# Patient Record
Sex: MALE | Race: WHITE | NOT HISPANIC OR LATINO | Employment: OTHER | URBAN - METROPOLITAN AREA
[De-identification: names, ages, dates, MRNs, and addresses within clinical notes are randomized per-mention and may not be internally consistent; named-entity substitution may affect disease eponyms.]

---

## 2017-01-18 ENCOUNTER — HOSPITAL ENCOUNTER (OUTPATIENT)
Dept: RADIOLOGY | Facility: HOSPITAL | Age: 72
Discharge: HOME | End: 2017-01-18
Attending: FAMILY MEDICINE
Payer: MEDICARE

## 2017-01-18 ENCOUNTER — TRANSCRIBE ORDERS (OUTPATIENT)
Dept: ADMINISTRATIVE | Facility: HOSPITAL | Age: 72
End: 2017-01-18

## 2017-01-18 DIAGNOSIS — R05.9 COUGH: Primary | ICD-10-CM

## 2017-01-18 DIAGNOSIS — R05.9 COUGH: ICD-10-CM

## 2017-01-18 PROCEDURE — 71020 HB CHEST X-RAY 2VW FRONTAL&LATL: CPT

## 2017-10-04 ENCOUNTER — HOSPITAL ENCOUNTER (EMERGENCY)
Facility: HOSPITAL | Age: 72
Discharge: HOME | End: 2017-10-04
Payer: OTHER MISCELLANEOUS

## 2017-10-04 VITALS
HEART RATE: 77 BPM | RESPIRATION RATE: 16 BRPM | SYSTOLIC BLOOD PRESSURE: 123 MMHG | OXYGEN SATURATION: 96 % | WEIGHT: 162.04 LBS | TEMPERATURE: 97.6 F | DIASTOLIC BLOOD PRESSURE: 73 MMHG

## 2017-10-04 DIAGNOSIS — M43.6 ACUTE TORTICOLLIS: Primary | ICD-10-CM

## 2017-10-04 PROCEDURE — 96372 THER/PROPH/DIAG INJ SC/IM: CPT

## 2017-10-04 PROCEDURE — 99283 EMERGENCY DEPT VISIT LOW MDM: CPT

## 2017-10-04 RX ORDER — DIAZEPAM 5 MG/ML
5 INJECTION, SOLUTION INTRAMUSCULAR; INTRAVENOUS ONCE
Status: COMPLETED | OUTPATIENT
Start: 2017-10-04 | End: 2017-10-04

## 2017-10-04 RX ORDER — BACLOFEN 10 MG/1
10 TABLET ORAL 3 TIMES DAILY
Qty: 4 TABLET | Refills: 0 | Status: SHIPPED | OUTPATIENT
Start: 2017-10-04 | End: 2018-03-29

## 2017-10-04 RX ORDER — GABAPENTIN 600 MG/1
800 TABLET ORAL 2 TIMES DAILY
COMMUNITY

## 2017-10-04 RX ORDER — IBUPROFEN 800 MG/1
TABLET ORAL EVERY 6 HOURS PRN
Status: ON HOLD | COMMUNITY
End: 2018-04-12

## 2017-10-04 RX ORDER — SIMVASTATIN 20 MG
20 TABLET ORAL
COMMUNITY

## 2017-10-04 RX ORDER — DIAZEPAM 10 MG/1
10 TABLET ORAL
COMMUNITY

## 2017-10-04 RX ORDER — PRAMIPEXOLE DIHYDROCHLORIDE 0.5 MG/1
0.5 TABLET ORAL DAILY
COMMUNITY
End: 2018-03-29

## 2017-10-04 RX ORDER — OXYCODONE HYDROCHLORIDE 15 MG/1
15 TABLET ORAL 2 TIMES DAILY
COMMUNITY
End: 2018-03-29

## 2017-10-04 RX ADMIN — DIAZEPAM 5 MG: 5 INJECTION, SOLUTION INTRAMUSCULAR; INTRAVENOUS at 14:00

## 2017-10-04 NOTE — ED NOTES
Pt reports worsening left lateral neck pain radiating into left shoulder  Pt reports pain has worsened over the last week  Pt denies injury  Pt reports has hx of C6 and C7 injury   Pt reports taking home medication with no relief      Gail Grewal RN  10/04/17 9617

## 2017-10-04 NOTE — ED PROVIDER NOTES
History  Chief Complaint   Patient presents with    Neck Pain     Gradually worsening left neck in to shoulder pain for 6 days  States 2 weeks ago PCP recommended ortho doctor for left arm pain and tingling  Hx C 6-7 damage from a fall  This is a 61-year-old male patient with chronic neck pain  He states that approximately 1 week ago he started getting increased pain without recent trauma  Patient had been on Percocet 2 a day but states she has been taking every 4 hours ran out 3 days ago  He has an appointment next week to see Orthopedics  He admits to smoking marijuana before he came in to help with this pain  His wife actually states that he is noncompliant with his medications  Patient states that he has chronic numbness in his 1st 3 digits in his left hand for many years  No weakness in the arm  The neck pain is no different than usual however just slightly more intense  He states that he is taking gabapentin and Motrin as directed  No fever no chills no headache no blurred vision or double vision no cough congestion sore throat no nausea vomiting diarrhea abdominal pain no chest pain or shortness of breath  He states he has occasional numbness and tingling down his arm but nothing worse than usual   He walked in under his own power  He moves his left arm without difficulty  Patient does admit to taking a steroids that was added date  Prior to Admission Medications   Prescriptions Last Dose Informant Patient Reported? Taking?    Cholecalciferol (VITAMIN D3) 50648 units TABS   Yes Yes   Sig: Take by mouth once a week   diazepam (VALIUM) 10 mg tablet   Yes Yes   Sig: Take 5 mg by mouth daily   gabapentin (NEURONTIN) 600 MG tablet   Yes Yes   Sig: Take 300 mg by mouth 3 (three) times a day   ibuprofen (MOTRIN) 800 mg tablet   Yes Yes   Sig: Take by mouth every 6 (six) hours as needed for mild pain   oxyCODONE (ROXICODONE) 15 mg immediate release tablet   Yes Yes   Sig: Take 15 mg by mouth 2 (two) times a day   pramipexole (MIRAPEX) 0 5 mg tablet   Yes Yes   Sig: Take 0 5 mg by mouth daily   simvastatin (ZOCOR) 20 mg tablet   Yes Yes   Sig: Take 20 mg by mouth daily at bedtime      Facility-Administered Medications: None       Past Medical History:   Diagnosis Date    Cervical pain (neck)        History reviewed  No pertinent surgical history  History reviewed  No pertinent family history  I have reviewed and agree with the history as documented  Social History   Substance Use Topics    Smoking status: Current Every Day Smoker    Smokeless tobacco: Former User    Alcohol use Not on file        Review of Systems   All other systems reviewed and are negative  Physical Exam  ED Triage Vitals [10/04/17 1248]   Temperature Pulse Respirations Blood Pressure SpO2   97 6 °F (36 4 °C) 77 16 123/73 96 %      Temp Source Heart Rate Source Patient Position - Orthostatic VS BP Location FiO2 (%)   Oral -- -- -- --      Pain Score       9           Physical Exam   Constitutional: He appears well-developed and well-nourished  HENT:   Head: Normocephalic and atraumatic  Right Ear: External ear normal    Left Ear: External ear normal    Nose: Nose normal    Mouth/Throat: Oropharynx is clear and moist    Eyes: Conjunctivae are normal  Pupils are equal, round, and reactive to light  Neck: Normal range of motion  Neck supple  Cardiovascular: Normal rate and regular rhythm  Pulmonary/Chest: Effort normal and breath sounds normal    Abdominal: Soft  Bowel sounds are normal  There is no tenderness  Musculoskeletal:        Back:    Neurological: He is alert  Skin: Skin is warm  Psychiatric: He has a normal mood and affect  His behavior is normal    Nursing note and vitals reviewed        ED Medications  Medications   diazepam (VALIUM) injection 5 mg (5 mg Intramuscular Given 10/4/17 1400)       Diagnostic Studies  Labs Reviewed - No data to display    No orders to display Procedures  Procedures      Phone Contacts  ED Phone Contact    ED Course  ED Course                                MDM  CritCare Time    Disposition  Final diagnoses:   Acute torticollis     ED Disposition     ED Disposition Condition Comment    Discharge  [address]  discharge to home/self care  Condition at discharge: Good        Follow-up Information     Follow up With Specialties Details Why Contact Clint John DO Family Medicine Schedule an appointment as soon as possible for a visit  [address] [place] [id number]  [phone]          Patient's Medications   Discharge Prescriptions    BACLOFEN 10 MG TABLET    Take 1 tablet by mouth 3 (three) times a day       Start Date: 10/4/2017 End Date: --       Order Dose: 10 mg       Quantity: 4 tablet    Refills: 0     No discharge procedures on file      ED Provider  Electronically Signed by       Mario Aiken PA-C  10/04/17 6832

## 2017-10-04 NOTE — DISCHARGE INSTRUCTIONS
Spasmodic Torticollis   WHAT YOU NEED TO KNOW:   Spasmodic torticollis, also called cervical dystonia, is a condition that causes your neck muscles to contract abnormally  Your neck may twist and cause your head to tilt to one side, forward, or backward  Spasmodic torticollis may occur occasionally or continuously  It often gets worse with stress  DISCHARGE INSTRUCTIONS:   Medicines: You may need any of the following:  · Muscle relaxers  decrease pain and muscle spasms  · Botulinum toxin injections  may also be given to relax your muscles  · NSAIDs , such as ibuprofen, help decrease swelling, pain, and fever  This medicine is available with or without a doctor's order  NSAIDs can cause stomach bleeding or kidney problems in certain people  If you take blood thinner medicine, always ask if NSAIDs are safe for you  Always read the medicine label and follow directions  Do not give these medicines to children under 10months of age without direction from your child's healthcare provider  · Acetaminophen  decreases pain  It is available without a doctor's order  Ask how much to take and how often to take it  Follow directions  Acetaminophen can cause liver damage if not taken correctly  · Prescription pain medicine  may be given  Ask your healthcare provider how to take this medicine safely  · Take your medicine as directed  Contact your healthcare provider if you think your medicine is not helping or if you have side effects  Tell him if you are allergic to any medicine  Keep a list of the medicines, vitamins, and herbs you take  Include the amounts, and when and why you take them  Bring the list or the pill bottles to follow-up visits  Carry your medicine list with you in case of an emergency  Follow up with your healthcare provider as directed:  Write down your questions so you remember to ask them during your visits    Self-care:   · Apply ice  on your neck for 15 to 20 minutes every hour or as directed  Use an ice pack, or put crushed ice in a plastic bag  Cover it with a towel  Ice helps prevent tissue damage and decreases swelling and pain  · Apply heat  on your neck for 20 to 30 minutes every 2 hours for as many days as directed  Heat helps decrease pain and muscle spasms  · Rest  as needed  Return to your daily activities as directed  · Use cervical collar as directed  A cervical collar may be needed to support your neck  Contact your healthcare provider if:   · You have a fever  · You have swelling in your neck area that gets worse or does not go away  · You have an increased feeling of sadness or loneliness, or you feel depressed  · You have questions or concerns about your condition or care  Return to the emergency department if:   · You have increased pain in your neck or shoulder  · You have sudden shortness of breath  · You have trouble moving your arms or legs  · Your arms or legs feel numb  © 2017 [facility] Information is for End User's use only and may not be sold, redistributed or otherwise used for commercial purposes  All illustrations and images included in CareNotes® are the copyrighted property of [employer]  or [employer]  The above information is an  only  It is not intended as medical advice for individual conditions or treatments  Talk to your doctor, nurse or pharmacist before following any medical regimen to see if it is safe and effective for you

## 2017-10-04 NOTE — ED NOTES
States has ortho appt next Wed  Took his Gabapentin 600mg and Ibuprofen 800mg 1100 today  Reports was on Oxycodone 15mg, but does not currently have any  Female with patient states they are trying to get him off of opiates       Crista Sandifer, RN  10/04/17 9740

## 2018-03-29 ENCOUNTER — APPOINTMENT (OUTPATIENT)
Dept: LAB | Facility: HOSPITAL | Age: 73
End: 2018-03-29
Attending: ORTHOPAEDIC SURGERY
Payer: OTHER MISCELLANEOUS

## 2018-03-29 ENCOUNTER — TRANSCRIBE ORDERS (OUTPATIENT)
Dept: ADMINISTRATIVE | Facility: HOSPITAL | Age: 73
End: 2018-03-29

## 2018-03-29 ENCOUNTER — HOSPITAL ENCOUNTER (OUTPATIENT)
Dept: NON INVASIVE DIAGNOSTICS | Facility: HOSPITAL | Age: 73
Discharge: HOME | End: 2018-03-29
Attending: ORTHOPAEDIC SURGERY
Payer: OTHER MISCELLANEOUS

## 2018-03-29 ENCOUNTER — APPOINTMENT (OUTPATIENT)
Dept: PREADMISSION TESTING | Facility: HOSPITAL | Age: 73
End: 2018-03-29
Payer: OTHER MISCELLANEOUS

## 2018-03-29 ENCOUNTER — HOSPITAL ENCOUNTER (OUTPATIENT)
Dept: RADIOLOGY | Facility: HOSPITAL | Age: 73
Discharge: HOME | End: 2018-03-29
Attending: ORTHOPAEDIC SURGERY
Payer: OTHER MISCELLANEOUS

## 2018-03-29 ENCOUNTER — ANESTHESIA EVENT (OUTPATIENT)
Dept: PERIOP | Facility: HOSPITAL | Age: 73
DRG: 472 | End: 2018-03-29
Payer: MEDICARE

## 2018-03-29 DIAGNOSIS — M54.12 BRACHIAL NEURITIS: ICD-10-CM

## 2018-03-29 DIAGNOSIS — G95.89 RADIATION-INDUCED MYELOPATHY (HCC): ICD-10-CM

## 2018-03-29 DIAGNOSIS — G95.89 RADIATION-INDUCED MYELOPATHY (HCC): Primary | ICD-10-CM

## 2018-03-29 DIAGNOSIS — Z01.818 PREOP EXAMINATION: ICD-10-CM

## 2018-03-29 LAB
25(OH)D3 SERPL-MCNC: 102.1 NG/ML (ref 30–100)
ALBUMIN SERPL BCP-MCNC: 3.9 G/DL (ref 3.5–5)
ALP SERPL-CCNC: 149 U/L (ref 46–116)
ALT SERPL W P-5'-P-CCNC: 33 U/L (ref 12–78)
ANION GAP SERPL CALCULATED.3IONS-SCNC: 7 MMOL/L (ref 4–13)
AST SERPL W P-5'-P-CCNC: 26 U/L (ref 5–45)
BASOPHILS # BLD AUTO: 0.06 THOUSANDS/ΜL (ref 0–0.1)
BASOPHILS NFR BLD AUTO: 1 % (ref 0–1)
BILIRUB SERPL-MCNC: 0.35 MG/DL (ref 0.2–1)
BILIRUB UR QL STRIP: NEGATIVE
BUN SERPL-MCNC: 12 MG/DL (ref 5–25)
CALCIUM SERPL-MCNC: 9.2 MG/DL (ref 8.3–10.1)
CHLORIDE SERPL-SCNC: 103 MMOL/L (ref 100–108)
CLARITY UR: CLEAR
CO2 SERPL-SCNC: 32 MMOL/L (ref 21–32)
COLOR UR: YELLOW
CREAT SERPL-MCNC: 0.9 MG/DL (ref 0.6–1.3)
EOSINOPHIL # BLD AUTO: 0.34 THOUSAND/ΜL (ref 0–0.61)
EOSINOPHIL NFR BLD AUTO: 5 % (ref 0–6)
ERYTHROCYTE [DISTWIDTH] IN BLOOD BY AUTOMATED COUNT: 15.4 % (ref 11.6–15.1)
ERYTHROCYTE [SEDIMENTATION RATE] IN BLOOD: 5 MM/HOUR (ref 0–10)
GFR SERPL CREATININE-BSD FRML MDRD: 86 ML/MIN/1.73SQ M
GLUCOSE SERPL-MCNC: 86 MG/DL (ref 65–140)
GLUCOSE UR STRIP-MCNC: NEGATIVE MG/DL
HCT VFR BLD AUTO: 47.2 % (ref 36.5–49.3)
HGB BLD-MCNC: 16 G/DL (ref 12–17)
HGB UR QL STRIP.AUTO: NEGATIVE
KETONES UR STRIP-MCNC: NEGATIVE MG/DL
LEUKOCYTE ESTERASE UR QL STRIP: NEGATIVE
LYMPHOCYTES # BLD AUTO: 2.21 THOUSANDS/ΜL (ref 0.6–4.47)
LYMPHOCYTES NFR BLD AUTO: 29 % (ref 14–44)
MCH RBC QN AUTO: 31.6 PG (ref 26.8–34.3)
MCHC RBC AUTO-ENTMCNC: 33.9 G/DL (ref 31.4–37.4)
MCV RBC AUTO: 93 FL (ref 82–98)
MONOCYTES # BLD AUTO: 0.76 THOUSAND/ΜL (ref 0.17–1.22)
MONOCYTES NFR BLD AUTO: 10 % (ref 4–12)
NEUTROPHILS # BLD AUTO: 4.19 THOUSANDS/ΜL (ref 1.85–7.62)
NEUTS SEG NFR BLD AUTO: 55 % (ref 43–75)
NITRITE UR QL STRIP: NEGATIVE
NRBC BLD AUTO-RTO: 0 /100 WBCS
PH UR STRIP.AUTO: 5.5 [PH] (ref 4.5–8)
PLATELET # BLD AUTO: 130 THOUSANDS/UL (ref 149–390)
POTASSIUM SERPL-SCNC: 4.6 MMOL/L (ref 3.5–5.3)
PROT SERPL-MCNC: 7.6 G/DL (ref 6.4–8.2)
PROT UR STRIP-MCNC: NEGATIVE MG/DL
RBC # BLD AUTO: 5.06 MILLION/UL (ref 3.88–5.62)
SODIUM SERPL-SCNC: 142 MMOL/L (ref 136–145)
SP GR UR STRIP.AUTO: <=1.005 (ref 1–1.03)
UROBILINOGEN UR QL STRIP.AUTO: 0.2 E.U./DL
WBC # BLD AUTO: 7.56 THOUSAND/UL (ref 4.31–10.16)

## 2018-03-29 PROCEDURE — 85025 COMPLETE CBC W/AUTO DIFF WBC: CPT

## 2018-03-29 PROCEDURE — 87081 CULTURE SCREEN ONLY: CPT

## 2018-03-29 PROCEDURE — 71046 X-RAY EXAM CHEST 2 VIEWS: CPT

## 2018-03-29 PROCEDURE — 86803 HEPATITIS C AB TEST: CPT

## 2018-03-29 PROCEDURE — 80053 COMPREHEN METABOLIC PANEL: CPT

## 2018-03-29 PROCEDURE — 93005 ELECTROCARDIOGRAM TRACING: CPT

## 2018-03-29 PROCEDURE — 81003 URINALYSIS AUTO W/O SCOPE: CPT | Performed by: ORTHOPAEDIC SURGERY

## 2018-03-29 PROCEDURE — 85652 RBC SED RATE AUTOMATED: CPT

## 2018-03-29 PROCEDURE — 82306 VITAMIN D 25 HYDROXY: CPT

## 2018-03-29 PROCEDURE — 36415 COLL VENOUS BLD VENIPUNCTURE: CPT

## 2018-03-29 RX ORDER — PRAMIPEXOLE DIHYDROCHLORIDE 0.75 MG/1
0.75 TABLET ORAL
COMMUNITY

## 2018-03-29 RX ORDER — CALCIUM CARBONATE 200(500)MG
1 TABLET,CHEWABLE ORAL AS NEEDED
COMMUNITY
End: 2018-06-13

## 2018-03-29 RX ORDER — AZELASTINE 1 MG/ML
2 SPRAY, METERED NASAL DAILY
COMMUNITY
End: 2018-07-24

## 2018-03-29 RX ORDER — SODIUM CHLORIDE 9 MG/ML
125 INJECTION, SOLUTION INTRAVENOUS CONTINUOUS
Status: CANCELLED | OUTPATIENT
Start: 2018-04-12

## 2018-03-29 NOTE — PRE-PROCEDURE INSTRUCTIONS
Pre-Surgery Instructions:   Medication Instructions    azelastine (ASTELIN) 0 1 % nasal spray Patient was instructed by Physician and understands   calcium carbonate (TUMS) 500 mg chewable tablet Patient was instructed by Physician and understands   Cholecalciferol (VITAMIN D3) 39737 units TABS Patient was instructed by Physician and understands   diazepam (VALIUM) 10 mg tablet Patient was instructed by Physician and understands   gabapentin (NEURONTIN) 600 MG tablet Patient was instructed by Physician and understands   ibuprofen (MOTRIN) 800 mg tablet Patient was instructed by Physician and understands   pramipexole (MIRAPEX) 0 75 MG tablet Patient was instructed by Physician and understands   simvastatin (ZOCOR) 20 mg tablet Patient was instructed by Physician and understands  Was seen by Dr Kayleigh Caldwell and was told to stop NSAIDS and supplements one week preop and on DOS may take Gabapentin with a sip of water and use nasal spray

## 2018-03-29 NOTE — ANESTHESIA PREPROCEDURE EVALUATION
Review of Systems/Medical History  Patient summary reviewed  Chart reviewed      Cardiovascular  Exercise tolerance: good,  Hyperlipidemia,    Pulmonary  Negative pulmonary ROS        GI/Hepatic  Negative GI/hepatic ROS          Negative  ROS        Endo/Other  Negative endo/other ROS      GYN  Negative gynecology ROS          Hematology  Negative hematology ROS      Musculoskeletal  Negative musculoskeletal ROS        Neurology    Motor deficit , left hand/finger weakness,   Comment: Left hand numbness Psychology   Negative psychology ROS              Physical Exam    Airway    Mallampati score: II  TM Distance: <3 FB  Neck ROM: full     Dental   lower dentures and upper dentures,     Cardiovascular  Rhythm: regular, Rate: normal,     Pulmonary  Breath sounds clear to auscultation,     Other Findings        Anesthesia Plan  ASA Score- 2     Anesthesia Type- general with ASA Monitors  Additional Monitors:   Airway Plan:         Plan Factors- Patient instructed to abstain from smoking on day of procedure  Patient did not smoke on day of surgery  Induction- intravenous  Postoperative Plan-     Informed Consent- Anesthetic plan and risks discussed with patient

## 2018-03-30 LAB
HCV AB SER QL: NORMAL
MRSA NOSE QL CULT: NORMAL

## 2018-04-02 LAB
ATRIAL RATE: 60 BPM
P AXIS: 74 DEGREES
PR INTERVAL: 138 MS
QRS AXIS: 74 DEGREES
QRSD INTERVAL: 88 MS
QT INTERVAL: 410 MS
QTC INTERVAL: 410 MS
T WAVE AXIS: 72 DEGREES
VENTRICULAR RATE: 60 BPM

## 2018-04-02 PROCEDURE — 93010 ELECTROCARDIOGRAM REPORT: CPT | Performed by: INTERNAL MEDICINE

## 2018-04-06 ENCOUNTER — ANESTHESIA EVENT (OUTPATIENT)
Dept: PERIOP | Facility: HOSPITAL | Age: 73
DRG: 460 | End: 2018-04-06
Payer: MEDICARE

## 2018-04-06 NOTE — PROGRESS NOTES
Patient was seen on 3/21 in PAT for surgery on 4/12 and surgery on 4/30  Instructions regarding bathing and medications at that time for both surgeries

## 2018-04-12 ENCOUNTER — HOSPITAL ENCOUNTER (INPATIENT)
Facility: HOSPITAL | Age: 73
LOS: 1 days | Discharge: HOME | DRG: 472 | End: 2018-04-13
Attending: ORTHOPAEDIC SURGERY | Admitting: ORTHOPAEDIC SURGERY
Payer: MEDICARE

## 2018-04-12 ENCOUNTER — ANESTHESIA (OUTPATIENT)
Dept: PERIOP | Facility: HOSPITAL | Age: 73
DRG: 472 | End: 2018-04-12
Payer: MEDICARE

## 2018-04-12 ENCOUNTER — APPOINTMENT (OUTPATIENT)
Dept: RADIOLOGY | Facility: HOSPITAL | Age: 73
DRG: 472 | End: 2018-04-12
Payer: MEDICARE

## 2018-04-12 DIAGNOSIS — M54.12 RADICULOPATHY OF CERVICAL SPINE: ICD-10-CM

## 2018-04-12 LAB
ABO GROUP BLD: NORMAL
BLD GP AB SCN SERPL QL: NEGATIVE
GLUCOSE SERPL-MCNC: 93 MG/DL (ref 65–140)
RH BLD: POSITIVE
SPECIMEN EXPIRATION DATE: NORMAL

## 2018-04-12 PROCEDURE — 86900 BLOOD TYPING SEROLOGIC ABO: CPT | Performed by: ORTHOPAEDIC SURGERY

## 2018-04-12 PROCEDURE — 82948 REAGENT STRIP/BLOOD GLUCOSE: CPT

## 2018-04-12 PROCEDURE — 0RB30ZZ EXCISION OF CERVICAL VERTEBRAL DISC, OPEN APPROACH: ICD-10-PCS | Performed by: ORTHOPAEDIC SURGERY

## 2018-04-12 PROCEDURE — 0RG20K0 FUSION OF 2 OR MORE CERVICAL VERTEBRAL JOINTS WITH NONAUTOLOGOUS TISSUE SUBSTITUTE, ANTERIOR APPROACH, ANTERIOR COLUMN, OPEN APPROACH: ICD-10-PCS | Performed by: ORTHOPAEDIC SURGERY

## 2018-04-12 PROCEDURE — 86901 BLOOD TYPING SEROLOGIC RH(D): CPT | Performed by: ORTHOPAEDIC SURGERY

## 2018-04-12 PROCEDURE — 72020 X-RAY EXAM OF SPINE 1 VIEW: CPT

## 2018-04-12 PROCEDURE — 0RG40K0 FUSION OF CERVICOTHORACIC VERTEBRAL JOINT WITH NONAUTOLOGOUS TISSUE SUBSTITUTE, ANTERIOR APPROACH, ANTERIOR COLUMN, OPEN APPROACH: ICD-10-PCS | Performed by: ORTHOPAEDIC SURGERY

## 2018-04-12 PROCEDURE — 4A11X4G MONITORING OF PERIPHERAL NERVOUS ELECTRICAL ACTIVITY, INTRAOPERATIVE, EXTERNAL APPROACH: ICD-10-PCS | Performed by: ORTHOPAEDIC SURGERY

## 2018-04-12 PROCEDURE — 86850 RBC ANTIBODY SCREEN: CPT | Performed by: ORTHOPAEDIC SURGERY

## 2018-04-12 PROCEDURE — 0RG20A0 FUSION OF 2 OR MORE CERVICAL VERTEBRAL JOINTS WITH INTERBODY FUSION DEVICE, ANTERIOR APPROACH, ANTERIOR COLUMN, OPEN APPROACH: ICD-10-PCS | Performed by: ORTHOPAEDIC SURGERY

## 2018-04-12 PROCEDURE — 94760 N-INVAS EAR/PLS OXIMETRY 1: CPT

## 2018-04-12 PROCEDURE — 0RB50ZZ EXCISION OF CERVICOTHORACIC VERTEBRAL DISC, OPEN APPROACH: ICD-10-PCS | Performed by: ORTHOPAEDIC SURGERY

## 2018-04-12 DEVICE — DBX PUTTY, 5CC
Type: IMPLANTABLE DEVICE | Site: SPINE CERVICAL | Status: FUNCTIONAL
Brand: DBX®

## 2018-04-12 DEVICE — IMPLANTABLE DEVICE: Type: IMPLANTABLE DEVICE | Site: SPINE CERVICAL | Status: FUNCTIONAL

## 2018-04-12 RX ORDER — SENNOSIDES 8.6 MG
1 TABLET ORAL DAILY
Status: DISCONTINUED | OUTPATIENT
Start: 2018-04-12 | End: 2018-04-13 | Stop reason: HOSPADM

## 2018-04-12 RX ORDER — ONDANSETRON 2 MG/ML
4 INJECTION INTRAMUSCULAR; INTRAVENOUS EVERY 6 HOURS PRN
Status: DISCONTINUED | OUTPATIENT
Start: 2018-04-12 | End: 2018-04-12 | Stop reason: HOSPADM

## 2018-04-12 RX ORDER — ACETAMINOPHEN 325 MG/1
975 TABLET ORAL
Status: DISCONTINUED | OUTPATIENT
Start: 2018-04-12 | End: 2018-04-12 | Stop reason: HOSPADM

## 2018-04-12 RX ORDER — 0.9 % SODIUM CHLORIDE 0.9 %
VIAL (ML) INJECTION AS NEEDED
Status: DISCONTINUED | OUTPATIENT
Start: 2018-04-12 | End: 2018-04-12 | Stop reason: HOSPADM

## 2018-04-12 RX ORDER — MAGNESIUM HYDROXIDE/ALUMINUM HYDROXICE/SIMETHICONE 120; 1200; 1200 MG/30ML; MG/30ML; MG/30ML
15 SUSPENSION ORAL EVERY 6 HOURS PRN
Status: DISCONTINUED | OUTPATIENT
Start: 2018-04-12 | End: 2018-04-13 | Stop reason: HOSPADM

## 2018-04-12 RX ORDER — MORPHINE SULFATE 2 MG/ML
1 INJECTION, SOLUTION INTRAMUSCULAR; INTRAVENOUS
Status: DISCONTINUED | OUTPATIENT
Start: 2018-04-12 | End: 2018-04-13 | Stop reason: HOSPADM

## 2018-04-12 RX ORDER — CELECOXIB 200 MG/1
200 CAPSULE ORAL
Status: DISCONTINUED | OUTPATIENT
Start: 2018-04-12 | End: 2018-04-12 | Stop reason: HOSPADM

## 2018-04-12 RX ORDER — SIMETHICONE 80 MG
80 TABLET,CHEWABLE ORAL 4 TIMES DAILY PRN
Status: DISCONTINUED | OUTPATIENT
Start: 2018-04-12 | End: 2018-04-13 | Stop reason: HOSPADM

## 2018-04-12 RX ORDER — CALCIUM CARBONATE 200(500)MG
1000 TABLET,CHEWABLE ORAL DAILY PRN
Status: DISCONTINUED | OUTPATIENT
Start: 2018-04-12 | End: 2018-04-13 | Stop reason: HOSPADM

## 2018-04-12 RX ORDER — PRAVASTATIN SODIUM 40 MG
40 TABLET ORAL
Status: DISCONTINUED | OUTPATIENT
Start: 2018-04-12 | End: 2018-04-13 | Stop reason: HOSPADM

## 2018-04-12 RX ORDER — METHOCARBAMOL 750 MG/1
750 TABLET, FILM COATED ORAL 4 TIMES DAILY PRN
Status: DISCONTINUED | OUTPATIENT
Start: 2018-04-12 | End: 2018-04-13 | Stop reason: HOSPADM

## 2018-04-12 RX ORDER — FENTANYL CITRATE 50 UG/ML
INJECTION, SOLUTION INTRAMUSCULAR; INTRAVENOUS AS NEEDED
Status: DISCONTINUED | OUTPATIENT
Start: 2018-04-12 | End: 2018-04-12 | Stop reason: SURG

## 2018-04-12 RX ORDER — PROMETHAZINE HYDROCHLORIDE 25 MG/ML
12.5 INJECTION, SOLUTION INTRAMUSCULAR; INTRAVENOUS EVERY 4 HOURS PRN
Status: DISCONTINUED | OUTPATIENT
Start: 2018-04-12 | End: 2018-04-12 | Stop reason: HOSPADM

## 2018-04-12 RX ORDER — PROPOFOL 10 MG/ML
INJECTION, EMULSION INTRAVENOUS AS NEEDED
Status: DISCONTINUED | OUTPATIENT
Start: 2018-04-12 | End: 2018-04-12 | Stop reason: SURG

## 2018-04-12 RX ORDER — ONDANSETRON 2 MG/ML
INJECTION INTRAMUSCULAR; INTRAVENOUS AS NEEDED
Status: DISCONTINUED | OUTPATIENT
Start: 2018-04-12 | End: 2018-04-12 | Stop reason: SURG

## 2018-04-12 RX ORDER — CALCIUM CARBONATE 200(500)MG
500 TABLET,CHEWABLE ORAL DAILY PRN
Status: DISCONTINUED | OUTPATIENT
Start: 2018-04-12 | End: 2018-04-13 | Stop reason: HOSPADM

## 2018-04-12 RX ORDER — SODIUM CHLORIDE, SODIUM LACTATE, POTASSIUM CHLORIDE, CALCIUM CHLORIDE 600; 310; 30; 20 MG/100ML; MG/100ML; MG/100ML; MG/100ML
100 INJECTION, SOLUTION INTRAVENOUS CONTINUOUS
Status: DISCONTINUED | OUTPATIENT
Start: 2018-04-12 | End: 2018-04-12

## 2018-04-12 RX ORDER — ONDANSETRON 2 MG/ML
4 INJECTION INTRAMUSCULAR; INTRAVENOUS EVERY 6 HOURS PRN
Status: DISCONTINUED | OUTPATIENT
Start: 2018-04-12 | End: 2018-04-13 | Stop reason: HOSPADM

## 2018-04-12 RX ORDER — SUCCINYLCHOLINE CHLORIDE 20 MG/ML
INJECTION INTRAMUSCULAR; INTRAVENOUS AS NEEDED
Status: DISCONTINUED | OUTPATIENT
Start: 2018-04-12 | End: 2018-04-12 | Stop reason: SURG

## 2018-04-12 RX ORDER — ASPIRIN 325 MG
325 TABLET, DELAYED RELEASE (ENTERIC COATED) ORAL DAILY
Status: DISCONTINUED | OUTPATIENT
Start: 2018-04-12 | End: 2018-04-13 | Stop reason: HOSPADM

## 2018-04-12 RX ORDER — FENTANYL CITRATE 50 UG/ML
50 INJECTION, SOLUTION INTRAMUSCULAR; INTRAVENOUS
Status: DISCONTINUED | OUTPATIENT
Start: 2018-04-12 | End: 2018-04-12 | Stop reason: HOSPADM

## 2018-04-12 RX ORDER — KETAMINE HYDROCHLORIDE 50 MG/ML
INJECTION, SOLUTION, CONCENTRATE INTRAMUSCULAR; INTRAVENOUS AS NEEDED
Status: DISCONTINUED | OUTPATIENT
Start: 2018-04-12 | End: 2018-04-12 | Stop reason: SURG

## 2018-04-12 RX ORDER — GABAPENTIN 300 MG/1
600 CAPSULE ORAL 2 TIMES DAILY
Status: DISCONTINUED | OUTPATIENT
Start: 2018-04-12 | End: 2018-04-13 | Stop reason: HOSPADM

## 2018-04-12 RX ORDER — SODIUM CHLORIDE, SODIUM LACTATE, POTASSIUM CHLORIDE, CALCIUM CHLORIDE 600; 310; 30; 20 MG/100ML; MG/100ML; MG/100ML; MG/100ML
125 INJECTION, SOLUTION INTRAVENOUS CONTINUOUS
Status: DISCONTINUED | OUTPATIENT
Start: 2018-04-12 | End: 2018-04-13 | Stop reason: HOSPADM

## 2018-04-12 RX ORDER — PROPOFOL 10 MG/ML
INJECTION, EMULSION INTRAVENOUS CONTINUOUS PRN
Status: DISCONTINUED | OUTPATIENT
Start: 2018-04-12 | End: 2018-04-12 | Stop reason: SURG

## 2018-04-12 RX ORDER — SODIUM CHLORIDE, SODIUM LACTATE, POTASSIUM CHLORIDE, CALCIUM CHLORIDE 600; 310; 30; 20 MG/100ML; MG/100ML; MG/100ML; MG/100ML
INJECTION, SOLUTION INTRAVENOUS CONTINUOUS PRN
Status: DISCONTINUED | OUTPATIENT
Start: 2018-04-12 | End: 2018-04-12 | Stop reason: SURG

## 2018-04-12 RX ORDER — HYDROCODONE BITARTRATE AND ACETAMINOPHEN 5; 325 MG/1; MG/1
1 TABLET ORAL EVERY 6 HOURS PRN
Qty: 80 TABLET | Refills: 0 | Status: SHIPPED | OUTPATIENT
Start: 2018-04-12 | End: 2018-04-22

## 2018-04-12 RX ORDER — EPHEDRINE SULFATE 50 MG/ML
INJECTION, SOLUTION INTRAVENOUS AS NEEDED
Status: DISCONTINUED | OUTPATIENT
Start: 2018-04-12 | End: 2018-04-12 | Stop reason: SURG

## 2018-04-12 RX ORDER — DIAZEPAM 5 MG/1
10 TABLET ORAL
Status: DISCONTINUED | OUTPATIENT
Start: 2018-04-12 | End: 2018-04-13 | Stop reason: HOSPADM

## 2018-04-12 RX ORDER — AZELASTINE 1 MG/ML
1 SPRAY, METERED NASAL 2 TIMES DAILY
Status: DISCONTINUED | OUTPATIENT
Start: 2018-04-12 | End: 2018-04-13 | Stop reason: HOSPADM

## 2018-04-12 RX ORDER — SODIUM CHLORIDE 9 MG/ML
125 INJECTION, SOLUTION INTRAVENOUS CONTINUOUS
Status: DISCONTINUED | OUTPATIENT
Start: 2018-04-12 | End: 2018-04-12

## 2018-04-12 RX ORDER — MIDAZOLAM HYDROCHLORIDE 1 MG/ML
INJECTION INTRAMUSCULAR; INTRAVENOUS AS NEEDED
Status: DISCONTINUED | OUTPATIENT
Start: 2018-04-12 | End: 2018-04-12 | Stop reason: SURG

## 2018-04-12 RX ORDER — DOCUSATE SODIUM 100 MG/1
100 CAPSULE, LIQUID FILLED ORAL 2 TIMES DAILY
Status: DISCONTINUED | OUTPATIENT
Start: 2018-04-12 | End: 2018-04-13 | Stop reason: HOSPADM

## 2018-04-12 RX ORDER — HYDROCODONE BITARTRATE AND ACETAMINOPHEN 5; 325 MG/1; MG/1
2 TABLET ORAL EVERY 4 HOURS PRN
Status: DISCONTINUED | OUTPATIENT
Start: 2018-04-12 | End: 2018-04-13 | Stop reason: HOSPADM

## 2018-04-12 RX ORDER — TIZANIDINE 4 MG/1
4 TABLET ORAL EVERY 8 HOURS PRN
Qty: 60 TABLET | Refills: 0 | Status: SHIPPED | OUTPATIENT
Start: 2018-04-12 | End: 2018-05-19 | Stop reason: HOSPADM

## 2018-04-12 RX ORDER — BUPIVACAINE HYDROCHLORIDE AND EPINEPHRINE 2.5; 5 MG/ML; UG/ML
INJECTION, SOLUTION INFILTRATION; PERINEURAL AS NEEDED
Status: DISCONTINUED | OUTPATIENT
Start: 2018-04-12 | End: 2018-04-12 | Stop reason: HOSPADM

## 2018-04-12 RX ADMIN — PRAMIPEXOLE DIHYDROCHLORIDE 0.75 MG: 0.5 TABLET ORAL at 22:53

## 2018-04-12 RX ADMIN — SODIUM CHLORIDE, SODIUM LACTATE, POTASSIUM CHLORIDE, AND CALCIUM CHLORIDE 125 ML/HR: .6; .31; .03; .02 INJECTION, SOLUTION INTRAVENOUS at 20:37

## 2018-04-12 RX ADMIN — SODIUM CHLORIDE, SODIUM LACTATE, POTASSIUM CHLORIDE, AND CALCIUM CHLORIDE 125 ML/HR: .6; .31; .03; .02 INJECTION, SOLUTION INTRAVENOUS at 11:32

## 2018-04-12 RX ADMIN — FENTANYL CITRATE 50 MCG: 50 INJECTION, SOLUTION INTRAMUSCULAR; INTRAVENOUS at 08:53

## 2018-04-12 RX ADMIN — KETAMINE HYDROCHLORIDE 25 MG: 50 INJECTION INTRAMUSCULAR; INTRAVENOUS at 08:28

## 2018-04-12 RX ADMIN — CELECOXIB 200 MG: 200 CAPSULE ORAL at 06:22

## 2018-04-12 RX ADMIN — FENTANYL CITRATE 200 MCG: 50 INJECTION, SOLUTION INTRAMUSCULAR; INTRAVENOUS at 07:38

## 2018-04-12 RX ADMIN — Medication 0.3 MCG/KG/HR: at 07:51

## 2018-04-12 RX ADMIN — SODIUM CHLORIDE 125 ML/HR: 0.9 INJECTION, SOLUTION INTRAVENOUS at 06:29

## 2018-04-12 RX ADMIN — PRAVASTATIN SODIUM 40 MG: 40 TABLET ORAL at 17:23

## 2018-04-12 RX ADMIN — SUCCINYLCHOLINE CHLORIDE 100 MG: 20 INJECTION, SOLUTION INTRAMUSCULAR; INTRAVENOUS at 07:38

## 2018-04-12 RX ADMIN — KETAMINE HYDROCHLORIDE 25 MG: 50 INJECTION INTRAMUSCULAR; INTRAVENOUS at 09:56

## 2018-04-12 RX ADMIN — EPHEDRINE SULFATE 10 MG: 50 INJECTION, SOLUTION INTRAMUSCULAR; INTRAVENOUS; SUBCUTANEOUS at 08:01

## 2018-04-12 RX ADMIN — KETAMINE HYDROCHLORIDE 25 MG: 50 INJECTION INTRAMUSCULAR; INTRAVENOUS at 07:38

## 2018-04-12 RX ADMIN — SODIUM CHLORIDE: 0.9 INJECTION, SOLUTION INTRAVENOUS at 08:14

## 2018-04-12 RX ADMIN — CEFAZOLIN SODIUM 1000 MG: 1 SOLUTION INTRAVENOUS at 17:22

## 2018-04-12 RX ADMIN — GABAPENTIN 600 MG: 300 CAPSULE ORAL at 14:05

## 2018-04-12 RX ADMIN — DIAZEPAM 10 MG: 5 TABLET ORAL at 22:53

## 2018-04-12 RX ADMIN — SODIUM CHLORIDE, SODIUM LACTATE, POTASSIUM CHLORIDE, AND CALCIUM CHLORIDE: .6; .31; .03; .02 INJECTION, SOLUTION INTRAVENOUS at 08:15

## 2018-04-12 RX ADMIN — EPHEDRINE SULFATE 10 MG: 50 INJECTION, SOLUTION INTRAMUSCULAR; INTRAVENOUS; SUBCUTANEOUS at 07:46

## 2018-04-12 RX ADMIN — ACETAMINOPHEN 975 MG: 325 TABLET, FILM COATED ORAL at 06:22

## 2018-04-12 RX ADMIN — DOCUSATE SODIUM 100 MG: 100 CAPSULE, LIQUID FILLED ORAL at 17:23

## 2018-04-12 RX ADMIN — PROPOFOL 75 MCG/KG/MIN: 10 INJECTION, EMULSION INTRAVENOUS at 07:42

## 2018-04-12 RX ADMIN — GABAPENTIN 600 MG: 300 CAPSULE ORAL at 17:23

## 2018-04-12 RX ADMIN — HYDROCODONE BITARTRATE AND ACETAMINOPHEN 2 TABLET: 5; 325 TABLET ORAL at 14:05

## 2018-04-12 RX ADMIN — ONDANSETRON HYDROCHLORIDE 4 MG: 2 INJECTION, SOLUTION INTRAVENOUS at 10:25

## 2018-04-12 RX ADMIN — PROPOFOL 200 MG: 10 INJECTION, EMULSION INTRAVENOUS at 07:38

## 2018-04-12 RX ADMIN — FENTANYL CITRATE 50 MCG: 50 INJECTION, SOLUTION INTRAMUSCULAR; INTRAVENOUS at 08:35

## 2018-04-12 RX ADMIN — ASPIRIN 325 MG: 325 TABLET, COATED ORAL at 14:06

## 2018-04-12 RX ADMIN — DEXAMETHASONE SODIUM PHOSPHATE 8 MG: 10 INJECTION INTRAMUSCULAR; INTRAVENOUS at 09:22

## 2018-04-12 RX ADMIN — MIDAZOLAM HYDROCHLORIDE 2 MG: 1 INJECTION, SOLUTION INTRAMUSCULAR; INTRAVENOUS at 07:29

## 2018-04-12 RX ADMIN — AZELASTINE HYDROCHLORIDE 1 SPRAY: 137 SPRAY, METERED NASAL at 17:23

## 2018-04-12 RX ADMIN — Medication 2000 MG: at 08:05

## 2018-04-12 RX ADMIN — SENNOSIDES 8.6 MG: 8.6 TABLET, FILM COATED ORAL at 14:05

## 2018-04-12 NOTE — PROGRESS NOTES
Pt's wife located his upper dentures  Patient belonging sheet has been updated to reflect that pt has both upper and lower dentures

## 2018-04-12 NOTE — DISCHARGE SUMMARY
DISCHARGE SUMMARY  ? Patient Name: Julio César Hernandes  Patient MRN: [id number]  Admitting Provider: Analia Whaley MD  Discharging Provider: Analia Whaley MD  Primary Care Phys[facility] at Discharge[facility]   Admission Date: 4/12/2018   Discharge Date: 4/13/18  Admission Diagnosis   Other specified diseases of spinal cord (CODE) [G95 89]  Radiculopathy of cervical region [M54 12]  Discharge Diagnoses  Same  Hospital Course  Pt was admitted to [facility] on 4/12/18 for ACDF with Dr Tarun Kline  He tolerated the procedure well and post-operative course was uneventful  He was discharged home on 4/13/18  This was part 1 of a staged procedure and the pt is scheduled to return to the hospital for part 2 (posterior cervical laminectomy and fusion) on 4/30/18  Medications  See after visit summary for reconciled discharge medications provided to patient and family  Allergies  No Known Allergies  Outpatient Follow-Up  No future appointments  Discharge Disposition  Condition: Good  Discharged to:  [facility] - Dr Lula Lemus  Operative Procedures Performed  Procedure(s):  FUSION CERVICAL ANTERIOR W DISCECTOMY C3/4, C7/T1: POSSIBLE C6/7  Pertinent Test Results   none    Discharge instructions:  ASA 325mg for DVT prophylaxis   Norco 5/325 for pain  Activity as tolerated in aspen collar   Follow up x 2 weeks in the office as instructed    ?   Mary Anne Perez PA-C

## 2018-04-12 NOTE — ANESTHESIA POSTPROCEDURE EVALUATION
Post-Op Assessment Note      CV Status:  Stable    Mental Status:  Alert and awake    Hydration Status:  Euvolemic    PONV Controlled:  Controlled    Airway Patency:  Patent    Post Op Vitals Reviewed: Yes          Staff: Anesthesiologist           /74 (04/12/18 1244)    Temp (!) 96 8 °F (36 °C) (04/12/18 1244)    Pulse 74 (04/12/18 1244)   Resp 18 (04/12/18 1244)    SpO2 97 % (3liter) (04/12/18 1244)

## 2018-04-13 VITALS
BODY MASS INDEX: 21.98 KG/M2 | HEIGHT: 72 IN | RESPIRATION RATE: 18 BRPM | HEART RATE: 73 BPM | TEMPERATURE: 98 F | WEIGHT: 162.26 LBS | SYSTOLIC BLOOD PRESSURE: 113 MMHG | DIASTOLIC BLOOD PRESSURE: 69 MMHG | OXYGEN SATURATION: 100 %

## 2018-04-13 PROBLEM — M48.02 SPINAL STENOSIS OF CERVICAL REGION: Chronic | Status: ACTIVE | Noted: 2018-04-13

## 2018-04-13 LAB
ANION GAP SERPL CALCULATED.3IONS-SCNC: 7 MMOL/L (ref 4–13)
BUN SERPL-MCNC: 15 MG/DL (ref 5–25)
CALCIUM SERPL-MCNC: 8.2 MG/DL
CHLORIDE SERPL-SCNC: 106 MMOL/L (ref 100–108)
CO2 SERPL-SCNC: 28 MMOL/L (ref 21–32)
CREAT SERPL-MCNC: 0.75 MG/DL (ref 0.6–1.3)
ERYTHROCYTE [DISTWIDTH] IN BLOOD BY AUTOMATED COUNT: 15.7 % (ref 11.6–15.1)
GFR SERPL CREATININE-BSD FRML MDRD: 93 ML/MIN/1.73SQ M
GLUCOSE SERPL-MCNC: 131 MG/DL (ref 65–140)
HCT VFR BLD AUTO: 40.7 % (ref 36.5–49.3)
HGB BLD-MCNC: 13.5 G/DL (ref 12–17)
MCH RBC QN AUTO: 31 PG (ref 26.8–34.3)
MCHC RBC AUTO-ENTMCNC: 33.2 G/DL (ref 31.4–37.4)
MCV RBC AUTO: 93 FL (ref 82–98)
PLATELET # BLD AUTO: 119 THOUSANDS/UL (ref 149–390)
PMV BLD AUTO: 14 FL (ref 8.9–12.7)
POTASSIUM SERPL-SCNC: 4.2 MMOL/L (ref 3.5–5.3)
RBC # BLD AUTO: 4.36 MILLION/UL (ref 3.88–5.62)
SODIUM SERPL-SCNC: 141 MMOL/L (ref 136–145)
WBC # BLD AUTO: 13.53 THOUSAND/UL (ref 4.31–10.16)

## 2018-04-13 PROCEDURE — 97163 PT EVAL HIGH COMPLEX 45 MIN: CPT | Performed by: PHYSICAL THERAPIST

## 2018-04-13 PROCEDURE — 80048 BASIC METABOLIC PNL TOTAL CA: CPT | Performed by: PHYSICIAN ASSISTANT

## 2018-04-13 PROCEDURE — G8979 MOBILITY GOAL STATUS: HCPCS | Performed by: PHYSICAL THERAPIST

## 2018-04-13 PROCEDURE — G8978 MOBILITY CURRENT STATUS: HCPCS | Performed by: PHYSICAL THERAPIST

## 2018-04-13 PROCEDURE — 85027 COMPLETE CBC AUTOMATED: CPT | Performed by: PHYSICIAN ASSISTANT

## 2018-04-13 PROCEDURE — G8980 MOBILITY D/C STATUS: HCPCS | Performed by: PHYSICAL THERAPIST

## 2018-04-13 RX ADMIN — CEFAZOLIN SODIUM 1000 MG: 1 SOLUTION INTRAVENOUS at 00:02

## 2018-04-13 RX ADMIN — GABAPENTIN 600 MG: 300 CAPSULE ORAL at 09:11

## 2018-04-13 RX ADMIN — ASPIRIN 325 MG: 325 TABLET, COATED ORAL at 09:11

## 2018-04-13 NOTE — PROGRESS NOTES
POST-OP SPINE PROGRESS NOTE    Patient Name: Shannon Mares  Patient MRN:  [id number]  Date of Service:  04/13/18 9:52 AM  Service: [facility] [address] Spine       Subjective      Pt seen sitting in chair  He states that he is not having any pain, denies any issues swallowing or other complaints at this time  He states that he has been up and out of bed today and is ready to go home      Objective      Physical Exam  Temp:  [96 6 °F (35 9 °C)-98 5 °F (36 9 °C)] 98 °F (36 7 °C)  HR:  [71-86] 73  Resp:  [13-18] 18  BP: (104-142)/(55-76) 113/69    Incision: Dressing clean, dry and intact    Sensation grossly intact    Motor function intact 5/5 without deficits BUE    Aspen collar in place      Laboratory Studies  Recent Results (from the past 24 hour(s))   Basic metabolic panel    Collection Time: 04/13/18  4:42 AM   Result Value Ref Range    Sodium 141 136 - 145 mmol/L    Potassium 4 2 3 5 - 5 3 mmol/L    Chloride 106 100 - 108 mmol/L    CO2 28 21 - 32 mmol/L    Anion Gap 7 4 - 13 mmol/L    BUN 15 5 - 25 mg/dL    Creatinine 0 75 0 60 - 1 30 mg/dL    Glucose 131 65 - 140 mg/dL    Calcium 8 2 mg/dL    eGFR 93 ml/min/1 73sq m   CBC    Collection Time: 04/13/18  4:42 AM   Result Value Ref Range    WBC 13 53 (H) 4 31 - 10 16 Thousand/uL    RBC 4 36 3 88 - 5 62 Million/uL    Hemoglobin 13 5 12 0 - 17 0 g/dL    Hematocrit 40 7 36 5 - 49 3 %    MCV 93 82 - 98 fL    MCH 31 0 26 8 - 34 3 pg    MCHC 33 2 31 4 - 37 4 g/dL    RDW 15 7 (H) 11 6 - 15 1 %    Platelets 605 (L) 868 - 390 Thousands/uL    MPV 14 0 (H) 8 9 - 12 7 fL       Assessment / Plan:     POD #1 s/p ACDF C3/4, 7/T1  Mobilize with PT/OT  Activity as tolerated in aspen collar  DVT Prophylaxis  daily  Pain medication Norco 5/325  Discharge planning - home today  Follow up in the office in 2 weeks as instructed  This was part 1 of a staged procedure and the patient is scheduled to return to the OR on 4/30 for posterior spinal fusion Rudy Polanco PA-C

## 2018-04-13 NOTE — OCCUPATIONAL THERAPY NOTE
Occupational Therapy         Patient Name: Frederick Abbott  RJPIV'K Date: 4/13/2018      MD's orders received  Per P T 's report, pt is doing well with their mobility/balance without major functional deficits noted  Acute OT tx not indicated at this time 2* limited functional deficits   Genevieve HERRERA/L

## 2018-04-13 NOTE — PHYSICAL THERAPY NOTE
Physical Therapy Evaluation      Patient Active Problem List   Diagnosis    Spinal stenosis of cervical region       Past Medical History:   Diagnosis Date    Arthritis     Back pain     Cervical pain (neck)     [facility]     History of transfusion     Age 8 after bleeding post tonsillectomy    Hyperlipidemia     Mild heartburn     Neck pain     Numbness     Left hand and arm    Scratches     Multiple on limbs and hands    Wears dentures     Upper and lower  Has 4 pins on bottom to hold bottom denture       Past Surgical History:   Procedure Laterality Date    COLONOSCOPY      ESOPHAGOGASTRODUODENOSCOPY      EYE SURGERY      Bilateral implants to correct vision    [place] ANTERIOR INSTRUMENTATION 2-3 VERTEBRAL SEGMENTS N/A 4/12/2018    Procedure: FUSION CERVICAL ANTERIOR W DISCECTOMY C3/4, C7/T1;  Surgeon: Myesha Gagnon MD;  Location: [facility];  Service: Orthopedics    TONSILLECTOMY          04/13/18 0828   Note Type   Note type Eval only   Pain Assessment   Pain Assessment No/denies pain   Home Living   Type of [address] Two level   Prior Function   Level of [place] Independent with ADLs and functional mobility   Lives With Spouse   Receives Help From Family   ADL Assistance Independent   IADLs Independent   Falls in the last 6 months 0   Comments pt indep without device   atrophy l shoulder, triceps, hand from prior neuropathy, long standing    Restrictions/Precautions   Weight Bearing Precautions Per Order No   Braces or Orthoses C/S Collar  (adjusted due to poor fit)   Other Precautions Spinal precautions   General   Family/Caregiver Present No   Cognition   Overall Cognitive Status WFL   Orientation Level Oriented X4   RUE Assessment   RUE Assessment WNL   LUE Assessment   LUE Assessment (deltoid 4+/5, biceps 4+/5, triceps 3/5, wrist extension 4/5)   RLE Assessment   RLE Assessment WNL   LLE Assessment   LLE Assessment WNL Coordination   Movements are Fluid and Coordinated 1   Sensation X  (l hand, except 5th digit numb)   Bed Mobility   Rolling R 7  Independent   Rolling L 7  Independent   Supine to Sit 7  Independent   Sit to Supine 7  Independent   Transfers   Sit to Stand 7  Independent   Stand to Sit 7  Independent   Stand pivot 7  Independent   Ambulation/Elevation   Gait pattern WNL   Gait Assistance 7  Independent   Assistive Device None   Distance 300'   Stair Management Assistance 7  Independent   Stair Management Technique One rail R;Alternating pattern; Foreward   Number of Stairs 13   Balance   Static Sitting Normal   Dynamic Sitting Normal   Static Standing Normal   Dynamic Standing Normal   Ambulatory Normal   Endurance Deficit   Endurance Deficit No   Activity Tolerance   Activity Tolerance Patient tolerated treatment well   Nurse Made Aware yes   Assessment   Assessment pt admitted with long standing cervical radiculopathy and recent exacerbation of pain  underwent acdf C3-4 and C7-T1  pt refered to PT  pt was indep PTA but having numbness l hand and loss of strength in triceps, wrist and biceps  pt demonstrated indep mobility without assistive device  pt continues to have sensory loss, strength loss and atrophy lue as noted above  pt educated on alley of collar which was adjusted tighter , spinal restrictions for rotation, flexion, lifting > 10#  pt edcuated on don and doff of c collar   pt is able to return home, pt does not need further skilled PT  eventually needing OPPT for strengthening lue as motor returns and cleared by MD   Barriers to Discharge None   Goals   Patient Goals go home   Recommendation   Recommendation Home with family support   PT - OK to Discharge Yes   Modified Granger Scale   Modified [place] Scale 2   Barthel Index   Feeding 10   Bathing 5   Grooming Score 5   Dressing Score 10   Bladder Score 10   Bowels Score 10   Toilet Use Score 10   Transfers (Bed/Chair) Score 15   Mobility (Level Surface) Score 15   Stairs Score 5   Barthel Index Score 95   History: co - morbidities, fall risk, spinal precautions    Exam: impairments in musculoskeletal, joint integrity, skin integrity, sensory, self care- spinal restrictions   Clinical: unstable/unpredictable  Complexity:high        Ellen Davis, PT

## 2018-04-13 NOTE — CASE MANAGEMENT
Initial Clinical Review    Age/Sex: 81 y o  male    Surgery Date: 4/12/2018    Procedure: Fusion Cervical Anterior W Discectomy C3/4, C7/T1: Possible C6/7- 4/12/18  acdf  Anesthesia:     Admission Orders: Date/Time/Statement: 4/12/18 @ 3351     Orders Placed This Encounter   Procedures    Inpatient Admission     Standing Status:   Standing     Number of Occurrences:   1     Order Specific Question:   Admitting Physician     Answer:   Geovanna Giraldo     Order Specific Question:   Level of Care     Answer:   Med Surg [16]     Order Specific Question:   Estimated length of stay     Answer:   Inpatient Only Surgery       Vital Signs: /69 (BP Location: Right arm)   Pulse 73   Temp 98 °F (36 7 °C) (Tympanic)   Resp 18   Ht 5' 11 5" (1 816 m)   Wt 73 6 kg (162 lb 4 1 oz)   SpO2 100%   BMI 22 32 kg/m²     Diet:        Diet Orders            Start     Ordered    04/12/18 1235  Diet Regular; Regular House  Diet effective now     Question Answer Comment   Diet Type Regular    Regular Regular House    RD to adjust diet per protocol?  Yes        04/12/18 1235          Mobility: oob with brace q8    DVT Prophylaxis: scd    Pain Control:   Pain Medications             gabapentin (NEURONTIN) 600 MG tablet Take 600 mg by mouth 2 (two) times a day      HYDROcodone-acetaminophen (NORCO) 5-325 mg per tablet Take 1 tablet by mouth every 6 (six) hours as needed for pain for up to 10 days Max Daily Amount: 4 tablets    tiZANidine (ZANAFLEX) 4 mg tablet Take 1 tablet (4 mg total) by mouth every 8 (eight) hours as needed for muscle spasms

## 2018-04-19 NOTE — OP NOTE
OPERATIVE REPORT  PATIENT NAME: Jevon Yates    :  1945  MRN: [id number]  Pt Location: [facility] 05    SURGERY DATE: 2018    Surgeon(s) and Role:     * Abimbola Barton MD - Primary     * Joan Evans PA-C - Assisting    Preop Diagnosis:  Other specified diseases of spinal cord (CODE) [G95 89]  Radiculopathy of cervical region [M54 12]    Post-Op Diagnosis Codes:     * Other specified diseases of spinal cord (CODE) [G95 89]     * Radiculopathy of cervical region [M54 12]    Procedure(s) (LRB):  FUSION CERVICAL ANTERIOR W DISCECTOMY C3/4, C7/T1 (N/A)  Anterior cervical diskectomy and fusion C3-4  Anterior cervical diskectomy fusion see C7-T1     Specimen(s):  * No specimens in log *    Estimated Blood Loss:   Minimal    Drains:  None    Anesthesia Type:   General    Operative Indications: Other specified diseases of spinal cord (CODE) [G95 89]  Radiculopathy of cervical region []  11year-old gentleman with multilevel severe cervical degenerative disc disease, out of fusion and sagittal imbalance  He was suffering from multilevel neural foraminal stenosis and significant left upper extremity radiculopathy  Significant stenosis and degenerative disc disease was noted at C3-4 and C7-T1  Given the extent of the involvement a stage procedure was recommended were anterior cervical diskectomy fusion would be performed at C3-4 and C7-T1 followed by a posterior foraminotomy and instrumentation  Risk and benefit surgeries with surgery was discussed with him in detail and he consented the procedure fully and sending the risks  Operative Findings:  Severe neural foraminal stenosis C3-4 and C7-T1  Severe degenerative disc disease C3-4 and C7-T1  Loss of sagittal balance    Complications:   None    Procedure and Technique:  The patient was brought to the operating room and following identification, underwent general endotracheal anesthesia  Antibiotic prophylaxis was infused    Neural monitoring electrodes were placed  He was then positioned supine on the OR table and all bony promises were padded  The neck was then placed in traction using chinstrap  Shoulders were taped down and all bony promises were padded  Post positioning evoked potentials were obtained and were noted to be stable  The anterior aspect neck was then prepped and draped in sterile fashion  Left-sided approach was utilized  A vertical incision was made along the anterior surface of the sternocleidomastoid  Subcutaneous tissue was divided using electrocautery  The platysma was incised in line with incision and subplatysmal dissection was performed  Superior thyroid vessels were isolated and tied off  Omahyoid muscle belly was then incised in a vertical fashion and deep cervical fascia was exposed  Attention was then directed C3-4 level which was isolated on the fluoroscopy  The anterior aspect of the disc was visualized  Osteophytes were resected  Annulus was then sized  The disc space was then thoroughly decompressed using combination of curettes and pituitaries and Kerrisons  Posterior longitudinal ligament was taken down and bilateral foraminotomy was performed  The disc was also distracted  Appropriate size self-retaining implant was then packed with demineralized bone matrix and tapped in place  Demineralized bone matrix was also placed posterior and lateral to the implant  Following confirmation of position of the anchors were extended into the endplate prevent thing of dislodgement of the device  Attention was then directed to the C7-T1 level  Anterior osteophytes were resected  Annulus was incised  The disc was then thoroughly decompressed  Bone-on-bone deformity was seen at both this level and the C3-4 level  Sequential dissected distraction was then performed  Posterior longitudinal ligament was then resected and foraminotomies were performed   This the disc space was sized and appropriate sized implant was tapped into place  Following confirmation of position the shims were extended prevent thing the device from coming back out  Relies bone matrix was placed posterior lateral and within the implant  At this time the wound was thoroughly irrigated add and examined for active bleeding  There was no evidence of active bleeding seen  The platysma was then reapproximated  This was followed by closure of subcutaneous tissue and skin  The patient was then extubated and taken to PACU in stable condition  Neural monitoring was extensively use during the procedure and there were no changes to the potentials  I was personally present throughout the procedure and a qualified resident or fellow was not available  Mrs Bruna Figueroa assistants was essential during every aspect of the case including positioning, exposure, diskectomies, instrumentation, fusion and closure      Patient Disposition:  PACU     SIGNATURE: Sha Corrales MD  DATE: April 19, 2018  TIME: 10:11 AM

## 2018-04-30 ENCOUNTER — HOSPITAL ENCOUNTER (INPATIENT)
Facility: HOSPITAL | Age: 73
LOS: 9 days | DRG: 460 | End: 2018-05-09
Attending: ORTHOPAEDIC SURGERY | Admitting: ORTHOPAEDIC SURGERY
Payer: MEDICARE

## 2018-04-30 ENCOUNTER — HOSPITAL ENCOUNTER (OUTPATIENT)
Dept: RADIOLOGY | Facility: HOSPITAL | Age: 73
Setting detail: SURGERY ADMIT
Discharge: HOME | DRG: 460 | End: 2018-04-30
Payer: MEDICARE

## 2018-04-30 ENCOUNTER — ANESTHESIA (OUTPATIENT)
Dept: PERIOP | Facility: HOSPITAL | Age: 73
DRG: 460 | End: 2018-04-30
Payer: MEDICARE

## 2018-04-30 DIAGNOSIS — M48.02 SPINAL STENOSIS OF CERVICAL REGION: Chronic | ICD-10-CM

## 2018-04-30 DIAGNOSIS — E46 PROTEIN CALORIE MALNUTRITION (HCC): Primary | ICD-10-CM

## 2018-04-30 DIAGNOSIS — M48.02 SPINAL STENOSIS IN CERVICAL REGION: ICD-10-CM

## 2018-04-30 LAB
ABO GROUP BLD: NORMAL
BLD GP AB SCN SERPL QL: NEGATIVE
GLUCOSE SERPL-MCNC: 78 MG/DL (ref 65–140)
RH BLD: POSITIVE
SPECIMEN EXPIRATION DATE: NORMAL

## 2018-04-30 PROCEDURE — C1713 ANCHOR/SCREW BN/BN,TIS/BN: HCPCS | Performed by: ORTHOPAEDIC SURGERY

## 2018-04-30 PROCEDURE — 86900 BLOOD TYPING SEROLOGIC ABO: CPT | Performed by: ORTHOPAEDIC SURGERY

## 2018-04-30 PROCEDURE — 0RG6071 FUSION OF THORACIC VERTEBRAL JOINT WITH AUTOLOGOUS TISSUE SUBSTITUTE, POSTERIOR APPROACH, POSTERIOR COLUMN, OPEN APPROACH: ICD-10-PCS | Performed by: ORTHOPAEDIC SURGERY

## 2018-04-30 PROCEDURE — 86901 BLOOD TYPING SEROLOGIC RH(D): CPT | Performed by: ORTHOPAEDIC SURGERY

## 2018-04-30 PROCEDURE — 0RG2071 FUSION OF 2 OR MORE CERVICAL VERTEBRAL JOINTS WITH AUTOLOGOUS TISSUE SUBSTITUTE, POSTERIOR APPROACH, POSTERIOR COLUMN, OPEN APPROACH: ICD-10-PCS | Performed by: ORTHOPAEDIC SURGERY

## 2018-04-30 PROCEDURE — 0RG4071 FUSION OF CERVICOTHORACIC VERTEBRAL JOINT WITH AUTOLOGOUS TISSUE SUBSTITUTE, POSTERIOR APPROACH, POSTERIOR COLUMN, OPEN APPROACH: ICD-10-PCS | Performed by: ORTHOPAEDIC SURGERY

## 2018-04-30 PROCEDURE — 86850 RBC ANTIBODY SCREEN: CPT | Performed by: ORTHOPAEDIC SURGERY

## 2018-04-30 PROCEDURE — 72040 X-RAY EXAM NECK SPINE 2-3 VW: CPT

## 2018-04-30 PROCEDURE — 82948 REAGENT STRIP/BLOOD GLUCOSE: CPT

## 2018-04-30 DEVICE — IMPLANTABLE DEVICE: Type: IMPLANTABLE DEVICE | Site: SPINE CERVICAL | Status: FUNCTIONAL

## 2018-04-30 DEVICE — GRAFT BONE PUTTY 10ML: Type: IMPLANTABLE DEVICE | Site: SPINE CERVICAL | Status: FUNCTIONAL

## 2018-04-30 RX ORDER — GABAPENTIN 300 MG/1
600 CAPSULE ORAL 2 TIMES DAILY
Status: DISCONTINUED | OUTPATIENT
Start: 2018-04-30 | End: 2018-05-09 | Stop reason: HOSPADM

## 2018-04-30 RX ORDER — CELECOXIB 200 MG/1
200 CAPSULE ORAL ONCE
Status: COMPLETED | OUTPATIENT
Start: 2018-04-30 | End: 2018-04-30

## 2018-04-30 RX ORDER — DIPHENHYDRAMINE HYDROCHLORIDE 50 MG/ML
25 INJECTION INTRAMUSCULAR; INTRAVENOUS EVERY 6 HOURS PRN
Status: DISCONTINUED | OUTPATIENT
Start: 2018-04-30 | End: 2018-05-01

## 2018-04-30 RX ORDER — DOCUSATE SODIUM 100 MG/1
100 CAPSULE, LIQUID FILLED ORAL 2 TIMES DAILY
Status: DISCONTINUED | OUTPATIENT
Start: 2018-04-30 | End: 2018-05-09 | Stop reason: HOSPADM

## 2018-04-30 RX ORDER — SENNOSIDES 8.6 MG
1 TABLET ORAL DAILY
Status: DISCONTINUED | OUTPATIENT
Start: 2018-04-30 | End: 2018-05-09 | Stop reason: HOSPADM

## 2018-04-30 RX ORDER — SIMETHICONE 80 MG
80 TABLET,CHEWABLE ORAL 4 TIMES DAILY PRN
Status: DISCONTINUED | OUTPATIENT
Start: 2018-04-30 | End: 2018-05-09 | Stop reason: HOSPADM

## 2018-04-30 RX ORDER — AZELASTINE 1 MG/ML
2 SPRAY, METERED NASAL DAILY
Status: DISCONTINUED | OUTPATIENT
Start: 2018-04-30 | End: 2018-05-09 | Stop reason: HOSPADM

## 2018-04-30 RX ORDER — EPHEDRINE SULFATE 50 MG/ML
INJECTION, SOLUTION INTRAVENOUS AS NEEDED
Status: DISCONTINUED | OUTPATIENT
Start: 2018-04-30 | End: 2018-04-30 | Stop reason: SURG

## 2018-04-30 RX ORDER — PROPOFOL 10 MG/ML
INJECTION, EMULSION INTRAVENOUS AS NEEDED
Status: DISCONTINUED | OUTPATIENT
Start: 2018-04-30 | End: 2018-04-30 | Stop reason: SURG

## 2018-04-30 RX ORDER — SODIUM CHLORIDE 9 MG/ML
INJECTION, SOLUTION INTRAVENOUS CONTINUOUS PRN
Status: DISCONTINUED | OUTPATIENT
Start: 2018-04-30 | End: 2018-04-30

## 2018-04-30 RX ORDER — ACETAMINOPHEN 325 MG/1
1000 TABLET ORAL ONCE
Status: COMPLETED | OUTPATIENT
Start: 2018-04-30 | End: 2018-04-30

## 2018-04-30 RX ORDER — ONDANSETRON 2 MG/ML
4 INJECTION INTRAMUSCULAR; INTRAVENOUS ONCE AS NEEDED
Status: DISCONTINUED | OUTPATIENT
Start: 2018-04-30 | End: 2018-04-30 | Stop reason: HOSPADM

## 2018-04-30 RX ORDER — MORPHINE SULFATE 2 MG/ML
1 INJECTION, SOLUTION INTRAMUSCULAR; INTRAVENOUS
Status: DISCONTINUED | OUTPATIENT
Start: 2018-04-30 | End: 2018-05-09 | Stop reason: HOSPADM

## 2018-04-30 RX ORDER — TIZANIDINE 4 MG/1
4 TABLET ORAL EVERY 8 HOURS PRN
Qty: 80 TABLET | Refills: 0 | Status: SHIPPED | OUTPATIENT
Start: 2018-04-30 | End: 2018-05-19 | Stop reason: HOSPADM

## 2018-04-30 RX ORDER — ONDANSETRON 2 MG/ML
INJECTION INTRAMUSCULAR; INTRAVENOUS AS NEEDED
Status: DISCONTINUED | OUTPATIENT
Start: 2018-04-30 | End: 2018-04-30 | Stop reason: SURG

## 2018-04-30 RX ORDER — ASPIRIN 325 MG
325 TABLET, DELAYED RELEASE (ENTERIC COATED) ORAL DAILY
Status: DISCONTINUED | OUTPATIENT
Start: 2018-04-30 | End: 2018-05-09 | Stop reason: HOSPADM

## 2018-04-30 RX ORDER — SODIUM CHLORIDE 9 MG/ML
125 INJECTION, SOLUTION INTRAVENOUS CONTINUOUS
Status: DISCONTINUED | OUTPATIENT
Start: 2018-04-30 | End: 2018-04-30

## 2018-04-30 RX ORDER — TIZANIDINE 4 MG/1
4 TABLET ORAL EVERY 8 HOURS PRN
Status: DISCONTINUED | OUTPATIENT
Start: 2018-04-30 | End: 2018-04-30 | Stop reason: ALTCHOICE

## 2018-04-30 RX ORDER — MIDAZOLAM HYDROCHLORIDE 1 MG/ML
INJECTION INTRAMUSCULAR; INTRAVENOUS AS NEEDED
Status: DISCONTINUED | OUTPATIENT
Start: 2018-04-30 | End: 2018-04-30 | Stop reason: SURG

## 2018-04-30 RX ORDER — HYDROCODONE BITARTRATE AND ACETAMINOPHEN 5; 325 MG/1; MG/1
1 TABLET ORAL EVERY 6 HOURS PRN
Qty: 80 TABLET | Refills: 0 | Status: SHIPPED | OUTPATIENT
Start: 2018-04-30 | End: 2018-05-08 | Stop reason: HOSPADM

## 2018-04-30 RX ORDER — MAGNESIUM HYDROXIDE/ALUMINUM HYDROXICE/SIMETHICONE 120; 1200; 1200 MG/30ML; MG/30ML; MG/30ML
15 SUSPENSION ORAL EVERY 6 HOURS PRN
Status: DISCONTINUED | OUTPATIENT
Start: 2018-04-30 | End: 2018-05-04

## 2018-04-30 RX ORDER — MAGNESIUM HYDROXIDE 1200 MG/15ML
LIQUID ORAL AS NEEDED
Status: DISCONTINUED | OUTPATIENT
Start: 2018-04-30 | End: 2018-04-30 | Stop reason: HOSPADM

## 2018-04-30 RX ORDER — SODIUM CHLORIDE, SODIUM LACTATE, POTASSIUM CHLORIDE, CALCIUM CHLORIDE 600; 310; 30; 20 MG/100ML; MG/100ML; MG/100ML; MG/100ML
100 INJECTION, SOLUTION INTRAVENOUS CONTINUOUS
Status: DISCONTINUED | OUTPATIENT
Start: 2018-04-30 | End: 2018-04-30

## 2018-04-30 RX ORDER — HYDROCODONE BITARTRATE AND ACETAMINOPHEN 5; 325 MG/1; MG/1
2 TABLET ORAL EVERY 4 HOURS PRN
Status: DISCONTINUED | OUTPATIENT
Start: 2018-04-30 | End: 2018-05-03

## 2018-04-30 RX ORDER — PROPOFOL 10 MG/ML
INJECTION, EMULSION INTRAVENOUS CONTINUOUS PRN
Status: DISCONTINUED | OUTPATIENT
Start: 2018-04-30 | End: 2018-04-30 | Stop reason: SURG

## 2018-04-30 RX ORDER — CALCIUM CARBONATE 200(500)MG
500 TABLET,CHEWABLE ORAL AS NEEDED
Status: DISCONTINUED | OUTPATIENT
Start: 2018-04-30 | End: 2018-05-09 | Stop reason: HOSPADM

## 2018-04-30 RX ORDER — PRAMIPEXOLE DIHYDROCHLORIDE 0.25 MG/1
0.75 TABLET ORAL
Status: DISCONTINUED | OUTPATIENT
Start: 2018-04-30 | End: 2018-05-09 | Stop reason: HOSPADM

## 2018-04-30 RX ORDER — DIAZEPAM 5 MG/1
10 TABLET ORAL
Status: DISCONTINUED | OUTPATIENT
Start: 2018-04-30 | End: 2018-05-09 | Stop reason: HOSPADM

## 2018-04-30 RX ORDER — SODIUM CHLORIDE, SODIUM LACTATE, POTASSIUM CHLORIDE, CALCIUM CHLORIDE 600; 310; 30; 20 MG/100ML; MG/100ML; MG/100ML; MG/100ML
125 INJECTION, SOLUTION INTRAVENOUS CONTINUOUS
Status: DISCONTINUED | OUTPATIENT
Start: 2018-04-30 | End: 2018-05-04

## 2018-04-30 RX ORDER — FENTANYL CITRATE 50 UG/ML
INJECTION, SOLUTION INTRAMUSCULAR; INTRAVENOUS AS NEEDED
Status: DISCONTINUED | OUTPATIENT
Start: 2018-04-30 | End: 2018-04-30 | Stop reason: SURG

## 2018-04-30 RX ORDER — BUPIVACAINE HYDROCHLORIDE AND EPINEPHRINE 5; 5 MG/ML; UG/ML
INJECTION, SOLUTION EPIDURAL; INTRACAUDAL; PERINEURAL AS NEEDED
Status: DISCONTINUED | OUTPATIENT
Start: 2018-04-30 | End: 2018-04-30 | Stop reason: HOSPADM

## 2018-04-30 RX ORDER — ONDANSETRON 2 MG/ML
4 INJECTION INTRAMUSCULAR; INTRAVENOUS EVERY 6 HOURS PRN
Status: DISCONTINUED | OUTPATIENT
Start: 2018-04-30 | End: 2018-05-01

## 2018-04-30 RX ORDER — CALCIUM CARBONATE 200(500)MG
1000 TABLET,CHEWABLE ORAL DAILY PRN
Status: DISCONTINUED | OUTPATIENT
Start: 2018-04-30 | End: 2018-05-09 | Stop reason: HOSPADM

## 2018-04-30 RX ORDER — METHOCARBAMOL 500 MG/1
750 TABLET, FILM COATED ORAL 4 TIMES DAILY PRN
Status: DISCONTINUED | OUTPATIENT
Start: 2018-04-30 | End: 2018-05-04

## 2018-04-30 RX ORDER — PRAVASTATIN SODIUM 40 MG
40 TABLET ORAL
Status: DISCONTINUED | OUTPATIENT
Start: 2018-04-30 | End: 2018-05-09 | Stop reason: HOSPADM

## 2018-04-30 RX ORDER — HYDROMORPHONE HYDROCHLORIDE 2 MG/ML
INJECTION, SOLUTION INTRAMUSCULAR; INTRAVENOUS; SUBCUTANEOUS AS NEEDED
Status: DISCONTINUED | OUTPATIENT
Start: 2018-04-30 | End: 2018-04-30 | Stop reason: SURG

## 2018-04-30 RX ADMIN — SODIUM CHLORIDE, SODIUM LACTATE, POTASSIUM CHLORIDE, AND CALCIUM CHLORIDE 100 ML/HR: .6; .31; .03; .02 INJECTION, SOLUTION INTRAVENOUS at 15:38

## 2018-04-30 RX ADMIN — CELECOXIB 200 MG: 200 CAPSULE ORAL at 08:17

## 2018-04-30 RX ADMIN — GABAPENTIN 600 MG: 300 CAPSULE ORAL at 18:17

## 2018-04-30 RX ADMIN — Medication 2000 MG: at 09:25

## 2018-04-30 RX ADMIN — HYDROMORPHONE HYDROCHLORIDE 0.5 MG: 1 INJECTION, SOLUTION INTRAMUSCULAR; INTRAVENOUS; SUBCUTANEOUS at 13:55

## 2018-04-30 RX ADMIN — PRAVASTATIN SODIUM 40 MG: 40 TABLET ORAL at 18:17

## 2018-04-30 RX ADMIN — CEFAZOLIN SODIUM 1000 MG: 10 INJECTION, POWDER, FOR SOLUTION INTRAVENOUS at 19:20

## 2018-04-30 RX ADMIN — EPHEDRINE SULFATE 10 MG: 50 INJECTION, SOLUTION INTRAMUSCULAR; INTRAVENOUS; SUBCUTANEOUS at 09:16

## 2018-04-30 RX ADMIN — ONDANSETRON HYDROCHLORIDE 4 MG: 2 INJECTION, SOLUTION INTRAVENOUS at 09:46

## 2018-04-30 RX ADMIN — ACETAMINOPHEN 975 MG: 325 TABLET, FILM COATED ORAL at 08:18

## 2018-04-30 RX ADMIN — MIDAZOLAM HYDROCHLORIDE 2 MG: 1 INJECTION, SOLUTION INTRAMUSCULAR; INTRAVENOUS at 09:00

## 2018-04-30 RX ADMIN — HYDROMORPHONE HYDROCHLORIDE 0.5 MG: 1 INJECTION, SOLUTION INTRAMUSCULAR; INTRAVENOUS; SUBCUTANEOUS at 14:05

## 2018-04-30 RX ADMIN — PROPOFOL 120 MCG/KG/MIN: 10 INJECTION, EMULSION INTRAVENOUS at 09:07

## 2018-04-30 RX ADMIN — ASPIRIN 325 MG: 325 TABLET, COATED ORAL at 18:16

## 2018-04-30 RX ADMIN — HYDROMORPHONE HYDROCHLORIDE 0.5 MG: 1 INJECTION, SOLUTION INTRAMUSCULAR; INTRAVENOUS; SUBCUTANEOUS at 14:26

## 2018-04-30 RX ADMIN — SODIUM CHLORIDE: 0.9 INJECTION, SOLUTION INTRAVENOUS at 12:12

## 2018-04-30 RX ADMIN — FENTANYL CITRATE 50 MCG: 50 INJECTION, SOLUTION INTRAMUSCULAR; INTRAVENOUS at 10:48

## 2018-04-30 RX ADMIN — HYDROMORPHONE HYDROCHLORIDE: 10 INJECTION, SOLUTION INTRAMUSCULAR; INTRAVENOUS; SUBCUTANEOUS at 18:08

## 2018-04-30 RX ADMIN — DIAZEPAM 10 MG: 5 TABLET ORAL at 21:58

## 2018-04-30 RX ADMIN — PRAMIPEXOLE DIHYDROCHLORIDE 0.75 MG: 0.25 TABLET ORAL at 21:58

## 2018-04-30 RX ADMIN — DEXAMETHASONE SODIUM PHOSPHATE 4 MG: 10 INJECTION INTRAMUSCULAR; INTRAVENOUS at 09:46

## 2018-04-30 RX ADMIN — FENTANYL CITRATE 25 MCG: 50 INJECTION, SOLUTION INTRAMUSCULAR; INTRAVENOUS at 12:47

## 2018-04-30 RX ADMIN — MORPHINE SULFATE 1 MG: 2 INJECTION, SOLUTION INTRAMUSCULAR; INTRAVENOUS at 16:23

## 2018-04-30 RX ADMIN — FENTANYL CITRATE 100 MCG: 50 INJECTION, SOLUTION INTRAMUSCULAR; INTRAVENOUS at 09:04

## 2018-04-30 RX ADMIN — HYDROMORPHONE HYDROCHLORIDE 0.5 MG: 1 INJECTION, SOLUTION INTRAMUSCULAR; INTRAVENOUS; SUBCUTANEOUS at 14:15

## 2018-04-30 RX ADMIN — SODIUM CHLORIDE 125 ML/HR: 0.9 INJECTION, SOLUTION INTRAVENOUS at 08:14

## 2018-04-30 RX ADMIN — HYDROMORPHONE HYDROCHLORIDE 0.5 MG: 1 INJECTION, SOLUTION INTRAMUSCULAR; INTRAVENOUS; SUBCUTANEOUS at 15:16

## 2018-04-30 RX ADMIN — FENTANYL CITRATE 25 MCG: 50 INJECTION, SOLUTION INTRAMUSCULAR; INTRAVENOUS at 13:02

## 2018-04-30 RX ADMIN — Medication 0.2 MCG/KG/MIN: at 09:07

## 2018-04-30 RX ADMIN — SODIUM CHLORIDE: 0.9 INJECTION, SOLUTION INTRAVENOUS at 09:18

## 2018-04-30 RX ADMIN — EPHEDRINE SULFATE 10 MG: 50 INJECTION, SOLUTION INTRAMUSCULAR; INTRAVENOUS; SUBCUTANEOUS at 09:14

## 2018-04-30 RX ADMIN — SODIUM CHLORIDE, SODIUM LACTATE, POTASSIUM CHLORIDE, AND CALCIUM CHLORIDE 125 ML/HR: .6; .31; .03; .02 INJECTION, SOLUTION INTRAVENOUS at 19:43

## 2018-04-30 RX ADMIN — HYDROMORPHONE HYDROCHLORIDE 0.5 MG: 1 INJECTION, SOLUTION INTRAMUSCULAR; INTRAVENOUS; SUBCUTANEOUS at 14:49

## 2018-04-30 RX ADMIN — EPHEDRINE SULFATE 10 MG: 50 INJECTION, SOLUTION INTRAMUSCULAR; INTRAVENOUS; SUBCUTANEOUS at 09:52

## 2018-04-30 RX ADMIN — SODIUM CHLORIDE: 0.9 INJECTION, SOLUTION INTRAVENOUS at 09:36

## 2018-04-30 RX ADMIN — PROPOFOL 200 MG: 10 INJECTION, EMULSION INTRAVENOUS at 09:12

## 2018-04-30 RX ADMIN — HYDROMORPHONE HYDROCHLORIDE 0.5 MG: 2 INJECTION, SOLUTION INTRAMUSCULAR; INTRAVENOUS; SUBCUTANEOUS at 10:11

## 2018-04-30 RX ADMIN — Medication 500 MG: at 22:32

## 2018-04-30 NOTE — ANESTHESIA POSTPROCEDURE EVALUATION
Post-Op Assessment Note      CV Status:  Stable    Mental Status:  Alert and awake    Hydration Status:  Euvolemic    PONV Controlled:  Controlled    Airway Patency:  Patent    Post Op Vitals Reviewed: Yes          Staff: Anesthesiologist           /79 (04/30/18 1452)    Temp     Pulse 94 (04/30/18 1452)   Resp (!) 9 (04/30/18 1452)    SpO2 92 % (04/30/18 1452)

## 2018-04-30 NOTE — DISCHARGE SUMMARY
DISCHARGE SUMMARY  ? Patient Name: Jevon Yates  Patient MRN: [id number]  Admitting Provider: Abimbola Barton MD  Discharging Provider: Abimbola Barton MD  Primary Care Physician at Discharge: Classie Decant, [facility]   Admission Date: 4/30/2018   Discharge Date: 5/9/18  Admission Diagnosis   Disease of spinal cord [facility]) [G95 9]  Spinal stenosis of cervical region [M48 02]  Other cervical disc degeneration, unspecified cervical region [M50 30]  Radiculopathy of cervical region [M54 12]  Discharge Diagnoses  Same  Hospital Course  Pt was admitted to [address] [place] [address] on 4/30/18 for the second part of a 2 stage cervical spinal fusion  He underwent ACDF on 4/12/18 and returned on 4/30/18 for posterior cervical spinal fusion  He had issues with pain post-operatively, although this was controlled with medications  He was discharged to rehab on 5/9/18  Medications  See after visit summary for reconciled discharge medications provided to patient and family  Allergies  No Known Allergies  Outpatient Follow-Up  No future appointments  Discharge Disposition  Condition: Stable  Discharged to:  [facility] - Dr Corrina Cloud  Operative Procedures Performed  Procedure(s):  POSSIBLE C3-4, C4/5 LAMINECTOMY  C3-T2 PSF With Instrumentation  C 6-7 Left HEMILAMINOTOMY AND FORAMINOTOMY C7-T2 LAMINECTOMY C7-T2 Left Foraminotomy  Pertinent Test Results   none    Discharge instructions:  ASA 325mg for DVT prophylaxis   Percocet 5/325 for pain  Activity as tolerated in cervical collar  Follow up x 2 weeks in the office as instructed    ?   Joan Evans PA-C

## 2018-05-01 ENCOUNTER — APPOINTMENT (INPATIENT)
Dept: RADIOLOGY | Facility: HOSPITAL | Age: 73
DRG: 460 | End: 2018-05-01
Payer: MEDICARE

## 2018-05-01 LAB
ANION GAP SERPL CALCULATED.3IONS-SCNC: 6 MMOL/L (ref 4–13)
BUN SERPL-MCNC: 10 MG/DL (ref 5–25)
CALCIUM SERPL-MCNC: 8.5 MG/DL (ref 8.3–10.1)
CHLORIDE SERPL-SCNC: 103 MMOL/L (ref 100–108)
CO2 SERPL-SCNC: 30 MMOL/L (ref 21–32)
CREAT SERPL-MCNC: 0.74 MG/DL (ref 0.6–1.3)
ERYTHROCYTE [DISTWIDTH] IN BLOOD BY AUTOMATED COUNT: 15.3 % (ref 11.6–15.1)
GFR SERPL CREATININE-BSD FRML MDRD: 93 ML/MIN/1.73SQ M
GLUCOSE SERPL-MCNC: 122 MG/DL (ref 65–140)
HCT VFR BLD AUTO: 41.8 % (ref 36.5–49.3)
HGB BLD-MCNC: 13.8 G/DL (ref 12–17)
MCH RBC QN AUTO: 30.9 PG (ref 26.8–34.3)
MCHC RBC AUTO-ENTMCNC: 33 G/DL (ref 31.4–37.4)
MCV RBC AUTO: 94 FL (ref 82–98)
PLATELET # BLD AUTO: 128 THOUSANDS/UL (ref 149–390)
PMV BLD AUTO: 13 FL (ref 8.9–12.7)
POTASSIUM SERPL-SCNC: 4.2 MMOL/L (ref 3.5–5.3)
RBC # BLD AUTO: 4.47 MILLION/UL (ref 3.88–5.62)
SODIUM SERPL-SCNC: 139 MMOL/L (ref 136–145)
WBC # BLD AUTO: 17.75 THOUSAND/UL (ref 4.31–10.16)

## 2018-05-01 PROCEDURE — 97530 THERAPEUTIC ACTIVITIES: CPT

## 2018-05-01 PROCEDURE — 72040 X-RAY EXAM NECK SPINE 2-3 VW: CPT

## 2018-05-01 PROCEDURE — 97163 PT EVAL HIGH COMPLEX 45 MIN: CPT

## 2018-05-01 PROCEDURE — 80048 BASIC METABOLIC PNL TOTAL CA: CPT | Performed by: PHYSICIAN ASSISTANT

## 2018-05-01 PROCEDURE — G8979 MOBILITY GOAL STATUS: HCPCS

## 2018-05-01 PROCEDURE — G8987 SELF CARE CURRENT STATUS: HCPCS

## 2018-05-01 PROCEDURE — 85027 COMPLETE CBC AUTOMATED: CPT | Performed by: PHYSICIAN ASSISTANT

## 2018-05-01 PROCEDURE — G8988 SELF CARE GOAL STATUS: HCPCS

## 2018-05-01 PROCEDURE — G8978 MOBILITY CURRENT STATUS: HCPCS

## 2018-05-01 PROCEDURE — 97167 OT EVAL HIGH COMPLEX 60 MIN: CPT

## 2018-05-01 RX ORDER — CHLORTHALIDONE 25 MG/1
12.5 TABLET ORAL DAILY
Status: DISCONTINUED | OUTPATIENT
Start: 2018-05-01 | End: 2018-05-04

## 2018-05-01 RX ORDER — HYDRALAZINE HYDROCHLORIDE 20 MG/ML
5 INJECTION INTRAMUSCULAR; INTRAVENOUS EVERY 6 HOURS PRN
Status: DISCONTINUED | OUTPATIENT
Start: 2018-05-01 | End: 2018-05-04

## 2018-05-01 RX ADMIN — ONDANSETRON 4 MG: 2 INJECTION INTRAMUSCULAR; INTRAVENOUS at 10:00

## 2018-05-01 RX ADMIN — DOCUSATE SODIUM 100 MG: 100 CAPSULE, LIQUID FILLED ORAL at 17:52

## 2018-05-01 RX ADMIN — GABAPENTIN 600 MG: 300 CAPSULE ORAL at 11:25

## 2018-05-01 RX ADMIN — GABAPENTIN 600 MG: 300 CAPSULE ORAL at 17:52

## 2018-05-01 RX ADMIN — ASPIRIN 325 MG: 325 TABLET, COATED ORAL at 11:25

## 2018-05-01 RX ADMIN — SODIUM CHLORIDE, SODIUM LACTATE, POTASSIUM CHLORIDE, AND CALCIUM CHLORIDE 125 ML/HR: .6; .31; .03; .02 INJECTION, SOLUTION INTRAVENOUS at 10:00

## 2018-05-01 RX ADMIN — METHOCARBAMOL 750 MG: 500 TABLET ORAL at 11:24

## 2018-05-01 RX ADMIN — CEFAZOLIN SODIUM 1000 MG: 10 INJECTION, POWDER, FOR SOLUTION INTRAVENOUS at 01:50

## 2018-05-01 RX ADMIN — AZELASTINE HYDROCHLORIDE 2 SPRAY: 137 SPRAY, METERED NASAL at 11:26

## 2018-05-01 RX ADMIN — HYDROCODONE BITARTRATE AND ACETAMINOPHEN 2 TABLET: 5; 325 TABLET ORAL at 11:24

## 2018-05-01 RX ADMIN — PRAMIPEXOLE DIHYDROCHLORIDE 0.75 MG: 0.25 TABLET ORAL at 21:25

## 2018-05-01 RX ADMIN — CHLORTHALIDONE 12.5 MG: 25 TABLET ORAL at 22:30

## 2018-05-01 RX ADMIN — SODIUM CHLORIDE, SODIUM LACTATE, POTASSIUM CHLORIDE, AND CALCIUM CHLORIDE 125 ML/HR: .6; .31; .03; .02 INJECTION, SOLUTION INTRAVENOUS at 17:51

## 2018-05-01 RX ADMIN — SENNOSIDES 8.6 MG: 8.6 TABLET, FILM COATED ORAL at 11:25

## 2018-05-01 RX ADMIN — DIAZEPAM 10 MG: 5 TABLET ORAL at 21:25

## 2018-05-01 RX ADMIN — PRAVASTATIN SODIUM 40 MG: 40 TABLET ORAL at 16:02

## 2018-05-01 NOTE — PHYSICAL THERAPY NOTE
PT EVALUATION    81 y o     [id number]    Disease of spinal cord ([facility]) [G95 9]  Spinal stenosis of cervical region [M48 02]  Other cervical disc degeneration, unspecified cervical region [M50 30]  Radiculopathy of cervical region [M54 12]    Past Medical History:   Diagnosis Date    Arthritis     Back pain     Cervical pain (neck)     COPD (chronic obstructive pulmonary disease) ([facility])     History of transfusion     Age 8 after bleeding post tonsillectomy    Hyperlipidemia     Mild heartburn     Neck pain     Numbness     Left hand and arm    Scratches     Multiple on limbs and hands    Wears dentures     Upper and lower  Has 4 pins on bottom to hold bottom denture         Past Surgical History:   Procedure Laterality Date    COLONOSCOPY      ESOPHAGOGASTRODUODENOSCOPY      EYE SURGERY      Bilateral implants to correct vision    MT ANTERIOR INSTRUMENTATION 2-3 VERTEBRAL SEGMENTS N/A 4/12/2018    Procedure: FUSION CERVICAL ANTERIOR W DISCECTOMY C3/4, C7/T1;  Surgeon: Zarina Ivy MD;  [facility]: [facility];  Service: Orthopedics    MT ARTHRODESIS POSTERIOR/POSTERIORLATERAL CERVICAL BELOW C2 N/A 4/30/2018    Procedure: POSSIBLE C3-4, C4/5 LAMINECTOMY;  Surgeon: Zarina Ivy MD;  [facility];  Service: Orthopedics    MT ARTHRODESIS POSTERIOR/POSTEROLATERAL THORACIC N/A 4/30/2018    Procedure: C3-T2 PSF With Instrumentation;  Surgeon: Zarina Ivy MD;  [facility];  Service: Orthopedics    MT EXCIS CERV DISK,ONE LEVEL N/A 4/30/2018    Procedure: C 6-7 Left HEMILAMINOTOMY AND FORAMINOTOMY C7-T2 LAMINECTOMY C7-T2 Left Foraminotomy;  Surgeon: Zarina Ivy MD;  [facility];  Service: Orthopedics    TONSILLECTOMY        05/01/18 1244   Note Type   Note type Eval/Treat   Pain Assessment   Pain Score 8   Pain Type Acute pain   Pain Location Back;Neck   Pain Orientation Posterior   Hospital Pain Intervention(s) Repositioned; Emotional support   Home Living   Type of Home House Home Layout One level  (1 JANNIE)   Bathroom Equipment Grab bars in [facility] (denies)   Additional Comments driving prior to last cervical surgery  Prior Function   Level of [place] Independent with ADLs and functional mobility   Lives With Spouse   Receives Help From Family   ADL Assistance Independent   IADLs Independent   Falls in the last 6 months 0   Vocational Retired   Comments I PTA without AD  Restrictions/Precautions   Weight Bearing Precautions Per Order No   Braces or Orthoses C/S Collar  (Aspen Collar)   Other Precautions Fall Risk;Multiple lines;Pain;Spinal precautions  (C spine precautions  Cervical Collar)   General   Additional Pertinent History Pt is 82 y/o male admitted for C3-T2 PSF  PT consulted  OOB with brace  Family/Caregiver Present No   Cognition   Overall Cognitive Status WFL   RUE Assessment   RUE Assessment (not tested 2* pain)   LUE Assessment   LUE Assessment (not tested 2* pain)   RLE Assessment   RLE Assessment WFL  (groslsy at least 3/5)   LLE Assessment   LLE Assessment WFL  (grossly at least 3/5)   Light Touch   RLE Light Touch Grossly intact   LLE Light Touch Grossly intact   Bed Mobility   Rolling L 4  Minimal assistance   Additional items Assist x 2; Increased time required;Verbal cues; Bedrails   Supine to Sit 3  Moderate assistance   Additional items Assist x 2; Increased time required;Verbal cues;LE management; Bedrails  (cues for technique to logroll )   Additional items (difficulty wiht compliance wiht logroll technique )   Additional Comments cues and assist for logroll technique in and OOB  Transfers   Sit to Stand 4  Minimal assistance   Additional items Assist x 2; Increased time required;Verbal cues   Stand to Sit 4  Minimal assistance   Additional items Assist x 2; Increased time required;Verbal cues   Additional Comments Increased time to steady  Increased pain with all mobiltiy     Ambulation/Elevation   Gait pattern Decreased foot clearance; Improper Weight shift; Inconsistent chelly; Short stride; Excessively slow   Gait Assistance 4  Minimal assist   Additional items Assist x 2;Verbal cues; Tactile cues   Assistive Device Other (Comment)  (BUE hand held assist )   Distance 3'x1  Balance   Static Sitting Fair +   Dynamic Sitting Fair -   Static Standing Poor +   Dynamic Standing Poor   Ambulatory Poor   Endurance Deficit   Endurance Deficit Yes   Endurance Deficit Description pain   Activity Tolerance   Activity Tolerance Patient limited by pain   Medical Staff Made Aware Nurse, Brooke Beauchamp   Nurse Made Aware yes   Assessment   Prognosis Fair   Problem List Decreased strength;Decreased range of motion;Decreased endurance; Impaired balance;Decreased mobility; Decreased safety awareness;Orthopedic restrictions;Pain   Assessment Pt is 80 y/o male admitted for PSF of C3-T2  Recent hx of ACDF C3/4, C7/T1 on 4/12  PLOF independent without use of AD residing with spouse  Currently with significant pain limiting mobilty  Keshia Ramirez in place  Requires modA of 2 for bed mobity and to comply wtih technique to logroll  Step by step cues and guidance needed  Increased time required for all mobitliy  Able to progress OOB to chair wiht Jean Paul of 2  Hand held assistance of 2 to ambulate short distance to chair  COntinues with increase in pain despite repositioning  Disposition will depend upon progress and pain control  Will continue to benefit from IPPT in order to optimize functional indepedence p cervical fusion  Barriers to Discharge Comments pain   Goals   Patient Goals pain to improve   STG Expiration Date 05/11/18   Short Term Goal #1 10 days:  Bed mobilty with Darío demosntrating appropriate technique 100% of the time  Transfers with Darío  Amb with LRAD prn > 200'x1 with s/I  Negotiate one step with S  Improve overall balance 1/2 grade     Treatment Day 0   Plan   Treatment/Interventions Functional transfer training;DANNY strengthening/ROM; Elevations; Therapeutic exercise; Endurance training;Patient/family training;Equipment eval/education; Bed mobility;Gait training; Compensatory technique education;Continued evaluation;Spoke to nursing;OT;Spoke to advanced practitioner  (spoke with Claudia Hernández, [facility])   PT Frequency 7x/wk; Other (Comment)  (6-7 times per week )   Recommendation   Recommendation (Monitor dispositoin with progress )   Equipment Recommended (will monitor with progress  )   PT - OK to Discharge No   Additional Comments must acheive IPPT goals if to d/c to home  Modified [place] Scale   Modified [place] Scale 4   Barthel Index   Feeding 5   Bathing 0   Grooming Score 0   Dressing Score 0   Bladder Score 10   Bowels Score 10   Toilet Use Score 5   Transfers (Bed/Chair) Score 5   Mobility (Level Surface) Score 0   Stairs Score 0   Barthel Index Score 35     History: co - morbidities, fall risk,multiple lines/PCA pump, Aspen Collar  Exam: impairments in locomotion, musculoskeletal, balance,posture, joint integrity,barthel 35  Clinical: unstable/unpredictable ( pain, fall risk, PCA pump)  Complexity:high     Katerine Angry, PT       Time In:1230  Time PFN:1242  Total Time: 14 minutes      S:  So much pain  Unable to tolerate sitting up  O:  OOB in chair for 5 minutes  Multiple attempts to reposition to improve pain management  Transfers sit<>stand with Jean Paul of 2  Amb 3'x1 to return to EOB with Jean Paul of 2-hand held assistance  Sit to supine with modA of 2  Cues and guidance to complete with logroll technique  Repositioning in bed to attempt to improve pain  Pt education on spinal precautions, use of Aspen Collar  A:  Pt continues with significant pain  Unable to tolerate OOB to chair for greater than 5 minutes despite multiple attempts to reposition patient  Continues to require Jean Paul of 2 for transfers and ambulation  ModA of 2 for bed mobility    Despite guidance, difficulty complying with logroll technique to complete transition  Verbalizes understanding of use of C collar and spinal precautions at this time  Anticipate progression as pain improves, however will monitor DME and disposition needs as pt progresses  P:  PT per POC, progress as tolerated      Yehuda Sahni, PT

## 2018-05-01 NOTE — CONSULTS
Consultation - Internal Medicine  Mariaa Ya 81 y o  male MRN: [id number]  Unit/Bed#: E5 -26 Encounter: [id number]      Impression :-    51-year-old gentleman status post stage II of posterior cervical laminectomy and fusion  Stage I surgery done on 04/12/2018  Cervical spinal stenosis  Previous worker related accident on 05/21/1992  COPD with emphysema  Hypercholesterolemia  Restless leg syndrome  Chronic pain management-Dr Noel Bui, [place]  Degenerative disc disease C2/C3 and C3/4  Chronic herniated nucleus pulposus with spinal cord compression and flattening of C3/4     Recommendation: -    Patient is currently recuperating well following his surgery  Continue postoperative care as recommended by Ortho Spine Service  Currently he is maintained on patient-controlled analgesia which she is tolerating well, he has a cervical brace as per Dr Dionne Gill  I have reviewed patients medications as initiated on post operative orders by the primary team  Recommend  monitoring closely for any hypoxemia / respiratory insufficieny related to narcotics and to reduce doses and frequency of narcotics if necessary  Resume patients home medications and I have reviewed them  I will be cautious with narcotics  Maintain Intravenous Fluids for next 24 -48 hours, till patient able to reliably keep meals and meds in  Suggest  Zofran ODT 4 mg sublingually PRN for control of post operative nausea  Patient encouraged to  use Incentive spirometry q 15 minutes while awake to minimize risk of postoperative atelectasis and pneumonia  Patient verbalized to understand and fully comprehend  Recommend DVT prophylaxis with use of Venodyne compression boots  If any factor X A inhibitor,  low molecule weight heparin or Coumadin is planned by the primary team, we will be happy to dose that  drug based on patient's hemostasis  Maintain ASA as antiplatelet drug per Ortho   Team  Use Proton Pump inhibitor to minimize risk of gastritis  Monitor for any tinnitus as an early sign of salicylism and check salicylate levels in that situation  Discontinue patient's Hart catheter within next 24- hours in order  to minimize risk of catheter associated UTI  Please check patient's hemoglobin and hematocrit within next 24 hours to ensure that there is no acute blood loss anemia which would need any blood transfusion  We will follow this pleasant patient with your service closely and recommend necessary changes based on  further hospital course and diagnostics  Thank you, Dr Francisco Freire , for your kind consultation  HISTORY OF PRESENT ILLNESS:    Reason for Consult:  Post operative management following stage II cervical spine surgery earlier today by Dr Francisco Freire  HPI: Jevon Yates is a 81 y o  male, who had initial date of injury on 05/21/1992 when he was working as a   Patient reportedly was walking to where he worked and he caught his left foot on a slab and fell onto his left shoulder and fractured his ankle  Patient was treated that time by Dr Anabella Bal from 1804-1732, at that time he was told that he would require a 6-7 operation and there was a 50% chance that he could be paralyzed  Patient has been having pain off and on he has been out of work since his accident of 14  He has been followed by Dr Francisco Freire who diagnosed him to have work min related accident with cervicalgia and cervical radiculopathy with degeneration of cervical intervertebral discs and myelomalacia  Patient had initial surgery done and was now admitted for 2nd stage  Patient is currently having moderate pain he is on a patient-controlled analgesia  He denies any respiratory difficulty denies any choking or coughing denies any chest pain etc     Review of Systems   Constitutional:  No recent  appetite change, denies chills, diaphoresis, fatigue or fever  HEENT:  Negative for congestion, sore throat and tinnitus   No visual complaints  Respiratory:  Negative cough, chest tightness, shortness of breath and wheezing  Cardiovascular:  Negative for chest pain and palpitations  Gastrointestinal: Negative for abdominal distention or pain, constipation, diarrhea and nausea  Endocrine: Negative for cold intolerance, heat intolerance, polydipsia and polyuria  Genitourinary:                Negative for  dysuria, flank pain  Tolerating Hart without difficulty  Skin:   Negative for color change, pallor and rash  Neurological:   Negative for dizziness, tremors, seizures, speech difficulty, weakness, light-headedness, numbness and headaches  Hematological:  Musculoskeletal:  Psychiatric:  No h/o spontaneous bruising/bleeding  Joint pains as above  Negative for agitation, decreased concentration, dysphoric mood and sleep disturbance  A complete system-based review of systems as discussed with patient is otherwise negative  PAST MEDICAL HISTORY:  Past Medical History:   Diagnosis Date    Arthritis     Back pain     Cervical pain (neck)     COPD (chronic obstructive pulmonary disease) (HCC)     History of transfusion     Age 8 after bleeding post tonsillectomy    Hyperlipidemia     Mild heartburn     Neck pain     Numbness     Left hand and arm    Scratches     Multiple on limbs and hands    Wears dentures     Upper and lower   Has 4 pins on bottom to hold bottom denture     Past Surgical History:   Procedure Laterality Date    COLONOSCOPY      ESOPHAGOGASTRODUODENOSCOPY      EYE SURGERY      Bilateral implants to correct vision    MD ANTERIOR INSTRUMENTATION 2-3 VERTEBRAL SEGMENTS N/A 4/12/2018    Procedure: FUSION CERVICAL ANTERIOR W DISCECTOMY C3/4, C7/T1;  Surgeon: Jan Alfaro MD;  Location: [facility];  Service: Orthopedics    TONSILLECTOMY         FAMILY HISTORY:  Non-contributory    SOCIAL HISTORY:  History   Alcohol Use    Yes     Comment: Occasional     History   Drug Use    Types: Marijuana     Comment: last time used yesterday  two to three bowls a day     History   Smoking Status    Former Smoker    Years: 55 00    Types: Cigars    Quit date: 1/29/2018   Smokeless Tobacco    Former User    Types: Chew     Comment: Quit cigarettes in 2002  Had smoked at most 1 5 packs daily  ALLERGIES:  No Known Allergies    MEDICATIONS:  All current active medications have been reviewed    Current Facility-Administered Medications   Medication Dose Route Frequency Provider Last Rate Last Dose    aluminum-magnesium hydroxide-simethicone (MYLANTA) 200-200-20 mg/5 mL oral suspension 15 mL  15 mL Oral Q6H PRN Bryce Se, PA-C        aspirin (ECOTRIN) EC tablet 325 mg  325 mg Oral Daily Edmon Se, PA-C   325 mg at 04/30/18 1816    azelastine (ASTELIN) 0 1 % nasal spray 2 spray  2 spray Each Nare Daily Edmon Se, PA-C        calcium carbonate (TUMS) chewable tablet 1,000 mg  1,000 mg Oral Daily PRN Brycemon Se, PA-C        calcium carbonate (TUMS) chewable tablet 500 mg  500 mg Oral PRN Edmon Se, PA-C        ceFAZolin (ANCEF) 1,000 mg in sodium chloride 0 9 % 50 mL IVPB  1,000 mg Intravenous Q8H Brycemon Se, PA-C 100 mL/hr at 04/30/18 1920 1,000 mg at 04/30/18 1920    diazepam (VALIUM) tablet 10 mg  10 mg Oral HS Edmon Se, PA-C        diphenhydrAMINE (BENADRYL) injection 25 mg  25 mg Intravenous Q6H PRN Brycemon Se, PA-C        docusate sodium (COLACE) capsule 100 mg  100 mg Oral BID Edmon Se, PA-C        gabapentin (NEURONTIN) capsule 600 mg  600 mg Oral BID Brycemon Se, PA-C   600 mg at 04/30/18 1817    HYDROcodone-acetaminophen (NORCO) 5-325 mg per tablet 2 tablet  2 tablet Oral Q4H PRN Brycemon Se, PA-C        HYDROmorphone (DILAUDID) 1 mg/mL 50 mL PCA   Intravenous Continuous Brycemon Se, PA-C        lactated ringers infusion  125 mL/hr Intravenous Continuous Bryce Se, PA-C 125 mL/hr at 04/30/18 1943 125 mL/hr at 04/30/18 1943    magnesium hydroxide (MILK OF MAGNESIA) 400 mg/5 mL oral suspension 30 mL  30 mL Oral Daily PRN Selina Prescott PA-C        methocarbamol (ROBAXIN) tablet 750 mg  750 mg Oral 4x Daily PRN Selina Prescott PA-C        morphine injection 1 mg  1 mg Intravenous Q3H PRN Selina Prescott PA-C   1 mg at 18 1623    naloxone (NARCAN) 0 04 mg/mL syringe 0 04 mg  0 04 mg Intravenous Q1MIN PRN Selina Prescott PA-C        ondansetron [facility]) injection 4 mg  4 mg Intravenous Q6H PRN Selina Prescott PA-C        pramipexole (MIRAPEX) tablet 0 75 mg  0 75 mg Oral HS Selina Prescott PA-C        pravastatin (PRAVACHOL) tablet 40 mg  40 mg Oral Daily With Philly Celestin PA-C   40 mg at 18 1817    senna (SENOKOT) tablet 8 6 mg  1 tablet Oral Daily Selina Prescott PA-C        [facility]) chewable tablet 80 mg  80 mg Oral 4x Daily PRN Selina Prescott PA-C           Prescriptions Prior to Admission   Medication    azelastine (ASTELIN) 0 1 % nasal spray    calcium carbonate (TUMS) 500 mg chewable tablet    Cholecalciferol (VITAMIN D3) 02346 units TABS    diazepam (VALIUM) 10 mg tablet    gabapentin (NEURONTIN) 600 MG tablet    pramipexole (MIRAPEX) 0 75 MG tablet    simvastatin (ZOCOR) 20 mg tablet    tiZANidine (ZANAFLEX) 4 mg tablet       PHYSICAL EXAM:    Vitals:  HR:  [60-98] 96  Resp:  [9-20] 18  BP: (142-182)/(69-82) 159/76  SpO2:  [92 %-97 %] 97 %  Temp (24hrs), Av 2 °F (36 2 °C), Min:96 2 °F (35 7 °C), Max:97 7 °F (36 5 °C)  Current: Temperature: 97 5 °F (36 4 °C)  Body mass index is 22 32 kg/m²  General Appearance:    Awake, alert, cooperative, appears of stated age  Head:    Normocephalic, without obvious abnormality, atraumatic   Eyes:    Pupils equal in size,conjunctiva/corneas clear, EOM's intact  Nose:   No evidence of epistaxis/ discharge from nares  Throat:   Lips, mucosa, and tongue normal    Neck:   Supple, symmetrical, trachea midline, no JVP  Surgical site has clear dressing / no bleeding or     hemorrhage apparent     Back:     Symmetric, no curvature, no CVA tenderness   Lungs:     Bilateral air entry is broncho-alveolar and equal        No crepitation or rales  No pleural rub  Heart:    Regular rate and rhythm, S1S2 normal, no murmur, rub  Abdomen:     Soft, non-tender, no masses, no organomegaly   Genitalia:    Indwelling Hart catheter  Clear urine +, No hematuria  Musculoskeletal:   Extremities appear normal, atraumatic, no cyanosis or     edema  Some wasting is noted on his both upper and lower extremities  Vascular:   2+ in Posterior tibialis / dorsalis pedis  Skin:   Skin color, texture, turgor normal, no rashes or lesions    Multiple tattoos bilateral arms  Neurologic:   Oriented/ Awake/ facial symmetry maintained  Speech is intact  Muscle bulk and strength is equivocal in B/L Upper and lower extremities  Light sensation is intact B/l LE  B/L Planta flexion is WNL  LABS, IMAGING, & OTHER STUDIES:  Lab Results:  I have personally reviewed pertinent labs  Lab Results   Component Value Date     04/13/2018     04/13/2018    CO2 28 04/13/2018    ANIONGAP 7 04/13/2018    BUN 15 04/13/2018    CREATININE 0 75 04/13/2018    EGFR 93 04/13/2018    GLUCOSE 131 04/13/2018    CALCIUM 8 2 04/13/2018    AST 26 03/29/2018    ALT 33 03/29/2018    ALKPHOS 149 (H) 03/29/2018    PROT 7 6 03/29/2018    BILITOT 0 35 03/29/2018     Lab Results   Component Value Date    WBC 13 53 (H) 04/13/2018    WBC 7 56 03/29/2018    HGB 13 5 04/13/2018    HGB 16 0 03/29/2018     (L) 04/13/2018     (L) 03/29/2018         Imaging Studies:   I have personally reviewed pertinent imaging study reports  Imaging:     Xr Spine Cervical 1 View    Result Date: 4/12/2018  Narrative: C-ARM - CERVICAL SPINE INDICATION: Procedure guidance  COMPARISON:  None TECHNIQUE: FLUOROSCOPY TIME:   30 sec  14 FLUOROSCOPIC IMAGES FINDINGS: Fluoroscopic guidance provided for surgical procedure   I was asked to provide a level verification based on the single image (image #5)  This image is a lateral projections of cervical spine with a thin surgical probe touching the anterior disc at C3-C4 level  Endotracheal tube noted  PH probe noted  Numerous wires projecting over the neck  The additional images were obtained intraoperatively, and demonstrate placement of a intervertebral fusion device at C3-C4  Additional images demonstrate a similar procedure being performed at the region of the lower cervical/cervicothoracic spine Osseous and soft tissue detail limited by technique  Impression: Fluoroscopic guidance provided for surgical procedure  Please refer to the separate procedure notes for additional details  Workstation performed: [id number]       EKG, Pathology, and Other Studies:   I have personally reviewed pertinent reports  Portions of the record may have been created with voice recognition software  Occasional wrong word or "sound a like" substitutions may have occurred due to the inherent limitations of voice recognition software  Read the chart carefully and recognize, using context, where substitutions have occurred

## 2018-05-01 NOTE — OCCUPATIONAL THERAPY NOTE
[address]  Evaluation     Patient Name: Shelley Hameed  KEWXO'J Date: 5/1/2018  Problem List  Patient Active Problem List   Diagnosis    Spinal stenosis of cervical region     Past Medical History  Past Medical History:   Diagnosis Date    Arthritis     Back pain     Cervical pain (neck)     COPD (chronic obstructive pulmonary disease) ([address] )     History of transfusion     Age 8 after bleeding post tonsillectomy    Hyperlipidemia     Mild heartburn     Neck pain     Numbness     Left hand and arm    Scratches     Multiple on limbs and hands    Wears dentures     Upper and lower  Has 4 pins on bottom to hold bottom denture     Past Surgical History  Past Surgical History:   Procedure Laterality Date    COLONOSCOPY      ESOPHAGOGASTRODUODENOSCOPY      EYE SURGERY      Bilateral implants to correct vision    MS ANTERIOR INSTRUMENTATION 2-3 VERTEBRAL SEGMENTS N/A 4/12/2018    Procedure: FUSION CERVICAL ANTERIOR W DISCECTOMY C3/4, C7/T1;  Surgeon: Charanjit Dominguez MD;  [facility];  Service: Orthopedics    MS ARTHRODESIS POSTERIOR/POSTERIORLATERAL CERVICAL BELOW C2 N/A 4/30/2018    Procedure: POSSIBLE C3-4, C4/5 LAMINECTOMY;  Surgeon: Charanjit Dominguez MD;  [facility];  Service: Orthopedics    MS ARTHRODESIS POSTERIOR/POSTEROLATERAL THORACIC N/A 4/30/2018    Procedure: C3-T2 PSF With Instrumentation;  Surgeon: Charanjit Dominguez MD;  [facility];  Service: Orthopedics    MS EXCIS CERV DISK,ONE LEVEL N/A 4/30/2018    Procedure: C 6-7 Left HEMILAMINOTOMY AND FORAMINOTOMY C7-T2 LAMINECTOMY C7-T2 Left Foraminotomy;  Surgeon: Charanjit Dominguez MD;  [facility];  Service: Orthopedics    TONSILLECTOMY             05/01/18 1240   Note Type   Note type Eval/Treat   Restrictions/Precautions   Weight Bearing Precautions Per Order No   Other Precautions Spinal precautions; Fall Risk;Pain;Multiple lines;Telemetry  (cervical precautions, aspen collar, PCA pump)   Pain Assessment Pain Assessment 0-10   Pain Score 8   Pain Type Acute pain   Pain Location Back;Neck   Pain Orientation Posterior   Hospital Pain Intervention(s) Repositioned; Emotional support   Response to Interventions tolerated   Home Living   Type of [address] One level  (1 [address]IE)   Bathroom Shower/Tub Tub/shower unit   Bathroom Toilet Standard   Bathroom Equipment Grab bars in shower   [address] (no DME)   Additional Comments driving prior to last cervical surgery   Prior Function   Level of [place] Independent with ADLs and functional mobility   Lives With Spouse   Receives Help From Family   ADL Assistance Independent   IADLs Independent   Falls in the last 6 months 0   Vocational Retired   Comments pt independent prior to surgery   Lifestyle   Autonomy per pt independent w/ ADLS, independent w/ IADLs, independent w/ functional transfers and mobility w/ no AD, driving   Reciprocal Relationships spouse   Service to Others retired crane operater   Intrinsic Gratification fishing and hunting   ADL   Where Assessed Chair   Eating Assistance 5  [address] [facility] 4  [facility] 4  [facility] 3  Moderate Assistance   [address] [facility] 4  [address] 2  Maximal [address]  3  Moderate Assistance   Bed Mobility   Supine to Sit 3  Moderate assistance   Additional items Assist x 2; Increased time required;Verbal cues;LE management; Bedrails   Sit to Supine 4  Minimal assistance   Additional items Assist x 2; Increased time required;Verbal cues;LE management; Bedrails   Additional Comments increased time and difficulty w/ compliance of log rolling techniques   Transfers   Sit to Stand 4  Minimal assistance   Additional items Assist x 2; Increased time required;Verbal cues   Stand to Sit 4  Minimal assistance   Additional items Assist x 2;Increased time required;Verbal cues;Armrests   Additional Comments VCs for positioning and safety   Functional Mobility   Functional Mobility 4  Minimal assistance   Additional Comments assist x2 w/ hand held assist   Balance   Static Sitting Fair   Dynamic Sitting Fair -   Static Standing Poor +   Dynamic Standing Poor   Ambulatory Poor   Activity Tolerance   Activity Tolerance Patient limited by pain; Patient limited by fatigue   Nurse Made Aware appropriate to see per Michael AYALA Assessment   RUE Assessment (NT 2* increased pain, from observation [facility])   LUE Assessment   LUE Assessment (NT 2* increased pain, from observation [facility])   Hand Function   Gross Motor Coordination Functional   Fine Motor Coordination Impaired   Sensation   Light Touch Partial deficits in the LUE   Vision-Basic Assessment   Current Vision No visual deficits   Vision - Complex Assessment   Ocular Range of Motion WFL   Acuity Able to read clock/calendar on wall without difficulty   Perception   Inattention/Neglect Appears intact   Cognition   Overall Cognitive Status [facility]   Arousal/Participation Alert; Cooperative   Attention Within functional limits   Orientation Level Oriented X4   Memory Within functional limits   Following Commands Follows one step commands without difficulty   Comments pt engages in appropriate conversations; pt w/ increased pain   Assessment   Limitation Decreased ADL status; Decreased UE strength;Decreased Safe judgement during ADL;Decreased endurance;Decreased cognition;Decreased self-care trans;Decreased high-level ADLs; Decreased sensation  (increased pain)   Prognosis Good   Assessment Pt is a 81 y o  male seen for OT evaluation s/p admit to [facility] on 4/30/2018 w/ Spinal stenosis of cervical region, s/p stage II posterior cervical laminectomy C3/C4, C4/5 and fusion C3-T2,  C 6-7 Left HEMILAMINOTOMY AND FORAMINOTOMY C7-T2 LAMINECTOMY C7-T2 w/  Aspen collar and cervical spinal precautions and 10lb lifting restriction  Pt w/ PCA pump  Pt currently on 2L O2 no O2 at baseline  Comorbidities affecting pt's functional performance at time of assessment include: FUSION CERVICAL ANTERIOR W DISCECTOMY C3/4, C7/T1 on 4/12, h/o mariajuana abuse, COPD, arthritis  Personal factors affecting pt at time of IE include:increased pain  Prior to admission, pt was living w/ spouse and reports independent w/ ADLs, indpendent w/ IADLS (wife does cooking), independent w/ functional transfers and mobility w/ no AD and driving prior to surgery  Upon evaluation: Pt requires MOD assist x2 supine>sit bed mobility w/ cues for log rolling technique, MIN assist x2 sit<>stand from bed w/ VCs for hand placement, MIN assist x2 functional mobility w/ hand held assist, MOD assist UB ADLS, MAX assist LB ADLs, MIN assist x2 sit>supine bed mobility w/ decreased compliance w/ log rolling technique 2* the following deficits impacting occupational performance: increased pain, impaired balance, multiple lines, decreased endurance, impaired safety awareness, poor activity tolerance, cervical spinal precautions  Pt education on cervical spinal precautions and repositioned in bed at end of session w/ pillows supporting b/l UEs and pillow under head  Pt to benefit from continued skilled OT tx while in the hospital to address deficits as defined above and maximize level of functional independence w ADL's and functional mobility  Occupational Performance areas to address include: grooming, bathing/shower, toilet hygiene, dressing, functional mobility and clothing management, continued training on log rolling techniques, continued education on precautions and safety  From OT standpoint, recommendation at time of d/c would be defer at this time pending further progress  Goals   Patient Goals "have less pain"   LTG Time Frame 7-10   Long Term Goal please see below goals   Plan   Treatment Interventions ADL retraining;Functional transfer training; Endurance training;UE strengthening/ROM; Patient/family training;Equipment evaluation/education; Compensatory technique education; Energy conservation; Activityengagement;Cognitive reorientation   Goal Expiration Date 05/11/18   OT Frequency 3-5x/wk   Recommendation   OT Discharge Recommendation Other (Comment)  (defer pending progress)   Barthel Index   Feeding 5   Bathing 0   Grooming Score 0   Dressing Score 0   Bladder Score 10   Bowels Score 10   Toilet Use Score 5   Transfers (Bed/Chair) Score 5   Mobility (Level Surface) Score 0   Stairs Score 0   Barthel Index Score 35   Modified [place] Scale   Modified Rory Scale 4      Occupational Therapy Goals to be met in 7-10 days:  1) Pt will improve activity tolerance to G for min 30 min txment sessions  2) Pt will complete ADLs/self care w/ mod I w/ LHAE prn  3) Pt will complete toileting w/ mod I w/ G hygiene/thoroughness using DME PRN  4) Pt will improve functional transfers on/off all surfaces using DME PRN w/ G balance/safety including toileting w/ mod I  5) Pt will improve fx'l mobility during I/ADl/leisure tasks using DME PRN w/ g balance/safety w/ mod I  6) Pt will engage in ongoing cognitive assessment w/ G participation to A w/ safe d/c planning/recommendations  7) Pt will demonstrate G carryover of pt/caregiver education and training as appropriate w/ mod I  w/ G tolerance  8) Pt will demonstrate improved bed mobility to MOD I w/ log rolling techniques  9) Pt will demonstrate 100% carryover of LHAE for LB ADLs/self care and leisure s/p skilled education w/ mod I and G participation  10) Pt will demonstrate improved standing tolerance to 3-5 minutes during functional tasks w/ no LOB to enhance ADL performance  11) Pt will recall and demonstrate 100% compliance to cervical spinal precautions t/o OT session    Documentation completed by: Nuno Huizar MS, OTR/L

## 2018-05-01 NOTE — CASE MANAGEMENT
Initial Clinical Review    Age/Sex: 81 y o  male    Surgery Date:    4/30/2018    Procedure:    stage II of posterior cervical laminectomy and fusion    Anesthesia:    general    Admission Orders: Date/Time/Statement: 4/30/18 @ 1344     Orders Placed This Encounter   Procedures    Inpatient Admission     Standing Status:   Standing     Number of Occurrences:   1     Order Specific Question:   Admitting Physician     Answer:   Kalen Yadav     Order Specific Question:   Level of Care     Answer:   Med Surg [16]     Order Specific Question:   Estimated length of stay     Answer:   More than 2 Midnights     Order Specific Question:   Certification     Answer:   I certify that inpatient services are medically necessary for this patient for a duration of greater than two midnights  See H&P and MD Progress Notes for additional information about the patient's course of treatment  Vital Signs: /63 (BP Location: Right arm)   Pulse 75   Temp 99 3 °F (37 4 °C) (Temporal)   Resp 18   Ht 5' 11 5" (1 816 m)   Wt 73 6 kg (162 lb 4 1 oz)   SpO2 100%   BMI 22 32 kg/m²     Diet:        Diet Orders            Start     Ordered    04/30/18 1619  Room Service  Once     Question:  Type of Service  Answer:  Room Service-Appropriate    04/30/18 1618    04/30/18 1548  Diet Regular; Regular House  Diet effective 1400     Question Answer Comment   Diet Type Regular    Regular Regular House    RD to adjust diet per protocol?  Yes        04/30/18 1547          Mobility:    As  jacinto    DVT Prophylaxis:    SCD'S    Pain Control:   Pain Medications             gabapentin (NEURONTIN) 600 MG tablet Take 600 mg by mouth 2 (two) times a day      HYDROcodone-acetaminophen (NORCO) 5-325 mg per tablet Take 1 tablet by mouth every 6 (six) hours as needed for pain for up to 10 days Max Daily Amount: 4 tablets    tiZANidine (ZANAFLEX) 4 mg tablet Take 1 tablet (4 mg total) by mouth every 8 (eight) hours as needed for muscle spasms cons  IM  Back brace  C/spine  Precautions  Pt/OT    Thank you,  [facility] in the [facility] by Faustino Patel for 2017  Network Utilization Review Department  Phone: [phone]; Fax [phone]  ATTENTION: The Network Utilization Review Department is now centralized for our 7 Facilities  Make a note that we have a new phone and fax numbers for our Department  Please call with any questions or concerns to [phone] and carefully follow the prompts so that you are directed to the right person  All voicemails are confidential  Fax any determinations, approvals, denials, and requests for initial or continue stay review clinical to [phone]  Due to HIGH CALL volume, it would be easier if you could please send faxed requests to expedite your requests and in part, help us provide discharge notifications faster

## 2018-05-01 NOTE — PROGRESS NOTES
POST-OP SPINE PROGRESS NOTE    Patient Name: Darnell Benito  Patient MRN:  [id number]  Date of Service:  05/01/18 1:00 PM  Service: [facility]    Subjective      Pt seen laying in bed  He states that he is having significantly more pain than he had after the first surgery, but the medication does help somewhat  His collar was uncomfortable for him, but he states that it is slightly better since PT adjusted it  He has not had much of an appetite today and has eaten little so far  His wife was also present        Objective      Physical Exam  Temp:  [97 5 °F (36 4 °C)-99 3 °F (37 4 °C)] 99 2 °F (37 3 °C)  HR:  [69-96] 80  Resp:  [14-18] 18  BP: (133-159)/(63-76) 140/63    Incision: Dressing clean, dry and intact    Sensation grossly intact    Motor function intact 5/5 without deficits BUE    Cervical collar in place    No calf tenderness bilaterally    Laboratory Studies  Recent Results (from the past 24 hour(s))   Basic metabolic panel    Collection Time: 05/01/18  4:41 AM   Result Value Ref Range    Sodium 139 136 - 145 mmol/L    Potassium 4 2 3 5 - 5 3 mmol/L    Chloride 103 100 - 108 mmol/L    CO2 30 21 - 32 mmol/L    Anion Gap 6 4 - 13 mmol/L    BUN 10 5 - 25 mg/dL    Creatinine 0 74 0 60 - 1 30 mg/dL    Glucose 122 65 - 140 mg/dL    Calcium 8 5 8 3 - 10 1 mg/dL    eGFR 93 ml/min/1 73sq m   CBC    Collection Time: 05/01/18  4:41 AM   Result Value Ref Range    WBC 17 75 (H) 4 31 - 10 16 Thousand/uL    RBC 4 47 3 88 - 5 62 Million/uL    Hemoglobin 13 8 12 0 - 17 0 g/dL    Hematocrit 41 8 36 5 - 49 3 %    MCV 94 82 - 98 fL    MCH 30 9 26 8 - 34 3 pg    MCHC 33 0 31 4 - 37 4 g/dL    RDW 15 3 (H) 11 6 - 15 1 %    Platelets 842 (L) 208 - 390 Thousands/uL    MPV 13 0 (H) 8 9 - 12 7 fL       Assessment / Plan:     POD # 1 s/p C3/4, C7/T1 and T1/2 laminectomy and PSF C3-T2  Mobilize with PT/OT  Activity as tolerated in cervical collar - pt can remove collar while laying in bed, and to eat if that is more comfortable for him  He should otherwise be wearing the collar whenever he is not laying in bed    DVT Prophylaxis  daily  Pain medication Norco 5/325 q4h; will continue Dilaudid PCA now for better pain control - plan to d/c PCA tomorrow      Mar Polanco PA-C  1:00 PM , 5/1/2018

## 2018-05-01 NOTE — PLAN OF CARE
Problem: PHYSICAL THERAPY ADULT  Goal: Performs mobility at highest level of function for planned discharge setting  See evaluation for individualized goals  Treatment/Interventions: Functional transfer training, LE strengthening/ROM, Elevations, Therapeutic exercise, Endurance training, Patient/family training, Equipment eval/education, Bed mobility, Gait training, Compensatory technique education, Continued evaluation, Spoke to nursing, OT, Spoke to advanced practitioner (spoke with Agueda Zhang, [facility])  Equipment Recommended:  (will monitor with progress )       See flowsheet documentation for full assessment, interventions and recommendations  Outcome: Progressing  Prognosis: Fair  Problem List: Decreased strength, Decreased range of motion, Decreased endurance, Impaired balance, Decreased mobility, Decreased safety awareness, Orthopedic restrictions, Pain  Assessment: Pt is 82 y/o male admitted for PSF of C3-T2  Recent hx of ACDF C3/4, C7/T1 on 4/12  PLOF independent without use of AD residing with spouse  Currently with significant pain limiting mobilty  Demarco Lo in place  Requires modA of 2 for bed mobity and to comply wt technique to logroll  Step by step cues and guidance needed  Increased time required for all mobitliy  Able to progress OOB to chair wiht Jean [place] of 2  Hand held assistance of 2 to ambulate short distance to chair  COntinues with increase in pain despite repositioning  Disposition will depend upon progress and pain control  Will continue to benefit from IPPT in order to optimize functional indepedence p cervical fusion  Barriers to Discharge Comments: pain  Recommendation:  (Monitor dispositoin with progress  )     PT - OK to Discharge: No    See flowsheet documentation for full assessment

## 2018-05-01 NOTE — PLAN OF CARE
Problem: OCCUPATIONAL THERAPY ADULT  Goal: Performs self-care activities at highest level of function for planned discharge setting  See evaluation for individualized goals  Treatment Interventions: ADL retraining, Functional transfer training, Endurance training, UE strengthening/ROM, Patient/family training, Equipment evaluation/education, Compensatory technique education, Energy conservation, Activityengagement, Cognitive reorientation          See flowsheet documentation for full assessment, interventions and recommendations  Limitation: Decreased ADL status, Decreased UE strength, Decreased Safe judgement during ADL, Decreased endurance, Decreased cognition, Decreased self-care trans, Decreased high-level ADLs, Decreased sensation (increased pain)  Prognosis: Good  Assessment: Pt is a 81 y o  male seen for OT evaluation s/p admit to [facility]  on 4/30/2018 w/ Spinal stenosis of cervical region, s/p stage II posterior cervical laminectomy C3/C4, C4/5 and fusion C3-T2,  C 6-7 Left HEMILAMINOTOMY AND FORAMINOTOMY C7-T2 LAMINECTOMY C7-T2 w/  Aspen collar and cervical spinal precautions and 10lb lifting restriction  Pt w/ PCA pump  Comorbidities affecting pt's functional performance at time of assessment include: FUSION CERVICAL ANTERIOR W DISCECTOMY C3/4, C7/T1 on 4/12, h/o mariajuana abuse, COPD, arthritis  Personal factors affecting pt at time of IE include:increased pain  Prior to admission, pt was living w/ spouse and reports independent w/ ADLs, indpendent w/ IADLS (wife does cooking), independent w/ functional transfers and mobility w/ no AD and driving prior to surgery   Upon evaluation: Pt requires MOD assist x2 supine>sit bed mobility w/ cues for log rolling technique, MIN assist x2 sit<>stand from bed w/ VCs for hand placement, MIN assist x2 functional mobility w/ hand held assist, MOD assist UB ADLS, MAX assist LB ADLs, MIN assist x2 sit>supine bed mobility w/ decreased compliance w/ log rolling technique 2* the following deficits impacting occupational performance: increased pain, impaired balance, multiple lines, decreased endurance, impaired safety awareness, poor activity tolerance, cervical spinal precautions  Pt education on cervical spinal precautions and repositioned in bed at end of session w/ pillows supporting b/l UEs and pillow under head  Pt to benefit from continued skilled OT tx while in the hospital to address deficits as defined above and maximize level of functional independence w ADL's and functional mobility  Occupational Performance areas to address include: grooming, bathing/shower, toilet hygiene, dressing, functional mobility and clothing management, continued training on log rolling techniques, continued education on precautions and safety  From OT standpoint, recommendation at time of d/c would be defer at this time pending further progress        OT Discharge Recommendation: Other (Comment) (defer pending progress)         Comments: Ivis Love MS, OTR/L

## 2018-05-02 LAB
BASOPHILS # BLD AUTO: 0.02 THOUSANDS/ΜL (ref 0–0.1)
BASOPHILS NFR BLD AUTO: 0 % (ref 0–1)
BILIRUB UR QL STRIP: NEGATIVE
CLARITY UR: CLEAR
COLOR UR: YELLOW
EOSINOPHIL # BLD AUTO: 0.01 THOUSAND/ΜL (ref 0–0.61)
EOSINOPHIL NFR BLD AUTO: 0 % (ref 0–6)
ERYTHROCYTE [DISTWIDTH] IN BLOOD BY AUTOMATED COUNT: 15.5 % (ref 11.6–15.1)
GLUCOSE UR STRIP-MCNC: NEGATIVE MG/DL
HCT VFR BLD AUTO: 42.6 % (ref 36.5–49.3)
HGB BLD-MCNC: 14.4 G/DL (ref 12–17)
HGB UR QL STRIP.AUTO: NEGATIVE
KETONES UR STRIP-MCNC: NEGATIVE MG/DL
LEUKOCYTE ESTERASE UR QL STRIP: NEGATIVE
LYMPHOCYTES # BLD AUTO: 1.12 THOUSANDS/ΜL (ref 0.6–4.47)
LYMPHOCYTES NFR BLD AUTO: 7 % (ref 14–44)
MCH RBC QN AUTO: 31.7 PG (ref 26.8–34.3)
MCHC RBC AUTO-ENTMCNC: 33.8 G/DL (ref 31.4–37.4)
MCV RBC AUTO: 94 FL (ref 82–98)
MONOCYTES # BLD AUTO: 1.67 THOUSAND/ΜL (ref 0.17–1.22)
MONOCYTES NFR BLD AUTO: 11 % (ref 4–12)
NEUTROPHILS # BLD AUTO: 12.25 THOUSANDS/ΜL (ref 1.85–7.62)
NEUTS SEG NFR BLD AUTO: 82 % (ref 43–75)
NITRITE UR QL STRIP: NEGATIVE
NRBC BLD AUTO-RTO: 0 /100 WBCS
PH UR STRIP.AUTO: 6.5 [PH] (ref 4.5–8)
PLATELET # BLD AUTO: 116 THOUSANDS/UL (ref 149–390)
PMV BLD AUTO: 13.7 FL (ref 8.9–12.7)
PROT UR STRIP-MCNC: NEGATIVE MG/DL
RBC # BLD AUTO: 4.54 MILLION/UL (ref 3.88–5.62)
SP GR UR STRIP.AUTO: 1.01 (ref 1–1.03)
UROBILINOGEN UR QL STRIP.AUTO: 0.2 E.U./DL
WBC # BLD AUTO: 15.07 THOUSAND/UL (ref 4.31–10.16)

## 2018-05-02 PROCEDURE — 97530 THERAPEUTIC ACTIVITIES: CPT

## 2018-05-02 PROCEDURE — 97116 GAIT TRAINING THERAPY: CPT

## 2018-05-02 PROCEDURE — 97110 THERAPEUTIC EXERCISES: CPT

## 2018-05-02 PROCEDURE — 81003 URINALYSIS AUTO W/O SCOPE: CPT | Performed by: INTERNAL MEDICINE

## 2018-05-02 PROCEDURE — 85025 COMPLETE CBC W/AUTO DIFF WBC: CPT | Performed by: INTERNAL MEDICINE

## 2018-05-02 RX ADMIN — ASPIRIN 325 MG: 325 TABLET, COATED ORAL at 08:05

## 2018-05-02 RX ADMIN — HYDROMORPHONE HYDROCHLORIDE 0.4 MG: 1 INJECTION, SOLUTION INTRAMUSCULAR; INTRAVENOUS; SUBCUTANEOUS at 09:18

## 2018-05-02 RX ADMIN — PRAVASTATIN SODIUM 40 MG: 40 TABLET ORAL at 17:35

## 2018-05-02 RX ADMIN — CHLORTHALIDONE 12.5 MG: 25 TABLET ORAL at 08:05

## 2018-05-02 RX ADMIN — METHOCARBAMOL 750 MG: 500 TABLET ORAL at 08:05

## 2018-05-02 RX ADMIN — GABAPENTIN 600 MG: 300 CAPSULE ORAL at 17:37

## 2018-05-02 RX ADMIN — DOCUSATE SODIUM 100 MG: 100 CAPSULE, LIQUID FILLED ORAL at 08:05

## 2018-05-02 RX ADMIN — GABAPENTIN 600 MG: 300 CAPSULE ORAL at 08:05

## 2018-05-02 RX ADMIN — PRAMIPEXOLE DIHYDROCHLORIDE 0.75 MG: 0.25 TABLET ORAL at 21:13

## 2018-05-02 RX ADMIN — ALUMINUM HYDROXIDE, MAGNESIUM HYDROXIDE, AND SIMETHICONE 15 ML: 200; 200; 20 SUSPENSION ORAL at 13:05

## 2018-05-02 RX ADMIN — AZELASTINE HYDROCHLORIDE 2 SPRAY: 137 SPRAY, METERED NASAL at 08:05

## 2018-05-02 RX ADMIN — SODIUM CHLORIDE, SODIUM LACTATE, POTASSIUM CHLORIDE, AND CALCIUM CHLORIDE 125 ML/HR: .6; .31; .03; .02 INJECTION, SOLUTION INTRAVENOUS at 17:32

## 2018-05-02 RX ADMIN — SENNOSIDES 8.6 MG: 8.6 TABLET, FILM COATED ORAL at 08:06

## 2018-05-02 RX ADMIN — SODIUM CHLORIDE, SODIUM LACTATE, POTASSIUM CHLORIDE, AND CALCIUM CHLORIDE 125 ML/HR: .6; .31; .03; .02 INJECTION, SOLUTION INTRAVENOUS at 09:18

## 2018-05-02 RX ADMIN — SODIUM CHLORIDE, SODIUM LACTATE, POTASSIUM CHLORIDE, AND CALCIUM CHLORIDE 125 ML/HR: .6; .31; .03; .02 INJECTION, SOLUTION INTRAVENOUS at 01:25

## 2018-05-02 RX ADMIN — HYDROCODONE BITARTRATE AND ACETAMINOPHEN 2 TABLET: 5; 325 TABLET ORAL at 17:32

## 2018-05-02 RX ADMIN — DIAZEPAM 10 MG: 5 TABLET ORAL at 21:13

## 2018-05-02 RX ADMIN — HYDROCODONE BITARTRATE AND ACETAMINOPHEN 2 TABLET: 5; 325 TABLET ORAL at 13:45

## 2018-05-02 RX ADMIN — HYDROCODONE BITARTRATE AND ACETAMINOPHEN 2 TABLET: 5; 325 TABLET ORAL at 05:20

## 2018-05-02 RX ADMIN — DOCUSATE SODIUM 100 MG: 100 CAPSULE, LIQUID FILLED ORAL at 17:37

## 2018-05-02 NOTE — PLAN OF CARE
Problem: OCCUPATIONAL THERAPY ADULT  Goal: Performs self-care activities at highest level of function for planned discharge setting  See evaluation for individualized goals  Treatment Interventions: ADL retraining, Functional transfer training, Endurance training, UE strengthening/ROM, Patient/family training, Equipment evaluation/education, Compensatory technique education, Energy conservation, Activityengagement, Cognitive reorientation          See flowsheet documentation for full assessment, interventions and recommendations  Outcome: Not Progressing  Limitation: Decreased ADL status, Decreased UE strength, Decreased Safe judgement during ADL, Decreased endurance, Decreased cognition, Decreased self-care trans, Decreased high-level ADLs, Decreased sensation (increased pain)  Prognosis: Good  Assessment: Pt seen for skilled OT session focused on bed mobility, donning/doffing of aspen collar and education on safety  OT donned pt aspen collar while supine in bed  Pt required MOD assist supine>sit bed mobility w/ use of bed rails and log rolling technique  While seated EOB pt stated, "I can't do this, I have too much pain, I need to lay down"; OT assist pt to supine w/ MIN assist  Pt w/ decreased compliance to log rolling techniques  While supine OT doffed pt aspen cervical collar  Pt educated on benefit of participating in therapy and encouraged to sit OOB in chair for all meals; pt acknowledges and requires continued education  Pt continues to be limited due to decreased strength and endurance, impaired balance, increased pain, cervical spinal precautions, decreased adherence to precautions all causing a decline in aDLs, functional transfers and mobility  Recommend STR vs  HOME w/ family support pending progress       OT Discharge Recommendation:  (STR vs  HOME w/ support pending progress)         Comments: Chika Goldman MS, OTR/L

## 2018-05-02 NOTE — OCCUPATIONAL THERAPY NOTE
OccupationalTherapy Progress Note     Patient Name: Karis Rousseau  BDQLY'W Date: 5/2/2018  Problem List  Patient Active Problem List   Diagnosis    Spinal stenosis of cervical region           05/02/18 1619   Restrictions/Precautions   Weight Bearing Precautions Per Order No   Braces or Orthoses C/S Collar  (aspen)   Other Precautions Chair Alarm; Bed Alarm;Cognitive; Fall Risk;Pain;Spinal precautions  (cervical spine precautions, aspen collar )   Pain Assessment   Pain Assessment 0-10   Pain Score 8   Pain Type Acute pain;Surgical pain   Pain Location Back;Neck; Shoulder   Pain Orientation [facility]   Hospital Pain Intervention(s) Ambulation/increased activity;Repositioned;Elevated   ADL   Eating Assistance 4  Minimal Assistance   Eating Deficit Setup;Supervision/safety   Bed Mobility   Supine to Sit 3  Moderate assistance   Additional items Assist x 1; Increased time required;Verbal cues;LE management; Bedrails;HOB elevated   Sit to Supine 4  Minimal assistance   Additional items Assist x 1; Increased time required;LE management; Bedrails  (unsafely transferred not using log rolling techniques)   Additional Comments cues for positioning and safety; MAX cues for log rolling OOB; while supine MAX assist to don aspen collar   Transfers   Sit to Stand Unable to assess   Additional Comments pt declined OOB due to increased pain   Cognition   Overall Cognitive Status Impaired   Arousal/Participation Responsive;Lethargic   Attention Attends with cues to redirect   Orientation Level Oriented to person;Oriented to place;Oriented to situation   Memory Decreased recall of precautions   Following Commands Follows one step commands with increased time or repetition   Comments pt w/ decreased recall of precautions and impaired safety awareness   Additional Activities   Additional Activities (education on benefit of sitting OOB and mobility)   Additional Activities Comments pt requires continued education   Activity Tolerance Activity Tolerance Patient limited by fatigue;Patient limited by pain   Medical Staff Made Aware appropriate to see per RNLnysey Pt seen for skilled OT session focused on bed mobility, donning/doffing of aspen collar and education on safety  OT donned pt aspen collar while supine in bed  Pt required MOD assist supine>sit bed mobility w/ use of bed rails and log rolling technique  While seated EOB pt stated, "I can't do this, I have too much pain, I need to lay down"; OT assist pt to supine w/ MIN assist  Pt w/ decreased compliance to log rolling techniques  While supine OT doffed pt aspen cervical collar  Pt educated on benefit of participating in therapy and encouraged to sit OOB in chair for all meals; pt acknowledges and requires continued education  Pt continues to be limited due to decreased strength and endurance, impaired balance, increased pain, cervical spinal precautions, decreased adherence to precautions all causing a decline in aDLs, functional transfers and mobility  Recommend STR vs  HOME w/ family support pending progress  Plan   Treatment Interventions ADL retraining;Functional transfer training;UE strengthening/ROM; Endurance training;Cognitive reorientation;Patient/family training;Equipment evaluation/education; Compensatory technique education; Activityengagement; Energy conservation   Goal Expiration Date 05/11/18   Treatment Day 1   OT Frequency 3-5x/wk   Recommendation   OT Discharge Recommendation (STR vs  HOME w/ support pending progress)   Barthel Index   Feeding 5   Bathing 0   Grooming Score 0   Dressing Score 0   Bladder Score 10   Bowels Score 10   Toilet Use Score 5   Transfers (Bed/Chair) Score 5   Mobility (Level Surface) Score 0   Stairs Score 0   Barthel Index Score 35   Modified Rory Scale   Modified Rory Scale 4     Documentation completed by: Julian Pierce MS, OTR/L

## 2018-05-02 NOTE — PROGRESS NOTES
POST-OP SPINE PROGRESS NOTE    Patient Name: Shannon Mares  Patient MRN:  [id number]  Date of Service:  05/02/18 1:00 PM  Service: [facility]    Subjective      Pt seen laying in bed  His wife is also present in the room today  She states that he seems more comfortable and has been resting more today  He was out of bed earlier today with PT and sitting in the chair  He states that he is still having a lot of soreness in and around his shoulder blades  He still has not had much of an appetite today and has only eaten a piece of toast thus far        Objective      Physical Exam  Temp:  [98 1 °F (36 7 °C)-100 1 °F (37 8 °C)] 100 1 °F (37 8 °C)  HR:  [71-80] 78  Resp:  [17-18] 18  BP: (119-163)/(56-93) 147/93    Incision: Dressing clean, dry and intact    Sensation grossly intact    Motor function intact 5/5 without deficits BUE    Cervical collar in place    No calf tenderness bilaterally    Laboratory Studies  Recent Results (from the past 24 hour(s))   UA w Reflex to Microscopic w Reflex to Culture    Collection Time: 05/02/18  1:24 AM   Result Value Ref Range    Color, UA Yellow     Clarity, UA Clear     Specific [place], UA 1 010 1 003 - 1 030    pH, UA 6 5 4 5 - 8 0    Leukocytes, UA Negative Negative    Nitrite, UA Negative Negative    Protein, UA Negative Negative mg/dl    Glucose, UA Negative Negative mg/dl    Ketones, UA Negative Negative mg/dl    Urobilinogen, UA 0 2 0 2, 1 0 E U /dl E U /dl    Bilirubin, UA Negative Negative    Blood, UA Negative >=2000 (4+), Trace-Intact, Negative   CBC and differential    Collection Time: 05/02/18  5:04 AM   Result Value Ref Range    WBC 15 07 (H) 4 31 - 10 16 Thousand/uL    RBC 4 54 3 88 - 5 62 Million/uL    Hemoglobin 14 4 12 0 - 17 0 g/dL    Hematocrit 42 6 36 5 - 49 3 %    MCV 94 82 - 98 fL    MCH 31 7 26 8 - 34 3 pg    MCHC 33 8 31 4 - 37 4 g/dL    RDW 15 5 (H) 11 6 - 15 1 %    MPV 13 7 (H) 8 9 - 12 7 fL    Platelets 368 (L) 992 - 390 Thousands/uL nRBC 0 /100 WBCs    Neutrophils Relative 82 (H) 43 - 75 %    Lymphocytes Relative 7 (L) 14 - 44 %    Monocytes Relative 11 4 - 12 %    Eosinophils Relative 0 0 - 6 %    Basophils Relative 0 0 - 1 %    Neutrophils Absolute 12 25 (H) 1 85 - 7 62 Thousands/µL    Lymphocytes Absolute 1 12 0 60 - 4 47 Thousands/µL    Monocytes Absolute 1 67 (H) 0 17 - 1 22 Thousand/µL    Eosinophils Absolute 0 01 0 00 - 0 61 Thousand/µL    Basophils Absolute 0 02 0 00 - 0 10 Thousands/µL       Assessment / Plan:     POD # 2 s/p C3/4, C7/T1 and T1/2 laminectomy and PSF C3-T2  Mobilize with PT/OT  Activity as tolerated in cervical collar - pt can remove collar while laying in bed, and to eat if that is more comfortable for him  He should otherwise be wearing the collar whenever he is not laying in bed    DVT Prophylaxis  daily  Pain medication Norco 5/325 q4h; d/c Dilaudid PCA now - continue with po Norco and IV morphine prn breakthrough pain      Jonnathan Brasher PA-C  1:00 PM , 5/2/2018

## 2018-05-02 NOTE — PROGRESS NOTES
Progress Note - Internal Medicine   Magdaleno Rojas 81 y o  male MRN: [id number]  Unit/Bed#: E5 -01 Encounter: [id number]      Impression :    POD #1, stage II of posterior cervical laminectomy and fusion, C3/4, C7/T1 and T1/2  Stage I surgery done on 04/12/2018  Cervical spinal stenosis  Degenerative disc disease C2/C3 and C3/4  Previous work related accident on 05/21/1992  COPD with emphysema  Hypercholesterolemia  Restless leg syndrome  Chronic pain management- Dr Claude Parrish, [place]  Chronic herniated nucleus pulposus with spinal cord compression and flattening of C3/4  Leukocytosis, 17 75, likely leukemoid reaction postoperatively  Dilaudid PCA # d2    Recommendation:    · Patient is gradually stabilizing, he still has labile hypertension, some component is also related to pain  · PRN hydralazine 5 mg q 6 hours if BP > 180  Add chlorthalidone 12 5 mg PO daily  · Continue DVT prophylaxis as per surgical team with  mg   · Monitor for any redness/ drainage / swelling around site of surgery and to notify Orthopedic team   · Recommend life style modifications and any high impact activities  · Continue PT /OT to improve endurance, range of motion, gait stabilization and use of assist device in a safe manner  · Recheck CBC in AM and check WBC  · Full fall and orthostatic precautions  Subjective:     Patient seen and examined today  EMR and overnight events reviewed  Patient is resting in bed, he is wearing his cervical brace  He has a patient-controlled analgesia with Dilaudid  He states that he is having moderate pain rated 5/10 on numeric pain scale well localized between his interscapular area and lower cervical spine area  He denies any bleeding from the surgical site  He does not have any numbness or paresthesia  He feels that his radicular pain is slightly better  He denies any dysuria frequency or hesitancy  Denies any cough denies any chest pain denies any abdominal pain  All other systems reviewed and are negative  OBJECTIVE:     Vitals:     HR:  [69-89] 80  Resp:  [14-18] 18  BP: (133-140)/(63-72) 140/63  SpO2:  [95 %-100 %] 95 %  Temp (24hrs), Av 8 °F (37 1 °C), Min:97 8 °F (36 6 °C), Max:99 3 °F (37 4 °C)  Current: Temperature: 99 2 °F (37 3 °C)    Intake/Output Summary (Last 24 hours) at 18  Last data filed at 18 1900   Gross per 24 hour   Intake           1001 9 ml   Output             3100 ml   Net          -2098 1 ml     Body mass index is 22 32 kg/m²  Physical Exam    Patient is of average build and nutrition, his multiple skin tattoos on his both arms  He was resting in bed, wearing cervical brace  Facial symmetry is maintained  Edentulous, tongue protrudes in midline which is slightly dry without any thrush  Clean neck brace was noted without any evidence of bleeding  Bilateral lungs clear no rales or crackles  S1 and S2 normal no murmurs or gallop  Abdomen is soft nontender scaphoid  No suprapubic fullness no suprapubic tenderness  Facial symmetry is maintained he has good hand grasp bilaterally and good plantar and dorsiflexion  Generalized muscle bulk is low probably due to mild malnutrition  Skin is dry and warm, multiple skin tattoos are noted  Mood is stable without any evidence of delusions hallucinations      Labs, Imaging, & Other studies:    All pertinent labs and imaging studies were personally reviewed    Results from last 7 days  Lab Units 18  0441   WBC Thousand/uL 17 75*   HEMOGLOBIN g/dL 13 8   PLATELETS Thousands/uL 128*       Results from last 7 days  Lab Units 18  0441   SODIUM mmol/L 139   POTASSIUM mmol/L 4 2   CHLORIDE mmol/L 103   CO2 mmol/L 30   ANION GAP mmol/L 6   BUN mg/dL 10   CREATININE mg/dL 0 74   EGFR ml/min/1 73sq m 93   GLUCOSE RANDOM mg/dL 122   CALCIUM mg/dL 8 5           Current Meds:  Current Facility-Administered Medications   Medication Dose Route Frequency Provider Last Rate Last Dose    aluminum-magnesium hydroxide-simethicone (MYLANTA) 200-200-20 mg/5 mL oral suspension 15 mL  15 mL Oral Q6H PRN Joan Evans, PA-C        aspirin (ECOTRIN) EC tablet 325 mg  325 mg Oral Daily Joan Evans, PA-C   325 mg at 05/01/18 1125    azelastine (ASTELIN) 0 1 % nasal spray 2 spray  2 spray Each Nare Daily Joan Evans, PA-C   2 spray at 05/01/18 1126    calcium carbonate (TUMS) chewable tablet 1,000 mg  1,000 mg Oral Daily PRN Joan Evans, PA-C        calcium carbonate (TUMS) chewable tablet 500 mg  500 mg Oral PRN Joan Evans, PA-C   500 mg at 04/30/18 2232    diazepam (VALIUM) tablet 10 mg  10 mg Oral HS Joan Evans, PA-C   10 mg at 04/30/18 2158    diphenhydrAMINE (BENADRYL) injection 25 mg  25 mg Intravenous Q6H PRN Joan Evans, PA-C        docusate sodium (COLACE) capsule 100 mg  100 mg Oral BID Joan Evans, PA-C   100 mg at 05/01/18 1752    gabapentin (NEURONTIN) capsule 600 mg  600 mg Oral BID Joan Evans, PA-C   600 mg at 05/01/18 1752    HYDROcodone-acetaminophen (NORCO) 5-325 mg per tablet 2 tablet  2 tablet Oral Q4H PRN Joan Evans, PA-C   2 tablet at 05/01/18 1124    HYDROmorphone (DILAUDID) 1 mg/mL 50 mL PCA   Intravenous Continuous Joan Evans, PA-C        lactated ringers infusion  125 mL/hr Intravenous Continuous Jeffyrimer Evans, PA-C 125 mL/hr at 05/01/18 1751 125 mL/hr at 05/01/18 1751    magnesium hydroxide (MILK OF MAGNESIA) 400 mg/5 mL oral suspension 30 mL  30 mL Oral Daily PRN Joan Evans, PA-C        methocarbamol (ROBAXIN) tablet 750 mg  750 mg Oral 4x Daily PRN Joan Evans, PA-C   750 mg at 05/01/18 1124    morphine injection 1 mg  1 mg Intravenous Q3H PRN LEANA Berumen-C   1 mg at 04/30/18 1623    naloxone (NARCAN) 0 04 mg/mL syringe 0 04 mg  0 04 mg Intravenous Q1MIN PRN Joan Evans PA-C        ondansetron [facility]) injection 4 mg  4 mg Intravenous Q6H PRN Joan Evans PA-C   4 mg at 05/01/18 1000    pramipexole (MIRAPEX) tablet 0 75 mg  0 75 mg Oral HS Joan Evans PA-C   0 75 mg at 04/30/18 2158    pravastatin (PRAVACHOL) tablet 40 mg  40 mg Oral Daily With Concha Deal PA-C   40 mg at 05/01/18 5021    senna (SENOKOT) tablet 8 6 mg  1 tablet Oral Daily Heike Sosa PA-C   8 6 mg at 05/01/18 1125    simethicone (MYLICON) chewable tablet 80 mg  80 mg Oral 4x Daily PRN Heike Sosa PA-C         Home Meds:  Prescriptions Prior to Admission   Medication    azelastine (ASTELIN) 0 1 % nasal spray    calcium carbonate (TUMS) 500 mg chewable tablet    Cholecalciferol (VITAMIN D3) 37936 units TABS    diazepam (VALIUM) 10 mg tablet    gabapentin (NEURONTIN) 600 MG tablet    pramipexole (MIRAPEX) 0 75 MG tablet    simvastatin (ZOCOR) 20 mg tablet    tiZANidine (ZANAFLEX) 4 mg tablet       Continuous Infusions:    HYDROmorphone     lactated ringers 125 mL/hr Last Rate: 125 mL/hr (05/01/18 1751)       Invasive Devices     Peripheral Intravenous Line            Peripheral IV 04/30/18 Left Forearm 1 day    Peripheral IV 04/30/18 Right Hand 1 day                VTE Pharmacologic Prophylaxis:  as per Primary Ortho Team   VTE Mechanical Prophylaxis: sequential compression device    Portions of the record may have been created with voice recognition software  Occasional wrong word or "sound a like" substitutions may have occurred due to the inherent limitations of voice recognition software  Read the chart carefully and recognize, using context, where substitutions have occurred

## 2018-05-02 NOTE — PLAN OF CARE
Problem: PHYSICAL THERAPY ADULT  Goal: Performs mobility at highest level of function for planned discharge setting  See evaluation for individualized goals  Treatment/Interventions: Functional transfer training, LE strengthening/ROM, Elevations, Therapeutic exercise, Endurance training, Patient/family training, Equipment eval/education, Bed mobility, Gait training, Compensatory technique education, Continued evaluation, Spoke to nursing, OT, Spoke to advanced practitioner (spoke with Monica Darby, [facility])  Equipment Recommended:  (will monitor with progress )       See flowsheet documentation for full assessment, interventions and recommendations  Outcome: Progressing  Prognosis: Fair  Problem List: Decreased strength, Decreased endurance, Impaired balance, Decreased mobility, Decreased cognition, Decreased safety awareness, Orthopedic restrictions, Pain, Decreased range of motion  Assessment: PT demonstrating impaired ability to perform bed mobility, transfers and ambulation due to increased neck and upper back pain  PT requires increased time and cues for all aspects of mobility, use of bed rail for bed mobility and cues for logrolling technique  PT required max assist to don Aspen c/s collar while supine in bed  Pt progressed with ambulation distances to 30' with use of rw and min- mod assist x2 due to (+)buckling of b/l le's  PT requires max cues for increased strides, step though gait pattern, use of rw and safety  PT performs b/l le arom 5-10 reps to tolerance  PT returned to bed post PT session due to increased pain and fatigue  PT  was encouraged to sit up in recliner in PM  Pt wishes to return home at d/c  PT will continue to benefit from inpt Pt for progressive mobility training with progression to stair climbing inorder to maximize functional mobility and I for safe d/c to home  Pt may benefit from HHPT at d/c pending progress, may need Rw for home        Barriers to Discharge Comments: pain  Recommendation: Home with family support, Home PT     PT - OK to Discharge: No (must achieve inpt PT goals if d/c to home  )    See flowsheet documentation for full assessment

## 2018-05-02 NOTE — PROGRESS NOTES
Progress Note - Internal Medicine   Emily Rojas 81 y o  male MRN: [id number]  Unit/Bed#: E5 -01 Encounter: [id number]      Impression :    POD #1, stage II of posterior cervical laminectomy and fusion, C3/4, C7/T1 and T1/2  Stage I surgery done on 04/12/2018  Cervical spinal stenosis  Degenerative disc disease C2/C3 and C3/4  Previous work related accident on 05/21/1992  COPD with emphysema  Hypercholesterolemia  Restless leg syndrome  Chronic pain management- Dr Luz Marina Cedillo, [place]  Chronic herniated nucleus pulposus with spinal cord compression and flattening of C3/4  Leukocytosis, 17 75 -->> 15 07 likely leukemoid reaction postoperatively  Dilaudid PCA d# 2    Recommendation:    Continue postoperative care as recommended by orthopedic service  Patient is having slight benefit with current medications it seems that he was getting chronic narcotic therapy from a pain management service in the past   He might be having high tolerance to opioids due to the same  Continue nutritional supplements with Ensure  Be cautious with any side effects of narcotics, consider adding MiraLax for possible constipation  Leukocytosis improving  Subjective:     Patient seen and examined today  EMR and overnight events reviewed  Patient was noted participating with therapy team and ambulating very slowly with help of the nursing staff as well as therapist   Patient feels that his pain is still there quite moderate to severe at times  He is not having any focal weakness but generalized weakness  He feels weak and tired  He still wearing a brace in his neck without any bleeding  He denies any nausea vomiting chest pain palpitations  Appetite is at his baseline  He denies any fever or chills  Denies any seizure-like activity no jerky movements  Denies any dysuria frequency  Has been using incentive spirometry but not as frequently     All other systems reviewed and are negative         OBJECTIVE:     Vitals: HR:  [71-80] 78  Resp:  [17-18] 18  BP: (119-163)/(56-93) 147/93  SpO2:  [93 %-95 %] 93 %  Temp (24hrs), Av 3 °F (37 4 °C), Min:98 1 °F (36 7 °C), Max:100 1 °F (37 8 °C)  Current: Temperature: 100 1 °F (37 8 °C)    Intake/Output Summary (Last 24 hours) at 18 1007  Last data filed at 18 0705   Gross per 24 hour   Intake           1003 1 ml   Output             1950 ml   Net           -946 9 ml     Body mass index is 22 32 kg/m²  Physical Exam    General Appearance:  Awake,alert, cooperative  Frail and weak and chronically ill-appearing nutrition is average  Multiple skin tattoos are noted  Noted ambulating with help of therapy and nursing staff  Using a rolling walker and a cervical neck brace  Pallor / Icterus/ Cyanosis negative  Oropharynx no thrush  Tongue protrudes in midline  Head:  Normocephalic, atraumatic  No titubations  Eyes:  No eye redness or discharge bilaterally  Neck: Neck brace is noted, surgical dressing on the posterior neck area is clean without any evidence of discharge drainage or dehiscence  Lungs:   B/L Clear to auscultation, no wheezing  Heart:   Regular S1S2, A2P2 normal, no murmur or gallop  Abdomen:   Soft, non-tender,no rebound, bowel sounds+  Musculoskeletal: Extremities no cyanosis or edema  Psych: Normal Affect / No hallucinations or delusions  Neurologic:             Skin:    Endocrine:  Vascular: Awake and alert  Mentation intact  Speech is intact  Patient has generalized muscle wasting probably related to some element of malnutrition  While he was walking and ambulating mild generalized fasciculations with strain were noted  These were spontaneously stopped when he tried  No seizure-like activity was witnessed  No rashes /purpura  No open wounds  Multiple tattoos both arms  No thyromegaly / no lid lag  Homans sign is negative  B/L Calf are supple and non tender         Labs, Imaging, & Other studies:    All pertinent labs and imaging studies were personally reviewed    Results from last 7 days  Lab Units 05/02/18  0504 05/01/18  0441   WBC Thousand/uL 15 07* 17 75*   HEMOGLOBIN g/dL 14 4 13 8   PLATELETS Thousands/uL 116* 128*       Results from last 7 days  Lab Units 05/01/18  0441   SODIUM mmol/L 139   POTASSIUM mmol/L 4 2   CHLORIDE mmol/L 103   CO2 mmol/L 30   ANION GAP mmol/L 6   BUN mg/dL 10   CREATININE mg/dL 0 74   EGFR ml/min/1 73sq m 93   GLUCOSE RANDOM mg/dL 122   CALCIUM mg/dL 8 5           Current Meds:  Current Facility-Administered Medications   Medication Dose Route Frequency Provider Last Rate Last Dose    aluminum-magnesium hydroxide-simethicone (MYLANTA) 200-200-20 mg/5 mL oral suspension 15 mL  15 mL Oral Q6H PRN Joan Evans PA-C        aspirin (ECOTRIN) EC tablet 325 mg  325 mg Oral Daily Joan Evans PA-C   325 mg at 05/02/18 0805    azelastine (ASTELIN) 0 1 % nasal spray 2 spray  2 spray Each Nare Daily Joan Evans PA-C   2 spray at 05/02/18 0805    calcium carbonate (TUMS) chewable tablet 1,000 mg  1,000 mg Oral Daily PRN Joan Evans PA-C        calcium carbonate (TUMS) chewable tablet 500 mg  500 mg Oral PRN BRUNA BerumenC   500 mg at 04/30/18 2232    chlorthalidone tablet 12 5 mg  12 5 mg Oral Daily Ina Muniz MD   12 5 mg at 05/02/18 0805    diazepam (VALIUM) tablet 10 mg  10 mg Oral HS Joan Evans PA-C   10 mg at 05/01/18 2125    docusate sodium (COLACE) capsule 100 mg  100 mg Oral BID Joan Evans PA-C   100 mg at 05/02/18 0805    gabapentin (NEURONTIN) capsule 600 mg  600 mg Oral BID LEANA Berumen-C   600 mg at 05/02/18 0805    hydrALAZINE (APRESOLINE) injection 5 mg  5 mg Intravenous Q6H PRN Ina Muniz MD        HYDROcodone-acetaminophen [facility]) 5-325 mg per tablet 2 tablet  2 tablet Oral Q4H PRN Jona Evans PA-C   2 tablet at 05/02/18 0520    HYDROmorphone (DILAUDID) 1 mg/mL 50 mL PCA   Intravenous Continuous Joan Evans PA-C        lactated ringers infusion  125 mL/hr Intravenous Continuous BRUNA VergaraC 125 mL/hr at 05/02/18 6728 125 mL/hr at 05/02/18 0918    magnesium hydroxide (MILK OF MAGNESIA) 400 mg/5 mL oral suspension 30 mL  30 mL Oral Daily PRN LEANA Vergara-C        methocarbamol (ROBAXIN) tablet 750 mg  750 mg Oral 4x Daily PRN LEANA Vergara-C   750 mg at 05/02/18 0805    morphine injection 1 mg  1 mg Intravenous Q3H PRN LEANA Vergara-C   1 mg at 04/30/18 1623    naloxone (NARCAN) 0 04 mg/mL syringe 0 04 mg  0 04 mg Intravenous Q1MIN PRN BRUNA VergaraC        pramipexole (MIRAPEX) tablet 0 75 mg  0 75 mg Oral HS BRUNA VergaraC   0 75 mg at 05/01/18 2125    pravastatin (PRAVACHOL) tablet 40 mg  40 mg Oral Daily With Adalberto Child PA-C   40 mg at 05/01/18 9660    senna (SENOKOT) tablet 8 6 mg  1 tablet Oral Daily BRUNA VergaraC   8 6 mg at 05/02/18 3664    simethicone (MYLICON) chewable tablet 80 mg  80 mg Oral 4x Daily PRN BRUNA VergaraC         Home Meds:  Prescriptions Prior to Admission   Medication    azelastine (ASTELIN) 0 1 % nasal spray    calcium carbonate (TUMS) 500 mg chewable tablet    Cholecalciferol (VITAMIN D3) 76449 units TABS    diazepam (VALIUM) 10 mg tablet    gabapentin (NEURONTIN) 600 MG tablet    pramipexole (MIRAPEX) 0 75 MG tablet    simvastatin (ZOCOR) 20 mg tablet    tiZANidine (ZANAFLEX) 4 mg tablet       Continuous Infusions:    HYDROmorphone     lactated ringers 125 mL/hr Last Rate: 125 mL/hr (05/02/18 4433)       VTE Pharmacologic Prophylaxis:  as per Primary Ortho Team   VTE Mechanical Prophylaxis: sequential compression device    Portions of the record may have been created with voice recognition software  Occasional wrong word or "sound a like" substitutions may have occurred due to the inherent limitations of voice recognition software  Read the chart carefully and recognize, using context, where substitutions have occurred

## 2018-05-02 NOTE — PHYSICAL THERAPY NOTE
Physical Therapy Treatment Note     05/02/18 [facility]   Pain Assessment   Pain Assessment 0-10   Pain Score 8   Pain Type Acute pain;Surgical pain   Pain Location Back;Neck   Pain Orientation Posterior   Pain Onset Ongoing   Patient's Stated Pain Goal No pain   Hospital Pain Intervention(s) Repositioned; Ambulation/increased activity; Emotional support   Response to Interventions fair tolerance  Precautions   Spinal Precautions (c-spine precautions)   Restrictions/Precautions   Braces or Orthoses C/S Collar  (aspen, )   Other Precautions Bed Alarm;Cognitive;Multiple lines; Fall Risk;Pain;Spinal precautions  (c-spine precautions)   General   Chart Reviewed Yes   Family/Caregiver Present No   Cognition   Overall Cognitive Status WFL   Arousal/Participation Lethargic   Attention Attends with cues to redirect   Following Commands Follows one step commands with increased time or repetition   Subjective   Subjective PT reports increased pain neck and upper back between shoulder blades  PT rates pain 8/10  PT  in bed upon arrival in supine position without c/s collar  PT agreeable to try PT  Bed Mobility   Rolling R 4  Minimal assistance   Additional items Assist x 1;Bedrails; Increased time required;Verbal cues;LE management   Supine to Sit 4  Minimal assistance   Additional items Assist x 1;Bedrails; Increased time required;Verbal cues;LE management   Sit to Supine 4  Minimal assistance   Additional items Assist x 1;Bedrails; Increased time required;Verbal cues;LE management   Additional Comments cues and assistance for logrolling and bed mobility  Max assist to don c/s collar  Transfers   Sit to Stand 4  Minimal assistance   Additional items Assist x 2;Bedrails; Increased time required;Verbal cues   Stand to Sit 4  Minimal assistance   Additional items Assist x 1;Verbal cues   Ambulation/Elevation   Gait pattern Short stride; Excessively slow;R Knee Autumn;L Knee Autumn   Gait Assistance 4  Minimal assist  (min- mod assist x1 and standby assist of another for safety)   Additional items Assist x 1;Verbal cues   Assistive Device Rolling walker   Distance 30'x1   Balance   Static Sitting Fair   Static Standing Poor +   Ambulatory Poor   Endurance Deficit   Endurance Deficit Yes   Endurance Deficit Description pain   Activity Tolerance   Activity Tolerance Patient limited by pain   Nurse Made Aware Juan AYALA   Exercises   Hip Flexion Supine;5 reps;AROM; Bilateral  (SLR B/l le's)   Knee AROM Long Arc Quad Supine;5 reps;AROM; Bilateral   Ankle Pumps Supine;10 reps;AROM; Bilateral   Equipment Use   Comments PT instructed in log rolling techniques, use of c/s collar  Assessment   Prognosis Fair   Problem List Decreased strength;Decreased endurance; Impaired balance;Decreased mobility; Decreased cognition;Decreased safety awareness;Orthopedic restrictions;Pain;Decreased range of motion   Assessment PT demonstrating impaired ability to perform bed mobility, transfers and ambulation due to increased neck and upper back pain  PT requires increased time and cues for all aspects of mobility, use of bed rail for bed mobility and cues for logrolling technique  PT required max assist to don Aspen c/s collar while supine in bed  Pt progressed with ambulation distances to 30' with use of rw and min- mod assist x2 due to (+)buckling of b/l le's  PT requires max cues for increased strides, step though gait pattern, use of rw and safety  PT performs b/l le arom 5-10 reps to tolerance  PT returned to bed post PT session due to increased pain and fatigue  PT  was encouraged to sit up in recliner in PM  Pt wishes to return home at d/c  PT will continue to benefit from inpt Pt for progressive mobility training with progression to stair climbing inorder to maximize functional mobility and I for safe d/c to home  Pt may benefit from HHPT at d/c pending progress, may need Rw for home      Goals   Patient Goals less pain   STG Expiration Date 05/11/18 Treatment Day 1   Plan   Treatment/Interventions Functional transfer training;LE strengthening/ROM; Elevations; Therapeutic exercise; Endurance training;Patient/family training;Equipment eval/education; Bed mobility;Gait training;Spoke to nursing   Progress Slow progress, medical status limitations  (pain)   PT Frequency 7x/wk; Twice a day   Recommendation   Recommendation Home with family support;Home PT   Equipment Recommended Other (Comment)  ( Rw for home  )   PT - OK to Discharge No  (must achieve inpt PT goals if d/c to home  )         Willis Olmstead, PTA

## 2018-05-02 NOTE — PLAN OF CARE
Problem: PHYSICAL THERAPY ADULT  Goal: Performs mobility at highest level of function for planned discharge setting  See evaluation for individualized goals  Treatment/Interventions: Functional transfer training, LE strengthening/ROM, Elevations, Therapeutic exercise, Endurance training, Patient/family training, Equipment eval/education, Bed mobility, Gait training, Compensatory technique education, Continued evaluation, Spoke to nursing, OT, Spoke to advanced practitioner (spoke with Lenora Mckenna, [facility])  Equipment Recommended:  (will monitor with progress )       See flowsheet documentation for full assessment, interventions and recommendations  Outcome: Progressing  Prognosis: Fair  Problem List: Decreased strength, Decreased endurance, Impaired balance, Decreased mobility, Decreased cognition, Decreased safety awareness, Orthopedic restrictions, Pain, Decreased range of motion  Assessment: Pt is 80 y/o male admitted for PSF of C3-T2  Recent hx of ACDF C3/4, C7/T1 on 4/12  PLOF independent without use of AD residing with spouse  Currently with significant pain limiting mobilty  Laynile Deluca in place  Requires modA of 2 for bed mobity and to comply wt technique to logroll  Step by step cues and guidance needed  Increased time required for all mobitliy  Able to progress OOB to chair wiht Jean [place] of 2  Hand held assistance of 2 to ambulate short distance to chair  COntinues with increase in pain despite repositioning  Disposition will depend upon progress and pain control  Will continue to benefit from IPPT in order to optimize functional indepedence p cervical fusion  Barriers to Discharge Comments: pain  Recommendation:  (Monitor dispositoin with progress  )     PT - OK to Discharge: No    See flowsheet documentation for full assessment

## 2018-05-03 PROCEDURE — 97535 SELF CARE MNGMENT TRAINING: CPT

## 2018-05-03 PROCEDURE — 97110 THERAPEUTIC EXERCISES: CPT

## 2018-05-03 PROCEDURE — 94760 N-INVAS EAR/PLS OXIMETRY 1: CPT

## 2018-05-03 PROCEDURE — 97530 THERAPEUTIC ACTIVITIES: CPT

## 2018-05-03 RX ORDER — OXYCODONE HYDROCHLORIDE AND ACETAMINOPHEN 5; 325 MG/1; MG/1
2 TABLET ORAL EVERY 4 HOURS PRN
Status: DISCONTINUED | OUTPATIENT
Start: 2018-05-03 | End: 2018-05-06

## 2018-05-03 RX ORDER — OXYCODONE HYDROCHLORIDE AND ACETAMINOPHEN 5; 325 MG/1; MG/1
1 TABLET ORAL EVERY 4 HOURS PRN
Status: DISCONTINUED | OUTPATIENT
Start: 2018-05-03 | End: 2018-05-09 | Stop reason: HOSPADM

## 2018-05-03 RX ADMIN — SENNOSIDES 8.6 MG: 8.6 TABLET, FILM COATED ORAL at 09:11

## 2018-05-03 RX ADMIN — SIMETHICONE CHEW TAB 80 MG 80 MG: 80 TABLET ORAL at 09:11

## 2018-05-03 RX ADMIN — DOCUSATE SODIUM 100 MG: 100 CAPSULE, LIQUID FILLED ORAL at 09:11

## 2018-05-03 RX ADMIN — OXYCODONE HYDROCHLORIDE AND ACETAMINOPHEN 2 TABLET: 5; 325 TABLET ORAL at 15:56

## 2018-05-03 RX ADMIN — PRAMIPEXOLE DIHYDROCHLORIDE 0.75 MG: 0.25 TABLET ORAL at 21:26

## 2018-05-03 RX ADMIN — MORPHINE SULFATE 1 MG: 2 INJECTION, SOLUTION INTRAMUSCULAR; INTRAVENOUS at 18:59

## 2018-05-03 RX ADMIN — HYDROCODONE BITARTRATE AND ACETAMINOPHEN 2 TABLET: 5; 325 TABLET ORAL at 09:11

## 2018-05-03 RX ADMIN — ASPIRIN 325 MG: 325 TABLET, COATED ORAL at 09:11

## 2018-05-03 RX ADMIN — AZELASTINE HYDROCHLORIDE 2 SPRAY: 137 SPRAY, METERED NASAL at 09:11

## 2018-05-03 RX ADMIN — HYDROCODONE BITARTRATE AND ACETAMINOPHEN 2 TABLET: 5; 325 TABLET ORAL at 06:16

## 2018-05-03 RX ADMIN — METHOCARBAMOL 750 MG: 500 TABLET ORAL at 15:55

## 2018-05-03 RX ADMIN — GABAPENTIN 600 MG: 300 CAPSULE ORAL at 17:03

## 2018-05-03 RX ADMIN — DOCUSATE SODIUM 100 MG: 100 CAPSULE, LIQUID FILLED ORAL at 17:03

## 2018-05-03 RX ADMIN — DIAZEPAM 10 MG: 5 TABLET ORAL at 21:26

## 2018-05-03 RX ADMIN — SODIUM CHLORIDE, SODIUM LACTATE, POTASSIUM CHLORIDE, AND CALCIUM CHLORIDE 125 ML/HR: .6; .31; .03; .02 INJECTION, SOLUTION INTRAVENOUS at 01:19

## 2018-05-03 RX ADMIN — SODIUM CHLORIDE, SODIUM LACTATE, POTASSIUM CHLORIDE, AND CALCIUM CHLORIDE 125 ML/HR: .6; .31; .03; .02 INJECTION, SOLUTION INTRAVENOUS at 19:03

## 2018-05-03 RX ADMIN — GABAPENTIN 600 MG: 300 CAPSULE ORAL at 09:11

## 2018-05-03 RX ADMIN — PRAVASTATIN SODIUM 40 MG: 40 TABLET ORAL at 15:55

## 2018-05-03 RX ADMIN — CHLORTHALIDONE 12.5 MG: 25 TABLET ORAL at 09:11

## 2018-05-03 NOTE — PROGRESS NOTES
Progress Note - Internal Medicine   [facility] Bob 81 y o  male MRN: [id number]  Unit/Bed#: E5 -01 Encounter: [id number]      Impression :    POD #2, posterior cervical laminectomy and fusion, C3/4, C7/T1 and T1/2  Cervical spinal stenosis  Degenerative disc disease C2/C3 and C3/4  Previous work related accident on 05/21/1992  COPD with emphysema  Hypercholesterolemia  Restless leg syndrome  Chronic pain management- Dr Rachael Carpenter, [place]  H/o marijuana use/ protein calorie malnutrition moderate  Chronic herniated nucleus pulposus with spinal cord compression and flattening of C3/4    Recommendation:    Continue postoperative care as recommended by primary service  Recommend minimizing narcotic medications  Patient has high tolerance probably related to his previous use of narcotics and using marijuana in the past   Recommend using nonsteroidal anti-inflammatory medications and muscle relaxants  Continue therapy as tolerated under supervision of primary service  Appreciate evaluation done by dietitian and continue Ensure  Patient's blood pressure is much improved after starting chlorthalidone 12 5 mg, he is not having any surgeries of his blood pressure at present  Maintained on aspirin 325 mg daily, continue bilateral lower extremity Venodyne compression boots were mechanical compression for DVT prophylaxis  Subjective:     Patient seen and examined today  EMR and overnight events reviewed  Patient is resting comfortably and is not in any apparent distress  He denies any nausea vomiting  Appetite is improving  Patient's God daughter sent her some basket of foods which she was very happy to show me  Patient indicates that he is thinking about either going to rehab versus going home has not met appears mind yet  He was evaluated by Dr Triny Quiros earlier this morning  Discussed with patient's nurse Tre Cortez  Review of Systems     Constitutional: No chills, diaphoresis, fatigue or fever     HEENT: No congestion, sore throat  No visual complaints  Denies any dysphagia, using his neck brace without difficulty  Respiratory: No cough, chest tightness, shortness of breath and wheezing  Cardiovascular: No chest pain and palpitations  No Syncope or grey out  GI: Negative for abdominal distention, pain or nausea  Patient received Ensure which he tried and was appreciative of ordering it  Endocrine: Negative for cold or heat intolerance, polydipsia and polyuria  Genitourinary: Negative for decreased urine volume, dysuria, flank pain and urgency  Skin: Negative for rash  Neurological: Negative for dizziness, weakness, numbness and headaches  Hematological: Negative for spontaneous bruising or bleeding  Psychiatric: Negative for confusion, dysphoric mood, hallucinations  All other systems reviewed and are negative  OBJECTIVE:     Vitals:     HR:  [61-98] 98  Resp:  [16-18] 18  BP: (120-157)/(73-92) 138/92  SpO2:  [95 %-99 %] 99 %  Temp (24hrs), Av 4 °F (37 4 °C), Min:98 9 °F (37 2 °C), Max:99 6 °F (37 6 °C)  Current: Temperature: 99 6 °F (37 6 °C)    Intake/Output Summary (Last 24 hours) at 18 1553  Last data filed at 18 1445   Gross per 24 hour   Intake          1908 33 ml   Output             1450 ml   Net           458 33 ml     Body mass index is 22 32 kg/m²  Physical Exam    Average frail built  Global muscular wasting due to malnutrition as well as protein calorie depletion  Multiple skin tattoos are noted on both arms  Wearing a neck brace  Facial symmetry is maintained, tongue protrudes in midline, no thrush was noted  Pallor JVP cyanosis negative  No peripheral edema  Bilateral Mauricio stockings  Clear lungs, no rales or crackles, S1-S2 normal no murmurs or gallop  Abdomen is soft and scaphoid no tenderness  Neck posterior dressing is clear without any discharge drainage or dehiscence  Good hand grasp and plantar flexion        Labs, Imaging, & Other studies:    All pertinent labs and imaging studies were personally reviewed    Results from last 7 days  Lab Units 05/02/18  0504 05/01/18  0441   WBC Thousand/uL 15 07* 17 75*   HEMOGLOBIN g/dL 14 4 13 8   PLATELETS Thousands/uL 116* 128*       Results from last 7 days  Lab Units 05/01/18  0441   SODIUM mmol/L 139   POTASSIUM mmol/L 4 2   CHLORIDE mmol/L 103   CO2 mmol/L 30   ANION GAP mmol/L 6   BUN mg/dL 10   CREATININE mg/dL 0 74   EGFR ml/min/1 73sq m 93   GLUCOSE RANDOM mg/dL 122   CALCIUM mg/dL 8 5           Current Meds:  Current Facility-Administered Medications   Medication Dose Route Frequency Provider Last Rate Last Dose    aluminum-magnesium hydroxide-simethicone (MYLANTA) 200-200-20 mg/5 mL oral suspension 15 mL  15 mL Oral Q6H PRN Max Gilmore PA-C   15 mL at 05/02/18 1305    aspirin (ECOTRIN) EC tablet 325 mg  325 mg Oral Daily LEANA Kline-C   325 mg at 05/03/18 0911    azelastine (ASTELIN) 0 1 % nasal spray 2 spray  2 spray Each Nare Daily Max Gilmore PA-C   2 spray at 05/03/18 0911    calcium carbonate (TUMS) chewable tablet 1,000 mg  1,000 mg Oral Daily PRN Max Gilmore PA-C        calcium carbonate (TUMS) chewable tablet 500 mg  500 mg Oral PRN Max Gilmore PA-C   500 mg at 04/30/18 2232    chlorthalidone tablet 12 5 mg  12 5 mg Oral Daily Claire Brown MD   12 5 mg at 05/03/18 0911    diazepam (VALIUM) tablet 10 mg  10 mg Oral HS Max Gilmore PA-C   10 mg at 05/02/18 2113    docusate sodium (COLACE) capsule 100 mg  100 mg Oral BID LEANA Kline-C   100 mg at 05/03/18 0762    gabapentin (NEURONTIN) capsule 600 mg  600 mg Oral BID Max Gilmore PA-C   600 mg at 05/03/18 1665    hydrALAZINE (APRESOLINE) injection 5 mg  5 mg Intravenous Q6H PRN Claire Brown MD        lactated ringers infusion  125 mL/hr Intravenous Continuous BRUNA KlineC 125 mL/hr at 05/03/18 0119 125 mL/hr at 05/03/18 0119    magnesium hydroxide (MILK OF MAGNESIA) 400 mg/5 mL oral suspension 30 mL  30 mL Oral Daily PRN Mando Mole, PA-C        methocarbamol (ROBAXIN) tablet 750 mg  750 mg Oral 4x Daily PRN Mando Mole, PA-C   750 mg at 05/02/18 0805    morphine injection 1 mg  1 mg Intravenous Q3H PRN Mando Mole, PA-C   1 mg at 04/30/18 1623    naloxone (NARCAN) 0 04 mg/mL syringe 0 04 mg  0 04 mg Intravenous Q1MIN PRN Mando Mole, PA-C        oxyCODONE-acetaminophen (PERCOCET) 5-325 mg per tablet 1 tablet  1 tablet Oral Q4H PRN Mando Mole, PA-C        oxyCODONE-acetaminophen (PERCOCET) 5-325 mg per tablet 2 tablet  2 tablet Oral Q4H PRN Mando Mole, PA-C        pramipexole (MIRAPEX) tablet 0 75 mg  0 75 mg Oral HS Mando Mole, PA-C   0 75 mg at 05/02/18 2113    pravastatin (PRAVACHOL) tablet 40 mg  40 mg Oral Daily With LEANA Whitfield-C   40 mg at 05/02/18 1735    senna (SENOKOT) tablet 8 6 mg  1 tablet Oral Daily Mando Mole, PA-C   8 6 mg at 05/03/18 3193    simethicone (MYLICON) chewable tablet 80 mg  80 mg Oral 4x Daily PRN Mando Miri, PA-C   80 mg at 05/03/18 0911     Home Meds:  Prescriptions Prior to Admission   Medication    azelastine (ASTELIN) 0 1 % nasal spray    calcium carbonate (TUMS) 500 mg chewable tablet    Cholecalciferol (VITAMIN D3) 81107 units TABS    diazepam (VALIUM) 10 mg tablet    gabapentin (NEURONTIN) 600 MG tablet    pramipexole (MIRAPEX) 0 75 MG tablet    simvastatin (ZOCOR) 20 mg tablet    tiZANidine (ZANAFLEX) 4 mg tablet       VTE Pharmacologic Prophylaxis:  as per Primary Ortho Team   VTE Mechanical Prophylaxis: sequential compression device    Portions of the record may have been created with voice recognition software  Occasional wrong word or "sound a like" substitutions may have occurred due to the inherent limitations of voice recognition software  Read the chart carefully and recognize, using context, where substitutions have occurred

## 2018-05-03 NOTE — PROGRESS NOTES
POST-OP SPINE PROGRESS NOTE    Patient Name: Armando Paez  Patient MRN:  [id number]  Date of Service:  05/03/18 1:00 PM  Service: [facility]    Subjective      Pt seen laying in bed  He states that he is still having pain in his upper back and between his shoulder blades, particularly when he moves  He also states that he is having some stomach discomfort and nausea  He has not eaten much since surgery, but had just ordered breakfast and hoping to try to eat more today  He was out of bed into a chair with PT yesterday  He states that the pain medication does not seem to be helping control his pain very much  Objective      Physical Exam  Temp:  [98 9 °F (37 2 °C)-99 6 °F (37 6 °C)] 98 9 °F (37 2 °C)  HR:  [61-93] 61  Resp:  [16] 16  BP: (120-157)/(72-91) 157/91    Incision: Dressing clean, dry and intact    Sensation grossly intact    Motor function intact 5/5 without deficits BUE    Cervical collar in place    Laboratory Studies  No results found for this or any previous visit (from the past 24 hour(s))  Assessment / Plan:     POD # 3 s/p C3/4, C7/T1 and T1/2 laminectomy and PSF C3-T2  Mobilize with PT/OT  Activity as tolerated in cervical collar - pt can remove collar while laying in bed, and to eat if that is more comfortable for him  He should otherwise be wearing the collar whenever he is not in bed    DVT Prophylaxis  daily  Pain medication - will change from Norco to Percocet, continue with IV morphine for breakthrough pain  Discussed with both patient and his wife that he may need to go to rehab when he is discharged      Claudia eHrnández PA-C  1:00 PM , 5/3/2018

## 2018-05-03 NOTE — PLAN OF CARE

## 2018-05-03 NOTE — OP NOTE
OPERATIVE REPORT  PATIENT NAME: Jackie Becker    :  1945  MRN: [id number]  Pt Location: [facility] OR ROOM 05    SURGERY DATE: 2018    Surgeon(s) and Role:     * Myesha Gagnon MD - Primary     * Kenna Patino PA-C - Assisting    Preop Diagnosis:  Disease of spinal cord [facility]) [G95 9]  Spinal stenosis of cervical region [M48 02]  Other cervical disc degeneration, unspecified cervical region [M50 30]  Radiculopathy of cervical region [M54 12]    Post-Op Diagnosis Codes:     * Disease of spinal cord ([facility] [address] ) [G95 9]     * Spinal stenosis of cervical region [M48 02]     * Other cervical disc degeneration, unspecified cervical region [M50 30]     * Radiculopathy of cervical region [M54 12]    Procedure(s) (LRB):  POSSIBLE C3-4, C4/5 LAMINECTOMY (N/A)  C3-T2 PSF With Instrumentation (N/A)  C 6-7 Left HEMILAMINOTOMY AND FORAMINOTOMY C7-T2 LAMINECTOMY C7-T2 Left Foraminotomy (N/A)    Specimen(s):  * No specimens in log *    Estimated Blood Loss:   100 mL    Drains:  [REMOVED] Urethral Catheter Latex 16 Fr  (Removed)   Site Assessment Clean;Skin intact 2018  1:51 PM   Collection Container Standard drainage bag 2018  1:51 PM   Securement Method Securing device (Describe) 2018  1:51 PM   Output (mL) 600 mL 2018  6:01 AM   Number of days: 1       Anesthesia Type:   General    Operative Indications:  Disease of spinal cord ([facility] [address] ) [G95 9]  Spinal stenosis of cervical region [M48 02]  Other cervical disc degeneration, unspecified cervical region [M50 30]  Radiculopathy of cervical region [M54 12]    [age]-year-old gentleman with neck pain and left upper extremity radiculopathy who was evaluated radiographically  Radiographic studies, MRI, demonstrated evidence of spinal cord compression due to stenosis and myelomalacia  Given the degree of cord involvement, surgical procedure was recommended  The patient was consented fully and sending the procedure  The surgery was scheduled in staged fashion   On April 12 he underwent an anterior cervical diskectomy and fusion at C3-4 and C7-T1  He was discharged the following day and was brought back today for the posterior aspect of the procedure  The patient was fully aware of the upcoming procedure and consented for the [facility] risk and benefits of the proposed surgery  Operative Findings:  Severe spinal stenosis C3-4, C4-5, C7-T1, and T1-T2  Severe multilevel cervical facet arthrosis and loss of cervical lordosis    Complications:   None    Procedure and Technique:  The patient was brought to the operating room and following identification, underwent endotracheal anesthesia  Antibiotic prophylaxis was administered  Hart was placed  Neural monitoring electrodes were placed  He was then positioned prone on a Williams table and all bony promises were padded  Post positioning evoked potentials were examined and were noted to be stable  The back of the neck was then prepped and draped in sterile fashion  Prior to this step not shoulders were taped down and all bony promises were padded  A vertical incision was then made along spinous processes of C2-T2  Subcutaneous tissue was divided using electrocautery  The paraspinal muscles were then released subperiosteally and retracted the facet capsule  Following identification, attention was directed to the instrumentation  Lateral mass screws were placed at C3, C4, C5 and C6  Pedicle screws were then placed at the left C7 and T1 and T2  The instrumentation was implanted routine fashion  Fluoroscopy was utilized confirming the position of each screw  At this time rods were bent to the appropriate shape  The cervical spine was then stabilized by placing set screws and tightening of the xavier to the to each individual screw  The wound was then thoroughly irrigated  Attention was then directed to the laminectomy  This C4 for lamina was thinned out and the spinous process was also resected   Central decompression was then performed using combination of curettes and Kerrisons  Ligamentum flavum was resected at both C3-4 and C4-5  While laminectomy was performed at that level  Attention was then directed to the C7-T1  Left-sided foraminotomy was then performed in a routine fashion high-speed bur was utilized  2   Kerrison was then utilized and a left hemilaminotomy and foraminotomy was completed  Attention was then directed to C7-T1 and T1-T2  The spinous processes of T1 was resected  The lamina was then thinned out using high-speed bur  Central decompression was then performed using Kerrison  Ligamentum flavum was then dissected both C7-T1 and T1-T2  Attention was then directed to the decompression of the lateral gutters which was performed using Kerrisons  The wound was then thoroughly irrigated  The local bone graft was cleaned of soft tissue morcellized and placed bilateral bilaterally along the area of the neck with the spine was instrumented  The relies bone matrix was also utilized  The dura was protected with the FloSeal and a well-hydrated piece a gel formed in order to prevent bony dislodgement into the canal  Fluoroscopy and neural monitoring was used extensively during the procedure  The paraspinal muscle was then reapproximated this was followed by closure of fascia, subcutaneous tissue and the skin  Subcuticular suture was utilized  At the end of the procedure the patient was extubated and taken to PACU in stable condition  I was personally present throughout the procedure  A qualified resident or fellow was not available  Mrs Mar Polanco PA-C's assistance was essential during every aspect of the procedure including positioning exposure decompression fusion instrumentation and closure      Patient Disposition:  PACU     SIGNATURE: Dom Jain MD  DATE: May 3, 2018  TIME: 12:26 PM

## 2018-05-03 NOTE — OCCUPATIONAL THERAPY NOTE
Occupational Therapy Treatment Note:         05/03/18 1725   Restrictions/Precautions   Weight Bearing Precautions Per Order No   Braces or Orthoses C/S Collar   Other Precautions Chair Alarm; Fall Risk;Pain;Multiple lines;Spinal precautions   Pain Assessment   Pain Assessment 0-10   Pain Score 8   Pain Type Surgical pain   Pain Location Neck   Pain Orientation Posterior   Cognition   Overall Cognitive Status Impaired   Arousal/Participation Alert   Attention Attends with cues to redirect   Orientation Level Oriented X4   Memory Decreased short term memory;Decreased recall of precautions   Following Commands Follows multistep commands with increased time or repetition   Comments Pt with increased fatigue/pain noted this tx session  Additional Activities   Additional Activities (reviewed leisure interests  )   Additional Activities Comments Pt able to identify fishing/hunting as priorities  Activity Tolerance   Activity Tolerance Patient limited by fatigue;Patient limited by pain   Medical Staff Made Aware Reported all findings to RN Young Doan  Pt refusing OOB positioning this tx session  Assessment   Assessment Pt was seen for education OT tx session  Pt declined OOB positioning at this time due to noted pain levels with movement  Reviewed back safety packet with Pt  Pt reports having good understanding but stated, "As soon as I sit up, I get crazy pain in my shoulders and back and flop back down immediately  Reviewed Pt's personal goals of hunting with his grandson and going fishing  Pt is an avid trout fisherman and had hoped to be fishing by now  Educated Pt on need for mobility as tolerated as well as upright positioning to achieve his personal goals  Will continue to follow as tolerated  Pt will benefit from further rehab to achieve optimal performance levels with all functional tasks  Plan   Treatment Interventions ADL retraining;Functional transfer training; Endurance training;Cognitive reorientation Goal Expiration Date 05/11/18   Treatment Day 2   OT Frequency 3-5x/wk   Barthel Index   Feeding 5   Bathing 0   Grooming Score 0   Dressing Score 0   Bladder Score 10   Bowels Score 10   Toilet Use Score 5   Transfers (Bed/Chair) Score 5   Mobility (Level Surface) Score 0   Stairs Score 0   Barthel Index Score 35   Modified Rory Scale   Modified Mcalester Scale 4

## 2018-05-03 NOTE — SOCIAL WORK
Pt is a 71yo male admitted for Spinal Stenosis of Cervic  Met with pt for initial assessment and discuss discharge needs  Pt stated he lives with his wife in a one level home with 1 step to enter  Prior to admission, pt reported to be independent with ambulation and ADLs  No hx of VNA or STR  Pt has only had outpt rehab at [facility]  PCP- Dr Patricia Quevedo  [facility] in [place]  Discussed STR recommendations and pt is amendable to rehab  List of facilities provided and he will discuss further with his wife  Will continue to follow for discharge planning needs

## 2018-05-03 NOTE — PLAN OF CARE
Problem: OCCUPATIONAL THERAPY ADULT  Goal: Performs self-care activities at highest level of function for planned discharge setting  See evaluation for individualized goals  Treatment Interventions: ADL retraining, Functional transfer training, Endurance training, UE strengthening/ROM, Patient/family training, Equipment evaluation/education, Compensatory technique education, Energy conservation, Activityengagement, Cognitive reorientation          See flowsheet documentation for full assessment, interventions and recommendations  Outcome: Progressing  Limitation: Decreased ADL status, Decreased UE strength, Decreased Safe judgement during ADL, Decreased endurance, Decreased cognition, Decreased self-care trans, Decreased high-level ADLs, Decreased sensation (increased pain)  Prognosis: Good  Assessment: Pt was seen for education OT tx session  Pt declined OOB positioning at this time due to noted pain levels with movement  Reviewed back safety packet with Pt  Pt reports having good understanding but stated, "As soon as I sit up, I get crazy pain in my shoulders and back and flop back down immediately  Reviewed Pt's personal goals of hunting with his grandson and going fishing  Pt is an avid trout fisherman and had hoped to be fishing by now  Educated Pt on need for mobility as tolerated as well as upright positioning to achieve his personal goals  Will continue to follow as tolerated  Pt will benefit from further rehab to achieve optimal performance levels with all functional tasks        OT Discharge Recommendation:  (STR vs  HOME w/ support pending progress)         Comments: Lluvia Ibarra, [address]

## 2018-05-03 NOTE — PLAN OF CARE
Problem: PHYSICAL THERAPY ADULT  Goal: Performs mobility at highest level of function for planned discharge setting  See evaluation for individualized goals  Treatment/Interventions: Functional transfer training, LE strengthening/ROM, Elevations, Therapeutic exercise, Endurance training, Patient/family training, Equipment eval/education, Bed mobility, Gait training, Compensatory technique education, Continued evaluation, Spoke to nursing, OT, Spoke to advanced practitioner (spoke with Ashkan Guerrero, [facility])  Equipment Recommended:  (will monitor with progress )       See flowsheet documentation for full assessment, interventions and recommendations  Outcome: Not Progressing  Prognosis: Fair  Problem List: Decreased strength, Decreased endurance, Impaired balance, Decreased mobility, Impaired judgement, Pain, Orthopedic restrictions  Assessment: Pt seen for PT per POC  Pt c/o inc neck & abdominal pain hence hesistant to participate in therapy  After encouragement, pt agreed to attempt bedlevel thera  ex first then will attempt OOB as tolerated  Pt tolerated above mentioned thera  ex well, A/AAROM  Immediately upon sitting at EOB pt assisted himself back in bed 2* to inc neck pain  Pt require minAx1 for log rolling & for supine to sit  Encouraged to attempt OOB again but pt refused this time 2* to inc pain  Pain medication pending, RN notified  Pt require inc time to complete overall tasks 2* to pain & fatigue  Pt educated on importance of mobility & effects of immobility w/ good understanding  Pt encouraged & willing to attempt OOB at a later time when pain decreases  Will continue PT per POC  Pt progressing slow, may benefit from inpt rehab if pain continues to limit mobility & affects home safety  Will continue to monitor progress  D/C rec: STR or HHPT, depending on progress  Nsg staff to continue to mobilized pt (OOB in chair for all meals & ambulate in room/unit) as tolerated to prevent decline in function   Nsg notified  Barriers to Discharge: Other (Comment)  Barriers to Discharge Comments: slow progress in PT  Recommendation: Short-term skilled PT, Home PT, 24 hour supervision/assist     PT - OK to Discharge: No (pt to achieve PT goals for safe D/C to home)    See flowsheet documentation for full assessment

## 2018-05-03 NOTE — PHYSICAL THERAPY NOTE
PT PROGRESS NOTE    *CORRECTION: PT tx frequency: 5-7x/wk       05/03/18 0910   Pain Assessment   Pain Score 8  (8/10 at rest; inc to 10/10 upon sitting at EOB)   Pain Type Acute pain;Surgical pain   Pain Location Neck;Abdomen   Hospital Pain Intervention(s) Repositioned; Emotional support  (RN notified)   Restrictions/Precautions   Weight Bearing Precautions Per Order No   Braces or Orthoses C/S Collar  (aspen)   Other Precautions Chair Alarm;Multiple lines; Fall Risk;Pain;Spinal precautions   General   Chart Reviewed Yes   Response to Previous Treatment Patient with no complaints from previous session  Family/Caregiver Present No   Cognition   Arousal/Participation Alert   Attention Attends with cues to redirect   Following Commands Follows one step commands without difficulty   Subjective   Subjective Pt agreeable to therapy after encouragement  Bed Mobility   Rolling L 4  Minimal assistance   Additional items Assist x 1;HOB elevated; Bedrails; Increased time required;Verbal cues;LE management   Supine to Sit 4  Minimal assistance   Additional items Assist x 1;Bedrails; Increased time required;Verbal cues;LE management   Sit to Supine 5  Supervision   Additional items Impulsive  (pt assisted himself back in bed immediately p sitting EOB)   Additional Comments pt require cues & assist for techniques   Transfers   Sit to Stand Unable to assess   Stand to Sit Unable to assess   Additional Comments pt refused to get OOB; pt assisted himself back in bed immediately after sitting at EOB 2* to inc pain   Balance   Static Sitting Fair   Dynamic Sitting Fair -   Endurance Deficit   Endurance Deficit Yes   Endurance Deficit Description pain   Activity Tolerance   Activity Tolerance Patient limited by pain   Nurse Made Aware Peter   Exercises   TKR Supine;10 reps;AAROM;AROM; Bilateral   Assessment   Prognosis Fair   Problem List Decreased strength;Decreased endurance; Impaired balance;Decreased mobility; Impaired judgement;Pain;Orthopedic restrictions   Assessment Pt seen for PT per POC  Pt c/o inc neck & abdominal pain hence hesistant to participate in therapy  After encouragement, pt agreed to attempt bedlevel thera  ex first then will attempt OOB as tolerated  Pt tolerated above mentioned thera  ex well, A/AAROM  Immediately upon sitting at EOB pt assisted himself back in bed 2* to inc neck pain  Pt require minAx1 for log rolling & for supine to sit  Encouraged to attempt OOB again but pt refused this time 2* to inc pain  Pain medication pending, RN notified  Pt require inc time to complete overall tasks 2* to pain & fatigue  Pt educated on importance of mobility & effects of immobility w/ good understanding  Pt encouraged & willing to attempt OOB at a later time when pain decreases  Will continue PT per POC  Pt progressing slow, may benefit from inpt rehab if pain continues to limit mobility & affects home safety  Will continue to monitor progress  D/C rec: STR or HHPT, depending on progress  Nsg staff to continue to mobilized pt (OOB in chair for all meals & ambulate in room/unit) as tolerated to prevent decline in function  Nsg notified  Barriers to Discharge Other (Comment)   Barriers to Discharge Comments slow progress in PT   Goals   Patient Goals less   STG Expiration Date 05/11/18   Treatment Day 2   Plan   Treatment/Interventions Functional transfer training;LE strengthening/ROM; Elevations; Therapeutic exercise; Endurance training;Patient/family training;Bed mobility;Gait training;Spoke to nursing   Progress Slow progress, decreased activity tolerance   PT Frequency 7x/wk; Twice a day   Recommendation   Recommendation Short-term skilled PT; Home PT;24 hour supervision/assist   Equipment Recommended Walker  (RW)   PT - OK to Discharge No  (pt to achieve PT goals for safe D/C to home)   Jaskaran So, PT

## 2018-05-04 PROCEDURE — 97530 THERAPEUTIC ACTIVITIES: CPT

## 2018-05-04 PROCEDURE — 97535 SELF CARE MNGMENT TRAINING: CPT

## 2018-05-04 PROCEDURE — 97116 GAIT TRAINING THERAPY: CPT

## 2018-05-04 RX ORDER — METHOCARBAMOL 500 MG/1
500 TABLET, FILM COATED ORAL EVERY 8 HOURS SCHEDULED
Status: DISCONTINUED | OUTPATIENT
Start: 2018-05-04 | End: 2018-05-09 | Stop reason: HOSPADM

## 2018-05-04 RX ORDER — CHLORTHALIDONE 25 MG/1
25 TABLET ORAL DAILY
Status: DISCONTINUED | OUTPATIENT
Start: 2018-05-05 | End: 2018-05-06

## 2018-05-04 RX ADMIN — DOCUSATE SODIUM 100 MG: 100 CAPSULE, LIQUID FILLED ORAL at 09:31

## 2018-05-04 RX ADMIN — SODIUM CHLORIDE, SODIUM LACTATE, POTASSIUM CHLORIDE, AND CALCIUM CHLORIDE 125 ML/HR: .6; .31; .03; .02 INJECTION, SOLUTION INTRAVENOUS at 05:08

## 2018-05-04 RX ADMIN — METHOCARBAMOL 750 MG: 500 TABLET ORAL at 14:19

## 2018-05-04 RX ADMIN — OXYCODONE HYDROCHLORIDE AND ACETAMINOPHEN 2 TABLET: 5; 325 TABLET ORAL at 02:22

## 2018-05-04 RX ADMIN — SODIUM CHLORIDE, SODIUM LACTATE, POTASSIUM CHLORIDE, AND CALCIUM CHLORIDE 125 ML/HR: .6; .31; .03; .02 INJECTION, SOLUTION INTRAVENOUS at 13:01

## 2018-05-04 RX ADMIN — SIMETHICONE CHEW TAB 80 MG 80 MG: 80 TABLET ORAL at 09:42

## 2018-05-04 RX ADMIN — OXYCODONE HYDROCHLORIDE AND ACETAMINOPHEN 2 TABLET: 5; 325 TABLET ORAL at 13:04

## 2018-05-04 RX ADMIN — GABAPENTIN 600 MG: 300 CAPSULE ORAL at 09:31

## 2018-05-04 RX ADMIN — GABAPENTIN 600 MG: 300 CAPSULE ORAL at 17:39

## 2018-05-04 RX ADMIN — METHOCARBAMOL 750 MG: 500 TABLET ORAL at 05:09

## 2018-05-04 RX ADMIN — MORPHINE SULFATE 1 MG: 2 INJECTION, SOLUTION INTRAMUSCULAR; INTRAVENOUS at 05:08

## 2018-05-04 RX ADMIN — MORPHINE SULFATE 1 MG: 2 INJECTION, SOLUTION INTRAMUSCULAR; INTRAVENOUS at 14:19

## 2018-05-04 RX ADMIN — CHLORTHALIDONE 12.5 MG: 25 TABLET ORAL at 09:32

## 2018-05-04 RX ADMIN — SENNOSIDES 8.6 MG: 8.6 TABLET, FILM COATED ORAL at 09:31

## 2018-05-04 RX ADMIN — ASPIRIN 325 MG: 325 TABLET, COATED ORAL at 09:31

## 2018-05-04 RX ADMIN — PRAVASTATIN SODIUM 40 MG: 40 TABLET ORAL at 17:40

## 2018-05-04 RX ADMIN — PRAMIPEXOLE DIHYDROCHLORIDE 0.75 MG: 0.25 TABLET ORAL at 21:33

## 2018-05-04 RX ADMIN — MORPHINE SULFATE 1 MG: 2 INJECTION, SOLUTION INTRAMUSCULAR; INTRAVENOUS at 11:13

## 2018-05-04 RX ADMIN — OXYCODONE HYDROCHLORIDE AND ACETAMINOPHEN 2 TABLET: 5; 325 TABLET ORAL at 17:40

## 2018-05-04 RX ADMIN — DOCUSATE SODIUM 100 MG: 100 CAPSULE, LIQUID FILLED ORAL at 17:40

## 2018-05-04 RX ADMIN — OXYCODONE HYDROCHLORIDE AND ACETAMINOPHEN 2 TABLET: 5; 325 TABLET ORAL at 07:38

## 2018-05-04 RX ADMIN — METHOCARBAMOL 500 MG: 500 TABLET ORAL at 21:33

## 2018-05-04 NOTE — PROGRESS NOTES
POST-OP SPINE PROGRESS NOTE    Patient Name: Mariaa Ya  Patient MRN:  [id number]  Date of Service:  05/04/18 1:40 PM  Service: Spine Surgery      Subjective      &3 yo male s/p PSF and instrumentation C3-T2 with Laminectomy  Findings:doing well postop  Complains of pain in between the shoulder blades  Preoperative Radiculopathy has resolved  postop pain well controlled with meds    Objective      Physical Exam  Temp:  [97 7 °F (36 5 °C)-99 6 °F (37 6 °C)] 98 7 °F (37 1 °C)  HR:  [] 88  Resp:  [18-20] 18  BP: (120-151)/(64-92) 151/90    Incision: clean, dry and intact, no erythema, no significant drainage and Dressing clean, dry and intact    Sensation grossly intact    Motor function intact 5/5 without deficits BUE    Aspen collar in place      Laboratory Studies  No results found for this or any previous visit (from the past 24 hour(s))      Assessment / Plan:     POD #4 s/p Posterior spinal fusion C 3-T2  Mobilize with PT/OT  Activity as tolerated in aspen collar; may take a shower  Pain medication Oxycodone 5mg  Discharge planning - awaiting rehab placement  Follow up in the office in 2 weeks as instructed        Peter Schumacher MD

## 2018-05-04 NOTE — PLAN OF CARE
Problem: PHYSICAL THERAPY ADULT  Goal: Performs mobility at highest level of function for planned discharge setting  See evaluation for individualized goals  Treatment/Interventions: Functional transfer training, LE strengthening/ROM, Elevations, Therapeutic exercise, Endurance training, Patient/family training, Equipment eval/education, Bed mobility, Gait training, Compensatory technique education, Continued evaluation, Spoke to nursing, OT, Spoke to advanced practitioner (spoke with Danyelle Green, [facility])  Equipment Recommended:  (will monitor with progress )       See flowsheet documentation for full assessment, interventions and recommendations  Outcome: Progressing  Prognosis: Fair  Problem List: Decreased strength, Decreased endurance, Impaired balance, Decreased mobility, Decreased safety awareness, Impaired judgement, Decreased skin integrity, Pain, Orthopedic restrictions  Assessment: Pt continue to require increased time to perform and complete all aspects of mobility  PT requires max cues for safe and proper bed mobility, transfers and ambulation techniques  Pt required total assist to don Aspen collar and mod assist to doff  Pt progressed with ambulation distances to 72' with mod assist x1 and standby assist of another for safety  Pt demonstrates adducted gait, decreased balance, decreased foot clearance, ataxic gait  Pt requires verbal cues for improved quality of gait and cues to widen base of support for improved balance and stability  Pt remains at increased risk for falls  Rehab is recommended at d/c inorder to progress mobility and maximize functional mobility and I    Barriers to Discharge: Other (Comment)  Barriers to Discharge Comments: slow progress in PT  Recommendation: Short-term skilled PT     PT - OK to Discharge: Yes (to rehab, not home)    See flowsheet documentation for full assessment

## 2018-05-04 NOTE — PLAN OF CARE
Problem: OCCUPATIONAL THERAPY ADULT  Goal: Performs self-care activities at highest level of function for planned discharge setting  See evaluation for individualized goals  Treatment Interventions: ADL retraining, Functional transfer training, Endurance training, UE strengthening/ROM, Patient/family training, Equipment evaluation/education, Compensatory technique education, Energy conservation, Activityengagement, Cognitive reorientation          See flowsheet documentation for full assessment, interventions and recommendations  Outcome: Progressing  Limitation: Decreased ADL status, Decreased UE strength, Decreased Safe judgement during ADL, Decreased endurance, Decreased cognition, Decreased self-care trans, Decreased high-level ADLs, Decreased sensation (increased pain)  Prognosis: Good  Assessment: Pt seen for skilled OT session focused on ADLs w/ LHAE, functional transfers and mobility, bed mobility  Pt required assistance to don aspen collar while in bed  Pt w/ MOD assist x2 supine>sit bed mobility w/ MAX cues for log rolling techniques and use of bed rails  Pt w/ MOD assist to don shirt while seated EOB w/ assist to thread through sleeves and pull over head  Pt w/ MIN assist sit>stand from bed w/ VCs for hand positioning  Pt w/ MOD assist functional mobility w/ RW w/ SBA of 2nd for safety  When pt returned to bed, pt declined sitting EOB to use LHAE for LB ADLS  Pt refused to sit EOB stating, "I'm in too much pain, I can't due it"  Pt required MIN assist x2 sit>supine bed mobility w/ cues for log rolling technique  Pt while upright in bed required min assist to doff socks w/ dressing stick and educated on use of sock aide  Pt able to doff cervical collar w/ min assist  Pt seated upright in bed w/ bed alarm intact  Pt continues to be limited due to decreased strength and endurance, impaired balance, cervical collar and spinal precautions, impaired cognition  Recommend STR when medically stable        OT Discharge Recommendation: Short Term Rehab  OT - OK to Discharge: Yes (rehab)      Comments: Sophie Watson MS, OTR/L

## 2018-05-04 NOTE — CASE MANAGEMENT
Continued Stay Review    Date/POD#:     5/4/2018   POD  #   3    Vital Signs: /90 (BP Location: Left arm)   Pulse 88   Temp 98 7 °F (37 1 °C) (Temporal)   Resp 18   Ht 5' 11 5" (1 816 m)   Wt 73 6 kg (162 lb 4 1 oz)   SpO2 98%   BMI 22 32 kg/m²     Medication:   Scheduled Meds:   Current Facility-Administered Medications:  aluminum-magnesium hydroxide-simethicone 15 mL Oral Q6H PRN Carmen Model, PA-C    aspirin 325 mg Oral Daily Carmen Model, PA-C    azelastine 2 spray Each Nare Daily Carmen Model, PA-C    calcium carbonate 1,000 mg Oral Daily PRN Carmen Model, PA-C    calcium carbonate 500 mg Oral PRN Carmen Model, PA-C    chlorthalidone 12 5 mg Oral Daily Rosie Gaona MD    diazepam 10 mg Oral HS Carmen Model, PA-C    docusate sodium 100 mg Oral BID Carmen Model, PA-C    gabapentin 600 mg Oral BID Carmen Model, PA-C    hydrALAZINE 5 mg Intravenous Q6H PRN Rosie Gaona MD    lactated ringers 125 mL/hr Intravenous Continuous Carmen Model, PA-C Last Rate: 125 mL/hr (05/04/18 0508)   magnesium hydroxide 30 mL Oral Daily PRN Carmen Model, PA-C    methocarbamol 750 mg Oral 4x Daily PRN Carmen Model, PA-C    morphine injection 1 mg Intravenous Q3H PRN Carmen Model, PA-C    naloxone 0 04 mg Intravenous Q1MIN PRN Carmen Model, PA-C    oxyCODONE-acetaminophen 1 tablet Oral Q4H PRN Carmen Model, PA-C    oxyCODONE-acetaminophen 2 tablet Oral Q4H PRN Carmen Model, PA-C    pramipexole 0 75 mg Oral HS Carmen Model, PA-C    pravastatin 40 mg Oral Daily With [place], PA-C    senna 1 tablet Oral Daily Carmen Model, PA-C    simethicone 80 mg Oral 4x Daily PRN Carmen Model, PA-C      Continuous Infusions:   lactated ringers 125 mL/hr Last Rate: 125 mL/hr (05/04/18 0508)     PRN Meds:   aluminum-magnesium hydroxide-simethicone    calcium carbonate    calcium carbonate    hydrALAZINE    magnesium hydroxide    methocarbamol    morphine injection    naloxone    oxyCODONE-acetaminophen    oxyCODONE-acetaminophen   simethicone    Abnormal Labs/Diagnostic Results:   No labs   5/4    Age/Sex: 81 y o  male     Assessment/Plan:     POD #3, posterior cervical laminectomy and fusion, C3/4, C7/T1 and T1/2  Cervical spinal stenosis  Degenerative disc disease C2/C3 and C3/4  Previous work related accident on 05/21/1992  COPD with emphysema  Hypercholesterolemia  Restless leg syndrome  Chronic pain management- Dr Noel Bui, [place]  H/o marijuana use/ protein calorie malnutrition moderate  Chronic herniated nucleus pulposus with spinal cord compression and flattening of C3/4       Discharge Plan:     STR

## 2018-05-04 NOTE — SOCIAL WORK
Followed-up with pt's STR choices  Pt referred to [facility] (1st choice), Matisa De La [facility], [facility]  Referrals made  Will continue to  follow-up

## 2018-05-04 NOTE — OCCUPATIONAL THERAPY NOTE
OccupationalTherapy Progress Note     Patient Name: Jalil Chauhan  [id number]'[id number] Date: 5/4/2018  Problem List  Patient Active Problem List   Diagnosis    Spinal stenosis of cervical region           05/04/18 1617   Restrictions/Precautions   Weight Bearing Precautions Per Order No   Braces or Orthoses C/S Collar   Other Precautions Bed Alarm; Fall Risk;Pain;Multiple lines;Spinal precautions  (cervical precautions w/ aspen collar)   Pain Assessment   Pain Assessment 0-10   Pain Score 6   Pain Type Surgical pain;Acute pain   Pain Location Back; Shoulder   Pain Orientation Bilateral   Hospital Pain Intervention(s) Ambulation/increased activity;Repositioned   Response to Interventions tolerated   ADL   Where Assessed Edge of bed   UB Dressing Assistance 3  Moderate Assistance   UB Dressing Deficit Setup;Verbal cueing; Increased time to complete;Supervision/safety; Thread RUE; Thread LUE;Pull over head   UB Dressing Comments increased time, pt c/o increased pain during tasks   LB Dressing Assistance 3  Moderate Assistance   LB Dressing Deficit Setup;Verbal cueing;Supervision/safety; Increased time to complete; Don/doff R sock; Don/doff L sock; Use of adaptive equipment   LB Dressing Comments pt able to use dressing stick to doff socks while seated upright in bed; educated on use of sock aide   Functional Standing Tolerance   Time 3 minutes   Activity functional mobility w/ RW   Comments w/ min assist balance   Bed Mobility   Supine to Sit 3  Moderate assistance   Additional items Assist x 2; Increased time required;Verbal cues;LE management; Bedrails  (log rolling technique and max cues)   Sit to Supine 4  Minimal assistance   Additional items Assist x 2; Increased time required;Verbal cues;LE management; Bedrails  (log rolling techniques)   Additional Comments MAX cues for safety and maintaining precautions w/ log rolling technique   Transfers   Sit to Stand 4  Minimal assistance   Additional items Assist x 1; Increased time required;Verbal cues;Armrests   Stand to Sit 4  Minimal assistance   Additional items Assist x 1; Increased time required;Verbal cues;Armrests   Additional Comments VCs for hand positioning and controlled descent    Functional Mobility   Functional Mobility 3  Moderate assistance   Additional Comments assist x1 w/ VCs for positioning and SBA of 2nd for safety   Additional items Rolling walker   Cognition   Overall Cognitive Status Impaired   Arousal/Participation Alert   Attention Attends with cues to redirect   Orientation Level Oriented to person;Oriented to place;Oriented to time   Memory Decreased short term memory;Decreased recall of precautions   Following Commands Follows one step commands with increased time or repetition   Comments pt w/ MAX cues to follow log rolling techniques in bed, pt w/ impaired insight and cues to recall precautions   Additional Activities   Additional Activities (education on LHAE)   Additional Activities Comments pt requires continued training, limited progress due to pain   Activity Tolerance   Activity Tolerance Patient limited by fatigue;Patient limited by pain   Medical Staff Made Aware appropriate to see per RNJason Pt seen for skilled OT session focused on ADLs w/ LHAE, functional transfers and mobility, bed mobility  Pt required assistance to don aspen collar while in bed  Pt w/ MOD assist x2 supine>sit bed mobility w/ MAX cues for log rolling techniques and use of bed rails  Pt w/ MOD assist to don shirt while seated EOB w/ assist to thread through sleeves and pull over head  Pt w/ MIN assist sit>stand from bed w/ VCs for hand positioning  Pt w/ MOD assist functional mobility w/ RW w/ SBA of 2nd for safety  When pt returned to bed, pt declined sitting EOB to use LHAE for LB ADLS  Pt refused to sit EOB stating, "I'm in too much pain, I can't due it"  Pt required MIN assist x2 sit>supine bed mobility w/ cues for log rolling technique   Pt while upright in bed required min assist to doff socks w/ dressing stick and educated on use of sock aide  Pt able to doff cervical collar w/ min assist  Pt seated upright in bed w/ bed alarm intact  Pt continues to be limited due to decreased strength and endurance, impaired balance, cervical collar and spinal precautions, impaired cognition  Recommend STR when medically stable  Plan   Treatment Interventions ADL retraining;UE strengthening/ROM; Endurance training;Cognitive reorientation;Patient/family training; Activityengagement; Compensatory technique education; Functional transfer training   Goal Expiration Date 05/11/18   Treatment Day 3   OT Frequency 3-5x/wk   Recommendation   OT Discharge Recommendation Short Term Rehab   Equipment Recommended Bedside commode;Tub seat with back   OT - OK to Discharge Yes  (rehab)   Barthel Index   Feeding 5   Bathing 0   Grooming Score 5   Dressing Score 5   Bladder Score 10   Bowels Score 10   Toilet Use Score 5   Transfers (Bed/Chair) Score 5   Mobility (Level Surface) Score 0   Stairs Score 0   Barthel Index Score 45   Modified [place] Scale   Modified [place] Scale 4     Documentation completed by: Ivis Love MS, OTR/L

## 2018-05-04 NOTE — PHYSICAL THERAPY NOTE
Physical Therapy Treatment Note       05/04/18 1228   Pain Assessment   Pain Assessment 0-10   Pain Score 6   Pain Type Surgical pain   Pain Location Back  (beween shoulder blades)   Pain Orientation Bilateral   Hospital Pain Intervention(s) Ambulation/increased activity;Repositioned; Heat applied; Emotional support   Response to Interventions tolerated   Restrictions/Precautions   Weight Bearing Precautions Per Order No   Braces or Orthoses C/S Collar  (total assist to don supine in bed and mod assist to doff)   Other Precautions Pain; Fall Risk;Bed Alarm  (c spine precautions with [place] collar)   General   Chart Reviewed Yes   Response to Previous Treatment Patient with no complaints from previous session  Family/Caregiver Present Yes   Cognition   Overall Cognitive Status Impaired   Arousal/Participation Alert   Attention Attends with cues to redirect   Orientation Level Oriented to person;Oriented to place;Oriented to time   Memory Decreased short term memory;Decreased recall of precautions   Following Commands Follows one step commands with increased time or repetition   Subjective   Subjective PT reports just having had a shower  Pt's wife reports she did all the work  PT  agreeable to PT  Bed Mobility   Rolling R 4  Minimal assistance   Additional items Assist x 1;Bedrails; Increased time required;Verbal cues;LE management   Supine to Sit 3  Moderate assistance   Additional items Assist x 2; Increased time required;Verbal cues;LE management; Bedrails  (max cues for technique)   Sit to Supine 4  Minimal assistance   Additional items Assist x 2; Increased time required;Verbal cues;LE management; Bedrails   Additional Comments max cues for safety and maintaining precutions with log rolling technique  Transfers   Sit to Stand 4  Minimal assistance   Additional items Assist x 1; Increased time required;Verbal cues   Stand to Sit 4  Minimal assistance   Additional items Assist x 1;Verbal cues; Increased time required   Ambulation/Elevation   Gait pattern Narrow WALDEMAR; Decreased foot clearance; Ataxia; Short stride; Excessively slow; Step to   Gait Assistance 3  Moderate assist   Additional items Assist x 1;Verbal cues  (standby assist of anotherf or safety  )   Assistive Device Rolling walker   Distance 65'x2   Balance   Static Sitting Fair -  (pt leaning posteriorly)   Dynamic Sitting Fair -   Static Standing Poor +   Dynamic Standing Poor   Ambulatory Poor   Endurance Deficit   Endurance Deficit Yes   Endurance Deficit Description pain, fatigue   Activity Tolerance   Activity Tolerance Patient limited by pain; Patient limited by fatigue   Nurse Made Aware alessia AYALA   Exercises   Knee AROM Long Arc Quad Sitting;Bilateral   Ankle Pumps 10 reps;Bilateral;Sitting   Assessment   Prognosis Fair   Problem List Decreased strength;Decreased endurance; Impaired balance;Decreased mobility; Decreased safety awareness; Impaired judgement;Decreased skin integrity;Pain;Orthopedic restrictions   Assessment Pt continue to require increased time to perform and complete all aspects of mobility  PT requires max cues for safe and proper bed mobility, transfers and ambulation techniques  Pt required total assist to don Aspen collar and mod assist to doff  Pt progressed with ambulation distances to 65'x2 with mod assist x1 and standby assist of another for safety  Pt demonstrates adducted gait, decreased balance, decreased foot clearance, ataxic gait  Pt requires verbal cues for improved quality of gait and cues to widen base of support for improved balance and stability  Pt remains at increased risk for falls  Rehab is recommended at d/c [facility] to progress mobility and maximize functional mobility and I   Goals   Patient Goals to have less pain  STG Expiration Date 05/11/18   Treatment Day 3   Plan   Treatment/Interventions Functional transfer training;LE strengthening/ROM; Elevations; Therapeutic exercise; Endurance training;Patient/family training;Equipment eval/education; Bed mobility;Gait training;Spoke to nursing;OT   Progress Progressing toward goals   PT Frequency 5x/wk;7x/wk   Recommendation   Recommendation Short-term skilled PT   PT - OK to Discharge Yes       Nubia Raman, PTA

## 2018-05-04 NOTE — PROGRESS NOTES
Progress Note - Internal Medicine   Rebel Rojas 81 y o  male MRN: [id number]  Unit/Bed#: E5 -01 Encounter: [id number]      Impression :    POD # 3, following posterior cervical laminectomy and fusion, C3/4, C7/T1 and T1/2  Degenerative disc disease C2/C3 and C3/4 and Cervical spinal stenosis  COPD with emphysema  Hypercholesterolemia  Restless leg syndrome  H/o marijuana use  Chronic herniated nucleus pulposus with spinal cord compression and flattening of C3/4  Previous work related accident on 05/21/1992  Protein calorie malnutrition  Recommendation:    Patient is medically stable at baseline and is awaiting disposition to a short-term rehab  He will need to work with Case Management to elect a suitable place to his preference for further ongoing physical therapy and occupational therapy  Continue postoperative care as recommended by Dr Smiley  He has been using [employer] without any difficulty  I also recommend that patient abstain from any substances of abuse particularly with marijuana as that might be playing some role into his weight loss  He has been benefitting with Ensure which he likes  I recommend that he continue taking it  He indicated that his primary care physician also at one time advised him to take ensure as he was losing a lot a weight  He should undergo further workup regarding weight loss as outpatient including panendoscopy is imaging studies under his primary care physician's supervision to which he fully agrees  Avoid narcotic medications, use muscle relaxants, would adjust his methocarbamol to her lower dose at 500 mg but give it more frequently q 8 hours the on a regular basis for a day or so  Hopefully that would ease his spasms which he has been having between his scapula and suprascapular area  Recheck his blood work tomorrow including CBC BMP and vitamin-D 25 hydroxy level  Subjective:     Patient seen and examined today   EMR and overnight events reviewed  Patient was reviewed earlier in the morning  He was resting in his bed he had taken his aspirin call or off  He reported that he has been having spasms in his lower neck particularly in the suprascapular and interscapular region  He has been taking pain medications which have been helping him  He feels that he is not ready to go home as he is having difficulty balancing and stabilizing himself particularly with his neck  He does not have any radicular pain as before and he feels good  He was easily able to lift his both legs above and moved his arms very easily above the level of the shoulders  He denies any dysuria frequency hesitancy denies any chest pain or palpitations  He denies any withdrawal, had been using marijuana in the past     Review of Systems     No neurological deficit in fact improvement of his radicular pain in his arms  All other systems reviewed and are negative  OBJECTIVE:     Vitals:     HR:  [] 88  Resp:  [18-20] 18  BP: (120-151)/(64-92) 151/90  SpO2:  [95 %-99 %] 98 %  Temp (24hrs), Av 7 °F (37 1 °C), Min:97 7 °F (36 5 °C), Max:99 6 °F (37 6 °C)  Current: Temperature: 98 7 °F (37 1 °C)    Intake/Output Summary (Last 24 hours) at 18 1110  Last data filed at 18 1107   Gross per 24 hour   Intake             1000 ml   Output              900 ml   Net              100 ml     Body mass index is 22 32 kg/m²  Physical Exam    Awake and alert, cooperative not in any distress  Average to low lean muscle built with mild malnutrition as evident with muscle wasting  Mild pallor, no JVP no pedal edema  Clean dressing noted in the posterior aspect of his lower cervical region without any discharge drainage or dehiscence  Clear lungs no rales or crackles  S1-S2 normal no murmurs  Scaphoid abdomen without any tenderness or hepatosplenomegaly  Lower extremity no pedal edema Homans sign is negative    Bilateral dorsalis pedis was present in both feet and good plantar flexion dorsiflexion  Good hand grasp bilaterally    No skin rashes but multiple skin tattoos unchanged from before        Results from last 7 days  Lab Units 05/02/18  0504 05/01/18  0441   WBC Thousand/uL 15 07* 17 75*   HEMOGLOBIN g/dL 14 4 13 8   PLATELETS Thousands/uL 116* 128*       Results from last 7 days  Lab Units 05/01/18  0441   SODIUM mmol/L 139   POTASSIUM mmol/L 4 2   CHLORIDE mmol/L 103   CO2 mmol/L 30   ANION GAP mmol/L 6   BUN mg/dL 10   CREATININE mg/dL 0 74   EGFR ml/min/1 73sq m 93   GLUCOSE RANDOM mg/dL 122   CALCIUM mg/dL 8 5           Current Meds:  Current Facility-Administered Medications   Medication Dose Route Frequency Provider Last Rate Last Dose    aluminum-magnesium hydroxide-simethicone (MYLANTA) 200-200-20 mg/5 mL oral suspension 15 mL  15 mL Oral Q6H PRN Deleta Constcorrine, PA-C   15 mL at 05/02/18 1305    aspirin (ECOTRIN) EC tablet 325 mg  325 mg Oral Daily Deleta Constable, PA-C   325 mg at 05/04/18 0931    azelastine (ASTELIN) 0 1 % nasal spray 2 spray  2 spray Each Nare Daily Deleta Constable, PA-C   2 spray at 05/03/18 0911    calcium carbonate (TUMS) chewable tablet 1,000 mg  1,000 mg Oral Daily PRN Deleta Constable, PA-C        calcium carbonate (TUMS) chewable tablet 500 mg  500 mg Oral PRN Deleta Constable, PA-C   500 mg at 04/30/18 2232    chlorthalidone tablet 12 5 mg  12 5 mg Oral Daily Regina Saldivar MD   12 5 mg at 05/04/18 0932    diazepam (VALIUM) tablet 10 mg  10 mg Oral HS Deleta Constable, PA-C   10 mg at 05/03/18 2126    docusate sodium (COLACE) capsule 100 mg  100 mg Oral BID Deleta Constcorrine, PA-C   100 mg at 05/04/18 3625    gabapentin (NEURONTIN) capsule 600 mg  600 mg Oral BID Deleta Constcorrine, PA-C   600 mg at 05/04/18 6882    hydrALAZINE (APRESOLINE) injection 5 mg  5 mg Intravenous Q6H PRN Regina Saldivar MD        lactated ringers infusion  125 mL/hr Intravenous Continuous Deleta Constable, PA-C 125 mL/hr at 05/04/18 0508 125 mL/hr at 05/04/18 0508    magnesium hydroxide (MILK OF MAGNESIA) 400 mg/5 mL oral suspension 30 mL  30 mL Oral Daily PRN Maureen Folsom, PA-C        methocarbamol (ROBAXIN) tablet 750 mg  750 mg Oral 4x Daily PRN Maureen Folsom, PA-C   750 mg at 05/04/18 0509    morphine injection 1 mg  1 mg Intravenous Q3H PRN Maureen Folsom, PA-C   1 mg at 05/04/18 1005    naloxone (NARCAN) 0 04 mg/mL syringe 0 04 mg  0 04 mg Intravenous Q1MIN PRN Maureen Folsom, PA-C        oxyCODONE-acetaminophen (PERCOCET) 5-325 mg per tablet 1 tablet  1 tablet Oral Q4H PRN Maureen Folsom, PA-C        oxyCODONE-acetaminophen (PERCOCET) 5-325 mg per tablet 2 tablet  2 tablet Oral Q4H PRN Maureen Folsom, PA-C   2 tablet at 05/04/18 2695    pramipexole (MIRAPEX) tablet 0 75 mg  0 75 mg Oral HS Maureen Folsom, PA-C   0 75 mg at 05/03/18 2126    pravastatin (PRAVACHOL) tablet 40 mg  40 mg Oral Daily With Muna Sports, PA-C   40 mg at 05/03/18 1555    senna (SENOKOT) tablet 8 6 mg  1 tablet Oral Daily Maureen Folsom, PA-C   8 6 mg at 05/04/18 7098    simethicone (MYLICON) chewable tablet 80 mg  80 mg Oral 4x Daily PRN Maureen Folsom, PA-C   80 mg at 05/04/18 8828     Home Meds:  Prescriptions Prior to Admission   Medication    azelastine (ASTELIN) 0 1 % nasal spray    calcium carbonate (TUMS) 500 mg chewable tablet    Cholecalciferol (VITAMIN D3) 38095 units TABS    diazepam (VALIUM) 10 mg tablet    gabapentin (NEURONTIN) 600 MG tablet    pramipexole (MIRAPEX) 0 75 MG tablet    simvastatin (ZOCOR) 20 mg tablet    tiZANidine (ZANAFLEX) 4 mg tablet         VTE Pharmacologic Prophylaxis:  as per Primary Ortho Team   VTE Mechanical Prophylaxis: sequential compression device    Portions of the record may have been created with voice recognition software  Occasional wrong word or "sound a like" substitutions may have occurred due to the inherent limitations of voice recognition software  Read the chart carefully and recognize, using context, where substitutions have occurred

## 2018-05-05 LAB
25(OH)D3 SERPL-MCNC: 79.9 NG/ML (ref 30–100)
BASOPHILS # BLD AUTO: 0.04 THOUSANDS/ΜL (ref 0–0.1)
BASOPHILS NFR BLD AUTO: 0 % (ref 0–1)
EOSINOPHIL # BLD AUTO: 0.16 THOUSAND/ΜL (ref 0–0.61)
EOSINOPHIL NFR BLD AUTO: 2 % (ref 0–6)
ERYTHROCYTE [DISTWIDTH] IN BLOOD BY AUTOMATED COUNT: 14.9 % (ref 11.6–15.1)
HCT VFR BLD AUTO: 40.4 % (ref 36.5–49.3)
HGB BLD-MCNC: 13.6 G/DL (ref 12–17)
LYMPHOCYTES # BLD AUTO: 1.68 THOUSANDS/ΜL (ref 0.6–4.47)
LYMPHOCYTES NFR BLD AUTO: 17 % (ref 14–44)
MCH RBC QN AUTO: 30.9 PG (ref 26.8–34.3)
MCHC RBC AUTO-ENTMCNC: 33.7 G/DL (ref 31.4–37.4)
MCV RBC AUTO: 92 FL (ref 82–98)
MONOCYTES # BLD AUTO: 1.22 THOUSAND/ΜL (ref 0.17–1.22)
MONOCYTES NFR BLD AUTO: 12 % (ref 4–12)
NEUTROPHILS # BLD AUTO: 6.96 THOUSANDS/ΜL (ref 1.85–7.62)
NEUTS SEG NFR BLD AUTO: 69 % (ref 43–75)
NRBC BLD AUTO-RTO: 0 /100 WBCS
PLATELET # BLD AUTO: 150 THOUSANDS/UL (ref 149–390)
PMV BLD AUTO: 14.1 FL (ref 8.9–12.7)
RBC # BLD AUTO: 4.4 MILLION/UL (ref 3.88–5.62)
WBC # BLD AUTO: 10.06 THOUSAND/UL (ref 4.31–10.16)

## 2018-05-05 PROCEDURE — 97530 THERAPEUTIC ACTIVITIES: CPT

## 2018-05-05 PROCEDURE — 97110 THERAPEUTIC EXERCISES: CPT

## 2018-05-05 PROCEDURE — 82306 VITAMIN D 25 HYDROXY: CPT | Performed by: INTERNAL MEDICINE

## 2018-05-05 PROCEDURE — 85025 COMPLETE CBC W/AUTO DIFF WBC: CPT | Performed by: INTERNAL MEDICINE

## 2018-05-05 PROCEDURE — 97116 GAIT TRAINING THERAPY: CPT

## 2018-05-05 RX ADMIN — Medication 1000 MG: at 03:33

## 2018-05-05 RX ADMIN — OXYCODONE HYDROCHLORIDE AND ACETAMINOPHEN 2 TABLET: 5; 325 TABLET ORAL at 21:37

## 2018-05-05 RX ADMIN — OXYCODONE HYDROCHLORIDE AND ACETAMINOPHEN 2 TABLET: 5; 325 TABLET ORAL at 17:33

## 2018-05-05 RX ADMIN — OXYCODONE HYDROCHLORIDE AND ACETAMINOPHEN 2 TABLET: 5; 325 TABLET ORAL at 03:35

## 2018-05-05 RX ADMIN — DIAZEPAM 10 MG: 5 TABLET ORAL at 21:36

## 2018-05-05 RX ADMIN — METHOCARBAMOL 500 MG: 500 TABLET ORAL at 13:25

## 2018-05-05 RX ADMIN — METHOCARBAMOL 500 MG: 500 TABLET ORAL at 05:19

## 2018-05-05 RX ADMIN — OXYCODONE HYDROCHLORIDE AND ACETAMINOPHEN 2 TABLET: 5; 325 TABLET ORAL at 13:24

## 2018-05-05 RX ADMIN — DOCUSATE SODIUM 100 MG: 100 CAPSULE, LIQUID FILLED ORAL at 17:34

## 2018-05-05 RX ADMIN — ASPIRIN 325 MG: 325 TABLET, COATED ORAL at 09:32

## 2018-05-05 RX ADMIN — CHLORTHALIDONE 25 MG: 25 TABLET ORAL at 09:33

## 2018-05-05 RX ADMIN — GABAPENTIN 600 MG: 300 CAPSULE ORAL at 17:34

## 2018-05-05 RX ADMIN — AZELASTINE HYDROCHLORIDE 2 SPRAY: 137 SPRAY, METERED NASAL at 09:34

## 2018-05-05 RX ADMIN — PRAMIPEXOLE DIHYDROCHLORIDE 0.75 MG: 0.25 TABLET ORAL at 21:36

## 2018-05-05 RX ADMIN — SENNOSIDES 8.6 MG: 8.6 TABLET, FILM COATED ORAL at 09:32

## 2018-05-05 RX ADMIN — OXYCODONE HYDROCHLORIDE AND ACETAMINOPHEN 2 TABLET: 5; 325 TABLET ORAL at 09:32

## 2018-05-05 RX ADMIN — PRAVASTATIN SODIUM 40 MG: 40 TABLET ORAL at 17:34

## 2018-05-05 RX ADMIN — GABAPENTIN 600 MG: 300 CAPSULE ORAL at 09:32

## 2018-05-05 RX ADMIN — METHOCARBAMOL 500 MG: 500 TABLET ORAL at 21:36

## 2018-05-05 RX ADMIN — DOCUSATE SODIUM 100 MG: 100 CAPSULE, LIQUID FILLED ORAL at 09:32

## 2018-05-05 NOTE — PROGRESS NOTES
Spoke to [facility] VELMA Abdi PA-C, patient is not going to be able to be discharge to facility because there is no bed  RN spoke to , [facility] referrals were sent and probably won't be able to hear but until Monday  Made aware and discontinue d/c orders

## 2018-05-05 NOTE — PROGRESS NOTES
POST-OP SPINE PROGRESS NOTE    Patient Name: Armando Paez  Patient MRN:  [id number]  Date of Service:  05/05/18 8:04 AM  Service: ortho      Subjective      POD#4  Postoperative course complicated by incisional pain  preop pain resolved  postop pain well controlled with meds    Objective      Physical Exam  VSS    healing well, no significant drainage, no significant erythema    grossly intact    Motor function intact 5/5 without deficits    JORDY Output none            Laboratory Studies  Recent Results (from the past 24 hour(s))   CBC and differential    Collection Time: 05/05/18  4:36 AM   Result Value Ref Range    WBC 10 06 4 31 - 10 16 Thousand/uL    RBC 4 40 3 88 - 5 62 Million/uL    Hemoglobin 13 6 12 0 - 17 0 g/dL    Hematocrit 40 4 36 5 - 49 3 %    MCV 92 82 - 98 fL    MCH 30 9 26 8 - 34 3 pg    MCHC 33 7 31 4 - 37 4 g/dL    RDW 14 9 11 6 - 15 1 %    MPV 14 1 (H) 8 9 - 12 7 fL    Platelets 320 711 - 175 Thousands/uL    nRBC 0 /100 WBCs    Neutrophils Relative 69 43 - 75 %    Lymphocytes Relative 17 14 - 44 %    Monocytes Relative 12 4 - 12 %    Eosinophils Relative 2 0 - 6 %    Basophils Relative 0 0 - 1 %    Neutrophils Absolute 6 96 1 85 - 7 62 Thousands/µL    Lymphocytes Absolute 1 68 0 60 - 4 47 Thousands/µL    Monocytes Absolute 1 22 0 17 - 1 22 Thousand/µL    Eosinophils Absolute 0 16 0 00 - 0 61 Thousand/µL    Basophils Absolute 0 04 0 00 - 0 10 Thousands/µL       Assessment / Plan:       s/p C3-T2 decompression and fusion  Encouraged to get out of bed to chair and ambulate  Plan for DC to rehab today when bed is available, awaiting rehab bed   Needs to ambulate in [place] Reed Ormond, PA-C

## 2018-05-05 NOTE — PHYSICAL THERAPY NOTE
Physical Therapy Treatment Note       05/05/18 9455   Pain Assessment   Pain Assessment 0-10   Pain Score 7   Pain Type Surgical pain   Pain Location Back;Neck   Pain Orientation Bilateral;Upper  (upper back between shoulder blades)   Patient's Stated Pain Goal No pain   Hospital Pain Intervention(s) Repositioned; Ambulation/increased activity; Emotional support; Heat applied   Response to Interventions fatigue, pain with ambulation   tolerated heat   Restrictions/Precautions   Weight Bearing Precautions Per Order No   Braces or Orthoses C/S Collar  (total assist to don and doff supine in bed )   Other Precautions Bed Alarm;Pain; Fall Risk;Spinal precautions  (c spine precutions w/ Aspen collar)   General   Chart Reviewed Yes   Response to Previous Treatment Patient with no complaints from previous session  Family/Caregiver Present Yes   Cognition   Overall Cognitive Status Impaired   Arousal/Participation Alert   Attention Attends with cues to redirect   Orientation Level Oriented to person;Oriented to place;Oriented to time   Memory Decreased short term memory;Decreased recall of precautions   Following Commands Follows one step commands with increased time or repetition   Subjective   Subjective Pt out of bed in recliner upon enterring room  Pt not wearing Aspen collar while in recliner  PT agreeable to PT  " If I continue to progress well will I be ablet o go home from here instead of going to rehab?"   Bed Mobility   Rolling R 3  Moderate assistance   Additional items Assist x 1;Bedrails; Increased time required;Verbal cues;LE management   Rolling L 3  Moderate assistance   Additional items Assist x 1;Bedrails; Increased time required;Verbal cues;LE management   Sit to Supine 3  Moderate assistance   Additional items Assist x 2; Increased time required;Verbal cues;LE management   Transfers   Sit to Stand 3  Moderate assistance  (min assist x1 initally, mod assist x2 when fatigued)   Additional items Assist x 1; Assist x 2;Armrests; Increased time required;Verbal cues   Stand to Sit 3  Moderate assistance   Additional items Assist x 1;Assist x 2;Armrests; Increased time required;Verbal cues   Stand pivot 3  Moderate assistance   Additional items Assist x 2; Increased time required;Verbal cues   Additional Comments pt performed sit to stand transfers x4 with mod assist x2 before being able to stand upright and long enough to perform stand pivot transfer from cahir to bed  Ambulation/Elevation   Gait pattern Narrow WALDEMAR; Decreased foot clearance; Ataxia; Short stride; Excessively slow; Step to;Redundant gait at times;R Knee Autumn;L Knee Autumn   Gait Assistance 3  Moderate assist  (min- mod assist x1)   Additional items Verbal cues; Assist x 1   Assistive Device Rolling walker   Distance 80'x1, 45'x1 standing rest break  Balance   Static Sitting Fair +  (supported in chair with back support and fair without )   Static Standing Poor -  (poor(+)- Poor(-)degrades with fatigue)   Ambulatory Poor   Endurance Deficit   Endurance Deficit Yes   Endurance Deficit Description pain, fatigue RUTHERFORD   Activity Tolerance   Activity Tolerance Patient limited by pain; Patient limited by fatigue  (RUTHERFORD)   Nurse Made Aware RN, [facility]   Exercises   Quad Sets 10 reps;AROM; Bilateral;Sitting  (longsitting)   Heelslides 10 reps; Sitting;AROM; Bilateral  (longsitting)   Hip Abduction Sitting;AAROM;AROM; Bilateral  (x 12 reps  longsitting)   Hip Adduction Sitting;AAROM;AROM; Bilateral  (longsitting)   Ankle Pumps Sitting;AROM; Bilateral;15 reps  (longsitting)   Assessment   Prognosis Fair   Problem List Decreased strength;Decreased endurance; Impaired balance;Decreased mobility; Decreased safety awareness; Impaired judgement;Decreased skin integrity;Pain;Orthopedic restrictions   Assessment Pt continues to require increased time to perform and complete all aspects of mobility   PT requires max cues for safe and proper bed mobility, transfers and ambulation techniques  Pt required total assist to don and doff Aspen collar due to pain and fatigue  Pt progressed with ambulation distances to 80'x1, 45'x1,with min- mod assist x1  Pt required seated rest in chair and needed to be wheeled back to room in chair due to extreme fatigue, pain and b/l le and ue weakness  PT required mod assist x2 for sit to stand and stand pivot from chiar to bed  Several attempts required until successful due to fatigue, weakness and pain  Pt demonstrates adducted gait, decreased balance, decreased foot clearance, ataxic gait  Pt requires verbal cues for improved quality of gait and cues to widen base of support for improved balance and stability  Pt remains at increased risk for falls  Pt performed b/l le arom exercises in longsitting position while out of bed in chair  Rehab is recommended at d/c [facility] to progress mobility and maximize functiona and I     Goals   Patient Goals To get better and go home  STG Expiration Date 05/11/18   Treatment Day 4   Plan   Treatment/Interventions Functional transfer training;LE strengthening/ROM; Elevations; Therapeutic exercise; Endurance training;Patient/family training;Equipment eval/education; Bed mobility;Gait training;Spoke to nursing   Progress Progressing toward goals   PT Frequency 7x/wk;5x/wk   Recommendation   Recommendation Short-term skilled PT   PT - OK to Discharge Yes       Rosalva Astorga, PTA

## 2018-05-05 NOTE — PROGRESS NOTES
Progress Note - Internal Medicine   Aki Rojas 81 y o  male MRN: [id number]  Unit/Bed#: E5 -01 Encounter: [id number]      Impression :    POD # 4, following posterior cervical laminectomy and fusion, C3/4, C7/T1 and T1/2  Degenerative disc disease C2/C3 and C3/4 and Cervical spinal stenosis  COPD with emphysema  Hypercholesterolemia  Restless leg syndrome  H/o marijuana use  Chronic herniated nucleus pulposus with spinal cord compression and flattening of C3/4  Previous work related accident on 05/21/1992  Protein calorie malnutrition  Leukocytosis improving  Recommendation:    Patient is medically stable for discharge to acute short-term rehabilitation  He will need to continue follow-up regarding his above noted medical conditions  I have advised him to abstain from any substance of abuse, minimize narcotic use, continue p r n  nonsteroidal anti-inflammatory medications, high-protein diet use of Ensure and repeating all his lab work  Full fall precautions orthostatic precautions use of Mauricio stocking and assist device as necessary  He will need quick follow-up appointment with his primary care physician and with Dr Eliceo Borges  Subjective:     Patient seen and examined today  EMR and overnight events reviewed  Patient resting comfortably not in any major distress, generally improving having mild to moderate pain occasionally but no radicular pain as he was having before  His current pain is well localized between his upper shoulder blades as well as in the lower neck  Current pain medications have been helping him  When he takes analgesics that relieves his symptoms  No weakness/ feels improving  Pain is 3/10 on NPS  Review of Systems     Constitutional:  No chills, diaphoresis, fatigue or fever  Taking ensure  HEENT: No congestion, sore throat  No visual complaints  Respiratory: No cough, chest tightness, shortness of breath and wheezing      Cardiovascular: No chest pain and palpitations  No Syncope or grey out  GI: Negative for abdominal distention, pain, constipation, diarrhea or nausea  Genitourinary: No complaints  Skin: Negative for rash  Neurological: Negative for dizziness, weakness, numbness and headaches  Hematological: Negative for spontaneous bruising or bleeding  Psychiatric: Negative for confusion, dysphoric mood, hallucinations  All other systems reviewed and are negative  OBJECTIVE:     Vitals:     HR:  [78-97] 89  Resp:  [18-20] 20  BP: (106-145)/(66-78) 106/66  SpO2:  [93 %-96 %] 93 %  Temp (24hrs), Av 8 °F (36 6 °C), Min:97 2 °F (36 2 °C), Max:98 5 °F (36 9 °C)  Current: Temperature: (!) 97 2 °F (36 2 °C)    Intake/Output Summary (Last 24 hours) at 18 1158  Last data filed at 18 0932   Gross per 24 hour   Intake                0 ml   Output             1050 ml   Net            -1050 ml     Body mass index is 22 32 kg/m²  Physical Exam    General Appearance:  Awake,alert, cooperative  Not in distress  Average built and nutrition  Lean and asthenic  Pallor / Icterus/ Cyanosis negative  Oropharynx no thrush  Tongue protrudes in midline  Head:  Normocephalic, atraumatic  No titubations  Eyes:  No eye redness or discharge bilaterally  Neck: Posterior aspect clean dressing without any discharge or drainage  ASPEN collar +   Back:   Symmetric, no kypho-scoliosis  Lungs:   B/L Clear to auscultation, no wheezing or rhonchi  Normal broncho-alveolar air entry  No pleural rubs  Heart:   Regular S1S2, A2P2 normal, no murmur or gallop  Abdomen:   Soft, non-tender,no rebound, bowel sounds+  Musculoskeletal: Extremities no cyanosis or edema  Psych: Normal Affect / No hallucinations or delusions  Neurologic:         Skin:  Endocrine:  Vascular: Awake and alert  Mentation intact  Speech is intact  Facial symmetry is maintained  Muscle strength is 5/5 in all muscle groups    Light touch and temperature sensation is maintained  No rashes /purpura  No open wounds  No thyromegaly / no lid lag  Homans sign is negative  B/L Calf are supple and non tender         Labs, Imaging, & Other studies:    All pertinent labs and imaging studies were personally reviewed    Results from last 7 days  Lab Units 05/05/18  0436 05/02/18  0504 05/01/18  0441   WBC Thousand/uL 10 06 15 07* 17 75*   HEMOGLOBIN g/dL 13 6 14 4 13 8   PLATELETS Thousands/uL 150 116* 128*       Results from last 7 days  Lab Units 05/01/18  0441   SODIUM mmol/L 139   POTASSIUM mmol/L 4 2   CHLORIDE mmol/L 103   CO2 mmol/L 30   ANION GAP mmol/L 6   BUN mg/dL 10   CREATININE mg/dL 0 74   EGFR ml/min/1 73sq m 93   GLUCOSE RANDOM mg/dL 122   CALCIUM mg/dL 8 5           Current Meds:  Current Facility-Administered Medications   Medication Dose Route Frequency Provider Last Rate Last Dose    aspirin (ECOTRIN) EC tablet 325 mg  325 mg Oral Daily Shelley Area, PA-C   325 mg at 05/05/18 0932    azelastine (ASTELIN) 0 1 % nasal spray 2 spray  2 spray Each Nare Daily Shelley Area, PA-C   2 spray at 05/05/18 1928    calcium carbonate (TUMS) chewable tablet 1,000 mg  1,000 mg Oral Daily PRN Shelley Area, PA-C   1,000 mg at 05/05/18 3883    calcium carbonate (TUMS) chewable tablet 500 mg  500 mg Oral PRN Shelley Area, PA-C   500 mg at 04/30/18 2232    chlorthalidone tablet 25 mg  25 mg Oral Daily Nakul Baptiste MD   25 mg at 05/05/18 8175    diazepam (VALIUM) tablet 10 mg  10 mg Oral HS Shelley Area, PA-C   10 mg at 05/03/18 2126    docusate sodium (COLACE) capsule 100 mg  100 mg Oral BID Shelley Area, PA-C   100 mg at 05/05/18 0932    gabapentin (NEURONTIN) capsule 600 mg  600 mg Oral BID Shelley Area, PA-C   600 mg at 05/05/18 0932    magnesium hydroxide (MILK OF MAGNESIA) 400 mg/5 mL oral suspension 30 mL  30 mL Oral Daily PRN Shelley Area, PA-C        methocarbamol (ROBAXIN) tablet 500 mg  500 mg Oral [facility] Nakul Baptiste MD   500 mg at 05/05/18 0519    morphine injection 1 mg 1 mg Intravenous Q3H PRN Stan Villarreal PA-C   1 mg at 05/04/18 1419    naloxone (NARCAN) 0 04 mg/mL syringe 0 04 mg  0 04 mg Intravenous Q1MIN PRN Stan Villarreal PA-C        oxyCODONE-acetaminophen (PERCOCET) 5-325 mg per tablet 1 tablet  1 tablet Oral Q4H PRN Stan Villarrael PA-C        oxyCODONE-acetaminophen (PERCOCET) 5-325 mg per tablet 2 tablet  2 tablet Oral Q4H PRN Stan Villarreal PA-C   2 tablet at 05/05/18 0932    pramipexole (MIRAPEX) tablet 0 75 mg  0 75 mg Oral HS Stan Villarreal PA-C   0 75 mg at 05/04/18 2133    pravastatin (PRAVACHOL) tablet 40 mg  40 mg Oral Daily With Zari Bellamy PA-C   40 mg at 05/04/18 1740    senna (SENOKOT) tablet 8 6 mg  1 tablet Oral Daily Stan Villarreal PA-C   8 6 mg at 05/05/18 0932    simethicone (MYLICON) chewable tablet 80 mg  80 mg Oral 4x Daily PRN Stan Villarreal PA-C   80 mg at 05/04/18 1555     Home Meds:  Prescriptions Prior to Admission   Medication    azelastine (ASTELIN) 0 1 % nasal spray    calcium carbonate (TUMS) 500 mg chewable tablet    Cholecalciferol (VITAMIN D3) 75576 units TABS    diazepam (VALIUM) 10 mg tablet    gabapentin (NEURONTIN) 600 MG tablet    pramipexole (MIRAPEX) 0 75 MG tablet    simvastatin (ZOCOR) 20 mg tablet    tiZANidine (ZANAFLEX) 4 mg tablet       Continuous Infusions:       Invasive Devices     Peripheral Intravenous Line            Peripheral IV 05/04/18 Left Forearm less than 1 day                VTE Pharmacologic Prophylaxis:  as per Primary Ortho Team   VTE Mechanical Prophylaxis: sequential compression device    Portions of the record may have been created with voice recognition software  Occasional wrong word or "sound a like" substitutions may have occurred due to the inherent limitations of voice recognition software  Read the chart carefully and recognize, using context, where substitutions have occurred

## 2018-05-05 NOTE — PLAN OF CARE
Problem: PHYSICAL THERAPY ADULT  Goal: Performs mobility at highest level of function for planned discharge setting  See evaluation for individualized goals  Treatment/Interventions: Functional transfer training, LE strengthening/ROM, Elevations, Therapeutic exercise, Endurance training, Patient/family training, Equipment eval/education, Bed mobility, Gait training, Compensatory technique education, Continued evaluation, Spoke to nursing, OT, Spoke to advanced practitioner (spoke with Shelley Govea, [facility])  Equipment Recommended:  (will monitor with progress )       See flowsheet documentation for full assessment, interventions and recommendations  Outcome: Progressing  Prognosis: Fair  Problem List: Decreased strength, Decreased endurance, Impaired balance, Decreased mobility, Decreased safety awareness, Impaired judgement, Decreased skin integrity, Pain, Orthopedic restrictions  Assessment: Pt continues to require increased time to perform and complete all aspects of mobility  PT requires max cues for safe and proper bed mobility, transfers and ambulation techniques  Pt required total assist to don and doff Aspen collar due to pain and fatigue  Pt progressed with ambulation distances to 80'x1, 45'x1,with min- mod assist x1  Pt required seated rest in chair and needed to be wheeled back to room in chair due to extreme fatigue, pain and b/l le and ue weakness  PT required mod assist x2 for sit to stand and stand pivot from chiar to bed  Several attempts required until successful due to fatigue, weakness and pain  Pt demonstrates adducted gait, decreased balance, decreased foot clearance, ataxic gait  Pt requires verbal cues for improved quality of gait and cues to widen base of support for improved balance and stability  Pt remains at increased risk for falls  Pt performed b/l le arom exercises in longsitting position while out of bed in chair    Rehab is recommended at d/c inorder to progress mobility and maximize functiona and I    Barriers to Discharge: Other (Comment)  Barriers to Discharge Comments: slow progress in PT  Recommendation: Short-term skilled PT     PT - OK to Discharge: Yes    See flowsheet documentation for full assessment

## 2018-05-05 NOTE — PLAN OF CARE
DISCHARGE PLANNING     Discharge to home or other facility with appropriate resources Progressing        INFECTION - ADULT     Absence or prevention of progression during hospitalization Progressing     Absence of fever/infection during neutropenic period Progressing        Knowledge Deficit     Patient/family/caregiver demonstrates understanding of disease process, treatment plan, medications, and discharge instructions Progressing        Nutrition/Hydration-ADULT     Nutrient/Hydration intake appropriate for improving, restoring or maintaining nutritional needs Progressing        PAIN - ADULT     Verbalizes/displays adequate comfort level or baseline comfort level Progressing        Potential for Falls     Patient will remain free of falls Progressing        Prexisting or High Potential for Compromised Skin Integrity     Skin integrity is maintained or improved Progressing        SAFETY ADULT     Patient will remain free of falls Progressing     Maintain or return to baseline ADL function Progressing     Maintain or return mobility status to optimal level Progressing

## 2018-05-06 RX ORDER — CHLORTHALIDONE 25 MG/1
12.5 TABLET ORAL DAILY
Status: DISCONTINUED | OUTPATIENT
Start: 2018-05-07 | End: 2018-05-09 | Stop reason: HOSPADM

## 2018-05-06 RX ORDER — CELECOXIB 100 MG/1
100 CAPSULE ORAL DAILY
Status: DISCONTINUED | OUTPATIENT
Start: 2018-05-06 | End: 2018-05-09 | Stop reason: HOSPADM

## 2018-05-06 RX ADMIN — OXYCODONE HYDROCHLORIDE AND ACETAMINOPHEN 2 TABLET: 5; 325 TABLET ORAL at 08:39

## 2018-05-06 RX ADMIN — OXYCODONE HYDROCHLORIDE AND ACETAMINOPHEN 1 TABLET: 5; 325 TABLET ORAL at 21:22

## 2018-05-06 RX ADMIN — GABAPENTIN 600 MG: 300 CAPSULE ORAL at 16:45

## 2018-05-06 RX ADMIN — OXYCODONE HYDROCHLORIDE AND ACETAMINOPHEN 2 TABLET: 5; 325 TABLET ORAL at 12:50

## 2018-05-06 RX ADMIN — PRAMIPEXOLE DIHYDROCHLORIDE 0.75 MG: 0.25 TABLET ORAL at 21:22

## 2018-05-06 RX ADMIN — PRAVASTATIN SODIUM 40 MG: 40 TABLET ORAL at 16:45

## 2018-05-06 RX ADMIN — ASPIRIN 325 MG: 325 TABLET, COATED ORAL at 08:38

## 2018-05-06 RX ADMIN — CELECOXIB 100 MG: 100 CAPSULE ORAL at 16:44

## 2018-05-06 RX ADMIN — DOCUSATE SODIUM 100 MG: 100 CAPSULE, LIQUID FILLED ORAL at 08:39

## 2018-05-06 RX ADMIN — DIAZEPAM 10 MG: 5 TABLET ORAL at 21:22

## 2018-05-06 RX ADMIN — METHOCARBAMOL 500 MG: 500 TABLET ORAL at 15:00

## 2018-05-06 RX ADMIN — METHOCARBAMOL 500 MG: 500 TABLET ORAL at 05:33

## 2018-05-06 RX ADMIN — CHLORTHALIDONE 25 MG: 25 TABLET ORAL at 08:42

## 2018-05-06 RX ADMIN — METHOCARBAMOL 500 MG: 500 TABLET ORAL at 21:22

## 2018-05-06 RX ADMIN — AZELASTINE HYDROCHLORIDE 2 SPRAY: 137 SPRAY, METERED NASAL at 08:42

## 2018-05-06 RX ADMIN — GABAPENTIN 600 MG: 300 CAPSULE ORAL at 08:39

## 2018-05-06 RX ADMIN — SENNOSIDES 8.6 MG: 8.6 TABLET, FILM COATED ORAL at 08:39

## 2018-05-06 RX ADMIN — DOCUSATE SODIUM 100 MG: 100 CAPSULE, LIQUID FILLED ORAL at 16:45

## 2018-05-06 NOTE — PROGRESS NOTES
Progress Note - Internal Medicine   Barbra Jonna Rojas 81 y o  male MRN: [id number]  Unit/Bed#: E5 -01 Encounter: [id number]      Impression :    POD # 5, s/p posterior cervical laminectomy and fusion, C3/4, C7/T1 and T1/2  Degenerative disc disease C2/C3 and C3/4 and Cervical spinal stenosis  COPD with emphysema  Hypercholesterolemia  Restless leg syndrome  H/o chronic marijuana use  Chronic herniated nucleus pulposus with spinal cord compression and flattening of C3/4  Previous work related accident on 05/21/1992  Protein calorie malnutrition  Leukocytosis improving  Recommendation:    Medically stable at baseline can be discharged to acute rehab when this is arranged by case management  Wife indicates difficulty taking care of him due to his current gait dysfunction  Minimize narcotic medication and abstain from marijuana  Use nonsteroidal anti-inflammatory medication and muscle relaxants  Patient also improved with cognitive behavior therapy as outpatient  He has chronic pain syndrome part of it might be chronic cleared behavior which can significantly improved with CBT  Multidisciplinary team approach would be helpful rather than just banking on narcotic medications  Discussed with the patient about its side effect in detail  Wife seemed to have very strong opinion regarding him using marijuana which she gave me examples help people with chronic pain, seizure disorder etc   Patient should be managed under supervised pain management service  Apparently he was seeing a physician in the past was not practice anymore  Name of physicians in [facility] area including [address] [place] [address] were given to them  Reduce the dose of chlorthalidone to 12 5 as blood pressure appears to be improving  Currently maintained on Neurontin methocarbamol 500 mg every 8 hours  Can add Celebrex 100 mg daily  Subjective:     Patient seen and examined today  EMR and overnight events reviewed    Patient is resting comfortably he has excellent appetite is improving overall  Denies any numbness or weakness  He has radiculopathy has significantly improved  He states that he overdid with therapy and is having slight discomfort in his left shoulder  He wants to take a little break from his therapy and does not want to push too hard  His neck pain is slowly improving  His wife was in the room and she had question regarding patient's chronic pain medications  She seemed to have an opinion regarding him using marijuana and did not think that was attributing to patient's current health issues  Patient is enjoying taking Ensure states that he never tried Ensure clear and feels very good about being prescribed the same  We discussed about improving his protein calorie malnutrition by taking high-protein diet including eggs lean white meats etc       Review of Systems     Constitutional: Appetite is significantly improved now he enjoys drinking Ensure  No chills, diaphoresis, fatigue or fever  HEENT: No congestion, sore throat  No visual complaints  Mild discomfort in his neck uses an Aspen collar  Respiratory: No cough, chest tightness, shortness of breath and wheezing  Cardiovascular: No chest pain and palpitations  No Syncope or grey out  GI: Negative for abdominal distention, pain, constipation, diarrhea or nausea  Genitourinary: Negative for dysuria, flank pain and urgency  Skin: Negative for rash  Neurological: Negative for weakness, numbness and headaches  Hematological: Negative for spontaneous bruising or bleeding  Psychiatric: Negative for confusion, dysphoric mood, hallucinations  All other systems reviewed and are negative         OBJECTIVE:     Vitals:     HR:  [78-99] 85  Resp:  [18-19] 18  BP: ()/(52-76) 106/76  SpO2:  [95 %-97 %] 95 %  Temp (24hrs), Av 4 °F (36 9 °C), Min:97 7 °F (36 5 °C), Max:99 °F (37 2 °C)  Current: Temperature: 99 °F (37 2 °C)    Intake/Output Summary (Last 24 hours) at 05/06/18 1540  Last data filed at 05/05/18 2100   Gross per 24 hour   Intake                0 ml   Output              300 ml   Net             -300 ml     Body mass index is 22 32 kg/m²  Physical Exam    Average built lean, multiple tattoos  Pallor is 1+, no icterus no jaundice  Neck clean dressing in the back no evidence of bleeding  Generalized muscle wasting from malnutrition  Clear lungs no rales or crackles  S1-S2 normal no murmurs  Abdomen is soft nontender no guarding  Neurologically no focal deficit at present, some limitation to movement of his left shoulder due to muscle spasm in his shoulder area  Radicular pain have significantly improved  Able to feel light touch in his both upper extremities and lower extremities  Ambulation still difficult  Unsteady and uses rolling walker        Labs, Imaging, & Other studies:    All pertinent labs and imaging studies were personally reviewed    Results from last 7 days  Lab Units 05/05/18  0436 05/02/18  0504 05/01/18  0441   WBC Thousand/uL 10 06 15 07* 17 75*   HEMOGLOBIN g/dL 13 6 14 4 13 8   PLATELETS Thousands/uL 150 116* 128*       Results from last 7 days  Lab Units 05/01/18  0441   SODIUM mmol/L 139   POTASSIUM mmol/L 4 2   CHLORIDE mmol/L 103   CO2 mmol/L 30   ANION GAP mmol/L 6   BUN mg/dL 10   CREATININE mg/dL 0 74   EGFR ml/min/1 73sq m 93   GLUCOSE RANDOM mg/dL 122   CALCIUM mg/dL 8 5           Current Meds:  Current Facility-Administered Medications   Medication Dose Route Frequency Provider Last Rate Last Dose    aspirin (ECOTRIN) EC tablet 325 mg  325 mg Oral Daily Darwin Hay, PA-C   325 mg at 05/06/18 5082    azelastine (ASTELIN) 0 1 % nasal spray 2 spray  2 spray Each Nare Daily Darwin Hay, PA-C   2 spray at 05/06/18 5056    calcium carbonate (TUMS) chewable tablet 1,000 mg  1,000 mg Oral Daily PRN Darwin Hay, PA-C   1,000 mg at 05/05/18 5803    calcium carbonate (TUMS) chewable tablet 500 mg  500 mg Oral PRN Anice Haagensen SIMEON Baer   500 mg at 04/30/18 2232    [START ON 5/7/2018] chlorthalidone tablet 12 5 mg  12 5 mg Oral Daily Tom Cabrera MD        diazepam (VALIUM) tablet 10 mg  10 mg Oral HS Tod Garcia PA-C   10 mg at 05/05/18 2136    docusate sodium (COLACE) capsule 100 mg  100 mg Oral BID Tod Garcia PA-C   100 mg at 05/06/18 5636    gabapentin (NEURONTIN) capsule 600 mg  600 mg Oral BID BRUNA DumontC   600 mg at 05/06/18 9428    magnesium hydroxide (MILK OF MAGNESIA) 400 mg/5 mL oral suspension 30 mL  30 mL Oral Daily PRN Tod Garcia PA-C        methocarbamol (ROBAXIN) tablet 500 mg  500 mg Oral [facility] Tom Cabrera MD   500 mg at 05/06/18 1500    morphine injection 1 mg  1 mg Intravenous Q3H PRN Tod Garcia PA-C   1 mg at 05/04/18 1419    naloxone (NARCAN) 0 04 mg/mL syringe 0 04 mg  0 04 mg Intravenous Q1MIN PRN Tod Garcia PA-C        oxyCODONE-acetaminophen (PERCOCET) 5-325 mg per tablet 1 tablet  1 tablet Oral Q4H PRN Tod Garcia PA-C        oxyCODONE-acetaminophen (PERCOCET) 5-325 mg per tablet 2 tablet  2 tablet Oral Q4H PRN Tod Garcia PA-C   2 tablet at 05/06/18 1250    pramipexole (MIRAPEX) tablet 0 75 mg  0 75 mg Oral HS Tod Garcia PA-C   0 75 mg at 05/05/18 2136    pravastatin (PRAVACHOL) tablet 40 mg  40 mg Oral Daily With Bossman Dominguez PA-C   40 mg at 05/05/18 1734    senna (SENOKOT) tablet 8 6 mg  1 tablet Oral Daily Tod Garcia PA-C   8 6 mg at 05/06/18 6476    simethicone (MYLICON) chewable tablet 80 mg  80 mg Oral 4x Daily PRN Tod Garcia PA-C   80 mg at 05/04/18 1942     Home Meds:  Prescriptions Prior to Admission   Medication    azelastine (ASTELIN) 0 1 % nasal spray    calcium carbonate (TUMS) 500 mg chewable tablet    Cholecalciferol (VITAMIN D3) 68884 units TABS    diazepam (VALIUM) 10 mg tablet    gabapentin (NEURONTIN) 600 MG tablet    pramipexole (MIRAPEX) 0 75 MG tablet    simvastatin (ZOCOR) 20 mg tablet    tiZANidine (ZANAFLEX) 4 mg tablet     VTE Pharmacologic Prophylaxis:  as per Primary Ortho Team   VTE Mechanical Prophylaxis: sequential compression device    Portions of the record may have been created with voice recognition software  Occasional wrong word or "sound a like" substitutions may have occurred due to the inherent limitations of voice recognition software  Read the chart carefully and recognize, using context, where substitutions have occurred

## 2018-05-06 NOTE — PROGRESS NOTES
POST-OP SPINE PROGRESS NOTE    Patient Name: Mariaa Hernandez  Patient MRN:  [id number]  Date of Service:  05/06/18 7:41 AM  Service: Spine      Subjective      Resting in bed appears comfortable  Findings:doing well postop  Complains of no complaints  preop pain resolved  postop pain well controlled with meds    Objective      Physical Exam  Temp:  [97 2 °F (36 2 °C)-98 5 °F (36 9 °C)] 98 5 °F (36 9 °C)  HR:  [78-99] 99  Resp:  [18-20] 18  BP: ()/(52-74) 127/74    Incision: clean, dry and intact, no erythema and no significant drainage    Sensation grossly intact    Motor function intact 5/5 without deficits BUE    Aspen collar in place      Laboratory Studies  No results found for this or any previous visit (from the past 24 hour(s))  Assessment / Plan:     POD #5 s/p Posterior spinal fusion C 3-T2  Mobilize with PT/OT  Activity as tolerated in aspen collar  DVT Prophylaxis  daily  Pain medication Oxycodone 5mg  Discharge planning   Follow up in the office in 2 weeks as instructed  Awaiting rehab placement        Orlando Suggs PA-C

## 2018-05-06 NOTE — PROGRESS NOTES
Counseling / Coordination of Care    Total time spent today 35 minutes  Greater than 50% of total time was spent with the patient and / or family counseling and / or coordination of care  A description of the counseling / coordination of care: Review of pertinent clinical studies and discussion of treatment plans, chronic use of marijuana which he has been using, long-term side effects, protein calorie malnutrition, brain plasticity with chronic narcotic dependence

## 2018-05-07 PROCEDURE — 97530 THERAPEUTIC ACTIVITIES: CPT

## 2018-05-07 PROCEDURE — 97110 THERAPEUTIC EXERCISES: CPT

## 2018-05-07 PROCEDURE — 92610 EVALUATE SWALLOWING FUNCTION: CPT

## 2018-05-07 PROCEDURE — 97535 SELF CARE MNGMENT TRAINING: CPT

## 2018-05-07 RX ADMIN — AZELASTINE HYDROCHLORIDE 2 SPRAY: 137 SPRAY, METERED NASAL at 08:51

## 2018-05-07 RX ADMIN — MAGNESIUM HYDROXIDE 30 ML: 400 SUSPENSION ORAL at 08:52

## 2018-05-07 RX ADMIN — PRAVASTATIN SODIUM 40 MG: 40 TABLET ORAL at 17:27

## 2018-05-07 RX ADMIN — PRAMIPEXOLE DIHYDROCHLORIDE 0.75 MG: 0.25 TABLET ORAL at 21:50

## 2018-05-07 RX ADMIN — GABAPENTIN 600 MG: 300 CAPSULE ORAL at 08:51

## 2018-05-07 RX ADMIN — SENNOSIDES 8.6 MG: 8.6 TABLET, FILM COATED ORAL at 08:51

## 2018-05-07 RX ADMIN — METHOCARBAMOL 500 MG: 500 TABLET ORAL at 06:07

## 2018-05-07 RX ADMIN — DOCUSATE SODIUM 100 MG: 100 CAPSULE, LIQUID FILLED ORAL at 08:51

## 2018-05-07 RX ADMIN — DOCUSATE SODIUM 100 MG: 100 CAPSULE, LIQUID FILLED ORAL at 17:26

## 2018-05-07 RX ADMIN — METHOCARBAMOL 500 MG: 500 TABLET ORAL at 14:15

## 2018-05-07 RX ADMIN — OXYCODONE HYDROCHLORIDE AND ACETAMINOPHEN 1 TABLET: 5; 325 TABLET ORAL at 08:51

## 2018-05-07 RX ADMIN — OXYCODONE HYDROCHLORIDE AND ACETAMINOPHEN 1 TABLET: 5; 325 TABLET ORAL at 17:27

## 2018-05-07 RX ADMIN — DIAZEPAM 10 MG: 5 TABLET ORAL at 21:49

## 2018-05-07 RX ADMIN — ASPIRIN 325 MG: 325 TABLET, COATED ORAL at 08:51

## 2018-05-07 RX ADMIN — METHOCARBAMOL 500 MG: 500 TABLET ORAL at 21:49

## 2018-05-07 RX ADMIN — GABAPENTIN 600 MG: 300 CAPSULE ORAL at 17:26

## 2018-05-07 RX ADMIN — CELECOXIB 100 MG: 100 CAPSULE ORAL at 08:52

## 2018-05-07 RX ADMIN — OXYCODONE HYDROCHLORIDE AND ACETAMINOPHEN 1 TABLET: 5; 325 TABLET ORAL at 04:05

## 2018-05-07 RX ADMIN — CHLORTHALIDONE 12.5 MG: 25 TABLET ORAL at 08:52

## 2018-05-07 NOTE — NUTRITION
Could not get in contact with provider to get discharge orders cancelled  Case management still working on placement to [facility] facility due to insurance issues and cannot be discharged at this time

## 2018-05-07 NOTE — PLAN OF CARE
Problem: PHYSICAL THERAPY ADULT  Goal: Performs mobility at highest level of function for planned discharge setting  See evaluation for individualized goals  Treatment/Interventions: Functional transfer training, LE strengthening/ROM, Elevations, Therapeutic exercise, Endurance training, Patient/family training, Equipment eval/education, Bed mobility, Gait training, Compensatory technique education, Continued evaluation, Spoke to nursing, OT, Spoke to advanced practitioner (spoke with Rasheeda Sanchez, [facility])  Equipment Recommended:  (will monitor with progress )       See flowsheet documentation for full assessment, interventions and recommendations  Outcome: Not Progressing  Prognosis: Fair  Problem List: Decreased strength, Decreased range of motion, Decreased endurance, Impaired balance, Decreased mobility, Decreased safety awareness, Impaired judgement, Decreased cognition, Decreased skin integrity, Orthopedic restrictions, Pain, Impaired sensation, Impaired tone  Assessment: Pt  supine in bed upon my arrival  Currently C-collar is donned while supine continues to require complete A with don/doffing of C-collar  Performance of HEP supine in bed with cues provided for proper technique  Pt  required additional time for participation in therapy this treatment session due to increased pain  Progressed with log roll transfers, requiring several attempts for proper completion with cueing provided for proper technique  Positioned seated at EOB, attempted to perform OOB mobility with pt  however refused due to increased pain requesting to return back to bed  Repositioned supine in bed with alarm active  Pt  required additional time for emotional support due to increased pain  PT will continue to recommend STR upon d/c for continued improvement of noted impairments above  Barriers to Discharge:  Other (Comment)  Barriers to Discharge Comments: medical  Recommendation: Short-term skilled PT     PT - OK to Discharge: Yes (if d/c to STR when medically stable )    See flowsheet documentation for full assessment

## 2018-05-07 NOTE — PLAN OF CARE
Problem: OCCUPATIONAL THERAPY ADULT  Goal: Performs self-care activities at highest level of function for planned discharge setting  See evaluation for individualized goals  Treatment Interventions: ADL retraining, Functional transfer training, Endurance training, UE strengthening/ROM, Patient/family training, Equipment evaluation/education, Compensatory technique education, Energy conservation, Activityengagement, Cognitive reorientation          See flowsheet documentation for full assessment, interventions and recommendations  Outcome: Progressing  Limitation: Decreased ADL status, Decreased UE strength, Decreased Safe judgement during ADL, Decreased endurance, Decreased cognition, Decreased self-care trans, Decreased high-level ADLs, Decreased sensation (increased pain)  Prognosis: Good  Assessment: Pt was seen for skilled OT with focus on completion of bed mobility, review of back safety, LE dressing with use of AE and review of current plan of care  Max A with step by step verbal cues required to complete log roll  Tech to achieve EOB positioning  Limited strength noted to push up from bed surface with BLE  Able to sit EOB for 10 mins with moderate reassurance and emotional support provided  Pt unable to return demonstration of use of AE at EOB due to noted pain levels  Further review will be required  Min A overall for sit to stand at [employer]  Increased pain noted with activity  Min A overall for functional transfer  Poor activity tolerance noted due to pain levels  F balance with support of RW  See above levels of A required for all functional tasks  Pt with increased pain noted warranting return to supine positioning  Pt will require further rehab with focus on achieving optimal performance levels with all functional tasks        OT Discharge Recommendation: Short Term Rehab  OT - OK to Discharge: Yes (rehab)      Comments: LORENA Kolb

## 2018-05-07 NOTE — SOCIAL WORK
Contacted [place] to follow-up on information for auth   Virginia Gomez [phone] was contacted and 2 messages have been left already  Pt is clinically accepted to [facility] just pending verification from worker's comp [id number] [facility] [place] [id number]    Will continue to follow-up

## 2018-05-07 NOTE — SPEECH THERAPY NOTE
Speech Language/Pathology  Speech/Language Pathology  Assessment    Patient Name: Lety Angelo  PENEK'V Date: 5/7/2018     Problem List  Patient Active Problem List   Diagnosis    Spinal stenosis of cervical region     Past Medical History  Past Medical History:   Diagnosis Date    Arthritis     Back pain     Cervical pain (neck)     COPD (chronic obstructive pulmonary disease) ([address] )     History of transfusion     Age 8 after bleeding post tonsillectomy    Hyperlipidemia     Mild heartburn     Neck pain     Numbness     Left hand and arm    Scratches     Multiple on limbs and hands    Wears dentures     Upper and lower  Has 4 pins on bottom to hold bottom denture     Past Surgical History  Past Surgical History:   Procedure Laterality Date    COLONOSCOPY      ESOPHAGOGASTRODUODENOSCOPY      EYE SURGERY      Bilateral implants to correct vision    WI ANTERIOR INSTRUMENTATION 2-3 VERTEBRAL SEGMENTS N/A 4/12/2018    Procedure: FUSION CERVICAL ANTERIOR W DISCECTOMY C3/4, C7/T1;  Surgeon: Alicia Walsh MD;  [facility];  Service: Orthopedics    WI ARTHRODESIS POSTERIOR/POSTERIORLATERAL CERVICAL BELOW C2 N/A 4/30/2018    Procedure: POSSIBLE C3-4, C4/5 LAMINECTOMY;  Surgeon: Alicia Walsh MD;  [facility];  Service: Orthopedics    WI ARTHRODESIS POSTERIOR/POSTEROLATERAL THORACIC N/A 4/30/2018    Procedure: C3-T2 PSF With Instrumentation;  Surgeon: Alicia Walsh MD;  [facility];  Service: Orthopedics    WI EXCIS CERV DISK,ONE LEVEL N/A 4/30/2018    Procedure: C 6-7 Left HEMILAMINOTOMY AND FORAMINOTOMY C7-T2 LAMINECTOMY C7-T2 Left Foraminotomy;  Surgeon: Alicia Walsh MD;  [facility];  Service: Orthopedics    TONSILLECTOMY         Per interim H&P  HISTORY OF PRESENT ILLNESS:  Reason for Consult:  Post operative management following stage II cervical spine surgery earlier today by Dr Triny Quiros    HPI: Lety Angelo is a 81 y o  male, who had initial date of injury on 05/21/1992 when he was working as a   Patient reportedly was walking to where he worked and he caught his left foot on a slab and fell onto his left shoulder and fractured his ankle  Patient was treated that time by Dr Felipe Peng from [id number], at that time he was told that he would require a 6-7 operation and there was a 50% chance that he could be paralyzed  Patient has been having pain off and on he has been out of work since his accident of 14  He has been followed by Dr Christopher Carson who diagnosed him to have work min related accident with cervicalgia and cervical radiculopathy with degeneration of cervical intervertebral discs and myelomalacia  Patient had initial surgery done and was now admitted for 2nd stage  Patient is currently having moderate pain he is on a patient-controlled analgesia  He denies any respiratory difficulty denies any choking or coughing denies any chest pain etc     5/6 note:  Impression :  POD # 5, s/p posterior cervical laminectomy and fusion, C3/4, C7/T1 and T1/2  Degenerative disc disease C2/C3 and C3/4 and Cervical spinal stenosis  COPD with emphysema  Hypercholesterolemia  Restless leg syndrome  H/o chronic marijuana use  Chronic herniated nucleus pulposus with spinal cord compression and flattening of C3/4  Previous work related accident on 05/21/1992  Protein calorie malnutrition  Leukocytosis improving  Recommendation:  Medically stable at baseline can be discharged to acute rehab when this is arranged by case management  Wife indicates difficulty taking care of him due to his current gait dysfunction  Minimize narcotic medication and abstain from marijuana  Use nonsteroidal anti-inflammatory medication and muscle relaxants  Patient also improved with cognitive behavior therapy as outpatient  He has chronic pain syndrome part of it might be chronic cleared behavior which can significantly improved with CBT    Multidisciplinary team approach would be helpful rather than just banking on narcotic medications  Discussed with the patient about its side effect in detail  Wife seemed to have very strong opinion regarding him using marijuana which she gave me examples help people with chronic pain, seizure disorder etc   Patient should be managed under supervised pain management service  Apparently he was seeing a physician in the past was not practice anymore  Name of physicians in [facility] area including [facility] were given to them  Summary:  Pt presents w/ no s/s dysphagia  Pt also denies any swallowing difficulty  Recommendations:  Diet: regular as tolerated  Liquid:thin  Meds:as tolerated/desired  Supervision: assist as needed  Positioning:Upright  Strategies: Pt to take PO/Meds only when fully alert and upright  Oral care  Aspiration precautions  Reflux precautions    Eval only, No f/u tx indicated  Reason for consult:  R/o aspiration  Determine safest and least restrictive diet  Poor appetite    Current diet:  regular  Premorbid diet[de-identified]  regular  Previous VBS:  none  O2 requirement:  none  Voice/Speech:  wnl  Social:  Home w/ wife  Follows commands:  yes                        Cognitive Status:  A&O  Oral mech exam:  Upper plate  Lower plate    Items administered:  Entire thin liquid ensure while lying flat  consecutive sips  (seated ~ 70 degrees initially  He then reported pain and wanted to lie down)    Oral stage: WNL  Pharyngeal stage:   WNL  Swallow promptness:prompt  Laryngeal rise:wnl  Wet voice:no  Throat clear:none  Cough:none  Secondary swallows:none  Audible swallows: no  No s/s aspiration:none    Esophageal stage:  h/o mild heartburn    Results d/w:  Pt, nursing

## 2018-05-07 NOTE — PHYSICAL THERAPY NOTE
Physical Therapy Progress Note     05/07/18 8905   Pain Assessment   Pain Assessment 0-10   Pain Score Worst Possible Pain   Pain Type Surgical pain   Pain Location Back; Chest;Shoulder;Neck   Pain Orientation Bilateral   Hospital Pain Intervention(s) Ambulation/increased activity;Repositioned   Response to Interventions Tolerated  Precautions   Spinal Precautions (c-spine precautions)   Restrictions/Precautions   Weight Bearing Precautions Per Order No   Braces or Orthoses C/S Collar  (aspen collar)   Other Precautions Bed Alarm;Cognitive; Fall Risk;Pain;Multiple lines  (cervical precautions, apsen collar)   General   Chart Reviewed Yes   Response to Previous Treatment Patient reporting fatigue but able to participate  Family/Caregiver Present Yes  (Pt's spouse present during treatment session)   Subjective   Subjective Willing to participate in therapy this PM    Bed Mobility   Rolling R 3  Moderate assistance   Additional items Assist x 2;Bedrails; Increased time required;Verbal cues;LE management   Supine to Sit 3  Moderate assistance   Additional items Assist x 2;HOB elevated; Bedrails; Increased time required;Leg ;Verbal cues;LE management   Sit to Supine 3  Moderate assistance   Additional items Assist x 2;Bedrails; Increased time required;Verbal cues;LE management;Leg    Additional Comments Performance of log rolling   Balance   Static Sitting Fair +   Dynamic Sitting Fair   Endurance Deficit   Endurance Deficit Yes   Endurance Deficit Description pain/fatigue   Activity Tolerance   Activity Tolerance Patient limited by fatigue;Patient limited by pain   Nurse Made Aware Yes   Exercises   TKR Supine;10 reps;AAROM; Bilateral   Assessment   Prognosis Fair   Problem List Decreased strength;Decreased range of motion;Decreased endurance; Impaired balance;Decreased mobility; Decreased safety awareness; Impaired judgement;Decreased cognition;Decreased skin integrity;Orthopedic restrictions;Pain; Impaired sensation; Impaired tone   Assessment Pt  supine in bed upon my arrival  Currently C-collar is donned while supine continues to require complete A with don/doffing of C-collar  Performance of HEP supine in bed with cues provided for proper technique  Pt  required additional time for participation in therapy this treatment session due to increased pain  Progressed with log roll transfers, requiring several attempts for proper completion with cueing provided for proper technique  Positioned seated at EOB, attempted to perform OOB mobility with pt  however refused due to increased pain requesting to return back to bed  Repositioned supine in bed with alarm active  Pt  required additional time for emotional support due to increased pain  PT will continue to recommend STR upon d/c for continued improvement of noted impairments above  Barriers to Discharge Comments medical   Goals   Patient Goals To have less pain  STG Expiration Date 05/11/18   Treatment Day 5   Plan   Treatment/Interventions Functional transfer training;LE strengthening/ROM; Therapeutic exercise; Endurance training;Bed mobility;Spoke to nursing;Spoke to case management;OT;Family   Progress Slow progress, decreased activity tolerance   PT Frequency 5x/wk;7x/wk   Recommendation   Recommendation Short-term skilled PT   Equipment Recommended Walker  (RW)   PT - OK to Discharge Yes  (if d/c to STR when medically stable )     Grace Marcio, PTA

## 2018-05-07 NOTE — OCCUPATIONAL THERAPY NOTE
Occupational Therapy Treatment Note:         05/07/18 8810   ADL   Where Assessed Edge of bed   Grooming Assistance 4  Minimal Assistance   UB Bathing Assistance 3  Moderate Assistance   UB Bathing Comments Limited reach with BUE due to noted pain  LUE increased weakness  LB Bathing Assistance 3  Moderate Assistance   LB Bathing Deficit Setup;Steadying;Verbal cueing;Supervision/safety; Increased time to complete; Buttocks; Perineal area   LB Bathing Comments Pt with limited stand tolerance  LB Dressing Assistance 2  Maximal Assistance   LB Dressing Deficit Setup;Steadying; Requires assistive device for steadying;Verbal cueing; Increased time to complete;Supervision/safety; Thread LLE into pants; Thread RLE into pants;Pull up over hips;Don/doff L sock; Don/doff R sock; Use of adaptive equipment   LB Dressing Comments Pt will benefit form further review of AE for LE dressing  Toileting Assistance  2  Maximal Assistance   Toileting Deficit Setup;Steadying;Verbal cueing;Supervison/safety; Increased time to complete;Clothing management up;Clothing management down;Perineal hygiene; Bedside commode   Toileting Comments Pt will require further review  Limited balance due to pain  Unable to release RW for clothing management  Functional Standing Tolerance   Time 3 mins   Activity functional transfers  Comments Cues to engage in activities required  Bed Mobility   Supine to Sit 2  Maximal assistance   Additional items Assist x 1; Increased time required;Verbal cues;LE management   Sit to Supine 3  Moderate assistance   Additional items Assist x 1; Increased time required;Verbal cues;LE management   Additional Comments Pt unable to push self from bed surface with LUE due to noted weakness  Transfers   Sit to Stand 4  Minimal assistance   Additional items Assist x 1; Increased time required;Verbal cues   Stand to Sit 4  Minimal assistance   Additional items Assist x 1; Increased time required;Verbal cues   Functional Mobility   Functional Mobility 3  Moderate assistance   Additional Comments x1   Additional items Rolling walker   Cognition   Overall Cognitive Status Impaired   Arousal/Participation Alert; Responsive   Attention Attends with cues to redirect   Orientation Level Oriented X4   Memory Decreased recall of precautions   Following Commands Follows multistep commands inconsistently   Comments Moderate reassurance required to engage in activities  Activity Tolerance   Activity Tolerance Patient limited by fatigue;Patient limited by pain   Medical Staff Made Aware Reported all findings to nursing staff  Assessment   Assessment Pt was seen for skilled OT with focus on completion of bed mobility, review of back safety, LE dressing with use of AE and review of current plan of care  Max A with step by step verbal cues required to complete log roll  Tech to achieve EOB positioning  Limited strength noted to push up from bed surface with BLE  Able to sit EOB for 10 mins with moderate reassurance and emotional support provided  Pt unable to return demonstration of use of AE at EOB due to noted pain levels  Further review will be required  Min A overall for sit to stand at [facility]  Increased pain noted with activity  Min A overall for functional transfer  Poor activity tolerance noted due to pain levels  F balance with support of RW  See above levels of A required for all functional tasks  Pt with increased pain noted warranting return to supine positioning  Pt will require further rehab with focus on achieving optimal performance levels with all functional tasks  Plan   Treatment Interventions ADL retraining;Functional transfer training; Endurance training;UE strengthening/ROM   Goal Expiration Date 05/11/18   Treatment Day 4   OT Frequency 3-5x/wk   Recommendation   OT Discharge Recommendation Short Term Rehab   Equipment Recommended Bedside commode   Barthel Index   Feeding 5   Bathing 0   Grooming Score 5   Dressing Score 5 Bladder Score 10   Bowels Score 10   Toilet Use Score 5   Transfers (Bed/Chair) Score 5   Mobility (Level Surface) Score 0   Stairs Score 0   Barthel Index Score 45   Modified Rory Scale   Modified Rory Scale 4   Felice Kim, [id number]

## 2018-05-07 NOTE — PLAN OF CARE
Problem: SLP ADULT - SWALLOWING, IMPAIRED  Goal: Initial SLP swallow eval performed  Outcome: Completed Date Met: 05/07/18

## 2018-05-07 NOTE — NURSING NOTE
Patient unable to be discharged today per STR unable to accept him due to insurance  Discussed with case management who is working on the situation  Was not able to get in contact with those who put in the discharge orders to get approval to cancel those orders  Patient cannot be discharged until placement is set up

## 2018-05-07 NOTE — PLAN OF CARE
Problem: PAIN - ADULT  Goal: Verbalizes/displays adequate comfort level or baseline comfort level  Interventions:  - Encourage patient to monitor pain and request assistance  - Assess pain using appropriate pain scale  - Administer analgesics based on type and severity of pain and evaluate response  - Implement non-pharmacological measures as appropriate and evaluate response  - Consider cultural and social influences on pain and pain management  - Notify physician/advanced practitioner if interventions unsuccessful or patient reports new pain   Outcome: Progressing      Problem: INFECTION - ADULT  Goal: Absence or prevention of progression during hospitalization  INTERVENTIONS:  - Assess and monitor for signs and symptoms of infection  - Monitor lab/diagnostic results  - Monitor all insertion sites, i e  indwelling lines, tubes, and drains  - Monitor endotracheal (as able) and nasal secretions for changes in amount and color  - [place] appropriate cooling/warming therapies per order  - Administer medications as ordered  - Instruct and encourage patient and family to use good hand hygiene technique  - Identify and instruct in appropriate isolation precautions for identified infection/condition   Outcome: Progressing    Goal: Absence of fever/infection during neutropenic period  INTERVENTIONS:  - Monitor WBC  - Implement neutropenic guidelines   Outcome: Progressing      Problem: SAFETY ADULT  Goal: Patient will remain free of falls  INTERVENTIONS:  - Assess patient frequently for physical needs  -  Identify cognitive and physical deficits and behaviors that affect risk of falls    -  [place] fall precautions as indicated by assessment   - Educate patient/family on patient safety including physical limitations  - Instruct patient to call for assistance with activity based on assessment  - Modify environment to reduce risk of injury  - Consider OT/PT consult to assist with strengthening/mobility    Outcome: Progressing    Goal: Maintain or return to baseline ADL function  INTERVENTIONS:  -  Assess patient's ability to carry out ADLs; assess patient's baseline for ADL function and identify physical deficits which impact ability to perform ADLs (bathing, care of mouth/teeth, toileting, grooming, dressing, etc )  - Assess/evaluate cause of self-care deficits   - Assess range of motion  - Assess patient's mobility; develop plan if impaired  - Assess patient's need for assistive devices and provide as appropriate  - Encourage maximum independence but intervene and supervise when necessary  ¯ Involve family in performance of ADLs  ¯ Assess for home care needs following discharge   ¯ Request OT consult to assist with ADL evaluation and planning for discharge  ¯ Provide patient education as appropriate   Outcome: Progressing    Goal: Maintain or return mobility status to optimal level  INTERVENTIONS:  - Assess patient's baseline mobility status (ambulation, transfers, stairs, etc )    - Identify cognitive and physical deficits and behaviors that affect mobility  - Identify mobility aids required to assist with transfers and/or ambulation (gait belt, sit-to-stand, lift, walker, cane, etc )  - [place] fall precautions as indicated by assessment  - Record patient progress and toleration of activity level on Mobility SBAR; progress patient to next Phase/Stage  - Instruct patient to call for assistance with activity based on assessment  - Request Rehabilitation consult to assist with strengthening/weightbearing, etc    Outcome: Progressing      Problem: DISCHARGE PLANNING  Goal: Discharge to home or other facility with appropriate resources  INTERVENTIONS:  - Identify barriers to discharge w/patient and caregiver  - Arrange for needed discharge resources and transportation as appropriate  - Identify discharge learning needs (meds, wound care, etc )  - Arrange for interpretive services to assist at discharge as needed  - Refer to Case Management Department for coordinating discharge planning if the patient needs post-hospital services based on physician/advanced practitioner order or complex needs related to functional status, cognitive ability, or social support system   Outcome: Progressing      Problem: Knowledge Deficit  Goal: Patient/family/caregiver demonstrates understanding of disease process, treatment plan, medications, and discharge instructions  Complete learning assessment and assess knowledge base  Interventions:  - Provide teaching at level of understanding  - Provide teaching via preferred learning methods   Outcome: Progressing      Problem: Potential for Falls  Goal: Patient will remain free of falls  INTERVENTIONS:  - Assess patient frequently for physical needs  -  Identify cognitive and physical deficits and behaviors that affect risk of falls  -  [place] fall precautions as indicated by assessment   - Educate patient/family on patient safety including physical limitations  - Instruct patient to call for assistance with activity based on assessment  - Modify environment to reduce risk of injury  - Consider OT/PT consult to assist with strengthening/mobility    Outcome: Progressing      Problem: Nutrition/Hydration-ADULT  Goal: Nutrient/Hydration intake appropriate for improving, restoring or maintaining nutritional needs  Monitor and assess patient's nutrition/hydration status for malnutrition (ex- brittle hair, bruises, dry skin, pale skin and conjunctiva, muscle wasting, smooth red tongue, and disorientation)  Collaborate with interdisciplinary team and initiate plan and interventions as ordered  Monitor patient's weight and dietary intake as ordered or per policy  Utilize nutrition screening tool and intervene per policy  Determine patient's food preferences and provide high-protein, high-caloric foods as appropriate       INTERVENTIONS:  - Monitor oral intake, urinary output, labs, and treatment plans  - Assess nutrition and hydration status and recommend course of action  - Evaluate amount of meals eaten  - Assist patient with eating if necessary   - Allow adequate time for meals  - Recommend/ encourage appropriate diets, oral nutritional supplements, and vitamin/mineral supplements  - Order, calculate, and assess calorie counts as needed  - Recommend, monitor, and adjust tube feedings and TPN/PPN based on assessed needs  - Assess need for intravenous fluids  - Provide specific nutrition/hydration education as appropriate  - Include patient/family/caregiver in decisions related to nutrition   Outcome: Progressing      Problem: DISCHARGE PLANNING - CARE MANAGEMENT  Goal: Discharge to post-acute care or home with appropriate resources  INTERVENTIONS:  - Conduct assessment to determine patient/family and health care team treatment goals, and need for post-acute services based on payer coverage, community resources, and patient preferences, and barriers to discharge  - Address psychosocial, clinical, and financial barriers to discharge as identified in assessment in conjunction with the patient/family and health care team  - Arrange appropriate level of post-acute services according to patients   needs and preference and payer coverage in collaboration with the physician and health care team  - Communicate with and update the patient/family, physician, and health care team regarding progress on the discharge plan  - Arrange appropriate transportation to post-acute venues   Outcome: Progressing      Problem: Prexisting or High Potential for Compromised Skin Integrity  Goal: Skin integrity is maintained or improved  INTERVENTIONS:  - Identify patients at risk for skin breakdown  - Assess and monitor skin integrity  - Assess and monitor nutrition and hydration status  - Monitor labs (i e  albumin)  - Assess for incontinence   - Turn and reposition patient  - Assist with mobility/ambulation  - Relieve pressure over bony prominences  - Avoid friction and shearing  - Provide appropriate hygiene as needed including keeping skin clean and dry  - Evaluate need for skin moisturizer/barrier cream  - Collaborate with interdisciplinary team (i e  Nutrition, Rehabilitation, etc )   - Patient/family teaching   Outcome: Progressing

## 2018-05-07 NOTE — SOCIAL WORK
Spoke with Martha Jones from [facility] who stated she would need to review his old records to determine if his current condition would be related to his worker's comp claim in order to determine if rehab would be covered under [facility]  She will call back once she reviews everything  Updated PT/OT notes were faxed over to her as requested  Pt informed CM is awaiting from [facility]  Will continue to follow-up

## 2018-05-08 ENCOUNTER — APPOINTMENT (INPATIENT)
Dept: RADIOLOGY | Facility: HOSPITAL | Age: 73
DRG: 460 | End: 2018-05-08
Payer: MEDICARE

## 2018-05-08 PROCEDURE — 97116 GAIT TRAINING THERAPY: CPT

## 2018-05-08 PROCEDURE — 71046 X-RAY EXAM CHEST 2 VIEWS: CPT

## 2018-05-08 PROCEDURE — 93005 ELECTROCARDIOGRAM TRACING: CPT

## 2018-05-08 PROCEDURE — 97530 THERAPEUTIC ACTIVITIES: CPT

## 2018-05-08 RX ORDER — OXYCODONE HYDROCHLORIDE AND ACETAMINOPHEN 5; 325 MG/1; MG/1
1 TABLET ORAL EVERY 6 HOURS PRN
Qty: 60 TABLET | Refills: 0 | Status: SHIPPED | OUTPATIENT
Start: 2018-05-08 | End: 2018-05-08

## 2018-05-08 RX ORDER — OXYCODONE HYDROCHLORIDE AND ACETAMINOPHEN 5; 325 MG/1; MG/1
1 TABLET ORAL EVERY 6 HOURS PRN
Qty: 60 TABLET | Refills: 0 | Status: SHIPPED | OUTPATIENT
Start: 2018-05-08 | End: 2018-05-19 | Stop reason: HOSPADM

## 2018-05-08 RX ADMIN — SENNOSIDES 8.6 MG: 8.6 TABLET, FILM COATED ORAL at 08:35

## 2018-05-08 RX ADMIN — PRAMIPEXOLE DIHYDROCHLORIDE 0.75 MG: 0.25 TABLET ORAL at 21:36

## 2018-05-08 RX ADMIN — DIAZEPAM 10 MG: 5 TABLET ORAL at 21:36

## 2018-05-08 RX ADMIN — DOCUSATE SODIUM 100 MG: 100 CAPSULE, LIQUID FILLED ORAL at 17:43

## 2018-05-08 RX ADMIN — GABAPENTIN 600 MG: 300 CAPSULE ORAL at 08:35

## 2018-05-08 RX ADMIN — DOCUSATE SODIUM 100 MG: 100 CAPSULE, LIQUID FILLED ORAL at 08:35

## 2018-05-08 RX ADMIN — OXYCODONE HYDROCHLORIDE AND ACETAMINOPHEN 1 TABLET: 5; 325 TABLET ORAL at 13:59

## 2018-05-08 RX ADMIN — AZELASTINE HYDROCHLORIDE 2 SPRAY: 137 SPRAY, METERED NASAL at 08:36

## 2018-05-08 RX ADMIN — PRAVASTATIN SODIUM 40 MG: 40 TABLET ORAL at 17:42

## 2018-05-08 RX ADMIN — GABAPENTIN 600 MG: 300 CAPSULE ORAL at 17:43

## 2018-05-08 RX ADMIN — METHOCARBAMOL 500 MG: 500 TABLET ORAL at 05:00

## 2018-05-08 RX ADMIN — METHOCARBAMOL 500 MG: 500 TABLET ORAL at 13:59

## 2018-05-08 RX ADMIN — CHLORTHALIDONE 12.5 MG: 25 TABLET ORAL at 08:35

## 2018-05-08 RX ADMIN — OXYCODONE HYDROCHLORIDE AND ACETAMINOPHEN 1 TABLET: 5; 325 TABLET ORAL at 04:59

## 2018-05-08 RX ADMIN — CELECOXIB 100 MG: 100 CAPSULE ORAL at 08:35

## 2018-05-08 RX ADMIN — METHOCARBAMOL 500 MG: 500 TABLET ORAL at 21:36

## 2018-05-08 RX ADMIN — ASPIRIN 325 MG: 325 TABLET, COATED ORAL at 08:35

## 2018-05-08 NOTE — PROGRESS NOTES
Progress Note - Internal Medicine   Funmilayo Rojas 81 y o  male MRN: [id number]  Unit/Bed#: E5 -01 Encounter: [id number]      Impression :    POD # 6, s/p posterior cervical laminectomy and fusion, C3/4, C7/T1 and T1/2  Degenerative disc disease C2/C3 and C3/4 and Cervical spinal stenosis  COPD with emphysema  Restless leg syndrome  H/o chronic marijuana use  Chronic herniated nucleus pulposus with spinal cord compression and flattening of C3/4  Previous work related accident on 1992  Protein calorie malnutrition  Recommendation:    Medically stable at baseline can be discharged once arrangements made by case management for his discharge  Continue close follow-up with outpatient primary care team and orthopedic service  Patient will require further ongoing OT and PT, improvement of his nutrition to improve his overall condition and healing  Patient also advised to discontinue marijuana or any other substances of abuse in order to improve his healing and recovery  He will need close monitoring of his blood pressure  Continue Ensure for now  Subjective:     Patient seen and examined today  EMR and overnight events reviewed  Awaiting discharge to rehabilitation  No new complaints  Review of Systems     Patient denies fever chills rigors, no nausea no vomiting no abdominal pain, no new  neurological deficits  Pain is slowly improving  Pre dominantly between suprascapular and interscapular areas  All other systems reviewed and are negative         OBJECTIVE:     Vitals:     HR:  [80-98] 98  Resp:  [18] 18  BP: (104-142)/(59-78) 142/78  SpO2:  [93 %-97 %] 97 %  Temp (24hrs), Av 3 °F (36 8 °C), Min:97 8 °F (36 6 °C), Max:99 1 °F (37 3 °C)  Current: Temperature: 99 1 °F (37 3 °C)    Intake/Output Summary (Last 24 hours) at 18  Last data filed at 18   Gross per 24 hour   Intake                0 ml   Output              ml   Net            -202 ml     Body mass index is 22 32 kg/m²  Physical Exam    Awake and alert not in any distress,  Malnourished, generalized muscle wasting  Oropharynx clear without thrush  Clear lungs no rales or crackles  S1-S2 normal no murmurs  Abdomen soft scaphoid nontender  Diffuse muscle wasting  Clean dressing incision site on posterior aspect of his neck without any discharge drainage dehiscence  Bilateral good hand grasp  Able to move his both legs shoulders significant improvement of his radiculopathy    Gait still remains a challenge noted OT and PT      Labs, Imaging, & Other studies:    All pertinent labs and imaging studies were personally reviewed    Results from last 7 days  Lab Units 05/05/18  0436 05/02/18  0504 05/01/18  0441   WBC Thousand/uL 10 06 15 07* 17 75*   HEMOGLOBIN g/dL 13 6 14 4 13 8   PLATELETS Thousands/uL 150 116* 128*       Results from last 7 days  Lab Units 05/01/18  0441   SODIUM mmol/L 139   POTASSIUM mmol/L 4 2   CHLORIDE mmol/L 103   CO2 mmol/L 30   ANION GAP mmol/L 6   BUN mg/dL 10   CREATININE mg/dL 0 74   EGFR ml/min/1 73sq m 93   GLUCOSE RANDOM mg/dL 122   CALCIUM mg/dL 8 5           Current Meds:  Current Facility-Administered Medications   Medication Dose Route Frequency Provider Last Rate Last Dose    aspirin (ECOTRIN) EC tablet 325 mg  325 mg Oral Daily Selina Prescott PA-C   325 mg at 05/07/18 0851    azelastine (ASTELIN) 0 1 % nasal spray 2 spray  2 spray Each Nare Daily Selina Prescott PA-C   2 spray at 05/07/18 0851    calcium carbonate (TUMS) chewable tablet 1,000 mg  1,000 mg Oral Daily PRN Selina Prescott PA-C   1,000 mg at 05/05/18 5219    calcium carbonate (TUMS) chewable tablet 500 mg  500 mg Oral PRN Selina Prescott PA-C   500 mg at 04/30/18 2232    celecoxib (CeleBREX) capsule 100 mg  100 mg Oral Daily Danielle Rivera MD   100 mg at 05/07/18 7201    chlorthalidone tablet 12 5 mg  12 5 mg Oral Daily Danielle Rivera MD   12 5 mg at 05/07/18 0852    diazepam (VALIUM) tablet 10 mg  10 mg Oral HS LEANA Blank-C   10 mg at 05/06/18 2122    docusate sodium (COLACE) capsule 100 mg  100 mg Oral BID LEANA Blank-C   100 mg at 05/07/18 1726    gabapentin (NEURONTIN) capsule 600 mg  600 mg Oral BID Monica Darby PA-C   600 mg at 05/07/18 1726    magnesium hydroxide (MILK OF MAGNESIA) 400 mg/5 mL oral suspension 30 mL  30 mL Oral Daily PRN LEANA Blank-C   30 mL at 05/07/18 1815    methocarbamol (ROBAXIN) tablet 500 mg  500 mg Oral [facility] Brissa Adler MD   500 mg at 05/07/18 1415    morphine injection 1 mg  1 mg Intravenous Q3H PRN LEANA Blank-C   1 mg at 05/04/18 1419    naloxone (NARCAN) 0 04 mg/mL syringe 0 04 mg  0 04 mg Intravenous Q1MIN PRN Monica Darby PA-C        oxyCODONE-acetaminophen (PERCOCET) 5-325 mg per tablet 1 tablet  1 tablet Oral Q4H PRN BRUNA BlankC   1 tablet at 05/07/18 1727    pramipexole (MIRAPEX) tablet 0 75 mg  0 75 mg Oral HS BRUNA BlankC   0 75 mg at 05/06/18 2122    pravastatin (PRAVACHOL) tablet 40 mg  40 mg Oral Daily With BRUNA ShafferC   40 mg at 05/07/18 1727    senna (SENOKOT) tablet 8 6 mg  1 tablet Oral Daily LEANA Blank-C   8 6 mg at 05/07/18 0256    simethicone (MYLICON) chewable tablet 80 mg  80 mg Oral 4x Daily PRN BRUNA BlankC   80 mg at 05/04/18 1432     Home Meds:  Prescriptions Prior to Admission   Medication    azelastine (ASTELIN) 0 1 % nasal spray    calcium carbonate (TUMS) 500 mg chewable tablet    Cholecalciferol (VITAMIN D3) 15171 units TABS    diazepam (VALIUM) 10 mg tablet    gabapentin (NEURONTIN) 600 MG tablet    pramipexole (MIRAPEX) 0 75 MG tablet    simvastatin (ZOCOR) 20 mg tablet    tiZANidine (ZANAFLEX) 4 mg tablet       Continuous Infusions:       Invasive Devices     Peripheral Intravenous Line            Peripheral IV 05/04/18 Left Forearm 3 days                VTE Pharmacologic Prophylaxis:  as per Primary Ortho Team   VTE Mechanical Prophylaxis: sequential compression device    Portions of the record may have been created with voice recognition software  Occasional wrong word or "sound a like" substitutions may have occurred due to the inherent limitations of voice recognition software  Read the chart carefully and recognize, using context, where substitutions have occurred

## 2018-05-08 NOTE — CASE MANAGEMENT
Continued Stay Review    Date/POD#:    5/8/2018  POD  #    8    Vital Signs: /78 (BP Location: Left arm)   Pulse 89   Temp 97 7 °F (36 5 °C) (Temporal)   Resp 19   Ht 5' 11 5" (1 816 m)   Wt 73 6 kg (162 lb 4 1 oz)   SpO2 97%   BMI 22 32 kg/m²     Medication:   Scheduled Meds:   Current Facility-Administered Medications:  aspirin 325 mg Oral Daily Bossman Cho, PA-C   azelastine 2 spray Each Nare Daily Bossman Cho, PA-C   calcium carbonate 1,000 mg Oral Daily PRN Bossman Cho, PA-C   calcium carbonate 500 mg Oral PRN Bossman Cho, PA-C   celecoxib 100 mg Oral Daily Micaela Stapleton MD   chlorthalidone 12 5 mg Oral Daily Micaela Stapleton MD   diazepam 10 mg Oral HS Bossman Cho, PA-C   docusate sodium 100 mg Oral BID Bossman Cho, PA-C   gabapentin 600 mg Oral BID Bossman Cho, PA-C   magnesium hydroxide 30 mL Oral Daily PRN Bossman Cho, PA-C   methocarbamol 500 mg Oral [facility] Micaela Stapleton MD   morphine injection 1 mg Intravenous Q3H PRN Bossman Cho, PA-C   naloxone 0 04 mg Intravenous Q1MIN PRN Bossman Cho, PA-C   oxyCODONE-acetaminophen 1 tablet Oral Q4H PRN Bossman Cho, PA-C   pramipexole 0 75 mg Oral HS Bossman Cho, PA-C   pravastatin 40 mg Oral Daily With Mine Lightning, PA-C   senna 1 tablet Oral Daily Bossman Cho, PA-C   simethicone 80 mg Oral 4x Daily PRN Bossman Cho, PA-C     Continuous Infusions:    PRN Meds: calcium carbonate    calcium carbonate    magnesium hydroxide    morphine injection    naloxone    oxyCODONE-acetaminophen    simethicone    Abnormal Labs/Diagnostic Results:    No labs   5/8    Age/Sex: 81 y o  male     Assessment/Plan: POD #8 s/p Posterior spinal fusion C 3-T2  Mobilize with PT/OT  Activity as tolerated in aspen collar  CXR and EKG now to further evaluate substernal chest pain  DVT Prophylaxis  daily  Pain medication Oxycodone 5mg  Discharge planning - pt will need acute rehab, awaiting placement    Discharge Plan: rehab

## 2018-05-08 NOTE — SOCIAL WORK
2 messages have been left for Patrick Cowden from [facility] to follow-up on their review  WILFREDO LIONEL [facility] is now not accepting the pt after speaking with yinka ruff as they have concerns about coverage and billing  Pt informed of this  Discussed rehab at [facility] and pt agreeable to it  Referral made and pt is clinically accepted to the facility but also pending on insurance verification  Will continue to follow-up

## 2018-05-08 NOTE — PROGRESS NOTES
Patient was seen earlier this morning while he was sleeping  He was not having any difficulties he stated that his pain is much better  He has had significant improvement of his radiculopathy and his interscapular spasms and lower neck spasms have significantly improved  His appetite is improving and he is liking the fact that he is getting additional nutritional supplements including Ensure  He denies any chest pain nausea vomiting denies any palpitation diaphoresis syncope or blackouts  His ambulation still is somewhat limited and patient states that his wife is afraid with his walking and her ability to take care of him  Patient is working with his workmen compensation insurance to approve of his rehabilitation  Case management is working with them and awaiting to hear from them  Patient hemodynamically remained stable his vital sheets is within normal limit  Temperature 97 7°, pulse 89, respiratory 19, /78  Weight 73 6 kg is unchanged from 04/30/2018  General physical examination has not changed much, his status quo from yesterday  Pallor JVP cyanosis negative  Clear lungs no rales or crackle  S1-S2 normal no murmurs  Abdomen is soft without any tenderness or guarding scaphoid  Multiple skin tattoos noted on both arms  Generalized muscle wasting from mild malnutrition  No focal weakness paresthesias or tingling  Area of his neck at the surgical site is healing nicely without any discharge drainage dehiscence or redness  Bilateral Babinski sign is negative  Caretha Mat sign is negative bilaterally    Lab work from 05/05/2018 was reviewed  Impression   status postop day 7, following posterior cervical laminectomy and fusion  COPD/emphysema  Chronic herniated nucleus pulposus/spinal cord compression  Previous work-related accident  On 05/21/1992  Protein calorie malnutrition  Previous history of narcotic use and chronic marijuana dependence        Recommendation  Patient is medically stable needs to continue postoperative care as recommended by orthopedic service OT and P T  Continue nutritional supplements full fall precautions  Would recommend cutting back on his benzodiazepine if possible and will defer to orthopedic service  He should follow up on his statins as well as lipid profile when he sees his primary care physician  He is currently on Neurontin 600 mg b i d   Continue DVT prophylaxis with aspirin and mechanical compression boots, continue Ensure and nutritional supplements

## 2018-05-08 NOTE — PROGRESS NOTES
POST-OP SPINE PROGRESS NOTE    Patient Name: Frederick Abbott  Patient MRN:  [id number]  Date of Service:  05/08/18 1:01 PM  Service: Spine      Subjective      Pt seen sitting in bedside chair  He states that he got out of bed this morning in an attempt to go to the bathroom and has been sitting in the chair since that time  He admits to pain in his neck and upper back which he attributes to sitting up  He also admits to epigastric/substernal chest pain that has been intermittent, but present this morning while sitting up in the chair  He denies any SOB  He states that he still has not had a BM, but denies any abdominal pain, n v    Objective      Physical Exam  Temp:  [97 7 °F (36 5 °C)-99 1 °F (37 3 °C)] 97 7 °F (36 5 °C)  HR:  [78-98] 89  Resp:  [18-19] 19  BP: (105-142)/(63-78) 126/78    Incision: clean, dry and intact, no erythema and no significant drainage    Sensation grossly intact    Motor function intact 5/5 without deficits BUE      Laboratory Studies  No results found for this or any previous visit (from the past 24 hour(s))      Assessment / Plan:     POD #8 s/p Posterior spinal fusion C 3-T2  Mobilize with PT/OT  Activity as tolerated in aspen collar  CXR and EKG now to further evaluate substernal chest pain  DVT Prophylaxis  daily  Pain medication Oxycodone 5mg  Discharge planning - pt will need acute rehab, awaiting placement  Follow up in the office in 2 weeks as instructed        Stan Villarreal PA-C

## 2018-05-08 NOTE — PLAN OF CARE
Problem: PHYSICAL THERAPY ADULT  Goal: Performs mobility at highest level of function for planned discharge setting  See evaluation for individualized goals  Treatment/Interventions: Functional transfer training, LE strengthening/ROM, Elevations, Therapeutic exercise, Endurance training, Patient/family training, Equipment eval/education, Bed mobility, Gait training, Compensatory technique education, Continued evaluation, Spoke to nursing, OT, Spoke to advanced practitioner (spoke with Mary Anne Perez, [facility])  Equipment Recommended:  (will monitor with progress )       See flowsheet documentation for full assessment, interventions and recommendations  Outcome: Progressing  Prognosis: Fair  Problem List: Decreased strength, Decreased range of motion, Decreased endurance, Impaired balance, Decreased mobility, Decreased coordination, Decreased cognition, Impaired judgement, Decreased safety awareness, Impaired tone, Impaired sensation, Decreased skin integrity, Orthopedic restrictions, Pain  Assessment: Pt  seated in bedside chair upon my arrival without cervical collar donned, required A to don/doff prior to and after amb  trials  Progressed with transfers continuing to require A of 2 with cues provided for proper technique  Progressed with an increased amb  trial with use of RW and modA of therapist with cues provided for LE sequencing  Continue to follow pt  with chair as, pt  fatigues quickly and will attempt to sit impulsively  Required chair transport back to room as pt  unable to continue amb  trial due to increased fatigue/pain after additional amb  distance  Repositioned seated in bedside chair with alarm active and cervical collar doffed at end of treatment session  PT will continue to recommend STR upon d/c for continued improvement of noted impairments above     Barriers to Discharge: Inaccessible home environment, Decreased caregiver support (JANNIE, level of support at home )  Barriers to Discharge Comments: medical  Recommendation: Short-term skilled PT     PT - OK to Discharge: Yes (if d/c to STR when medically stable )    See flowsheet documentation for full assessment

## 2018-05-08 NOTE — SOCIAL WORK
Spoke with Martha Jones from [facility] who stated that she will be sending a denial letter to [facility] that [facility] will not cover rehab at this time as they still need to review pt's prior medical hx which can take up to 2wks  She stated since pt needs rehab this should not prevent pt to go to rehab and pt can go under his insurance (Medicare)  Spoke with Yuan Buchanan from the [facility] and she is awaiting for the letter from [facility] and can take the pt tomorrow  Pt is setup with transport with [facility] for BLS transport for 12pm tomorrow

## 2018-05-09 ENCOUNTER — HOSPITAL ENCOUNTER (INPATIENT)
Facility: HOSPITAL | Age: 73
LOS: 10 days | Discharge: HOME | DRG: 560 | End: 2018-05-19
Attending: PHYSICAL MEDICINE & REHABILITATION | Admitting: PHYSICAL MEDICINE & REHABILITATION
Payer: MEDICARE

## 2018-05-09 VITALS
TEMPERATURE: 98.2 F | DIASTOLIC BLOOD PRESSURE: 83 MMHG | RESPIRATION RATE: 19 BRPM | OXYGEN SATURATION: 100 % | WEIGHT: 162.26 LBS | HEIGHT: 72 IN | HEART RATE: 87 BPM | BODY MASS INDEX: 21.98 KG/M2 | SYSTOLIC BLOOD PRESSURE: 134 MMHG

## 2018-05-09 DIAGNOSIS — K21.9 GERD (GASTROESOPHAGEAL REFLUX DISEASE): ICD-10-CM

## 2018-05-09 DIAGNOSIS — M48.02 SPINAL STENOSIS OF CERVICAL REGION: Chronic | ICD-10-CM

## 2018-05-09 DIAGNOSIS — I10 ESSENTIAL HYPERTENSION: Primary | ICD-10-CM

## 2018-05-09 DIAGNOSIS — L60.2 NAIL THICKENING: ICD-10-CM

## 2018-05-09 DIAGNOSIS — Z98.1 S/P LAMINECTOMY WITH SPINAL FUSION: ICD-10-CM

## 2018-05-09 LAB
ATRIAL RATE: 90 BPM
ATRIAL RATE: 93 BPM
P AXIS: 89 DEGREES
P AXIS: 90 DEGREES
PR INTERVAL: 146 MS
PR INTERVAL: 148 MS
QRS AXIS: 79 DEGREES
QRS AXIS: 80 DEGREES
QRSD INTERVAL: 86 MS
QRSD INTERVAL: 90 MS
QT INTERVAL: 350 MS
QT INTERVAL: 358 MS
QTC INTERVAL: 435 MS
QTC INTERVAL: 437 MS
T WAVE AXIS: 78 DEGREES
T WAVE AXIS: 92 DEGREES
VENTRICULAR RATE: 90 BPM
VENTRICULAR RATE: 93 BPM

## 2018-05-09 PROCEDURE — 99223 1ST HOSP IP/OBS HIGH 75: CPT | Performed by: PHYSICAL MEDICINE & REHABILITATION

## 2018-05-09 PROCEDURE — 97110 THERAPEUTIC EXERCISES: CPT | Performed by: PHYSICAL THERAPIST

## 2018-05-09 PROCEDURE — 97530 THERAPEUTIC ACTIVITIES: CPT

## 2018-05-09 PROCEDURE — 97167 OT EVAL HIGH COMPLEX 60 MIN: CPT

## 2018-05-09 PROCEDURE — 97535 SELF CARE MNGMENT TRAINING: CPT

## 2018-05-09 PROCEDURE — 97530 THERAPEUTIC ACTIVITIES: CPT | Performed by: PHYSICAL THERAPIST

## 2018-05-09 PROCEDURE — 99231 SBSQ HOSP IP/OBS SF/LOW 25: CPT | Performed by: PHYSICAL MEDICINE & REHABILITATION

## 2018-05-09 PROCEDURE — 93010 ELECTROCARDIOGRAM REPORT: CPT | Performed by: INTERNAL MEDICINE

## 2018-05-09 PROCEDURE — 97162 PT EVAL MOD COMPLEX 30 MIN: CPT | Performed by: PHYSICAL THERAPIST

## 2018-05-09 RX ORDER — SENNOSIDES 8.6 MG
1 TABLET ORAL DAILY
Status: DISCONTINUED | OUTPATIENT
Start: 2018-05-10 | End: 2018-05-19 | Stop reason: HOSPADM

## 2018-05-09 RX ORDER — CELECOXIB 100 MG/1
100 CAPSULE ORAL DAILY
Status: DISCONTINUED | OUTPATIENT
Start: 2018-05-10 | End: 2018-05-17

## 2018-05-09 RX ORDER — OXYCODONE HYDROCHLORIDE AND ACETAMINOPHEN 5; 325 MG/1; MG/1
1 TABLET ORAL EVERY 4 HOURS PRN
Status: DISCONTINUED | OUTPATIENT
Start: 2018-05-09 | End: 2018-05-19 | Stop reason: HOSPADM

## 2018-05-09 RX ORDER — DOCUSATE SODIUM 100 MG/1
100 CAPSULE, LIQUID FILLED ORAL 2 TIMES DAILY
Status: DISCONTINUED | OUTPATIENT
Start: 2018-05-09 | End: 2018-05-19 | Stop reason: HOSPADM

## 2018-05-09 RX ORDER — CHLORTHALIDONE 25 MG/1
12.5 TABLET ORAL DAILY
Status: DISCONTINUED | OUTPATIENT
Start: 2018-05-10 | End: 2018-05-13

## 2018-05-09 RX ORDER — POLYETHYLENE GLYCOL 3350 17 G/17G
17 POWDER, FOR SOLUTION ORAL DAILY
Status: DISCONTINUED | OUTPATIENT
Start: 2018-05-09 | End: 2018-05-19 | Stop reason: HOSPADM

## 2018-05-09 RX ORDER — ASPIRIN 325 MG
325 TABLET, DELAYED RELEASE (ENTERIC COATED) ORAL DAILY
Status: DISCONTINUED | OUTPATIENT
Start: 2018-05-10 | End: 2018-05-19 | Stop reason: HOSPADM

## 2018-05-09 RX ORDER — GABAPENTIN 300 MG/1
600 CAPSULE ORAL 2 TIMES DAILY
Status: DISCONTINUED | OUTPATIENT
Start: 2018-05-09 | End: 2018-05-19 | Stop reason: HOSPADM

## 2018-05-09 RX ORDER — AZELASTINE 1 MG/ML
2 SPRAY, METERED NASAL DAILY
Status: DISCONTINUED | OUTPATIENT
Start: 2018-05-10 | End: 2018-05-19 | Stop reason: HOSPADM

## 2018-05-09 RX ORDER — PRAVASTATIN SODIUM 40 MG
40 TABLET ORAL
Status: DISCONTINUED | OUTPATIENT
Start: 2018-05-09 | End: 2018-05-19 | Stop reason: HOSPADM

## 2018-05-09 RX ORDER — DIAZEPAM 5 MG/1
5 TABLET ORAL
Status: DISCONTINUED | OUTPATIENT
Start: 2018-05-09 | End: 2018-05-19 | Stop reason: HOSPADM

## 2018-05-09 RX ORDER — SIMETHICONE 80 MG
80 TABLET,CHEWABLE ORAL 4 TIMES DAILY PRN
Status: DISCONTINUED | OUTPATIENT
Start: 2018-05-09 | End: 2018-05-19 | Stop reason: HOSPADM

## 2018-05-09 RX ORDER — CALCIUM CARBONATE 200(500)MG
1000 TABLET,CHEWABLE ORAL DAILY PRN
Status: DISCONTINUED | OUTPATIENT
Start: 2018-05-09 | End: 2018-05-19 | Stop reason: HOSPADM

## 2018-05-09 RX ORDER — METHOCARBAMOL 500 MG/1
500 TABLET, FILM COATED ORAL EVERY 8 HOURS SCHEDULED
Status: DISCONTINUED | OUTPATIENT
Start: 2018-05-09 | End: 2018-05-16

## 2018-05-09 RX ADMIN — OXYCODONE HYDROCHLORIDE AND ACETAMINOPHEN 1 TABLET: 5; 325 TABLET ORAL at 08:03

## 2018-05-09 RX ADMIN — METHOCARBAMOL 500 MG: 500 TABLET ORAL at 06:12

## 2018-05-09 RX ADMIN — GABAPENTIN 600 MG: 300 CAPSULE ORAL at 18:34

## 2018-05-09 RX ADMIN — OXYCODONE HYDROCHLORIDE AND ACETAMINOPHEN 1 TABLET: 5; 325 TABLET ORAL at 17:04

## 2018-05-09 RX ADMIN — METHOCARBAMOL 500 MG: 500 TABLET ORAL at 15:38

## 2018-05-09 RX ADMIN — METHOCARBAMOL 500 MG: 500 TABLET ORAL at 21:20

## 2018-05-09 RX ADMIN — PRAMIPEXOLE DIHYDROCHLORIDE 0.75 MG: 0.5 TABLET ORAL at 21:18

## 2018-05-09 RX ADMIN — OXYCODONE HYDROCHLORIDE AND ACETAMINOPHEN 1 TABLET: 5; 325 TABLET ORAL at 01:02

## 2018-05-09 RX ADMIN — MORPHINE SULFATE 1 MG: 2 INJECTION, SOLUTION INTRAMUSCULAR; INTRAVENOUS at 09:10

## 2018-05-09 RX ADMIN — GABAPENTIN 600 MG: 300 CAPSULE ORAL at 09:15

## 2018-05-09 RX ADMIN — CELECOXIB 100 MG: 100 CAPSULE ORAL at 09:15

## 2018-05-09 RX ADMIN — OXYCODONE HYDROCHLORIDE AND ACETAMINOPHEN 1 TABLET: 5; 325 TABLET ORAL at 11:47

## 2018-05-09 RX ADMIN — POLYETHYLENE GLYCOL 3350 17 G: 17 POWDER, FOR SOLUTION ORAL at 17:05

## 2018-05-09 RX ADMIN — ASPIRIN 325 MG: 325 TABLET, COATED ORAL at 09:15

## 2018-05-09 RX ADMIN — SENNOSIDES 8.6 MG: 8.6 TABLET, FILM COATED ORAL at 09:15

## 2018-05-09 RX ADMIN — DOCUSATE SODIUM 100 MG: 100 CAPSULE, LIQUID FILLED ORAL at 09:15

## 2018-05-09 RX ADMIN — CHLORTHALIDONE 12.5 MG: 25 TABLET ORAL at 09:15

## 2018-05-09 RX ADMIN — AZELASTINE HYDROCHLORIDE 2 SPRAY: 137 SPRAY, METERED NASAL at 09:19

## 2018-05-09 RX ADMIN — DOCUSATE SODIUM 100 MG: 100 CAPSULE, LIQUID FILLED ORAL at 18:34

## 2018-05-09 RX ADMIN — PRAVASTATIN SODIUM 40 MG: 40 TABLET ORAL at 18:34

## 2018-05-09 RX ADMIN — OXYCODONE HYDROCHLORIDE AND ACETAMINOPHEN 1 TABLET: 5; 325 TABLET ORAL at 23:35

## 2018-05-09 RX ADMIN — DIAZEPAM 5 MG: 5 TABLET ORAL at 21:18

## 2018-05-09 NOTE — PROGRESS NOTES
PT Evaluation     05/09/18 3400   Patient Data   Rehab Impairment Orthopedic Disorders   Etiologic Diagnosis cervical disc degeneration, radiculopathy of cervical region   Date of Onset 04/20/18   Home Setup   Type of Home Single Level   Method of Entry Curb  (1+1)   First Floor Bathroom Full   First Floor Setup Available Yes   Available Equipment (none)   Prior Level of Function   Self-Care 3  Independent - Patient completed the activities by him/herself, with or without an assistive device, with no assistance from a helper  Indoor-Mobility (Ambulation) 3  Independent - Patient completed the activities by him/herself, with or without an assistive device, with no assistance from a helper  Stairs 3  Independent - Patient completed the activities by him/herself, with or without an assistive device, with no assistance from a helper  Functional Cognition 3  Independent - Patient completed the activities by him/herself, with or without an assistive device, with no assistance from a helper  Patient Preference   Nickname (Patient Preference) Andrew   Restrictions/Precautions   Precautions Bed/chair alarms; Fall Risk;Spinal precautions   Braces or Orthoses C/S Collar   Pain Assessment   Pain Assessment 0-10   Pain Score 8   Pain Type Acute pain;Surgical pain   Pain Location Shoulder;Neck   Pain Orientation Bilateral;Posterior   Hospital Pain Intervention(s) Medication (See MAR); Ambulation/increased activity   Response to Interventions pt able to tolerate session   QI: Roll Left and Right   Assistance Needed Physical assistance   Assistance Provided by [place] 25%-49%   Roll Left and Right CARE Score 3   QI: Sit to Lying   Assistance Needed Physical assistance   Assistance Provided by [place] 25%-49%   Sit to Lying CARE Score 3   QI: Lying to Sitting on Side of Bed   Assistance Needed Physical assistance   Assistance Provided by [place] 25%-49%   Lying to Sitting on Side of Bed CARE Score 3   QI: Sit to Stand   Assistance Needed Physical assistance   Assistance Provided by [place] 50%-74%   Sit to Stand CARE Score 2   QI: Chair/Bed-to-Chair Transfer   Assistance Needed Physical assistance; Adaptive equipment   Assistance Provided by [place] 50%-74%   Chair/Bed-to-Chair Transfer CARE Score 2   QI: Car Transfer   Reason if not Attempted Safety concerns   Car Transfer CARE Score 88   Transfer Bed/Chair/Wheelchair   Limitations Noted In Balance; Coordination;Confidence; Endurance;Pain Management; Sequencing;UE Strength;LE Strength   Adaptive Equipment Roller Walker   Stand Pivot Moderate Assist   Sit to Stand Moderate Assist   Stand to Sit Minimal   Supine to Sit Minimal   Sit to Supine Minimal   Bed, Chair, Wheelchair Transfer (FIM) 2 - [place] needs to lift or boost to rise AND assist to sit   QI: Walk 10 Feet   Assistance Needed Physical assistance; Adaptive equipment   Assistance Provided by [place] Total assistance   Walk 10 Feet CARE Score 1   QI: Walk 50 Feet with Two Turns   Assistance Needed Physical assistance; Adaptive equipment   Assistance Provided by [place] Total assistance   Walk 50 Feet with Two Turns CARE Score 1   QI: Walk 150 Feet   Assistance Needed Physical assistance; Adaptive equipment   Assistance Provided by [place] Total assistance   Walk 150 Feet CARE Score 1   QI: Walking 10 Feet on Uneven Surfaces   Reason if not Attempted Safety concerns   Walking 10 Feet on Uneven Surfaces CARE Score 88   Ambulation   Does the patient walk? 2  Yes   Primary Discharge Mode of Locomotion Walk   Walk Assist Level Moderate Assist;Chair Follow   Gait Pattern Ataxic; Inconsistant Lynne; Slow Lynne;Decreased foot clearance; Forward Flexion;L knee ronnie; Improper weight shift   Assist Device Terrell Peña Walked (feet) 200 ft   Limitations Noted In Balance; Coordination;Device Management; Endurance; Heel Strike;Posture;Speed;Strength;Swing   Walking (FIM) 1 - Patient requires assist of two people   QI: Wheel 50 Feet with Two Turns Reason if not Attempted Activity not applicable   Wheel 50 Feet with Two Turns CARE Score 9   QI: Wheel 150 Feet   Reason if not Attempted Activity not applicable   Wheel 012 Feet CARE Score 9   Wheelchair mobility   QI: Does the patient use a wheelchair? 0  No   QI: 1 Step (Curb)   Comment to be assessed tomorrow   Reason if not Attempted Safety concerns   1 Step (Curb) CARE Score 88   QI: 4 Steps   Reason if not Attempted Safety concerns   4 Steps CARE Score 88   QI: 12 Steps   Reason if not Attempted Safety concerns   12 Steps CARE Score 88   Comprehension   QI: Comprehension 4  Undestands: Clear comprehension without cues or repetitions   Comprehension (FIM) 6 - Understands complex/abstract but requires more time   Expression   QI: Expression 4  Express complex messages without difficulty and with speech that is clear and easy to [place]   Expression (FIM) 6 - Expresses complex/abstract but requires:  more time   Social Interaction   Social Interaction (FIM) 6 - Interacts appropriately with others BUT requires extra  time   Problem Solving   Problem solving (FIM) 5 - Solves basic problems 90% of time   Memory   Memory (FIM) 5 - Needs cueing reminders <10%   RLE Assessment   RLE Assessment WFL  (grossly 3+/5)   LLE Assessment   LLE Assessment WFL  (grossly 3+/5)   Coordination   Movements are Fluid and Coordinated 0   Coordination and Movement Description ataxic gait pattern; impaired FMC BUE's   Sensation   Light Touch No apparent deficits   Cognition   Arousal/Participation Cooperative   Orientation Level Oriented X4   Objective Measure   PT Findings Nustep L1 x 10 min  Discharge Information   Patient's Discharge Plan home w/ family support   Patient's Rehab Expectations to return to [facility]   Barriers to Discharge Home Decreased Strength;Decreased Endurance;Pain; Safety Considerations   Impressions Pt is 82 y/o male s/p cervical laminectomy  Pt is s/p ACDF on 4/12/18 and now s/p posterior fusion   Pt lives with his wife in 1 story home and was I PTA  Pt currently presents with pain, dec BLE strength, dec coordination, impaired balance, dec activity tolerance, dec safety awareness; overall decline in functional mobility from baseline  Pt currently functioning @ mod A level using RW for transfers and ambulation (with 2nd person w/c follow for safety)  Will assess stairs tomorrow  Per MD orders pt can remove C-collar while in bed  Pt demo G rehab potential to meet S level goals; estimated LOS 2 wks  Pt will benefit from skilled PT to address above impairments and maximize safety with all functional mobility      PT Therapy Minutes   PT Time In 1430   PT Time Out 1530   PT Total Time (minutes) 60   PT Mode of treatment - Individual (minutes) 22   PT Mode of treatment - Concurrent (minutes) 0   PT Mode of treatment - Group (minutes) 0   PT Mode of treatment - Co-treat (minutes) 38   PT Mode of Teatment - Total time(minutes) 60 minutes

## 2018-05-09 NOTE — H&P
Physical Medicine & Rehabilitation History & Physical with Post-Admission Physician Evaluation    Dahlia Chan 81 y o  male MRN: [id number]  Unit/Bed#: [facility] 457-01 Encounter: [id number]    Primary Rehabilitation Diagnosis: Severe Cervical Stenosis with Cervical Radicuopathy    History of Present Illness   Dahlia Chan is a 81 y o  RIGHT -handed  male with past medical history significant for COPD, HLD, GERD and known severe cervical stenosis/cervical radiculopathy who presented to [facility] on 4/30/18 for second part elective surgical intervention of cervical stenosis  Patient underwent an ACDF C3/4, C7/T1 by Dr Cristal Green on 4/12/18  Patient was taken to the OR for second part posterior surgery on 4/30/18 for a C3-T2 laminectomy and fusion  Post-operatively patient was placed in a cervical collar to be worn while out of bed  Patient was started on Aspirin 325mg for DVT ppx per Dr Larsen team and managed for increased post-operative pain  Medically, patient had HTN and was started on Chlorthalidone  Patient had an episode of chest pain which resolved with negative workup  Patient with acute functional decline from baseline with L>R upper extremity weakness, generalized weakness, instability  After medical stabilization and clearance to participate in an intensive rehabilitation program from surgical team, patient was admitted to [facility] on 5/10/18      Review Of Systems:   General: Negative  Head, Eyes, Ears, Nose, Throat: Negative  Pulmonary: Negative  Cardiovascular: Negative  Gastrointestinal: Negative  Genitourinary: Negative  Musculoskeletal: Weakness all over   Neurologic: Negative  Skin: Negative  Psychiatric: Negative    Past Medical History:   Diagnosis Date    Arthritis     Back pain     Cervical pain (neck)     COPD (chronic obstructive pulmonary disease) (HCC)     History of transfusion     Age 8 after bleeding post tonsillectomy    Hyperlipidemia     Mild heartburn     Neck pain     Numbness     Left hand and arm    Scratches     Multiple on limbs and hands    Wears dentures     Upper and lower  Has 4 pins on bottom to hold bottom denture       Past Surgical History:   Procedure Laterality Date    COLONOSCOPY      ESOPHAGOGASTRODUODENOSCOPY      EYE SURGERY      Bilateral implants to correct vision    AR ANTERIOR INSTRUMENTATION 2-3 VERTEBRAL SEGMENTS N/A 4/12/2018    Procedure: FUSION CERVICAL ANTERIOR W DISCECTOMY C3/4, C7/T1;  Surgeon: Stephenie Redd MD;  [facility];  Service: Orthopedics    AR ARTHRODESIS POSTERIOR/POSTERIORLATERAL CERVICAL BELOW C2 N/A 4/30/2018    Procedure: POSSIBLE C3-4, C4/5 LAMINECTOMY;  Surgeon: Stephenie Redd MD;  [facility];  Service: Orthopedics    AR ARTHRODESIS POSTERIOR/POSTEROLATERAL THORACIC N/A 4/30/2018    Procedure: C3-T2 PSF With Instrumentation;  Surgeon: Stephenie Redd MD;  [facility];  Service: Orthopedics    AR EXCIS CERV DISK,ONE LEVEL N/A 4/30/2018    Procedure: C 6-7 Left HEMILAMINOTOMY AND FORAMINOTOMY C7-T2 LAMINECTOMY C7-T2 Left Foraminotomy;  Surgeon: Stephenie Redd MD;  [facility];  Service: Orthopedics    TONSILLECTOMY         History reviewed  No pertinent family history  Social History:   PRIOR LEVEL OF FUNCTION:  He lives in [facility] single family home  Shaheed Elder is  and lives with their spouse  Self Care: Independent, Indoor Mobility: Independent, Stairs (in/outdoor): Independent and Cognition: Independent     HOME ENVIRONMENT:  The living area: can live on one level  There is 1 step to enter the home      The patient will not have 24 hour supervision/physical assistance available upon discharge      Wife will be there, however at his current level of function the wife is stating she would have a difficult time assisting him      Current level of functioning: Min-Mod A with mobility, Mod A with self care  (I have reviewed the patient's functional and medical status at the time of the preadmission screening and they are the same as on the day of this admission )      No Known Allergies    Scheduled Meds:    Current Facility-Administered Medications:  [START ON 5/10/2018] aspirin 325 mg Oral Daily MD Augustine James ON 5/10/2018] azelastine 2 spray Each Nare Daily Felicia Brock MD   calcium carbonate 1,000 mg Oral Daily PRN Felicia Brock MD   [START ON 5/10/2018] celecoxib 100 mg Oral Daily MD Augustine James ON 5/10/2018] chlorthalidone 12 5 mg Oral Daily Felicia Brock MD   diazepam 5 mg Oral HS Felicia Brock MD   docusate sodium 100 mg Oral BID Felicia Brock MD   gabapentin 600 mg Oral BID Felicia Brock MD   magnesium hydroxide 30 mL Oral Daily PRN Felicia Brock MD   methocarbamol 500 mg Oral Q8H [facility] Felicia Brock MD   NON FORMULARY 1 puff Oral Daily PRN Gerri Khan PA-C   oxyCODONE-acetaminophen 1 tablet Oral Q4H PRN Felicia Brock MD   polyethylene glycol 17 g Oral Daily Gerri Khan PA-C   pramipexole 0 75 mg Oral HS Felicia Brock MD   pravastatin 40 mg Oral Daily With Jose Mcqueen MD   [START ON 5/10/2018] senna 1 tablet Oral Daily Felicia Brock MD   simethicone 80 mg Oral 4x Daily PRN Felicia Brock MD       PRN Meds  calcium carbonate    magnesium hydroxide    NON FORMULARY    oxyCODONE-acetaminophen    simethicone    Laboratory:    Results from last 7 days  Lab Units 05/05/18  0436   WBC Thousand/uL 10 06   HEMOGLOBIN g/dL 13 6   HEMATOCRIT % 40 4   PLATELETS Thousands/uL 150                 Glucose (mg/dL)   Date Value   05/01/2018 122   04/13/2018 131   03/29/2018 86       Pertinent Imaging: personally reviewed    Physical Examination:  Vitals:   Vitals:    05/09/18 1423   BP: 142/78   BP Location: Right arm   Pulse: 96   Resp: 20   Temp: 98 6 °F (37 °C)   TempSrc: Oral   SpO2: 95%   Weight: 65 2 kg (143 lb 11 8 oz)   Height: 5' 11" (1 803 m)       GENERAL: No acute distress    HEENT: Atraumatic/Normocephalic, PERRL, EOMI, mucous membranes moist   NECK: healing anterior neck incision  Posterior neck incision C/D/I   CARDIOVASCULAR: regular rate rhythm, no murmurs  LUNGS: clear to ausculation bilaterally, good inspiratory effort, no wheezes, rales, rhonchi   ABDOMEN: soft, non-tender, non-distended, no guarding  Bowel sounds x 4 normal   EXTREMITIES: no pedal edema bilaterally, negative Coleen's bilaterally  MUSCULOSKELETAL: active assist and passive ROM functional x 4  NEUROLOGICAL:   Awake, alert, oriented to person, place, time, and situation  Speech fluent  Able to follow simple commands  CN II-XII grossly intact  Cerebellar exam with finger to nose intact  Neg Lobato's   Neg Babinski  Sensation to light touch is symmetric and preserved x 4  Motor   RUE: 4+/5 throughout  LUE 3-/5 SF, SAB, 4-/5 EE, EF, 4/5 WE, WF,   BLE: 5/5  Generalized UE mild muscular atrophy      Assessment:  Karis Rousseau is a 81 y o  male in fair condition with severe cervical stenosis/radiculopathy s/p a two part surgery ACDF and PSF levels C3-T2:    Rehabilitation Plan:    -Rehabilitation of cervical stenosis/radiculopathy: PT/OT therapies  Deficits include L > R UE weakness, generalized weakness, decreased balance and strength   Rehabilitation Problems and Goals:  · Mobility dysfunction: Modified Independent  · ADL dysfunction: Modified Independent  · Speech, swallow, cognitive dysfunction: Modified Independent    Estimated length of stay: 7-10 days   Anticipated discharge setting: home   Functional Prognosis: good     Acute inpatient rehabilitation is necessary due to the medical impact on function as a result of impaired functional mobility, ambulation, bed mobility, transfers, self-care/ADL's, strength, endurance, range of motion/flexibility, coordination and impaired gait/balance  Treatment plan will include a comprehensive rehabilitation program with intense therapies for 3 hours/day, 5 days/week  Furthermore treatment plan includes:   · 24-hour availability of a physician specializing in rehabilitation who will coordinate the rehabilitation disciplines, manage the comorbid conditions, monitor the patient's functional improvement, and maximize the rehabilitation outcome  · Physical therapy to address bed mobidility, car and mat transfer, progressive ambulation with use of least restrictive assistive device, optimization of gait biomechanics, truncal strengthening, lower extremity strengthening, coordination, range of motion/flexibility, wheelchair and cushion evaluation, durable medical equipment evaluation, patient and family instruction and endurance training  · Occupational therapy to address feeding, grooming, upper and lower body dressing and bathing, toileting, tub/toilet/bed transfers, durable medical equipment evaluation, upper extremity strengthening, range of motion/flexibility, dexterity, coordination and patient and family instruction  · Speech-Language Pathology to address cognitive impairments including problem solving and reasoning, judgment and insight into deficits, as well as speech production and communication including language therapy, phonation and articulation  Also, for swallowing therapy if needed    · Rehabilitation nursing 24 hours per day to monitor bowel and bladder function, work on bowel routine, voiding trials/real catheter management as per bladder management protocol, assess falls risk upon admission and periodically thereafter, implement and revise falls prevention strategies, maintain skin integrity through initial and daily pressure sore risk assessment (Dougie scale), implement and revise pressure sore prevention strategies, educate patient and family members regarding medication administration, ADL's, transfers, and mobility and continue therapy carryover with ADL's, transfers, and mobility  · Social work and case management consults for discharge planning/disposition issues, as well as coordination and communication of patient progress between family and providers  · Rehab psychology- as needed for adjustment, coping      -Consult to Internal Medicine    -severe cervical stenosis/radiculopathy s/p a two part surgery ACDF and PSF levels C3-T2 on 4/12/18 and 4/30/18:   C Collar to be worn while OOB  F/U with Dr Sade Blunt  Cervical precautions     -nociceptive pain: managed on Percocet 1 tab Q4h PRN, Tylenol PRN, and scheduled low dose celebrex 100mg daily    -neuropathic pain: managed on neurontin 600mg BID    -muscular spasms: managed on valium 5mg QHS and scheduled robaxin 500mg Q8h  Wean as progressing      -restless legs: managed on Mirapex    -HTN: managed on chlorthalidone (new med) IM to monitor BPs    -HLD: managed on Pravachol    -COPD: managed on PRN Anoro Ellipta inhaler     -GERD: managed on Protonix    -DVT ppx: SCDS + Aspirin 325mg daily per Surg team    -Code status:  FULL       Post-Admission Physician Evaluation:  The patient has the potential to make improvement and is in need of at least 2 of the following multidisciplinary therapies including, but not limited to physical, occupational, speech and respiratory  The patient may also need nutritional services, wound care and prosthetics/orthotics prescription  Given the patient's complex medical condition and risk of further medical complications, rehabilitative services cannot be safely provided at a lower level of care, such as a skilled nursing facility  I have reviewed the patient's functional and medical status at the time of the preadmission screening and they are the same as on the day of this admission  I acknowledge that I have personally performed a full physical examination on this patient within 24 hours of admission  I have determined that the patient is able to tolerate the above course of treatment, at the appropriate level of intensity, for a reasonable period of time  Therefore, it is my professional opinion that acute intensive inpatient rehabilitation services that this patient needs, are reasonable and necessary  The patient demonstrated understanding the rehabilitation program and the discharge process after we discussed them    Agree in entirety: yes  Minor adaptions: none   Major changes: none      Marie Chavira MD  PM&R Primary Service

## 2018-05-09 NOTE — PROGRESS NOTES
OT initial eval     05/09/18 0510   Patient Data   Rehab Impairment Orthopedic Disorders   Etiologic Diagnosis cervical disc degeneration, radiculopathy of cervical region   Date of Onset 04/20/18   Home Setup   Type of Home Single Level   Method of Entry Curb  (1+1)   First Floor Bathroom Full;Tub; Shower   First Floor Setup Available Yes   Home Modification Comment grab bar installation; Pt lives in [facility] with 1+1 JANNIE with no hand rail  Pt has FF s/u with options to sleep in recliner chair if needed   Available Equipment (none)   Baseline Information   Outpatient Services Received Prior to Admission Physical Therapy  (for shoulder)   Prior Device(s) Used (none)   Prior Level of Function   Self-Care 3  Independent - Patient completed the activities by him/herself, with or without an assistive device, with no assistance from a helper  Functional Cognition 3  Independent - Patient completed the activities by him/herself, with or without an assistive device, with no assistance from a helper  Patient Preference   Nickname (Patient Preference) Deirdre Canales   Psychosocial   Psychosocial (WDL) WDL   Restrictions/Precautions   Precautions Fall Risk;Spinal precautions; Bed/chair alarms   Braces or Orthoses C/S Collar  (PER ORDER: CERVICAL COLLAR CAN BE OFF WHEN SUPINE )   Pain Assessment   Pain Assessment 0-10   Pain Score 6   Pain Location [facility] Pain Intervention(s) Repositioned   QI: Sit to Lying   Assistance Needed Physical assistance   Assistance Provided by [place] 25%-49%   Sit to Lying CARE Score 3   QI: Lying to Sitting on Side of Bed   Assistance Needed Physical assistance   Assistance Provided by [place] 25%-49%   Lying to Sitting on Side of Bed CARE Score 3   QI: Sit to Stand   Assistance Needed Physical assistance   Assistance Provided by [place] 50%-74%   Sit to Stand CARE Score 2   QI: Chair/Bed-to-Chair Transfer   Assistance Needed Physical assistance   Assistance Provided by [place] 50%-74% Chair/Bed-to-Chair Transfer CARE Score 2   Transfer Bed/Chair/Wheelchair   Limitations Noted In Balance;Confidence; Coordination; Endurance;Pain Management;Problem Solving; Sequencing;UE Strength;LE Strength   Adaptive Equipment Roller Walker   Findings Pt req guiding assist to lower to surface and A to boost with cues for carryover of safe hand placement   Bed, Chair, Wheelchair Transfer (FIM) 2 - Aline needs to lift or boost to rise AND assist to sit   QI: Toilet Transfer   Assistance Needed Physical assistance   Assistance Provided by [place] 50%-74%   Toilet Transfer CARE Score 2   Toilet Transfer   Surface Assessed Standard Commode   Transfer Technique Stand Pivot   Toilet Transfer (FIM) 2 - Aline needs to lift or boost to rise AND assist to sit   Comprehension   QI: Comprehension 4  Undestands: Clear comprehension without cues or repetitions   Comprehension (FIM) 6 - Has only MILD difficulty with complex/abstract info   Expression   QI: Expression 4  Express complex messages without difficulty and with speech that is clear and easy to [place]   Expression (FIM) 7 - Expresses complex/abstract ideas in a reasonable time w/o devices or helper     Social Interaction   Social Interaction (FIM) 5 - Needs monitoring/encouragement  to participate/interact   Problem Solving   Problem solving (FIM) 5 - Solves basic problems 90% of time   Memory   Memory (FIM) 5 - Aline cues patient   RUE Assessment   RUE Assessment X   LUE Assessment   LUE Assessment X  (limited AROM proximal >distal)   Coordination   Movements are Fluid and Coordinated 0   Cognition   Comments Pt reports he had episodes of confusion prior to admission to rehab, particularly in the am/night   Discharge Information   Patient's Discharge Plan return home with spouse; pt's grandson moving in to assist    Patient's Rehab Expectations "to be able to use my arm again"   Barriers to Discharge Home Limited Family Support;Decreased Strength;Decreased Endurance; Safety Considerations;Pain   Impressions Pt is a 81 y o  male seen for OT evaluation s/p admit to [address] [place] [address] on 5/9/2018 s/p laminectomy  Prior to admission, pt was fully indep however reports he was receiving therapy for L shoulder  Pt was not utilizing a device PTA and was able to perform ADLs indep  Upon evaluation: Pt requires Max A for xfers and inc A for ADL fxn 2* the following deficits impacting occupational performance: weakness, decreased ROM, decreased strength, decreased balance, decreased tolerance, impaired GMC, impaired sensation, decreased safety awareness, increased pain and orthopedic restrictions  Pt's spouse present for initial OT evaluation  She reports she can assist with IADLs and provide minimal help however she cannot provide lifting or heavy physical assistance  OT checked with PA who clarified collar order, pt must have cervical collar on when OOB, may remove if supine   Pt ELOS 2 wks to achieve S level goals   OT Therapy Minutes   OT Time In 1420   OT Time Out 1508   OT Total Time (minutes) 48   OT Mode of treatment - Individual (minutes) 10   OT Mode of treatment - Concurrent (minutes) 0   OT Mode of treatment - Group (minutes) 0   OT Mode of treatment - Co-treat (minutes) 38   OT Mode of Teatment - Total time(minutes) 48 minutes

## 2018-05-09 NOTE — RESPIRATORY THERAPY NOTE
RT Protocol Note  Sherwin Conti 81 y o  male MRN: [id number]  Unit/Bed#: [facility] 457-01 Encounter: [id number]    Assessment    Active Problems:    * No active hospital problems  *      Home Pulmonary Medications:  Home Devices/Therapy: (P)  (Anora prn)    Past Medical History:   Diagnosis Date    Arthritis     Back pain     Cervical pain (neck)     COPD (chronic obstructive pulmonary disease) (HCC)     History of transfusion     Age 8 after bleeding post tonsillectomy    Hyperlipidemia     Mild heartburn     Neck pain     Numbness     Left hand and arm    Scratches     Multiple on limbs and hands    Wears dentures     Upper and lower  Has 4 pins on bottom to hold bottom denture     Social History     Social History    Marital status: /Civil Union     Spouse name: N/A    Number of children: N/A    Years of education: N/A     Social History Main Topics    Smoking status: Former Smoker     Years: 55 00     Types: Cigars, Cigarettes     Quit date: 1/29/2018    Smokeless tobacco: Former User     Types: Chew      Comment: Quit cigarettes in 2002  Had smoked at most 1 5 packs daily   Alcohol use Yes      Comment: Occasional    Drug use: Yes     Types: Marijuana      Comment: two to three bowls a day    Sexual activity: Not Asked     Other Topics Concern    None     Social History Narrative    None       Subjective    Subjective Data: (P) im fine    Objective    Physical Exam:   Assessment Type: (P) Assess only  General Appearance: (P) Alert, Awake  Respiratory Pattern: (P) Normal  Chest Assessment: (P) Chest expansion symmetrical  Bilateral Breath Sounds: (P) Clear, Diminished  Cough: (P) Non-productive  O2 Device: (P) none    Vitals:  Blood pressure 142/78, pulse (P) 100, temperature 98 6 °F (37 °C), temperature source Oral, resp  rate (P) 18, height 5' 11" (1 803 m), weight 65 2 kg (143 lb 11 8 oz), SpO2 (P) 96 %            Imaging and other studies: I have personally reviewed pertinent reports        O2 Device: (P) none     Plan    Respiratory Plan: (P) Home Bronchodilator Patient pathway        Resp Comments: (P) pt assessed per respiratory protocol, Dx Spinal Stenosis, alert, awake, no sob or distress, sitting up in bed eating, takes PRN Anora rarely, RMA sat 96%, states his breathing is fine, will d/c respiratory protocol at this time and re assess if further respiratory intervention needed

## 2018-05-09 NOTE — CONSULTS
Consultation - Jackie Becker 81 y o  male MRN: [id number]    Unit/Bed#: -95 Encounter: [id number]        History of Present Illness     HPI: Jackie Becker is a 81y o  year old male with a history of hyperlipidemia, COPD, GERD presented on 4/30 for second stage of cervical laminectomy and fusion due to ongoing pain related to cervical spinal stenosis and disc degeneration  First part of the procedure was completed on 4/12  He did well postoperatively without complications  He was started on chlorthalidone for blood pressure control  He reported substernal chest pain yesterday and a CXR was completed and normal  He reports he only has this if he coughs or moves a certain way  He was deemed medically stable and transferred to [facility] for ongoing therapy  ROS:  Constitutional: Negative  HENT: Negative  Respiratory: Negative  Cardiovascular: Negative  Gastrointestinal: + constipation  Musculoskeletal: + substernal/pleuritic pain  , + neck/shoulder blade pain  Neurological: + paresthesias and weakness LUE   Psychiatric/Behavioral: Negative  Historical Information   Past Medical History:   Diagnosis Date    Arthritis     Back pain     Cervical pain (neck)     COPD (chronic obstructive pulmonary disease) (HCC)     History of transfusion     Age 8 after bleeding post tonsillectomy    Hyperlipidemia     Mild heartburn     Neck pain     Numbness     Left hand and arm    Scratches     Multiple on limbs and hands    Wears dentures     Upper and lower   Has 4 pins on bottom to hold bottom denture     Past Surgical History:   Procedure Laterality Date    COLONOSCOPY      ESOPHAGOGASTRODUODENOSCOPY      EYE SURGERY      Bilateral implants to correct vision    NE ANTERIOR INSTRUMENTATION 2-3 VERTEBRAL SEGMENTS N/A 4/12/2018    Procedure: FUSION CERVICAL ANTERIOR W DISCECTOMY C3/4, C7/T1;  Surgeon: Myesha Gagnon MD;  [facility];  Service: Orthopedics    NE ARTHRODESIS POSTERIOR/POSTERIORLATERAL CERVICAL BELOW C2 N/A 4/30/2018    Procedure: POSSIBLE C3-4, C4/5 LAMINECTOMY;  Surgeon: Alfreda Contreras MD;  [facility];  Service: Orthopedics    MO ARTHRODESIS POSTERIOR/POSTEROLATERAL THORACIC N/A 4/30/2018    Procedure: C3-T2 PSF With Instrumentation;  Surgeon: Alfreda Contreras MD;  [facility];  Service: Orthopedics    MO EXCIS CERV DISK,ONE LEVEL N/A 4/30/2018    Procedure: C 6-7 Left HEMILAMINOTOMY AND FORAMINOTOMY C7-T2 LAMINECTOMY C7-T2 Left Foraminotomy;  Surgeon: Alfreda Contreras MD;  [facility];  Service: Orthopedics    TONSILLECTOMY       Social History   History   Alcohol Use    Yes     Comment: Occasional     History   Drug Use    Types: Marijuana     Comment: last time used yesterday  two to three bowls a day     History   Smoking Status    Former Smoker    Years: 55 00    Types: Cigars    Quit date: 1/29/2018   Smokeless Tobacco    Former User    Types: Chew     Comment: Quit cigarettes in 2002  Had smoked at most 1 5 packs daily  No family history on file      Meds/Allergies   current meds:  Current Facility-Administered Medications   Medication Dose Route Frequency    [START ON 5/10/2018] aspirin (ECOTRIN) EC tablet 325 mg  325 mg Oral Daily    [START ON 5/10/2018] azelastine (ASTELIN) 0 1 % nasal spray 2 spray  2 spray Each Nare Daily    calcium carbonate (TUMS) chewable tablet 1,000 mg  1,000 mg Oral Daily PRN    [START ON 5/10/2018] celecoxib (CeleBREX) capsule 100 mg  100 mg Oral Daily    [START ON 5/10/2018] chlorthalidone tablet 12 5 mg  12 5 mg Oral Daily    diazepam (VALIUM) tablet 5 mg  5 mg Oral HS    docusate sodium (COLACE) capsule 100 mg  100 mg Oral BID    gabapentin (NEURONTIN) capsule 600 mg  600 mg Oral BID    magnesium hydroxide (MILK OF MAGNESIA) 400 mg/5 mL oral suspension 30 mL  30 mL Oral Daily PRN    methocarbamol (ROBAXIN) tablet 500 mg  500 mg Oral Q8H [address]    naloxone (NARCAN) 0 04 mg/mL syringe 0 04 mg  0 04 mg Intravenous Q1MIN PRN    oxyCODONE-acetaminophen (PERCOCET) 5-325 mg per tablet 1 tablet  1 tablet Oral Q4H PRN    pramipexole (MIRAPEX) tablet 0 75 mg  0 75 mg Oral HS    pravastatin (PRAVACHOL) tablet 40 mg  40 mg Oral Daily With Dinner    [START ON 5/10/2018] senna (SENOKOT) tablet 8 6 mg  1 tablet Oral Daily    simethicone (MYLICON) chewable tablet 80 mg  80 mg Oral 4x Daily PRN       PTA meds:   Prescriptions Prior to Admission   Medication    azelastine (ASTELIN) 0 1 % nasal spray    calcium carbonate (TUMS) 500 mg chewable tablet    Cholecalciferol (VITAMIN D3) 15585 units TABS    diazepam (VALIUM) 10 mg tablet    gabapentin (NEURONTIN) 600 MG tablet    oxyCODONE-acetaminophen (PERCOCET) 5-325 mg per tablet    pramipexole (MIRAPEX) 0 75 MG tablet    simvastatin (ZOCOR) 20 mg tablet    tiZANidine (ZANAFLEX) 4 mg tablet    tiZANidine (ZANAFLEX) 4 mg tablet     No Known Allergies    Objective   Vitals: Temp 98 6 Pulse 96 RR 20 /78 SpO2 95%  Physical Exam   Constitutional: Pt is in NAD, nontoxic  HENT: nares patent, oropharynx negative for thrush  Head: Normocephalic  Eyes: EOM are normal  Pupils are equal, round, and reactive to light  Neck: Neck supple  Cardiovascular: Normal rate and regular rhythm  No murmur heard  Pulmonary/Chest: Breath sounds normal  No respiratory distress  Pt has no wheezes  Pt has no rales  Abdominal: Soft  Bowel sounds are normal  Pt exhibits no distension  There is no tenderness  There is no rebound and no guarding  Extremities: no LE edema  Neurological: Pt is alert and oriented to person, place, and time  3/5 strength LUE and 5/5 RUE  Psychiatric: Pt has a normal mood and affect         Lab Results:   Results from last 7 days  Lab Units 05/05/18  0436   WBC Thousand/uL 10 06   HEMOGLOBIN g/dL 13 6   HEMATOCRIT % 40 4   PLATELETS Thousands/uL 150                   Glucose (mg/dL)   Date Value   05/01/2018 122   04/13/2018 131   03/29/2018 80       Labs reviewed    Imaging: reviewed  EKG, Pathology, and Other Studies: I have personally reviewed pertinent reports  VTE Prophylaxis: Aspirin 325mg daily    Code Status: Level 1 - Full Code      Assessment/Plan     1  Stage 2 C3/4, C7/T1 and T1/2 laminectomy and PSF C3-T2: monitor incision; continue pain control  Therapy per primary team  Add Miralax to bowel regimen due to constipation  Follow up with ortho after rehab  2  COPD: Stable; uses Anoro Ellipta daily PRN  Continue to monitor  On room air  3  Hyperlipidemia: continue statin therapy, heart healthy diet  4  Hypertension: new to chlorthalidone  Blood pressure currently well controlled  Will continue to monitor  5  Leukocytosis: resolved on most recent CBC  Likely reactive to surgery  Will follow up in AM  6  DVT prophylaxis: Aspirin 325mg daily    Counseling / Coordination of Care  Total floor / unit time spent today 60 minutes  Greater than 50% of total time was spent with the patient and / or family counseling and / or coordination of care        Rob Rosado PA-C

## 2018-05-09 NOTE — SOCIAL WORK
Spoke with Ellen Herrmann from the [facility] they received the letter from [facility]  Pt is all setup for discharge today at 12  Pt and his wife informed

## 2018-05-09 NOTE — PROGRESS NOTES
Progress Note - Internal Medicine   Sandy Rojas 81 y o  male MRN: [id number]  Unit/Bed#: E5 -01 Encounter: [id number]      Impression :    s/p posterior cervical laminectomy and fusion, C3/4, C7/T1 and T1/2  Degenerative disc disease C2/C3 and C3/4 and Cervical spinal stenosis  COPD with emphysema  Restless leg syndrome  H/o chronic marijuana use  Chronic herniated nucleus pulposus with spinal cord compression and flattening of C3/4  Previous work related accident on 05/21/1992  Protein calorie malnutrition  Recommendation:    · Patient is hemodynamically stable as evident on the flow sheets  Patients  pain is well controlled with current analgesics  · Medically stable for discharge with out patient follow ups as arranged by  once cleared by Therapy and Orthopedic team   Patient is being transferred to acute rehabilitation for short-term rehab  · Continue DVT prophylaxis as per surgical team with  mg  Continue bilateral Venodyne compression boots  · Monitor for any redness/ drainage / swelling around site of surgery and to notify Orthopedic team or rehab team   · Continue PT /OT to improve endurance, range of motion, gait stabilization and use of assist device in a safe manner  · Recheck Hemoglobin and hematocrit  Discussed with Dr Xena Hogan regarding his disposition plans  He will follow up with Dr Xena Hogan by calling their office and making a follow-up appointment  · Full fall and orthostatic precautions  Subjective:     Patient seen and examined today  EMR and overnight events reviewed  Patient is steadily improving, his radiculopathic symptoms have improved significantly following his surgery  He has mild discomfort which he operates in his left shoulder area which he thinks is related to him doing too much of therapy  His neck pain is significantly better  He denies any nausea vomiting appetite is improving    He is using additional nutritional supplements including Ensure  He is awaiting disposition later today to short-term rehabilitation  He was resting comfortably when I saw him this morning  Review of Systems     He denied any chest pain nausea vomiting, no new paresthesias or tingling  No bladder or bowel complaints  Denies any dizziness lightheadedness  His ambulation is still not the best and is fearful of falling  All other systems reviewed and are negative  OBJECTIVE:     Reviewed hemodynamics stable  Physical Exam    Patient is awake and alert not in any distress  Pallor JVP cyanosis negative  No peripheral edema  Homans sign is negative  Clear lungs no rales or crackles  S1-S2 normal no murmurs gallops  Abdomen is soft scaphoid nontender  Lower extremity no pedal edema, no peripheral swelling  Will healing surgical scar natali noted on his posterior aspect of cervical spine without any discharge drainage or redness  Patient is slightly guarded and rotating his neck  Neurologically no focal deficit he has good hand grasp  Bilateral plantar flexion and dorsiflexion is within normal limit now  Babinski is bilateral downgoing  Denies any sensory diminished to light touch  Labs, Imaging, & Other studies:    All pertinent labs and imaging studies were personally reviewed    Results from last 7 days  Lab Units 05/05/18  0436   WBC Thousand/uL 10 06   HEMOGLOBIN g/dL 13 6   PLATELETS Thousands/uL 150         VTE Mechanical Prophylaxis: sequential compression device    Portions of the record may have been created with voice recognition software  Occasional wrong word or "sound a like" substitutions may have occurred due to the inherent limitations of voice recognition software  Read the chart carefully and recognize, using context, where substitutions have occurred

## 2018-05-09 NOTE — PLAN OF CARE
Problem: OCCUPATIONAL THERAPY ADULT  Goal: Performs self-care activities at highest level of function for planned discharge setting  See evaluation for individualized goals  Treatment Interventions: ADL retraining, Functional transfer training, Endurance training, UE strengthening/ROM, Patient/family training, Equipment evaluation/education, Compensatory technique education, Energy conservation, Activityengagement, Cognitive reorientation          See flowsheet documentation for full assessment, interventions and recommendations  Outcome: Progressing  Limitation: Decreased ADL status, Decreased UE strength, Decreased Safe judgement during ADL, Decreased endurance, Decreased cognition, Decreased self-care trans, Decreased high-level ADLs, Decreased sensation (increased pain)  Prognosis: Good  Assessment: Pt was seen for skilled OT with focus on completion of bed mobility, review of brace application, sitting balance activity, self care routine, review of LHAE for LE dressing and review of current plan of care  Pt with noted anxiety related to increased pain levels with movement  Extended time provided for Pt to adjust positioning following removal of shirt using one handed techs  Pt reports having increased pain and weakness in LUE  Educated Pt on removal of shirt from [address], then over head then LUE  Opposite completed with donning shirt  Recommended continued use of spandex type material or oversized T-shirts to encourage independence and decrease pain levels with activity  Pt able to tolerate activity with UE self care with Mod A overall  Mod A required using log roll tech to sit EOB  Pt with increased pain with activity, requesting to immediately return to supine positioning  Reassurance and emotional support provided to maintain seated positioning at EOB  Improved pain tolerance noted with extended time provided to stabilize self and use relaxation techs with pursed lip breathing   Pt then able to tolerate review of LE dressing with use of long handled reacher and sock aid  Able to return demonstration to use sock aid with F quality noted  Mod A overall for LE self care with cues to encourage activity  Pt will required moderate encouragement through out all functional tasks to engage with functional movement due to increased pain levels  See above levels of A required for all functional tasks  Pt will benefit from further rehab to achieve highest levels of independence with all functional tasks        OT Discharge Recommendation: Short Term Rehab  OT - OK to Discharge: Yes (rehab)      Comments: LORENA Bustillos

## 2018-05-09 NOTE — PHYSICIAN ADVISOR
Current patient class: Inpatient  The patient is currently on Hospital Day: 10      The patient was admitted to the hospital  on 4/30/18 at [id number] for the following diagnosis:  Disease of spinal cord ([address] ) [G95 9]  Spinal stenosis of cervical region [M48 02]  Other cervical disc degeneration, unspecified cervical region [M50 30]  Radiculopathy of cervical region [M54 12]       There is documentation in the medical record of an expected length of stay of at least 2 midnights  The patient is therefore expected to satisfy the 2 midnight benchmark and given the 2 midnight presumption is appropriate for INPATIENT ADMISSION  Given this expectation of a satisfying stay, CMS instructs us that the patient is most often appropriate for inpatient admission under part A provided medical necessity is documented in the chart  After review of the relevant documentation, labs, vital signs and test results, the patient is appropriate for INPATIENT ADMISSION  Pt admitted for planned surgical procedure, Posterior spinal fusion C3-T2 and remained in the hospital for pain control and PT/OT and required placement at acute rehab  Admission to the hospital as an inpatient is a complex decision making process which requires the practitioner to consider the patients presenting complaint, history and physical examination and all relevant testing  With this in mind, in this case, the patient was deemed appropriate for INPATIENT ADMISSION  After review of the documentation and testing available at the time of the admission I concur with this clinical determination of medical necessity  The patient does have an inpatient admission within the previous 30 days  The patient was admitted on 4/12/2018 and discharged on 4/13/2018 as an inpatient  The patient therefore required readmission review  In this case the patient should be considered a A RELATED INPATIENT ADMISSION   The patient had been discharged in stable condition with a completed care plan  Rationale is as follows:    Pt was readmitted for a planned two part surgery so this would be considered a related admission  The patient is a 81 yrs   Male who presented to the ED at 4/30/2018  7:36 AM with a chief complaint of No chief complaint on file  The patients vitals on arrival were ED Triage Vitals   Temperature Pulse Respirations Blood Pressure SpO2   04/30/18 0754 04/30/18 0754 04/30/18 0754 04/30/18 0754 04/30/18 0754   (!) 96 2 °F (35 7 °C) 60 16 142/74 96 %      Temp Source Heart Rate Source Patient Position - Orthostatic VS BP Location FiO2 (%)   04/30/18 0754 04/30/18 0754 04/30/18 1606 04/30/18 1606 --   Tympanic Monitor Lying Right arm       Pain Score       04/30/18 0817       No Pain           Past Medical History:   Diagnosis Date    Arthritis     Back pain     Cervical pain (neck)     COPD (chronic obstructive pulmonary disease) (HCC)     History of transfusion     Age 8 after bleeding post tonsillectomy    Hyperlipidemia     Mild heartburn     Neck pain     Numbness     Left hand and arm    Scratches     Multiple on limbs and hands    Wears dentures     Upper and lower   Has 4 pins on bottom to hold bottom denture     Past Surgical History:   Procedure Laterality Date    COLONOSCOPY      ESOPHAGOGASTRODUODENOSCOPY      EYE SURGERY      Bilateral implants to correct vision    GA ANTERIOR INSTRUMENTATION 2-3 VERTEBRAL SEGMENTS N/A 4/12/2018    Procedure: FUSION CERVICAL ANTERIOR W DISCECTOMY C3/4, C7/T1;  Surgeon: Carey Huber MD;  [facility];  Service: Orthopedics    GA ARTHRODESIS POSTERIOR/POSTERIORLATERAL CERVICAL BELOW C2 N/A 4/30/2018    Procedure: POSSIBLE C3-4, C4/5 LAMINECTOMY;  Surgeon: Carey Huber MD;  [facility];  Service: Orthopedics    GA ARTHRODESIS POSTERIOR/POSTEROLATERAL THORACIC N/A 4/30/2018    Procedure: C3-T2 PSF With Instrumentation;  Surgeon: Carey Huber MD;  [facility];  Service: Orthopedics    OR EXCIS CERV DISK,ONE LEVEL N/A 4/30/2018    Procedure: C 6-7 Left HEMILAMINOTOMY AND FORAMINOTOMY C7-T2 LAMINECTOMY C7-T2 Left Foraminotomy;  Surgeon: Madi To MD;  [facility];  Service: Orthopedics    TONSILLECTOMY             Consults have been placed to:   IP CONSULT TO CASE MANAGEMENT  IP CONSULT TO INTERNAL MEDICINE  IP CONSULT TO NUTRITION SERVICES    Vitals:    05/07/18 2330 05/08/18 0743 05/08/18 2236 05/09/18 0744   BP: 105/63 126/78 122/70 134/83   BP Location: Left arm Left arm Left arm Left arm   Pulse: 78 89 78 87   Resp: 18 19 18 19   Temp: 99 1 °F (37 3 °C) 97 7 °F (36 5 °C) 98 °F (36 7 °C) 98 2 °F (36 8 °C)   TempSrc: Temporal Temporal Temporal Temporal   SpO2: 96% 97% 97% 100%   Weight:       Height:           Most recent labs:    No results for input(s): WBC, HGB, HCT, PLT, K, NA, CALCIUM, BUN, CREATININE, LIPASE, AMYLASE, INR, TROPONINI, CKTOTAL, AST, ALT, ALKPHOS, BILITOT in the last 72 hours  Scheduled Meds:  Continuous Infusions:  No current facility-administered medications for this encounter  PRN Meds:      Surgical procedures (if appropriate):  Procedure(s):  POSSIBLE C3-4, C4/5 LAMINECTOMY  C3-T2 PSF With Instrumentation  C 6-7 Left HEMILAMINOTOMY AND FORAMINOTOMY C7-T2 LAMINECTOMY C7-T2 Left Foraminotomy

## 2018-05-09 NOTE — PROGRESS NOTES
PHYSICAL MEDICINE AND REHABILITATION   PREADMISSION ASSESSMENT     Projected [facility] Diagnoses:  Impairment of mobility, safety and Activities of Daily Living (ADLs) due to Orthopedic Disorders:  08 9  Other Orthopedic posterior cervical lamininectomy and fusion  Etiologic: cervical disc degeneration, radiculopathy of cervical region  Date of Onset: 4/30/18   Date of surgery: 4/30/18    Procedures:   POSSIBLE C3-4, C4/5 LAMINECTOMY (N/A)  C3-T2 PSF With Instrumentation (N/A)  C 6-7 Left HEMILAMINOTOMY AND FORAMINOTOMY C7-T2 LAMINECTOMY C7-T2 Left Foraminotomy (N/A)    PATIENT INFORMATION  Name: Alexandru Nino Phone #: [phone] (home)   Address: [address] [address]  YOB: 1945 Age: 81 y o  SS#   Marital Status: /Civil Union  Ethnicity:   Employment Status: retired  Extended Emergency Contact Information  Primary Emergency Contact: BobBecky ma  Address: [address] [place] [address] Phone: [phone]  Relation: Spouse  Advance Directive: Level 1 Full Code, Unknown Advanced Directive     INSURANCE/COVERAGE:     Primary Payor: Caesar Solano / Plan: [employer] / Product Type: Workers Compensation /   Secondary Payer: MEDICARE A AND B Tertiary Payer: Commercial Miscellaneous   Member #: [id number]   Payer Contact: Adela Valadez Payer Contact:    Contact Phone: [phone] Contact Phone:    Author contacted Business Office who stated to reach out to Workers Comp to see if they would pay and authorize claim  If they will not we would need a letter of denial in order to bill Medicare  Letter obtained from [id number] [facility] with letter that stated : "I understand that Mr Rojas recently underwent cervical surgical procedures which now require him to be transferred to an Acute Rehabilitation facility    Based on review of the current treating records, these appear to be for degenerative conditions which are not compensable under his Workers' Compensatio claim from [facility], and billing associated with this condition are denied  If there is any additional documentation available for review, please forward to me for review  Otherwise, I recommend that the patient refer this through their private health carrier"    Authorization #:   Coverage Dates:  LCD:   MEDICARE #: [id number]  Medicare Days: 60/30/60  Medical Record #: [id number]    REFERRAL SOURCE:   Referring provider: Carey Huber MD  Referring facility: [address] [place] [address]  Room: [address] 87 [id number]/E5 -*  PCP: Mirela Saez DO PCP phone number: [phone]    MEDICAL INFORMATION  HPI:   Patient is a 81year old male who presented to the [address] [place] [address] on 4/30/18 for the second part of a stage 2 cervical spinal fusion       (Of note, he did undergo an ACDF on 4/12/18 and then returned on 4/30/18 for the posterior cervical spinal fusion  Patient had an initial day of injury on 5/21/1992 when he was working as a   Patient reportedly was walking to where he worked and he caught his left foot on a slab and fell onto his left shoulder and fractured his ankle  Patient was treated that time by Dr Apoorva Braswell from [id number], at that time he was told that he would require a 6-7 operation and there was a 50% chance that he could be paralyzed  Patient has been having pain off and on he has been out of work since his accident of [facility]  He has been followed by Dr Ashtyn Cano who diagnosed him to have work min related accident with cervicalgia and cervical radiculopathy with degeneration of cervical intervertebral discs and myelomalacia  Patient had initial surgery done and was now admitted for 2nd stage )    He underwent the procedure on 4/30/18 which was performed by Dr Ashtyn Cano   Post op the patient had moderate amounts of pain which was addressed by his attending physician  Per SLIM - Patient has high tolerance probably related to his previous use of narcotics and using marijuana in the past   Recommend using nonsteroidal anti-inflammatory medications and muscle relaxants  He was evaluated by both PT/OT who both recommending inpatient acute rehab  At this time the attending physician is clearing the patient for discharge to inpatient acute rehab  Past Medical History:   Past Surgical History: Allergies:     Past Medical History:   Diagnosis Date    Arthritis     Back pain     Cervical pain (neck)     COPD [facility]ch[facility] obstructive pulmonary disease) ([facility])     History of transfusion     Age 8 after bleeding post tonsillectomy    Hyperlipidemia     Mild heartburn     Neck pain     Numbness     Left hand and arm    Scratches     Multiple on limbs and hands    Wears dentures     Upper and lower   Has 4 pins on bottom to hold bottom denture    Past Surgical History:   Procedure Laterality Date    COLONOSCOPY      ESOPHAGOGASTRODUODENOSCOPY      EYE SURGERY      Bilateral implants to correct vision    OK ANTERIOR INSTRUMENTATION 2-3 VERTEBRAL SEGMENTS N/A 4/12/2018    Procedure: FUSION CERVICAL ANTERIOR W DISCECTOMY C3/4, C7/T1;  Surgeon: Abimbola Barton MD;  [facility];  Service: Orthopedics    OK ARTHRODESIS POSTERIOR/POSTERIORLATERAL CERVICAL BELOW C2 N/A 4/30/2018    Procedure: POSSIBLE C3-4, C4/5 LAMINECTOMY;  Surgeon: Abimbola Barton MD;  [facility];  Service: Orthopedics    OK ARTHRODESIS POSTERIOR/POSTEROLATERAL THORACIC N/A 4/30/2018    Procedure: C3-T2 PSF With Instrumentation;  Surgeon: Abimbola Barton MD;  [facility];  Service: Orthopedics    OK EXCIS CERV DISK,ONE LEVEL N/A 4/30/2018    Procedure: C 6-7 Left HEMILAMINOTOMY AND FORAMINOTOMY C7-T2 LAMINECTOMY C7-T2 Left Foraminotomy;  Surgeon: Abimbola Barton MD;  [facility];  Service: Orthopedics    TONSILLECTOMY       No Known Allergies Comorbidities: chronic herniated nucleus pulposus with spinal cord compression and flattening of C3/4, COPD with emphysema, restless leg syndrome, protein calorie malnutrition, previous work related incident from   [place] 59:   Temp:  [98 °F (36 7 °C)-98 2 °F (36 8 °C)] 98 2 °F (36 8 °C)  HR:  [78-87] 87  Resp:  [18-19] 19  BP: (122-134)/(70-83) 134/83   Intake/Output Summary (Last 24 hours) at 05/09/18 1035  Last data filed at 05/09/18 0101   Gross per 24 hour   Intake                0 ml   Output              800 ml   Net             -800 ml        LABORATORY RESULTS:      Lab Results   Component Value Date    HGB 13 6 05/05/2018    HCT 40 4 05/05/2018    WBC 10 06 05/05/2018     Lab Results   Component Value Date    BUN 10 05/01/2018     05/01/2018    K 4 2 05/01/2018     05/01/2018    GLUCOSE 122 05/01/2018    CREATININE 0 74 05/01/2018     No results found for: PROTIME, INR     DIAGNOSTIC STUDIES:  Xr Spine Cervical 2 Or 3 Vw Injury    Result Date: 5/1/2018  Impression: Fluoroscopic guidance provided for surgical procedure  Please refer to the separate procedure notes for additional details  Workstation performed: [id number]     Xr Cervical Spine 2 Or 3 Views    Result Date: 5/1/2018  Impression: Postsurgical changes after C3-T2 fixation  No acute or unexpected findings  Workstation performed: [id number]       PRECAUTIONS/SPECIAL NEEDS:  Tobacco:   History   Smoking Status    Former Smoker    Years: 55 00    Types: Cigars    Quit date: 1/29/2018   Smokeless Tobacco    Former User    Types: Chew     Comment: Quit cigarettes in 2002  Had smoked at most 1 5 packs daily     , Alcohol:    History   Alcohol Use    Yes     Comment: Occasional   , Splints/Braces: C/S Collar, Edema Management, Safety Concerns, Pain Management, IV: Type: peripheral  Location: left forearm Reason: medications and fluids, Language Preference: English and fall precautions    MEDICATIONS: Current Facility-Administered Medications:     aspirin (ECOTRIN) EC tablet 325 mg, 325 mg, Oral, Daily, Agueda Zhang, PA-C, 325 mg at 05/09/18 0915    azelastine (ASTELIN) 0 1 % nasal spray 2 spray, 2 spray, Each Nare, Daily, Agueda Zhang, PA-C, 2 spray at 05/09/18 0919    calcium carbonate (TUMS) chewable tablet 1,000 mg, 1,000 mg, Oral, Daily PRN, Agueda Zhang, PA-C, 1,000 mg at 05/05/18 8048    calcium carbonate (TUMS) chewable tablet 500 mg, 500 mg, Oral, PRN, Agueda Zhang, PA-C, 500 mg at 04/30/18 2232    celecoxib (CeleBREX) capsule 100 mg, 100 mg, Oral, Daily, Darien Lakhani MD, 100 mg at 05/09/18 0915    chlorthalidone tablet 12 5 mg, 12 5 mg, Oral, Daily, Darien Lakhani MD, 12 5 mg at 05/09/18 0915    diazepam (VALIUM) tablet 10 mg, 10 mg, Oral, HS, Agueda Zhang, PA-C, 10 mg at 05/08/18 2136    docusate sodium (COLACE) capsule 100 mg, 100 mg, Oral, BID, Agueda Zhang, PA-C, 100 mg at 05/09/18 0915    gabapentin (NEURONTIN) capsule 600 mg, 600 mg, Oral, BID, Agueda Zhang, PA-C, 600 mg at 05/09/18 0915    magnesium hydroxide (MILK OF MAGNESIA) 400 mg/5 mL oral suspension 30 mL, 30 mL, Oral, Daily PRN, Agueda Zhang, PA-C, 30 mL at 05/07/18 1194    methocarbamol (ROBAXIN) tablet 500 mg, 500 mg, Oral, Q8H [facility], Darien Lakhani MD, 500 mg at 05/09/18 0612    morphine injection 1 mg, 1 mg, Intravenous, Q3H PRN, Agueda Zhang, PA-C, 1 mg at 05/09/18 0910    naloxone (NARCAN) 0 04 mg/mL syringe 0 04 mg, 0 04 mg, Intravenous, Q1MIN PRN, Agueda Zhang PA-C    oxyCODONE-acetaminophen (PERCOCET) 5-325 mg per tablet 1 tablet, 1 tablet, Oral, Q4H PRN, Agueda Zhang PA-C, 1 tablet at 05/09/18 0803    pramipexole (MIRAPEX) tablet 0 75 mg, 0 75 mg, Oral, HS, Agueda Zhang PA-C, 0 75 mg at 05/08/18 2136    pravastatin (PRAVACHOL) tablet 40 mg, 40 mg, Oral, Daily With Peter Chandler PA-C, 40 mg at 05/08/18 1742    senna (SENOKOT) tablet 8 6 mg, 1 tablet, Oral, Daily, Agueda Zhang PA-C, 8 6 mg at 05/09/18 0915   simethicone (MYLICON) chewable tablet 80 mg, 80 mg, Oral, 4x Daily PRN, Velia Zelaya PA-C, 80 mg at 05/04/18 8924    SKIN INTEGRITY:   incision noted to the neck    PRIOR LEVEL OF FUNCTION:  He lives in a(n) single family home  [place] is  and lives with their spouse  Self Care: Independent, Indoor Mobility: Independent, Stairs (in/outdoor): Independent and Cognition: Independent    HOME ENVIRONMENT:  The living area: can live on one level  There is 1 step to enter the home  The patient will not have 24 hour supervision/physical assistance available upon discharge  Wife will be there, however at his current level of function the wife is stating she would have a difficult time assisting him  PREVIOUS DME:  Equipment in home (previous DME): grab bars in the shower    FUNCTIONAL STATUS:  Physical Therapy Occupational Therapy Speech Therapy   5/8/18    Bed Mobility   Additional Comments Pt  seated in bedside chair upon my arrival, and remained seated in chair at end of treatment session  Transfers   Sit to Stand 4  Minimal assistance   Additional items Assist x 2;Armrests; Increased time required;Verbal cues   Stand to Sit 4  Minimal assistance   Additional items Assist x 2;Armrests; Increased time required;Verbal cues   Ambulation/Elevation   Gait pattern Narrow WALDEMAR; Decreased foot clearance; Ataxia; Inconsistent chelly; Shuffling; Improper Weight shift; Poor UE support; Short stride; Step to;Excessively slow   Gait Assistance 3  Moderate assist   Additional items Assist x 1;Verbal cues; Tactile cues; Other (Comment)  (with second from a chair follow)   Assistive Device Rolling walker   Distance 70' x 1, 40' x 1 requiring chair transport back to room due to fatigue   Stair Management Assistance Not tested   Balance   Static Sitting Fair +   Dynamic Sitting Fair   Static Standing Fair -   Dynamic Standing Poor +   Ambulatory Poor +   Endurance Deficit   Endurance Deficit Yes   Endurance Deficit Description pain/fatigue   Activity Tolerance   Activity Tolerance Patient limited by fatigue;Patient limited by pain   Nurse Made Aware Yes   Assessment   Prognosis Fair   Problem List Decreased strength;Decreased range of motion;Decreased endurance; Impaired balance;Decreased mobility; Decreased coordination;Decreased cognition; Impaired judgement;Decreased safety awareness; Impaired tone; Impaired sensation;Decreased skin integrity;Orthopedic restrictions;Pain   Assessment Pt  seated in bedside chair upon my arrival without cervical collar donned, required A to don/doff prior to and after amb  trials  Progressed with transfers continuing to require A of 2 with cues provided for proper technique  Progressed with an increased amb  trial with use of RW and modA of therapist with cues provided for LE sequencing  Continue to follow pt  with chair as, pt  fatigues quickly and will attempt to sit impulsively  Required chair transport back to room as pt  unable to continue amb  trial due to increased fatigue/pain after additional amb  distance  Repositioned seated in bedside chair with alarm active and cervical collar doffed at end of treatment session  PT will continue to recommend STR upon d/c for continued improvement of noted impairments above      5/9/18    ADL   Where Assessed Edge of bed   Eating Assistance 4  Minimal Assistance   Eating Deficit Setup; Increased time to complete;Bringing food to mouth assist   Eating Comments tray table positioned at chest level  Grooming Assistance 4  Minimal Assistance   Grooming Deficit Setup   UB Bathing Assistance 4  Minimal Assistance   UB Bathing Deficit Setup;Verbal cueing;Supervision/safety; Increased time to complete; Chest;Right arm;Left arm; Abdomen   UB Bathing Comments while supine   LB Bathing Assistance 3  Moderate Assistance   LB Bathing Deficit Setup;Steadying;Verbal cueing;Supervision/safety; Increased time to complete; Buttocks; Left lower leg including foot;Right lower leg including foot   LB Bathing Comments Limited functional reach noted instance  UB Dressing Assistance 3  Moderate Assistance   UB Dressing Deficit Setup;Verbal cueing;Supervision/safety; Increased time to complete; Thread RUE; Thread LUE;Pull over head;Pull around back;Pull down in back   UB Dressing Comments recommended tech in below assessment  LB Dressing Assistance 3  Moderate Assistance   LB Dressing Deficit Setup;Steadying; Requires assistive device for steadying;Verbal cueing;Supervision/safety; Increased time to complete; Don/doff R sock; Don/doff L sock; Thread RLE into pants; Thread LLE into pants;Pull up over hips;Use of adaptive equipment   LB Dressing Comments Pt trialed use of AE for LE dressing with F carry over noted  Toileting Assistance  2  Maximal Assistance   Toileting Deficit Setup;Steadying;Supervison/safety;Verbal cueing; Increased time to complete;Clothing management up;Clothing management down;Perineal hygiene; Bedside commode   Toileting Comments Limited functional reach noted due to pain levels  Functional Standing Tolerance   Time 4 mins   Activity self care routine  Comments Pt able to tolerate with Mod A x1   Bed Mobility   Rolling L 3  Moderate assistance   Additional items Assist x 1   Supine to Sit 3  Moderate assistance   Additional items Assist x 1   Additional Comments cues to engage movement required  Pt resistive due to noted pain levels  Moderate encouragement required  Transfers   Sit to Stand 4  Minimal assistance   Additional items Assist x 1   Stand to Sit 4  Minimal assistance   Additional items Assist x 1   Stand pivot 3  Moderate assistance   Additional items Assist x 2; Increased time required;Verbal cues;Armrests; Bedrails   Toilet transfer 4  Minimal assistance   Additional items Assist x 1;Verbal cues; Commode   Additional Comments Pt stated, "I just can't go", regarding having no BM      Functional Mobility   Functional Mobility 3  Moderate assistance Additional Comments x1   Additional items Rolling walker   Toilet Transfers   Toilet Transfer From [employer] Transfer Type To   Toilet Transfer to Standard bedside commode   Toilet Transfer Technique Stand pivot; Ambulating   Toilet Transfers Verbal cues; Minimal assistance   Toilet Transfers Comments cues for safe hand placement required  Cognition   Overall Cognitive Status Impaired   Arousal/Participation Alert; Responsive   Attention Attends with cues to redirect   Orientation Level Oriented X4   Memory Decreased short term memory   Following Commands Follows multistep commands with increased time or repetition   Comments cues required for redirection due to poor carry over of reviewed techs to push from base of support with sit to stand and reach for armrests with seated positioning  Additional Activities   Additional Activities Other (Comment)  (reviewed current plan of care  )   Additional Activities Comments Pt reports, "I may just go home and let my wife take care of me"  Encouraged further therapies to achieve optimal outcome  Activity Tolerance   Activity Tolerance Patient limited by fatigue;Patient limited by pain   Medical Staff Made Aware Reported all findings to nursing staff  Pt OOB in bedside recliner with seat alarm activated  Assessment   Assessment Pt was seen for skilled OT with focus on completion of bed mobility, review of brace application, sitting balance activity, self care routine, review of LHAE for LE dressing and review of current plan of care  Pt with noted anxiety related to increased pain levels with movement  Extended time provided for Pt to adjust positioning following removal of shirt using one handed techs  Pt reports having increased pain and weakness in LUE  Educated Pt on removal of shirt from [employer] [place], then over head then LUE  Opposite completed with donning shirt   Recommended continued use of spandex type material or oversized T-shirts to encourage independence and decrease pain levels with activity  Pt able to tolerate activity with UE self care with Mod A overall  Mod A required using log roll tech to sit EOB  Pt with increased pain with activity, requesting to immediately return to supine positioning  Reassurance and emotional support provided to maintain seated positioning at EOB  Improved pain tolerance noted with extended time provided to stabilize self and use relaxation techs with pursed lip breathing  Pt then able to tolerate review of LE dressing with use of long handled reacher and sock aid  Able to return demonstration to use sock aid with F quality noted  Mod A overall for LE self care with cues to encourage activity  Pt will required moderate encouragement through out all functional tasks to engage with functional movement due to increased pain levels  See above levels of A required for all functional tasks  Pt will benefit from further rehab to achieve highest levels of independence with all functional tasks  5/7/18    Summary:  Pt presents w/ no s/s dysphagia  Pt also denies any swallowing difficulty       Recommendations:  Diet: regular as tolerated  Liquid:thin  Meds:as tolerated/desired  Supervision: assist as needed  Positioning:Upright  Strategies: Pt to take PO/Meds only when fully alert and upright  Oral care  Aspiration precautions  Reflux precautions     Eval only, No f/u tx indicated       Reason for consult:  R/o aspiration  Determine safest and least restrictive diet  Poor appetite     Current diet:  regular  Premorbid diet[de-identified]  regular  Previous VBS:  none  O2 requirement:  none  Voice/Speech:  wnl  Social:  Home w/ wife  Follows commands:  yes                        Cognitive Status:  A&O  Oral mech exam:  Upper plate  Lower plate     Items administered:  Entire thin liquid ensure while lying flat  consecutive sips  (seated ~ 70 degrees initially  He then reported pain and wanted to lie down)     Oral stage:   WNL  Pharyngeal stage:  WNL  Swallow promptness:prompt  Laryngeal rise:wnl  Wet voice:no  Throat clear:none  Cough:none  Secondary swallows:none  Audible swallows: no  No s/s aspiration:none     Esophageal stage:  h/o mild heartburn     Results d/w:  Pt, nursing        CURRENT GAP IN FUNCTION     Prior to Admission:     Functional Status: Patient was independent with mobility/ambulation, transfers, ADL's, IADL's  Estimated length of stay: 10 to 14 days    Anticipated Post-Discharge Disposition/Treatment  Disposition: Return to previous home/apartment  Outpatient Services: Physical Therapy (PT) and Occupational Therapy (OT)    BARRIERS TO DISCHARGE  Weakness, Pain, Balance Difficulty, Fatigue, Home Accessibility, Caregiver Accessibility, Financial Resources, Equipment Needs and Resource Availability    INTERVENTIONS FOR DISCHARGE  Adaptive equipment, Financial Assistance, Arrange DME needs, Home Modifcations, Medication Changes per MD recommendations, Therapy exercises, Center of balance support  and Energy conservation education     REQUIRED THERAPY:  Patient will require PT and OT 90 minutes each per day, five days per week to achieve rehab goals  REQUIRED FUNCTIONAL AND MEDICAL MANAGEMENT FOR INPATIENT REHABILITATION:  Skin:  incision noted to the neck, Pain Management: Overall pain is moderately controlled, and further internal medicine management of additional medical conditions while on the ARC, PT/OT intervention, patient/family education and training and any needed consults PRN    RECOMMENDED LEVEL OF CARE:  Patient is a 81year old male who presented to the [address] [place] [address] for a scheduled posterior cervical laminectomy and fusion on 4/30/18  Prior to admission the patient was fully independent with both ADLS and IADLS  The patient was not driving at this time, however was driving prior to his first surgery which was on 4/12/18  At this time the patient requires mod assist with both UB and LB ADLs    He also requires mod assist with transfers and ambulation  He is able to ambulate 70 feet and then 40 feet with the use of a RW  He required chair transport back to his room due to fatigue  At this time close medical management and PM&R management is recommend for the patient while on the ARC to help monitor labs as well as other medical conditions  Nursing management will be required to monitor bowel/bladder function to prevent incontinent episodes and skin breakdown as well as education on medication changes  Inpatient acute rehab is recommended at this time for the patient to maximize his overall strength, endurance, self care, and mobility upon discharge to home with the support of his wife

## 2018-05-09 NOTE — OCCUPATIONAL THERAPY NOTE
Occupational Therapy Treatment Note:       05/09/18 0905   Restrictions/Precautions   Weight Bearing Precautions Per Order No   Braces or Orthoses C/S Collar   Other Precautions Cognitive; Fall Risk;Pain; Chair Alarm; Bed Alarm   Pain Assessment   Pain Assessment 0-10   Pain Score 8   Pain Type Surgical pain   Pain Location Neck;Back   Pain Orientation Bilateral   ADL   Where Assessed Edge of bed   Eating Assistance 4  Minimal Assistance   Eating Deficit Setup; Increased time to complete;Bringing food to mouth assist   Eating Comments tray table positioned at chest level  Grooming Assistance 4  Minimal Assistance   Grooming Deficit Setup   UB Bathing Assistance 4  Minimal Assistance   UB Bathing Deficit Setup;Verbal cueing;Supervision/safety; Increased time to complete; Chest;Right arm;Left arm; Abdomen   UB Bathing Comments while supine   LB Bathing Assistance 3  Moderate Assistance   LB Bathing Deficit Setup;Steadying;Verbal cueing;Supervision/safety; Increased time to complete; Buttocks; Left lower leg including foot;Right lower leg including foot   LB Bathing Comments Limited functional reach noted instance  UB Dressing Assistance 3  Moderate Assistance   UB Dressing Deficit Setup;Verbal cueing;Supervision/safety; Increased time to complete; Thread RUE; Thread LUE;Pull over head;Pull around back;Pull down in back   UB Dressing Comments recommended tech in below assessment  LB Dressing Assistance 3  Moderate Assistance   LB Dressing Deficit Setup;Steadying; Requires assistive device for steadying;Verbal cueing;Supervision/safety; Increased time to complete; Don/doff R sock; Don/doff L sock; Thread RLE into pants; Thread LLE into pants;Pull up over hips;Use of adaptive equipment   LB Dressing Comments Pt trialed use of AE for LE dressing with F carry over noted  Toileting Assistance  2  Maximal Assistance   Toileting Deficit Setup;Steadying;Supervison/safety;Verbal cueing; Increased time to complete;Clothing management up;Clothing management down;Perineal hygiene; Bedside commode   Toileting Comments Limited functional reach noted due to pain levels  Functional Standing Tolerance   Time 4 mins   Activity self care routine  Comments Pt able to tolerate with Mod A x1   Bed Mobility   Rolling L 3  Moderate assistance   Additional items Assist x 1   Supine to Sit 3  Moderate assistance   Additional items Assist x 1   Additional Comments cues to engage movement required  Pt resistive due to noted pain levels  Moderate encouragement required  Transfers   Sit to Stand 4  Minimal assistance   Additional items Assist x 1   Stand to Sit 4  Minimal assistance   Additional items Assist x 1   Stand pivot 3  Moderate assistance   Additional items Assist x 2; Increased time required;Verbal cues;Armrests; Bedrails   Toilet transfer 4  Minimal assistance   Additional items Assist x 1;Verbal cues; Commode   Additional Comments Pt stated, "I just can't go", regarding having no BM  Functional Mobility   Functional Mobility 3  Moderate assistance   Additional Comments x1   Additional items Rolling walker   Toilet Transfers   Toilet Transfer From Bed   Toilet Transfer Type To   Toilet Transfer to Standard bedside commode   Toilet Transfer Technique Stand pivot; Ambulating   Toilet Transfers Verbal cues; Minimal assistance   Toilet Transfers Comments cues for safe hand placement required  Cognition   Overall Cognitive Status Impaired   Arousal/Participation Alert; Responsive   Attention Attends with cues to redirect   Orientation Level Oriented X4   Memory Decreased short term memory   Following Commands Follows multistep commands with increased time or repetition   Comments cues required for redirection due to poor carry over of reviewed techs to push from base of support with sit to stand and reach for armrests with seated positioning      Additional Activities   Additional Activities Other (Comment)  (reviewed current plan of care  ) Additional Activities Comments Pt reports, "I may just go home and let my wife take care of me"  Encouraged further therapies to achieve optimal outcome  Activity Tolerance   Activity Tolerance Patient limited by fatigue;Patient limited by pain   Medical Staff Made Aware Reported all findings to nursing staff  Pt OOB in bedside recliner with seat alarm activated  Assessment   Assessment Pt was seen for skilled OT with focus on completion of bed mobility, review of brace application, sitting balance activity, self care routine, review of LHAE for LE dressing and review of current plan of care  Pt with noted anxiety related to increased pain levels with movement  Extended time provided for Pt to adjust positioning following removal of shirt using one handed techs  Pt reports having increased pain and weakness in LUE  Educated Pt on removal of shirt from [place], then over head then LUE  Opposite completed with donning shirt  Recommended continued use of spandex type material or oversized T-shirts to encourage independence and decrease pain levels with activity  Pt able to tolerate activity with UE self care with Mod A overall  Mod A required using log roll tech to sit EOB  Pt with increased pain with activity, requesting to immediately return to supine positioning  Reassurance and emotional support provided to maintain seated positioning at EOB  Improved pain tolerance noted with extended time provided to stabilize self and use relaxation techs with pursed lip breathing  Pt then able to tolerate review of LE dressing with use of long handled reacher and sock aid  Able to return demonstration to use sock aid with F quality noted  Mod A overall for LE self care with cues to encourage activity  Pt will required moderate encouragement through out all functional tasks to engage with functional movement due to increased pain levels  See above levels of A required for all functional tasks   Pt will benefit from further rehab to achieve highest levels of independence with all functional tasks  Plan   Treatment Interventions ADL retraining;Functional transfer training; Endurance training;Cognitive reorientation   Goal Expiration Date 05/11/18   Treatment Day 5   OT Frequency 3-5x/wk   Recommendation   OT Discharge Recommendation Short Term Rehab   Equipment Recommended Bedside commode   Barthel Index   Feeding 5   Bathing 0   Grooming Score 5   Dressing Score 5   Bladder Score 10   Bowels Score 10   Toilet Use Score 5   Transfers (Bed/Chair) Score 5   Mobility (Level Surface) Score 0   Stairs Score 0   Barthel Index Score 45   Modified [place] Scale   Modified Rory Scale 4   [facility], [address]

## 2018-05-10 PROBLEM — Z98.1 S/P LAMINECTOMY WITH SPINAL FUSION: Status: ACTIVE | Noted: 2018-05-10

## 2018-05-10 PROBLEM — R29.898 UPPER EXTREMITY WEAKNESS: Status: ACTIVE | Noted: 2018-05-10

## 2018-05-10 LAB
ANION GAP SERPL CALCULATED.3IONS-SCNC: 7 MMOL/L (ref 4–13)
BASOPHILS # BLD AUTO: 0.06 THOUSANDS/ΜL (ref 0–0.1)
BASOPHILS NFR BLD AUTO: 1 % (ref 0–1)
BUN SERPL-MCNC: 31 MG/DL (ref 5–25)
CALCIUM SERPL-MCNC: 9.5 MG/DL (ref 8.3–10.1)
CHLORIDE SERPL-SCNC: 92 MMOL/L (ref 100–108)
CO2 SERPL-SCNC: 34 MMOL/L (ref 21–32)
CREAT SERPL-MCNC: 0.78 MG/DL (ref 0.6–1.3)
EOSINOPHIL # BLD AUTO: 0.21 THOUSAND/ΜL (ref 0–0.61)
EOSINOPHIL NFR BLD AUTO: 2 % (ref 0–6)
ERYTHROCYTE [DISTWIDTH] IN BLOOD BY AUTOMATED COUNT: 14.5 % (ref 11.6–15.1)
GFR SERPL CREATININE-BSD FRML MDRD: 91 ML/MIN/1.73SQ M
GLUCOSE SERPL-MCNC: 86 MG/DL (ref 65–140)
HCT VFR BLD AUTO: 41 % (ref 36.5–49.3)
HGB BLD-MCNC: 14.1 G/DL (ref 12–17)
LYMPHOCYTES # BLD AUTO: 2.45 THOUSANDS/ΜL (ref 0.6–4.47)
LYMPHOCYTES NFR BLD AUTO: 24 % (ref 14–44)
MAGNESIUM SERPL-MCNC: 2.4 MG/DL (ref 1.6–2.6)
MCH RBC QN AUTO: 31.5 PG (ref 26.8–34.3)
MCHC RBC AUTO-ENTMCNC: 34.4 G/DL (ref 31.4–37.4)
MCV RBC AUTO: 92 FL (ref 82–98)
MONOCYTES # BLD AUTO: 1.43 THOUSAND/ΜL (ref 0.17–1.22)
MONOCYTES NFR BLD AUTO: 14 % (ref 4–12)
NEUTROPHILS # BLD AUTO: 6.18 THOUSANDS/ΜL (ref 1.85–7.62)
NEUTS SEG NFR BLD AUTO: 59 % (ref 43–75)
NRBC BLD AUTO-RTO: 0 /100 WBCS
PLATELET # BLD AUTO: 240 THOUSANDS/UL (ref 149–390)
PMV BLD AUTO: 13.3 FL (ref 8.9–12.7)
POTASSIUM SERPL-SCNC: 4.2 MMOL/L (ref 3.5–5.3)
RBC # BLD AUTO: 4.47 MILLION/UL (ref 3.88–5.62)
SODIUM SERPL-SCNC: 133 MMOL/L (ref 136–145)
WBC # BLD AUTO: 10.37 THOUSAND/UL (ref 4.31–10.16)

## 2018-05-10 PROCEDURE — 97530 THERAPEUTIC ACTIVITIES: CPT

## 2018-05-10 PROCEDURE — 97530 THERAPEUTIC ACTIVITIES: CPT | Performed by: PHYSICAL THERAPIST

## 2018-05-10 PROCEDURE — 80048 BASIC METABOLIC PNL TOTAL CA: CPT | Performed by: PHYSICAL MEDICINE & REHABILITATION

## 2018-05-10 PROCEDURE — 97110 THERAPEUTIC EXERCISES: CPT | Performed by: PHYSICAL THERAPIST

## 2018-05-10 PROCEDURE — 97116 GAIT TRAINING THERAPY: CPT

## 2018-05-10 PROCEDURE — 85025 COMPLETE CBC W/AUTO DIFF WBC: CPT | Performed by: PHYSICAL MEDICINE & REHABILITATION

## 2018-05-10 PROCEDURE — 99232 SBSQ HOSP IP/OBS MODERATE 35: CPT | Performed by: PHYSICAL MEDICINE & REHABILITATION

## 2018-05-10 PROCEDURE — 97535 SELF CARE MNGMENT TRAINING: CPT

## 2018-05-10 PROCEDURE — 97116 GAIT TRAINING THERAPY: CPT | Performed by: PHYSICAL THERAPIST

## 2018-05-10 PROCEDURE — 83735 ASSAY OF MAGNESIUM: CPT | Performed by: PHYSICAL MEDICINE & REHABILITATION

## 2018-05-10 RX ORDER — LACTULOSE 20 G/30ML
20 SOLUTION ORAL ONCE
Status: COMPLETED | OUTPATIENT
Start: 2018-05-10 | End: 2018-05-10

## 2018-05-10 RX ORDER — PANTOPRAZOLE SODIUM 20 MG/1
20 TABLET, DELAYED RELEASE ORAL
Status: DISCONTINUED | OUTPATIENT
Start: 2018-05-10 | End: 2018-05-19 | Stop reason: HOSPADM

## 2018-05-10 RX ORDER — OXYCODONE HYDROCHLORIDE 5 MG/1
2.5 TABLET ORAL EVERY 6 HOURS PRN
Status: DISCONTINUED | OUTPATIENT
Start: 2018-05-10 | End: 2018-05-19 | Stop reason: HOSPADM

## 2018-05-10 RX ORDER — LIDOCAINE 50 MG/G
1 PATCH TOPICAL DAILY
Status: DISCONTINUED | OUTPATIENT
Start: 2018-05-10 | End: 2018-05-16

## 2018-05-10 RX ADMIN — DOCUSATE SODIUM 100 MG: 100 CAPSULE, LIQUID FILLED ORAL at 09:01

## 2018-05-10 RX ADMIN — SENNOSIDES 8.6 MG: 8.6 TABLET ORAL at 09:01

## 2018-05-10 RX ADMIN — LACTULOSE 20 G: 20 SOLUTION ORAL at 15:27

## 2018-05-10 RX ADMIN — OXYCODONE HYDROCHLORIDE AND ACETAMINOPHEN 1 TABLET: 5; 325 TABLET ORAL at 15:26

## 2018-05-10 RX ADMIN — ASPIRIN 325 MG: 325 TABLET, COATED ORAL at 09:02

## 2018-05-10 RX ADMIN — METHOCARBAMOL 500 MG: 500 TABLET ORAL at 15:26

## 2018-05-10 RX ADMIN — METHOCARBAMOL 500 MG: 500 TABLET ORAL at 05:27

## 2018-05-10 RX ADMIN — GABAPENTIN 600 MG: 300 CAPSULE ORAL at 09:01

## 2018-05-10 RX ADMIN — METHOCARBAMOL 500 MG: 500 TABLET ORAL at 21:34

## 2018-05-10 RX ADMIN — POLYETHYLENE GLYCOL 3350 17 G: 17 POWDER, FOR SOLUTION ORAL at 09:02

## 2018-05-10 RX ADMIN — OXYCODONE HYDROCHLORIDE AND ACETAMINOPHEN 1 TABLET: 5; 325 TABLET ORAL at 11:07

## 2018-05-10 RX ADMIN — AZELASTINE HYDROCHLORIDE 2 SPRAY: 137 SPRAY, METERED NASAL at 10:34

## 2018-05-10 RX ADMIN — OXYCODONE HYDROCHLORIDE AND ACETAMINOPHEN 1 TABLET: 5; 325 TABLET ORAL at 05:30

## 2018-05-10 RX ADMIN — DIAZEPAM 5 MG: 5 TABLET ORAL at 21:34

## 2018-05-10 RX ADMIN — GABAPENTIN 600 MG: 300 CAPSULE ORAL at 18:29

## 2018-05-10 RX ADMIN — LIDOCAINE 1 PATCH: 50 PATCH CUTANEOUS at 10:44

## 2018-05-10 RX ADMIN — PANTOPRAZOLE SODIUM 20 MG: 20 TABLET, DELAYED RELEASE ORAL at 09:08

## 2018-05-10 RX ADMIN — CHLORTHALIDONE 12.5 MG: 25 TABLET ORAL at 10:33

## 2018-05-10 RX ADMIN — PRAMIPEXOLE DIHYDROCHLORIDE 0.75 MG: 0.5 TABLET ORAL at 21:34

## 2018-05-10 RX ADMIN — CELECOXIB 100 MG: 100 CAPSULE ORAL at 10:32

## 2018-05-10 NOTE — PROGRESS NOTES
05/10/18 0901   Pain Assessment   Pain Assessment 0-10   Pain Score 7   Pain Type Acute pain;Surgical pain   Pain Location Neck; Shoulder   Pain Orientation Posterior   Hospital Pain Intervention(s) Medication (See MAR); Ambulation/increased activity   Response to Interventions pt able to tolerate session without increased c/o pain    Restrictions/Precautions   Precautions Bed/chair alarms; Fall Risk;Spinal precautions;Pain   Braces or Orthoses C/S Collar   Cognition   Arousal/Participation Cooperative   Subjective   Subjective Pt reports he feels like he is walking better today    QI: Sit to Stand   Assistance Needed Physical assistance; Adaptive equipment   Assistance Provided by [place] 25%-49%   Sit to Stand CARE Score 3   QI: Chair/Bed-to-Chair Transfer   Assistance Needed Physical assistance; Adaptive equipment   Assistance Provided by [place] 25%-49%   Chair/Bed-to-Chair Transfer CARE Score 3   Transfer Bed/Chair/Wheelchair   Limitations Noted In Balance; Coordination; Endurance;Pain Management;UE Strength;LE Strength   Adaptive Equipment Roller Walker   Stand Pivot Minimal Assist   Sit to Stand Minimal Assist   Stand to Sit Minimal Assist   Bed, Chair, Wheelchair Transfer (FIM) 2 - [place] needs to lift or boost to rise AND assist to sit   QI: Walk 10 Feet   Assistance Needed Physical assistance; Adaptive equipment   Assistance Provided by [place] 25%-49%   Walk 10 Feet CARE Score 3   QI: Walk 50 Feet with Two Turns   Assistance Needed Physical assistance; Adaptive equipment   Assistance Provided by [place] 25%-49%   Walk 50 Feet with Two Turns CARE Score 3   QI: Walk 150 Feet   Assistance Needed Physical assistance; Adaptive equipment   Assistance Provided by [place] 25%-49%   Walk 150 Feet CARE Score 3   Ambulation   Does the patient walk? 2  Yes   Primary Discharge Mode of Locomotion Walk   Walk Assist Level Minimum Assist;Assist x 1   Gait Pattern Ataxic; Inconsistant Lynne; Slow Lynne;Decreased foot clearance; Forward Flexion; Improper weight shift   Assist Device Roller Marcella Peña Walked (feet) 200 ft   Limitations Noted In Balance; Coordination;Device Management; Endurance;Posture;Speed;Strength;Swing   Walking (FIM) 4 - Patient requires steadying assist or light touching AND distance 150 feet or more, no rest   Therapeutic Interventions   Strengthening seated LAQ 3# ankle weights; seated HS curls blue TB   Equipment Use   NuStep L2 x 10 min LE's only   Assessment   Treatment Assessment Pt participated in skilled PT session focusing on LE strengthening and gait training  Pt is tangential during conversation and requires redirection to stay focused on task  Noted improvements in gait pattern today; still ataxic but less assistance required to maintain balance; improved foot clearance  Also noted improved performance with sit>stand and SPT using RW; good carryover of hand placement  Assessed BP after using Nustep; 125/80  Reviewed D/C planning and overall POC with pt; educated on weekly team meeting as part of D/C planning process; pt verbalized understanding  Pt will cont to benefit from skilled PT to maximize I and safety with all functional mobility  Barriers to Discharge Inaccessible home environment;Decreased caregiver support   PT Barriers   Physical Impairment Decreased strength;Decreased range of motion;Decreased endurance; Impaired balance;Decreased mobility; Decreased coordination;Decreased safety awareness;Pain;Orthopedic restrictions   Functional Limitation Car transfers; Ramp negotiation;Stair negotiation;Standing;Transfers; Walking   Plan   Treatment/Interventions Functional transfer training;LE strengthening/ROM; Elevations; Therapeutic exercise; Endurance training;Patient/family training;Bed mobility; Equipment eval/education;Gait training   Progress Progressing toward goals   Recommendation   Recommendation Home with family support; Outpatient PT   Equipment Recommended Karoline Quintero   PT Therapy Minutes PT Time In 0900   PT Time Out 1000   PT Total Time (minutes) 60   PT Mode of treatment - Individual (minutes) 30   PT Mode of treatment - Concurrent (minutes) 30   PT Mode of treatment - Group (minutes) 0   PT Mode of treatment - Co-treat (minutes) 0   PT Mode of Teatment - Total time(minutes) 60 minutes   Therapy Time missed   Time missed?  No

## 2018-05-10 NOTE — PROGRESS NOTES
Internal Medicine Progress Note  Patient: Jalil Chauhan  Age/sex: 81 y o  male  Medical Record #: [id number]      ASSESSMENT/PLAN:  Jalil Chauhan is seen and examined and mangement for following issues:    1  Stage 2 C3/4, C7/T1 and T1/2 laminectomy and PSF C3-T2: monitor incision; continue pain control  Therapy per primary team  Continue bowel regimen  Dose of lactulose ordered  Follow up with ortho after rehab  2  COPD: Stable; uses Anoro Ellipta daily PRN  Continue to monitor  On room air  3  Hyperlipidemia: continue statin therapy, heart healthy diet  4  Hypertension: new to chlorthalidone  Blood pressure currently well controlled  Will continue to monitor  5  Leukocytosis: resolved on most recent CBC  Likely reactive to surgery  Will follow up in AM  6  Sternal pain: Pleuritic and MSK components  CXR negative  Patient reports getting soreness with certain movements/positions and coughing  He reports he needs to relax his shoulders which helps  Denies chest pain/sob with ambulation  Will trial Lidocaine path  7  Hyponatremia: mild at 133; encourage increased oral intake  8  DVT prophylaxis: Aspirin 325mg daily               Subjective: Patient seen and examined  Doing well in PT  Has not moved his bowels yet  Still noting sternal pain which occurred when trying to get up OOB to use the bathroom this morning  Denies SOB or GERD symptoms       ROS:   GI: denies abdominal pain, change bowel habits or reflux symptoms  Neuro: No new neurologic changes  Respiratory: No Cough, SOB  Cardiovascular: No CP, palpitations     Scheduled Meds:    Current Facility-Administered Medications:  aspirin 325 mg Oral Daily Sebastian Martínez MD   azelastine 2 spray Each Nare Daily Sebastian Martínez MD   calcium carbonate 1,000 mg Oral Daily PRN Sebastian Martínez MD   celecoxib 100 mg Oral Daily Sebastian Martínez MD   chlorthalidone 12 5 mg Oral Daily Sebastian Martínez MD   diazepam 5 mg Oral HS Sebastian Martínez MD   docusate sodium 100 mg Oral BID Bud Lazaro MD   gabapentin 600 mg Oral BID Bud Lazaro MD   lactulose 20 g Oral Once Casper Dixon PA-C   lidocaine 1 patch Transdermal Daily Casper Dixon PA-C   magnesium hydroxide 30 mL Oral Daily PRN Bud Lazaro MD   methocarbamol 500 mg Oral Q8H [address] Bud Lazaro MD   NON FORMULARY 1 puff Oral Daily PRN Casper Dixon PA-C   oxyCODONE-acetaminophen 1 tablet Oral Q4H PRN Bud Lazaro MD   pantoprazole 20 mg Oral Early Morning Bud Lazaro MD   polyethylene glycol 17 g Oral Daily Casper Dixon PA-C   pramipexole 0 75 mg Oral HS Bud Lazaro MD   pravastatin 40 mg Oral Daily With Cheryl Henson MD   senna 1 tablet Oral Daily Bud Lazaro MD   simethicone 80 mg Oral 4x Daily PRN Bud Lazaro MD       Labs:       Results from last 7 days  Lab Units 05/10/18  0506 05/05/18  0436   WBC Thousand/uL 10 37* 10 06   HEMOGLOBIN g/dL 14 1 13 6   HEMATOCRIT % 41 0 40 4   PLATELETS Thousands/uL 240 150       Results from last 7 days  Lab Units 05/10/18  0506   SODIUM mmol/L 133*   POTASSIUM mmol/L 4 2   CHLORIDE mmol/L 92*   CO2 mmol/L 34*   BUN mg/dL 31*   CREATININE mg/dL 0 78   GLUCOSE RANDOM mg/dL 86   CALCIUM mg/dL 9 5                Glucose (mg/dL)   Date Value   05/10/2018 86   05/01/2018 122   04/13/2018 131   03/29/2018 86       Labs reviewed    Physical Examination:  Vitals:   Vitals:    05/09/18 1423 05/09/18 1747 05/09/18 2356 05/10/18 0758   BP: 142/78  153/82 133/76   BP Location: Right arm   Right arm   Pulse: 96 100 84 82   Resp: 20 18 20 20   Temp: 98 6 °F (37 °C)  98 2 °F (36 8 °C) 98 2 °F (36 8 °C)   TempSrc: Oral  Oral Oral   SpO2: 95% 96% 97% 97%   Weight: 65 2 kg (143 lb 11 8 oz)      Height: 5' 11" (1 803 m)          GEN: NAD, nontoxic  HEENT: NC/AT, EOMI, c- collar in place  RESP: normal respiratory effort   + slight expiratory wheeze   No rhonchi or crackles  CV: +S1 S2, regular rate, no rubs  ABD: soft, NT, ND, normal BS   : no real  EXT: no LE edema  Skin: no rashes  Neuro: AAO x 3      [x ] Total time spent: 30 Mins and greater than 50% of this time was spent counseling/coordinating care        Bernie De La Torre PA-C  Internal Medicine

## 2018-05-10 NOTE — PROGRESS NOTES
05/10/18 4795   Pain Assessment   Pain Assessment 0-10   Pain Score 7   Pain Type Acute pain;Surgical pain   Pain Location Neck;Back   Pain Orientation Mid   Pain Descriptors Aching;Discomfort;Dull   Hospital Pain Intervention(s) Medication (See MAR); Repositioned   Restrictions/Precautions   Precautions Bed/chair alarms; Fall Risk;Pain;Spinal precautions   Braces or Orthoses C/S Collar   Cognition   Arousal/Participation Cooperative; Alert   Attention Attends with cues to redirect   Orientation Level Oriented X4   Following Commands Follows one step commands without difficulty   Subjective   Subjective Agreeable to PT  Conts with c/o upper back pain/neck pain 7/10  QI: Sit to Stand   Assistance Needed Physical assistance   Assistance Provided by [place] 25%-49%   Sit to Stand CARE Score 3   QI: Chair/Bed-to-Chair Transfer   Assistance Needed Physical assistance;Verbal cues; Adaptive equipment   Assistance Provided by [place] 25%-49%   Chair/Bed-to-Chair Transfer CARE Score 3   Transfer Bed/Chair/Wheelchair   Stand Pivot Minimal Assist   Sit to Stand Minimal Assist   Stand to Sit Contact Guard   Bed, Chair, Wheelchair Transfer (FIM) 2 - [place] needs to lift or boost to rise AND assist to sit   QI: Walk 10 Feet   Assistance Needed Physical assistance;Verbal cues; Adaptive equipment   Assistance Provided by [place] 25%-49%   Walk 10 Feet CARE Score 3   QI: Walk 50 Feet with Two Turns   Assistance Needed Physical assistance;Verbal cues; Adaptive equipment   Assistance Provided by [place] 25%-49%   Walk 50 Feet with Two Turns CARE Score 3   QI: Walk 150 Feet   Assistance Needed Physical assistance;Verbal cues; Adaptive equipment   Assistance Provided by [place] 25%-49%   Walk 150 Feet CARE Score 3   QI: Walking 10 Feet on Uneven Surfaces   Reason if not Attempted Activity not applicable   Walking 10 Feet on Uneven Surfaces CARE Score 9   Ambulation   Does the patient walk? 2   Yes   Primary Discharge Mode of Locomotion Walk Walk Assist Level Minimum Assist   Gait Pattern Ataxic; Inconsistant Lynne;[place];Scissoring; Improper weight shift   Assist Device Terrell Peña Walked (feet) 200 ft   Limitations Noted In Balance; Coordination; Endurance;Midline Orientation;Posture; Safety; Sequencing;Speed;Strength   Findings 200ft x2   Walking (FIM) 4 - Patient requires steadying assist or light touching AND distance 150 feet or more, no rest   Wheelchair mobility   QI: Does the patient use a wheelchair? 0  No   QI: Indicate the type of wheelchair 1  Manual   Assessment   Treatment Assessment Pt participated in PT session with focus on mobility with RW including transfers and ambulation  Noted improvement in gait today with pt requiring overall CG/min A for ambulation  Gait conts to be ataxic at times, xiao when pt is fatigued or distracted  He also conts to be tangential with conversation and requires cues for redirection and focus on task  Pt demonstrated good safety with transfers to/from bedside chair  He remains motivated and eager for d/c home  Pt will cont to benefit from skilled PT to improve functional mobility, strength, balance and endurance  Family/Caregiver Present No   Problem List Decreased strength;Decreased endurance; Impaired balance;Decreased mobility; Decreased coordination; Impaired judgement;Pain;Orthopedic restrictions   Barriers to Discharge Decreased caregiver support   PT Barriers   Physical Impairment Decreased strength;Decreased endurance; Impaired balance;Decreased mobility; Decreased coordination; Impaired judgement;Orthopedic restrictions;Pain   Functional Limitation Car transfers; Ramp negotiation;Stair negotiation;Transfers; Walking   Plan   Treatment/Interventions Functional transfer training;LE strengthening/ROM; Elevations; Therapeutic exercise; Endurance training;Cognitive reorientation;Patient/family training;Bed mobility;Gait training   Progress Progressing toward goals   Recommendation Recommendation Home with family support; Outpatient PT   PT Therapy Minutes   PT Time In 1255   PT Time Out 1325   PT Total Time (minutes) 30   PT Mode of treatment - Individual (minutes) 30   PT Mode of treatment - Concurrent (minutes) 0   PT Mode of treatment - Group (minutes) 0   PT Mode of treatment - Co-treat (minutes) 0   PT Mode of Teatment - Total time(minutes) 30 minutes   Therapy Time missed   Time missed?  No

## 2018-05-10 NOTE — PROGRESS NOTES
05/10/18 0700   Pain Assessment   Pain Assessment 0-10   Pain Score 7   Pain Location Neck; Shoulder   Restrictions/Precautions   Precautions Bed/chair alarms; Fall Risk;Spinal precautions;Supervision on toilet/commode   Braces or Orthoses C/S Collar   QI: Eating   Assistance Needed Set-up / clean-up   Eating CARE Score 5   Eating Assessment   Eating (FIM) 5 - Patient needs help to open contianers or set up tray   QI: Oral Hygiene   Assistance Needed Physical assistance   Assistance Provided by [place] 50%-74%   Oral Hygiene CARE Score 2   Grooming   Able To Wash/Dry Face;Wash/Dry Hands   Limitation Noted In Coordination; Safety;Strength   Findings Pt req A to shave supine in bed 2* spinal precautions and A for hair care 2* proximal UE weakness   Grooming (FIM) 3 - Patient completed 2/4  tasks   QI: Shower/Bathe Self   Assistance Needed Physical assistance   Assistance Provided by [place] 75% or more   Shower/Bathe Self CARE Score 2   Bathing   Assessed Bath Style Sponge Bath   Anticipated D/C Bath Style Shower   Able to Oskar Tj No   Able to [employer] Temperature No   Able to Wash/Rinse/Dry (body part) Chest;Abdomen   Limitations Noted in Balance; Coordination; Endurance;ROM;Safety;Strength;Timeliness   Positioning Seated;Standing   Bathing (FIM) 1 - Patient completes 1/10 or 2/10 parts   Tub/Shower Transfer   Not Assessed Balance; Endurance;Sponge Bath   QI: Upper Body Dressing   Assistance Needed Physical assistance   Assistance Provided by [place] Total assistance   Upper Body Dressing CARE Score 1   QI: Lower Body Dressing   Assistance Needed Physical assistance   Assistance Provided by [place] Total assistance   Lower Body Dressing CARE Score 1   QI: Putting On/Taking Off Footwear   Assistance Needed Physical assistance   Assistance Provided by [place] Total assistance   Putting On/Taking Off Footwear CARE Score 1   QI: Picking Up Object   Reason if not Attempted Safety concerns   Picking Up Object CARE Score 80   Dressing/Undressing Clothing   Remove UB Clothes Pullover Shirt   Remove LB Clothes Pants;Socks   Don UB Clothes Pullover Shirt   Don LB Clothes Pants;Socks   Limitations Noted In Balance; Coordination;Buttoning; Endurance; Safety;Strength;ROM   Positioning Supported Sit;Standing   Findings Pt req TA for UB dressing 2* impaired b/l UE proximal ROM limitations  Pt req TA for LB dressing to adhere to spinal precautions and 2* inc pain noted with reaching   UB Dressing (FIM) 1 - Patient completes less than 25% of all tasks   LB Dressing (FIM) 1 - Patient completes less than 25% of all tasks   QI: Roll Left and Right   Assistance Needed Incidental touching   Roll Left and Right CARE Score 4   QI: Lying to Sitting on Side of Bed   Assistance Needed Physical assistance   Assistance Provided by [place] 25%-49%   Lying to Sitting on Side of Bed CARE Score 3   QI: Sit to Stand   Assistance Needed Physical assistance   Assistance Provided by [place] 25%-49%   Sit to Stand CARE Score 3   QI: Chair/Bed-to-Chair Transfer   Assistance Needed Physical assistance   Assistance Provided by [place] 50%-74%   Chair/Bed-to-Chair Transfer CARE Score 2   Transfer Bed/Chair/Wheelchair   Bed, Chair, Wheelchair Transfer (FIM) 2 - [place] needs to lift or boost to rise AND assist to sit   QI: [address] Needed Physical assistance   Assistance Provided by [place] Total assistance   Toileting Hygiene CARE Score 1   Toileting   Able to [address] down no, up no     Toileting (FIM) 1 - Patient completes less than 25% of all tasks   QI: Toilet Transfer   Assistance Needed Physical assistance   Assistance Provided by [place] 50%-74%   Toilet Transfer CARE Score 2   Toilet Transfer   Surface Assessed Raised Toilet   Transfer Technique Standard   Toilet Transfer (FIM) 2 - Patient completes 25 - 49% of all tasks   Assessment   Treatment Assessment Pt engaged in skilled OT with focus on ADL fxn, fxnl xfers, pt education and continuation of OT evaluation that was initiated yesterday  pt req inc A for UB and LB dressing 2* UE ROM limiations, strength deficits, sensory impairments and pain associated with ROM  See above for further details   Prognosis Good   Problem List Decreased strength;Decreased range of motion;Decreased endurance; Impaired balance;Decreased mobility; Decreased safety awareness;Pain;Orthopedic restrictions   Barriers to Discharge Inaccessible home environment;Decreased caregiver support   Plan   Treatment/Interventions ADL retraining;Functional transfer training;LE strengthening/ROM; Therapeutic exercise; Endurance training;Patient/family training;Equipment eval/education; Bed mobility; Compensatory technique education   OT Therapy Minutes   OT Time In 0700   OT Time Out 0845   OT Total Time (minutes) 105   OT Mode of treatment - Individual (minutes) 90   OT Mode of treatment - Concurrent (minutes) 15   OT Mode of treatment - Group (minutes) 0   OT Mode of treatment - Co-treat (minutes) 0   OT Mode of Teatment - Total time(minutes) 105 minutes   Therapy Time missed   Time missed?  No

## 2018-05-10 NOTE — PROGRESS NOTES
Physical Medicine & Rehabilitation Progress Note:    Primary Rehabilitation Diagnosis: Severe Cervical Stenosis with Cervical Radicuopathy    Subjective: Patient seen face to face  Feeling well after shower today  Substernal palpable discomfort which has improved with Lidoderm patch ordered by IM earlier    Review Of Systems:   10 point ROS completed, pertinent positives above    Functional Update:  Functional evaluations in progress    Objective:  /64 (BP Location: Right arm)   Pulse 91   Temp 98 3 °F (36 8 °C) (Oral)   Resp 20   Ht 5' 11" (1 803 m)   Wt 65 2 kg (143 lb 11 8 oz)   SpO2 97%   BMI 20 05 kg/m²     GENERAL: No acute distress    NECK: +C-Collar in place,  healing anterior neck incision  Posterior neck incision C/D/I   CARDIOVASCULAR: regular rate rhythm, no murmurs  LUNGS: clear to ausculation bilaterally, good inspiratory effort, no wheezes, rales, rhonchi   ABDOMEN: soft, non-tender, non-distended, no guarding  Bowel sounds x 4 normal   EXTREMITIES: no pedal edema bilaterally, negative Coleen's bilaterally  MUSCULOSKELETAL: active assist and passive ROM functional x 4  NEUROLOGICAL:   Awake, alert, oriented to person, place, time, and situation  Speech fluent  Able to follow simple commands  CN II-XII grossly intact  Cerebellar exam with finger to nose intact  Neg Lobato's   Neg Babinski  Sensation to light touch is symmetric and preserved x 4  Motor   RUE: 4+/5 throughout  LUE 3-/5 SF, SAB, 4-/5 EE, EF, 4/5 WE, WF,   BLE: 5/5  Generalized UE mild muscular atrophy    Assessment:    Julio César Hernandes is a 81 y o  male in fair condition with severe cervical stenosis/radiculopathy s/p a two part surgery ACDF and PSF levels C3-T2:    Rehabilitation Plan:     -Rehabilitation of cervical stenosis/radiculopathy: PT/OT therapies  Deficits include L > R UE weakness, generalized weakness, decreased balance and strength    +cervical spine precautions     -[address] Internal Medicine following - Dr Faith Rider service    -severe cervical stenosis/radiculopathy s/p a two part surgery ACDF and PSF levels C3-T2 on 4/12/18 and 4/30/18:   C Collar to be worn while OOB  F/U with Dr Evie Giles  Cervical precautions     -substernal palpable chest wall pain: likely mild costochondritis from surgical positioning  Trial of topical lidoderm patch      -nociceptive pain: managed on Percocet 1 tab Q4h PRN, Tylenol PRN, and scheduled low dose celebrex 100mg daily     -neuropathic pain: managed on neurontin 600mg BID     -muscular spasms: managed on valium 5mg QHS and scheduled robaxin 500mg Q8h    Wean as progressing       -restless legs: managed on Mirapex     -HTN: managed on chlorthalidone (new med) IM to monitor BPs     -HLD: managed on Pravachol     -COPD: managed on PRN Anoro Ellipta inhaler      -GERD: managed on Protonix    -Constipation: Lactulose x 1 today     -DVT ppx: SCDS + Aspirin 325mg daily per Surg team       Lab Results   Component Value Date    WBC 10 37 (H) 05/10/2018    HGB 14 1 05/10/2018    HCT 41 0 05/10/2018    MCV 92 05/10/2018     05/10/2018     Lab Results   Component Value Date    GLUCOSE 86 05/10/2018    CALCIUM 9 5 05/10/2018     (L) 05/10/2018    K 4 2 05/10/2018    CO2 34 (H) 05/10/2018    CL 92 (L) 05/10/2018    BUN 31 (H) 05/10/2018    CREATININE 0 78 05/10/2018       Current Facility-Administered Medications:     aspirin (ECOTRIN) EC tablet 325 mg, 325 mg, Oral, Daily, Sofia Ireland MD, 325 mg at 05/10/18 0902    azelastine (ASTELIN) 0 1 % nasal spray 2 spray, 2 spray, Each Nare, Daily, Sofia Ireland MD, 2 spray at 05/10/18 1034    calcium carbonate (TUMS) chewable tablet 1,000 mg, 1,000 mg, Oral, Daily PRN, Sofia Ireland MD    celecoxib (CeleBREX) capsule 100 mg, 100 mg, Oral, Daily, Sofia Ireland MD, 100 mg at 05/10/18 1032    chlorthalidone tablet 12 5 mg, 12 5 mg, Oral, Daily, Sofia Ireland MD, 12 5 mg at 05/10/18 1033    diazepam (VALIUM) tablet 5 mg, 5 mg, Oral, HS, Mili Light MD, 5 mg at 05/09/18 2118    docusate sodium (COLACE) capsule 100 mg, 100 mg, Oral, BID, Mili Light MD, 100 mg at 05/10/18 0901    gabapentin (NEURONTIN) capsule 600 mg, 600 mg, Oral, BID, Mili Light MD, 600 mg at 05/10/18 0901    lidocaine (LIDODERM) 5 % patch 1 patch, 1 patch, Transdermal, Daily, Santos Faye PA-C, 1 patch at 05/10/18 1044    magnesium hydroxide (MILK OF MAGNESIA) 400 mg/5 mL oral suspension 30 mL, 30 mL, Oral, Daily PRN, Mili Light MD    methocarbamol (ROBAXIN) tablet 500 mg, 500 mg, Oral, Q8H [facility], Mili Light MD, 500 mg at 05/10/18 1526    NON FORMULARY, 1 puff, Oral, Daily PRN, Santos Faye PA-C    oxyCODONE-acetaminophen (PERCOCET) 5-325 mg per tablet 1 tablet, 1 tablet, Oral, Q4H PRN, Mili Light MD, 1 tablet at 05/10/18 1526    pantoprazole (PROTONIX) EC tablet 20 mg, 20 mg, Oral, Early Morning, Mili Light MD, 20 mg at 05/10/18 0908    polyethylene glycol (MIRALAX) packet 17 g, 17 g, Oral, Daily, Santos Faye PA-C, 17 g at 05/10/18 0902    pramipexole (MIRAPEX) tablet 0 75 mg, 0 75 mg, Oral, HS, Mili Light MD, 0 75 mg at 05/09/18 2118    pravastatin (PRAVACHOL) tablet 40 mg, 40 mg, Oral, Daily With Devin Hernandez MD, 40 mg at 05/09/18 1834    senna (SENOKOT) tablet 8 6 mg, 1 tablet, Oral, Daily, Mili Light MD, 8 6 mg at 05/10/18 0901    simethicone (MYLICON) chewable tablet 80 mg, 80 mg, Oral, 4x Daily PRN, MD Davina Vitaleyce Colder, MD  [facility]

## 2018-05-10 NOTE — TREATMENT PLAN
Individualized Plan of [address] [place] [address] Bob 81 y o  male MRN: [id number]  Unit/Bed#: -01 Encounter: [id number]     PATIENT INFORMATION  ADMISSION DATE: 5/9/2018  2:08 PM BOBBY CATEGORY: cervical stenosis    ADMISSION DIAGNOSIS: S/P laminectomy [[id number]]  EXPECTED LOS: 7-10 days      MEDICAL/FUNCTIONAL PROGNOSIS  Based on my assessment of the patient's medical conditions and current functional status, the prognosis for attaining medical and functional goals or the IRF stay is:  Good    Medical Goals: Patient will be medically stable for discharge to [facility] upon completion of rehab program and Patient will be able to manage medical conditions and comorbid conditions with medications and follow up upon completion of rehab program    [address]: Home - Assistance    ANTICIPATED FOLLOW-UP SERVICE:   Outpatient Therapy Services: PT and OT    DISCIPLINE SPECIFIC PLANS:  Required Disciplines & Services: Rehabillitation Nursing, Case Management and Psychology    REQUIRED THERAPY:  Therapy Hours per Day Days per Week Total Days   Physical Therapy 1 5 5-6 7   Occupational Therapy 1 5 5-6 7   Speech/Language Therapy 0 0 0   NOTE: Additional therapy time(s) may be added as appropriate to meet patient needs and to achieve functional goals        ANTICIPATED FUNCTIONAL OUTCOMES:  ADL: Patient will require supervision with ADLs with least restrictive device upon completion of rehab program   Bladder/Bowel: Patient will be independent with bladder/bowel management with least restrictive device upon completion of rehab program   Transfers: Patient will require supervision with transfers with least restrictive device upon completion of rehab program   Locomotion: Patient will require supervision with locomotion with least restrictive device upon completion of rehab program   Cognitive: Patient will be independent for basic and complex tasks upon completion of rehab program     DISCHARGE PLANNING NEEDS  Equipment needs: Discharge needs to be reviewed with team      REHAB ANTICIPATED PARTICIPATION RESTRICTIONS:  Assist with Mobility and Rquires Assist with ADLS

## 2018-05-10 NOTE — PROGRESS NOTES
05/10/18 1100   Pain Assessment   Pain Assessment 0-10   Pain Score 7   Pain Type Acute pain   Pain Location Neck;Sternum   Pain Descriptors Aching   Pain Frequency Intermittent   Hospital Pain Intervention(s) Medication (See MAR); Distraction   Restrictions/Precautions   Precautions Fall Risk;Bed/chair alarms;Spinal precautions;Pain   Braces or Orthoses C/S Collar   Cognition   Overall Cognitive Status Impaired   Arousal/Participation Cooperative; Alert   Attention Attends with cues to redirect   Memory Decreased short term memory;Decreased recall of precautions   Following Commands Follows one step commands with increased time or repetition   Subjective   Subjective pt reported 6 5-7/10 neck and sternum pain before and after PT session  QI: Sit to Stand   Assistance Needed Physical assistance   Assistance Provided by [place] 25%-49%   Sit to Stand CARE Score 3   QI: Chair/Bed-to-Chair Transfer   Assistance Needed Physical assistance   Assistance Provided by [place] 25%-49%   Chair/Bed-to-Chair Transfer CARE Score 3   Transfer Bed/Chair/Wheelchair   Limitations Noted In LE Strength; Endurance;Balance;Pain Management; Coordination   Adaptive Equipment Roller Walker   Stand Pivot Minimal Assist   Sit to Stand Minimal Assist   Stand to Sit Minimal Assist   Bed, Chair, Wheelchair Transfer (FIM) 2 - [place] needs to lift or boost to rise AND assist to sit   QI: Walk 10 Feet   Assistance Needed Physical assistance   Assistance Provided by [place] 25%-49%   Walk 10 Feet CARE Score 3   QI: Walk 50 Feet with Two Turns   Assistance Needed Physical assistance   Assistance Provided by [place] 25%-49%   Walk 50 Feet with Two Turns CARE Score 3   QI: Walk 150 Feet   Assistance Needed Physical assistance   Assistance Provided by [place] 25%-49%   Walk 150 Feet CARE Score 3   Ambulation   Does the patient walk? 2   Yes   Primary Discharge Mode of Locomotion Walk   Walk Assist Level Minimum Assist   Gait Pattern Ataxic;Decreased foot clearance; Inconsistant [place]ne;Narrow WALDEMAR; Improper weight shift; Slow Lynne   Assist Device Roller Walker   Distance Walked (feet) 150 ft  (200)   Limitations Noted In Balance; Endurance;Speed;Strength; Coordination; Safety   Walking (FIM) 4 - Patient requires steadying assist or light touching AND distance 150 feet or more, no rest   QI: 1 Step (Curb)   Assistance Needed Physical assistance   Assistance Provided by [place] 50%-74%   Comment ataxic with narrow WALDEMAR, required step by step cueing    1 Step (Curb) CARE Score 2   Stairs   Type Curb   # of Steps 1   Weight Bearing Precautions Cervical;Fall Risk   Assist Devices Roller Walker   Findings mod of 1 with SBA of another for safety   Stairs (FIM) 1 - Patient requires assist of two people   Assessment   Treatment Assessment Pt is very verbose requiring redirection to attend to task at hand  Also continues to be ataxic with narrow WALDEMAR especially during curb step negotiation  able to increased gait distance to 200' using a RW with cues to widen WALDEMAR and min A for safety  PT will continue to work on strengthening, balance training, and functional mobility training using a RW to facilitate a safe d/c to home with dec burden of care,    Family/Caregiver Present no   Barriers to Discharge Inaccessible home environment;Decreased caregiver support   PT Barriers   Physical Impairment Decreased strength;Decreased range of motion;Decreased endurance; Impaired balance;Decreased mobility; Decreased coordination;Decreased safety awareness;Pain;Orthopedic restrictions   Functional Limitation Car transfers; Ramp negotiation;Stair negotiation;Standing;Transfers; Walking   Plan   Treatment/Interventions Functional transfer training;Gait training;Spoke to nursing;OT;Endurance training; Therapeutic exercise;Equipment eval/education   Progress Progressing toward goals   Recommendation   Recommendation Home with family support; Outpatient PT;24 hour supervision/assist   PT - OK to Discharge No PT Therapy Minutes   PT Time In 1100   PT Time Out 1140   PT Total Time (minutes) 40   PT Mode of treatment - Individual (minutes) 40   PT Mode of treatment - Concurrent (minutes) 0   PT Mode of treatment - Group (minutes) 0   PT Mode of treatment - Co-treat (minutes) 0   PT Mode of Teatment - Total time(minutes) 40 minutes

## 2018-05-11 LAB
ANION GAP SERPL CALCULATED.3IONS-SCNC: 6 MMOL/L (ref 4–13)
BUN SERPL-MCNC: 32 MG/DL (ref 5–25)
CALCIUM SERPL-MCNC: 9.4 MG/DL (ref 8.3–10.1)
CHLORIDE SERPL-SCNC: 94 MMOL/L (ref 100–108)
CO2 SERPL-SCNC: 34 MMOL/L (ref 21–32)
CREAT SERPL-MCNC: 0.88 MG/DL (ref 0.6–1.3)
GFR SERPL CREATININE-BSD FRML MDRD: 87 ML/MIN/1.73SQ M
GLUCOSE SERPL-MCNC: 91 MG/DL (ref 65–140)
POTASSIUM SERPL-SCNC: 4.2 MMOL/L (ref 3.5–5.3)
SODIUM SERPL-SCNC: 134 MMOL/L (ref 136–145)

## 2018-05-11 PROCEDURE — 97112 NEUROMUSCULAR REEDUCATION: CPT

## 2018-05-11 PROCEDURE — 80048 BASIC METABOLIC PNL TOTAL CA: CPT | Performed by: PHYSICIAN ASSISTANT

## 2018-05-11 PROCEDURE — 97110 THERAPEUTIC EXERCISES: CPT

## 2018-05-11 PROCEDURE — 97530 THERAPEUTIC ACTIVITIES: CPT

## 2018-05-11 PROCEDURE — 99232 SBSQ HOSP IP/OBS MODERATE 35: CPT | Performed by: PHYSICAL MEDICINE & REHABILITATION

## 2018-05-11 PROCEDURE — 97535 SELF CARE MNGMENT TRAINING: CPT

## 2018-05-11 RX ADMIN — OXYCODONE HYDROCHLORIDE AND ACETAMINOPHEN 1 TABLET: 5; 325 TABLET ORAL at 00:01

## 2018-05-11 RX ADMIN — DOCUSATE SODIUM 100 MG: 100 CAPSULE, LIQUID FILLED ORAL at 08:08

## 2018-05-11 RX ADMIN — OXYCODONE HYDROCHLORIDE AND ACETAMINOPHEN 1 TABLET: 5; 325 TABLET ORAL at 15:09

## 2018-05-11 RX ADMIN — SENNOSIDES 8.6 MG: 8.6 TABLET ORAL at 08:21

## 2018-05-11 RX ADMIN — OXYCODONE HYDROCHLORIDE AND ACETAMINOPHEN 1 TABLET: 5; 325 TABLET ORAL at 23:33

## 2018-05-11 RX ADMIN — PRAVASTATIN SODIUM 40 MG: 40 TABLET ORAL at 17:22

## 2018-05-11 RX ADMIN — AZELASTINE HYDROCHLORIDE 2 SPRAY: 137 SPRAY, METERED NASAL at 08:09

## 2018-05-11 RX ADMIN — GABAPENTIN 600 MG: 300 CAPSULE ORAL at 17:22

## 2018-05-11 RX ADMIN — LIDOCAINE 1 PATCH: 50 PATCH CUTANEOUS at 08:08

## 2018-05-11 RX ADMIN — METHOCARBAMOL 500 MG: 500 TABLET ORAL at 15:09

## 2018-05-11 RX ADMIN — METHOCARBAMOL 500 MG: 500 TABLET ORAL at 05:57

## 2018-05-11 RX ADMIN — PANTOPRAZOLE SODIUM 20 MG: 20 TABLET, DELAYED RELEASE ORAL at 05:57

## 2018-05-11 RX ADMIN — OXYCODONE HYDROCHLORIDE AND ACETAMINOPHEN 1 TABLET: 5; 325 TABLET ORAL at 05:57

## 2018-05-11 RX ADMIN — OXYCODONE HYDROCHLORIDE AND ACETAMINOPHEN 1 TABLET: 5; 325 TABLET ORAL at 10:31

## 2018-05-11 RX ADMIN — GABAPENTIN 600 MG: 300 CAPSULE ORAL at 08:08

## 2018-05-11 RX ADMIN — ASPIRIN 325 MG: 325 TABLET, COATED ORAL at 08:08

## 2018-05-11 RX ADMIN — DIAZEPAM 5 MG: 5 TABLET ORAL at 21:23

## 2018-05-11 RX ADMIN — DOCUSATE SODIUM 100 MG: 100 CAPSULE, LIQUID FILLED ORAL at 17:22

## 2018-05-11 RX ADMIN — METHOCARBAMOL 500 MG: 500 TABLET ORAL at 21:23

## 2018-05-11 RX ADMIN — CELECOXIB 100 MG: 100 CAPSULE ORAL at 08:10

## 2018-05-11 RX ADMIN — PRAMIPEXOLE DIHYDROCHLORIDE 0.75 MG: 0.5 TABLET ORAL at 21:23

## 2018-05-11 NOTE — PROGRESS NOTES
05/11/18 1230   Pain Assessment   Pain Assessment 0-10   Pain Score 4   Pain Type Acute pain   Pain Location Shoulder   Pain Orientation Left   Restrictions/Precautions   Precautions Bed/chair alarms; Fall Risk;Spinal precautions   Braces or Orthoses C/S Collar   QI: Putting On/Taking Off Footwear   Assistance Needed Incidental touching;Verbal cues   Assistance Provided by [place] Less than 25%   Comment Pt requires increased time and VC's to complete with leg over opposing knee technique  Incidental touching for maintaining LLE on RLE  Putting On/Taking Off Footwear CARE Score 3   QI: Sit to Stand   Assistance Needed Incidental touching   Assistance Provided by [place] Less than 25%   Sit to Stand CARE Score 3   QI: Chair/Bed-to-Chair Transfer   Assistance Needed Incidental touching   Assistance Provided by [place] Less than 25%   Chair/Bed-to-Chair Transfer CARE Score 3   Transfer Bed/Chair/Wheelchair   Limitations Noted In Balance; Endurance;UE Strength;LE Strength   Adaptive Equipment Roller Walker   Findings Continued VC's for safe hand placement and steadying A during transfer due to slight LOB when turning to approach surface  Bed, Chair, Wheelchair Transfer (FIM) 4 - Patient requires steadying assist or light touching   Exercise Tools   Theraputty Pt completes hand strengthening with use of yellow theraputty to complete bead retrieval with use of LUE  Pt tolerates well but reports fatigue in L digits post activity  Other Exercise Tool 1 Pt completes table glides moving through all planes 3 x 10 each to increase UB strength and ROM and reduce UE stiffness for increased independence with ADL tasks  Pt tolerates with rest breaks to manage fatigue  Cognition   Overall Cognitive Status WFL   Arousal/Participation Cooperative; Alert   Attention Attends with cues to redirect   Orientation Level Oriented X4   Memory Decreased short term memory   Following Commands Follows one step commands without difficulty Activity Tolerance   Activity Tolerance Patient tolerated treatment well   Assessment   Treatment Assessment Pt participated in skilled OT services with focus on UE ROM, FMC/strengthening, and cervical collar management  Pt seated in recliner upon arrival, agreeable to session at this time  Pt reports biggest deficit is LUE weakness/numbness/decreased sensation  Pt with minimal c/o pain  He tolerates towel glides on tabletop to increase ROM and reduce stiffness, see above for details  Education provided on [address] for LB dressing to increase independence and compliance with spinal precautions  Pt refuses stating, I can do it without that stuff  Pt trials donning/doffing socks with cross leg technique with increased time and VC's  He reports slight increase in sternal pain but continues to decline LHAE despite recommendation  Pt educated on ASPEN collar management including how to complete a pad change, how to wash, and don/doff brace  Pt is able to don/doff with [place] and VC's  Pt will continue to benefit from skilled OT services with focus on UE ROM, FMC, standing balance, and endurance to increase safety and independence with ADL tasks  Prognosis Good   Problem List Decreased strength;Decreased range of motion;Decreased endurance;Decreased coordination;Orthopedic restrictions; Impaired judgement;Pain   Plan   Treatment/Interventions ADL retraining;Functional transfer training; Therapeutic exercise; Endurance training;Equipment eval/education; Compensatory technique education   Progress Progressing toward goals   OT Therapy Minutes   OT Time In 1230   OT Time Out 1400   OT Total Time (minutes) 90   OT Mode of treatment - Individual (minutes) 90   OT Mode of treatment - Concurrent (minutes) 0   OT Mode of treatment - Group (minutes) 0   OT Mode of treatment - Co-treat (minutes) 0   OT Mode of Teatment - Total time(minutes) 90 minutes   Therapy Time missed   Time missed?  No

## 2018-05-11 NOTE — SOCIAL WORK
Met w/pt and wife and reviewed rehab routine and cm role  Pt resides w/spouse in one level home, spouse is retired, independent and drives, and can assist on dc  No dme in the home, no prior hhc but pt has had outpt therapy with [facility]  Informed of team mtg process and potential los, pt expects to be going home by the end of next week  Pt uses [facility] [place] for rx needs and may be interested in [facility]  Pt made reference to having an rx card through [employer], cm has received information that this surgery and rehab stay are not covered under [employer]  Following to assist w/dc planning recommendations

## 2018-05-11 NOTE — PROGRESS NOTES
05/11/18 0830   Pain Assessment   Pain Assessment 0-10   Pain Score 7   Pain Location Neck   Pain Orientation Bilateral   Pain Descriptors Aching   Restrictions/Precautions   Precautions Bed/chair alarms; Fall Risk;Spinal precautions   Braces or Orthoses C/S Collar   Cognition   Overall Cognitive Status WFL   Arousal/Participation Cooperative; Alert   Attention Attends with cues to redirect   Orientation Level Oriented X4   Memory Decreased short term memory   Following Commands Follows one step commands without difficulty   Subjective   Subjective pt reports he ios doing well and wants to get out as soon as he can   QI: Sit to Stand   Assistance Needed Supervision   Sit to Stand CARE Score 4   QI: Chair/Bed-to-Chair Transfer   Assistance Needed Supervision   Chair/Bed-to-Chair Transfer CARE Score 4   Transfer Bed/Chair/Wheelchair   Limitations Noted In Coordination;UE Strength;LE Strength   [address] to Stand Supervision   Stand to [employer] Transfer Supervision   Bed, Chair, Wheelchair Transfer (FIM) 5 - Patient requires supervision/monitoring   QI: Car Transfer   Assistance Needed Supervision   Car Transfer CARE Score 4   QI: [address] 10 Feet CARE Score 4   QI: Walk 50 Feet with Two [address]   Walk 50 Feet with Two Turns CARE Score 4   QI: Walk 150 Feet   Assistance Needed Supervision   Walk 150 Feet CARE Score 4   Ambulation   Does the patient walk? 2  Yes   Primary Discharge Mode of Locomotion Walk   Walk Assist Level Supervision   Gait Pattern Inconsistant Lynne; Slow Lynne;Decreased foot clearance;Narrow WALDEMAR;Step through; Improper weight shift   Assist Device Terrell Peña Walked (feet) 500 ft   Limitations Noted In Coordination; Heel Strike;Posture;Speed;Strength;Swing   Walking (FIM) 5 - Patient requires supervision/monitoring AND distance 150 feet or more, no rest   QI: 1 Step (Curb)   Assistance Needed Supervision   1 Step (Curb) CARE Score 4   QI: 4 Steps   Assistance Needed Supervision   4 Steps CARE Score 4   QI: 12 Steps   Assistance Needed Supervision   12 Steps CARE Score 4   Stairs   Type Stairs;Curb;Ramp   # of Steps 14   Weight Bearing Precautions Cervical;Fall Risk   Assist Devices Roller Walker;Bilateral Rail   Findings reciprocal up and non-reciprocal down   Stairs (FIM) 5 - Patient requires supervision/monitoring AND goes up and down full flight (12- 14 stairs)   QI: Toilet Transfer   Assistance Needed Supervision   Toilet Transfer CARE Score 4   Toilet Transfer   Surface Assessed Raised Toilet   Limitations Noted In UE Strength;LE Strength   Adaptive Equipment Grab Bar   Positioning Concerns Grab Bars   Toilet Transfer (FIM) 5 - Patient requires supervision/monitoring   Therapeutic Interventions   Flexibility hamstring and gastroc 60"x2   Equipment Use   NuStep 15 mins level 3   Assessment   Treatment Assessment session focused on functional mobility and activities; pt has good balance throughout session and good endurance, needed a rest after the nustep; pt has good safety awareness for use of RW and sequencing for xfers and functional tasks; BHR in FF up and down, needed VC to take his time; during swing of LLE, his foot swings out and back before heelstrike, normal heelstrike on RLE; pt has very straight posture during amb, needs to shift weight forward to improve heelstrike, limited by pain in neck from pushing on walker; continue POC as per PT   Problem List Decreased strength;Decreased range of motion;Decreased endurance;Decreased coordination;Orthopedic restrictions; Impaired judgement;Pain   Barriers to Discharge Decreased caregiver support   PT Barriers   Physical Impairment Decreased strength;Decreased range of motion;Decreased endurance;Decreased coordination; Impaired judgement;Orthopedic restrictions;Pain   Functional Limitation [employer] negotiation   Plan   Treatment/Interventions Functional transfer training;LE strengthening/ROM; Elevations; Endurance training; Therapeutic exercise;Patient/family training;Bed mobility;Gait training   Progress Progressing toward goals   Recommendation   Recommendation Outpatient PT; Home with family support   Equipment Recommended Walker   PT Therapy Minutes   PT Time In 0830   PT Time Out 1000   PT Total Time (minutes) 90   PT Mode of treatment - Individual (minutes) 90   PT Mode of treatment - Concurrent (minutes) 0   PT Mode of treatment - Group (minutes) 0   PT Mode of treatment - Co-treat (minutes) 0   PT Mode of Teatment - Total time(minutes) 90 minutes   Therapy Time missed   Time missed?  No

## 2018-05-11 NOTE — PROGRESS NOTES
Physical Medicine & Rehabilitation Progress Note:    Primary Rehabilitation Diagnosis: Severe Cervical Stenosis with Cervical Radicuopathy    Subjective: Patient seen face to face  Seen in the gym using the NuStep  Yesterday had increased spasm when removing his shirt with aide  Small amount of oxycodone 2 5mg PRN was added for breakthrough pain with his understanding of weaning off  Review Of Systems:   10 point ROS completed, pertinent positives above    Functional Update:  Transfers are supervision with RW  Ambulating 500 feet with RW supervision level  Negotiated 14 steps aramis supervision level   OT assessments pending    Objective:  /68 (BP Location: Left arm)   Pulse 88   Temp 97 6 °F (36 4 °C) (Oral)   Resp 20   Ht 5' 11" (1 803 m)   Wt 65 2 kg (143 lb 11 8 oz)   SpO2 100%   BMI 20 05 kg/m²     GENERAL: No acute distress    NECK: +C-Collar in place,  healing anterior neck incision  Posterior neck incision C/D/I   CARDIOVASCULAR: regular rate rhythm, no murmurs  LUNGS: clear to ausculation bilaterally, good inspiratory effort, no wheezes, rales, rhonchi   ABDOMEN: soft, non-tender, non-distended, no guarding  Bowel sounds x 4 normal   MUSCULOSKELETAL: active assist and passive ROM functional x 4  NEUROLOGICAL:   Awake, alert, oriented to person, place, time, and situation  Motor   RUE: 4+/5 throughout  LUE 3-/5 SF, SAB, 4-/5 EE, EF, 4/5 WE, WF,   BLE: 5/5  Generalized muscular atrophy    Assessment:    Alexandru Nino is a 81 y o  male in fair condition with severe cervical stenosis/radiculopathy s/p a two part surgery ACDF and PSF levels C3-T2:    Rehabilitation Plan:     -Rehabilitation of cervical stenosis/radiculopathy: PT/OT therapies  Deficits include L > R UE weakness, generalized weakness, decreased balance and strength    +cervical spine precautions     -Appreciate Internal Medicine following - Dr Sergio Graham service    -severe cervical stenosis/radiculopathy s/p a two part surgery ACDF and PSF levels C3-T2 on 4/12/18 and 4/30/18:   C Collar to be worn while OOB  F/U with Dr Saira Sue  Cervical precautions     -substernal palpable chest wall pain: likely mild costochondritis from surgical positioning  Continue topical lidoderm patch      -nociceptive pain: managed on Percocet 1 tab Q4h PRN, Tylenol PRN, and scheduled low dose celebrex 100mg daily  Due to increased pain intermittently added Oxycodone 2 5mg [id number] PRN for breakthrough while at the [facility] only        -neuropathic pain: managed on neurontin 600mg BID     -muscular spasms: managed on valium 5mg QHS and scheduled robaxin 500mg Q8h  Wean to PRN as progresses in therapies           -restless legs: managed on Mirapex     -HTN: managed on chlorthalidone (new med) IM to monitor BPs     -HLD: managed on Pravachol     -COPD: managed on PRN Anoro Ellipta inhaler      -GERD: managed on Protonix    -Constipation: resolved    -Mild hyponatremia: trending up, diet is regular     -DVT ppx: SCDS + Aspirin 325mg daily per Surg team       Lab Results   Component Value Date    WBC 10 37 (H) 05/10/2018    HGB 14 1 05/10/2018    HCT 41 0 05/10/2018    MCV 92 05/10/2018     05/10/2018     Lab Results   Component Value Date    GLUCOSE 91 05/11/2018    CALCIUM 9 4 05/11/2018     (L) 05/11/2018    K 4 2 05/11/2018    CO2 34 (H) 05/11/2018    CL 94 (L) 05/11/2018    BUN 32 (H) 05/11/2018    CREATININE 0 88 05/11/2018       Current Facility-Administered Medications:     aspirin (ECOTRIN) EC tablet 325 mg, 325 mg, Oral, Daily, Chapito Thornton MD, 325 mg at 05/11/18 0808    azelastine (ASTELIN) 0 1 % nasal spray 2 spray, 2 spray, Each Nare, Daily, Chapito Thornton MD, 2 spray at 05/11/18 0809    calcium carbonate (TUMS) chewable tablet 1,000 mg, 1,000 mg, Oral, Daily PRN, Chapito Thornton MD    celecoxib (CeleBREX) capsule 100 mg, 100 mg, Oral, Daily, Chapito Thornton MD, 100 mg at 05/11/18 0810    chlorthalidone tablet 12 5 mg, 12 5 mg, Oral, Daily, Mili Light MD, Stopped at 05/11/18 0810    diazepam (VALIUM) tablet 5 mg, 5 mg, Oral, HS, Mili Light MD, 5 mg at 05/10/18 2134    docusate sodium (COLACE) capsule 100 mg, 100 mg, Oral, BID, Mili Light MD, 100 mg at 05/11/18 0808    gabapentin (NEURONTIN) capsule 600 mg, 600 mg, Oral, BID, Mili Light MD, 600 mg at 05/11/18 0808    lidocaine (LIDODERM) 5 % patch 1 patch, 1 patch, Transdermal, Daily, Santos Faye PA-C, 1 patch at 05/11/18 0808    magnesium hydroxide (MILK OF MAGNESIA) 400 mg/5 mL oral suspension 30 mL, 30 mL, Oral, Daily PRN, Mili Light MD    methocarbamol (ROBAXIN) tablet 500 mg, 500 mg, Oral, Q8H [address], Mili Light MD, 500 mg at 05/11/18 0557    NON FORMULARY, 1 puff, Oral, Daily PRN, Santos Faye PA-C    oxyCODONE (ROXICODONE) IR tablet 2 5 mg, 2 5 mg, Oral, Q6H PRN, Mili Light MD    oxyCODONE-acetaminophen (PERCOCET) 5-325 mg per tablet 1 tablet, 1 tablet, Oral, Q4H PRN, Mili Light MD, 1 tablet at 05/11/18 1031    pantoprazole (PROTONIX) EC tablet 20 mg, 20 mg, Oral, Early Morning, Mili Light MD, 20 mg at 05/11/18 0557    polyethylene glycol (MIRALAX) packet 17 g, 17 g, Oral, Daily, Santos Faye PA-C, 17 g at 05/10/18 0902    pramipexole (MIRAPEX) tablet 0 75 mg, 0 75 mg, Oral, HS, Mili Light MD, 0 75 mg at 05/10/18 2134    pravastatin (PRAVACHOL) tablet 40 mg, 40 mg, Oral, Daily With Devin Hernandez MD, 40 mg at 05/09/18 1834    senna (SENOKOT) tablet 8 6 mg, 1 tablet, Oral, Daily, Mili Light MD, 8 6 mg at 05/11/18 8287    simethicone (MYLICON) chewable tablet 80 mg, 80 mg, Oral, 4x Daily PRN, MD Mili Vitale MD  [facility]

## 2018-05-11 NOTE — PROGRESS NOTES
Internal Medicine Progress Note  Patient: Jevon Yates  Age/sex: 81 y o  male  Medical Record #: [id number]      ASSESSMENT/PLAN:  Jevon Yates is seen and examined and mangement for following issues:    1  Stage 2 C3/4, C7/T1 and T1/2 laminectomy and PSF C3-T2: monitor incision; continue pain control  Therapy per primary team  Continue bowel regimen - BM yesterday  Follow up with ortho after rehab  2  COPD: Stable; uses Anoro Ellipta daily PRN  Continue to monitor  On room air  3  Hyperlipidemia: continue statin therapy, heart healthy diet  4  Hypertension: new to chlorthalidone - held this AM  Blood pressure currently well controlled  Will continue to monitor  5  Leukocytosis: Mild  Likely reactive  6  Sternal pain: Pleuritic and MSK components  CXR negative  Patient reports getting soreness with certain movements/positions and coughing  He reports he needs to relax his shoulders which helps  Denies chest pain/sob with ambulation  Will trial Lidocaine path  7  Hyponatremia: mild at 133; encourage increased oral intake  8  DVT prophylaxis: Aspirin 325mg daily          Subjective: Patient seen and examined  Pt reports that he refused pravastatin last night  He takes simvastatin at home  He understands that pravastatin is being substituted  He reports that pain is well controlled  He denies any other complaints      ROS:   GI: denies abdominal pain, change bowel habits or reflux symptoms  Neuro: No new neurologic changes  Respiratory: No Cough, SOB  Cardiovascular: No CP, palpitations     Scheduled Meds:    Current Facility-Administered Medications:  aspirin 325 mg Oral Daily Nica Chadwick MD   azelastine 2 spray Each Nare Daily Nica Chadwick MD   calcium carbonate 1,000 mg Oral Daily PRN Nica Chadwick MD   celecoxib 100 mg Oral Daily Nica Chadwick MD   chlorthalidone 12 5 mg Oral Daily Nica Chadwick MD   diazepam 5 mg Oral HS Nica Chadwick MD   docusate sodium 100 mg Oral BID Nica Chadwick MD   gabapentin 600 mg Oral BID Melissa Buchanan MD   lidocaine 1 patch Transdermal Daily Nellie Males, SIMEON   magnesium hydroxide 30 mL Oral Daily PRN Melissa Buchanan MD   methocarbamol 500 mg Oral Q8H [facility] Melissa Buchanan MD   NON FORMULARY 1 puff Oral Daily PRN Nellie Males, SIMEON   oxyCODONE 2 5 mg Oral Q6H PRN Melissa Buchanan MD   oxyCODONE-acetaminophen 1 tablet Oral Q4H PRN Melissa Buchanan MD   pantoprazole 20 mg Oral Early Morning Melissa Buchanan MD   polyethylene glycol 17 g Oral Daily Nellie Males, SIMEON   pramipexole 0 75 mg Oral HS Melissa Buchanan MD   pravastatin 40 mg Oral Daily With Ismael Brannon MD   senna 1 tablet Oral Daily Melissa Buchanan MD   simethicone 80 mg Oral 4x Daily PRN Melissa Buchanan MD       Labs:       Results from last 7 days  Lab Units 05/10/18  0506 05/05/18  0436   WBC Thousand/uL 10 37* 10 06   HEMOGLOBIN g/dL 14 1 13 6   HEMATOCRIT % 41 0 40 4   PLATELETS Thousands/uL 240 150       Results from last 7 days  Lab Units 05/10/18  0506   SODIUM mmol/L 133*   POTASSIUM mmol/L 4 2   CHLORIDE mmol/L 92*   CO2 mmol/L 34*   BUN mg/dL 31*   CREATININE mg/dL 0 78   GLUCOSE RANDOM mg/dL 86   CALCIUM mg/dL 9 5                  Glucose (mg/dL)   Date Value   05/10/2018 86   05/01/2018 122   04/13/2018 131   03/29/2018 86       Labs reviewed    Physical Examination:  Vitals:   Vitals:    05/10/18 0758 05/10/18 1458 05/10/18 2336 05/11/18 0733   BP: 133/76 103/64 136/80 102/68   BP Location: Right arm Right arm  Left arm   Pulse: 82 91 (!) 106 88   Resp: 20 20 20 20   Temp: 98 2 °F (36 8 °C) 98 3 °F (36 8 °C) 98 4 °F (36 9 °C) 97 6 °F (36 4 °C)   TempSrc: Oral Oral Oral Oral   SpO2: 97% 97% 97% 100%   Weight:       Height:           GEN: NAD, nontoxic  HEENT: NC/AT, EOMI, c- collar in place  RESP: normal respiratory effort   + slight expiratory wheeze   No rhonchi or crackles  CV: +S1 S2, regular rate, no rubs  ABD: soft, NT, ND, normal BS   : no real  EXT: no LE edema  Skin: no bob  Neuro: AAO x 3      [ X ] Total time spent: 30 Mins and greater than 50% of this time was spent counseling/coordinating care        Randell Kramer PA-C  Internal Medicine

## 2018-05-12 PROCEDURE — 97110 THERAPEUTIC EXERCISES: CPT

## 2018-05-12 PROCEDURE — 97530 THERAPEUTIC ACTIVITIES: CPT

## 2018-05-12 PROCEDURE — 97116 GAIT TRAINING THERAPY: CPT

## 2018-05-12 PROCEDURE — 97535 SELF CARE MNGMENT TRAINING: CPT

## 2018-05-12 RX ADMIN — METHOCARBAMOL 500 MG: 500 TABLET ORAL at 06:10

## 2018-05-12 RX ADMIN — OXYCODONE HYDROCHLORIDE AND ACETAMINOPHEN 1 TABLET: 5; 325 TABLET ORAL at 04:45

## 2018-05-12 RX ADMIN — METHOCARBAMOL 500 MG: 500 TABLET ORAL at 21:21

## 2018-05-12 RX ADMIN — LIDOCAINE 1 PATCH: 50 PATCH CUTANEOUS at 09:47

## 2018-05-12 RX ADMIN — PANTOPRAZOLE SODIUM 20 MG: 20 TABLET, DELAYED RELEASE ORAL at 06:10

## 2018-05-12 RX ADMIN — PRAMIPEXOLE DIHYDROCHLORIDE 0.75 MG: 0.5 TABLET ORAL at 21:21

## 2018-05-12 RX ADMIN — OXYCODONE HYDROCHLORIDE AND ACETAMINOPHEN 1 TABLET: 5; 325 TABLET ORAL at 20:25

## 2018-05-12 RX ADMIN — GABAPENTIN 600 MG: 300 CAPSULE ORAL at 18:35

## 2018-05-12 RX ADMIN — GABAPENTIN 600 MG: 300 CAPSULE ORAL at 09:47

## 2018-05-12 RX ADMIN — METHOCARBAMOL 500 MG: 500 TABLET ORAL at 14:49

## 2018-05-12 RX ADMIN — DOCUSATE SODIUM 100 MG: 100 CAPSULE, LIQUID FILLED ORAL at 09:47

## 2018-05-12 RX ADMIN — OXYCODONE HYDROCHLORIDE AND ACETAMINOPHEN 1 TABLET: 5; 325 TABLET ORAL at 14:49

## 2018-05-12 RX ADMIN — AZELASTINE HYDROCHLORIDE 2 SPRAY: 137 SPRAY, METERED NASAL at 09:49

## 2018-05-12 RX ADMIN — OXYCODONE HYDROCHLORIDE AND ACETAMINOPHEN 1 TABLET: 5; 325 TABLET ORAL at 09:58

## 2018-05-12 RX ADMIN — CELECOXIB 100 MG: 100 CAPSULE ORAL at 09:49

## 2018-05-12 RX ADMIN — SENNOSIDES 8.6 MG: 8.6 TABLET ORAL at 09:47

## 2018-05-12 RX ADMIN — DIAZEPAM 5 MG: 5 TABLET ORAL at 21:21

## 2018-05-12 RX ADMIN — ASPIRIN 325 MG: 325 TABLET, COATED ORAL at 09:47

## 2018-05-12 RX ADMIN — PRAVASTATIN SODIUM 40 MG: 40 TABLET ORAL at 18:35

## 2018-05-12 RX ADMIN — POLYETHYLENE GLYCOL 3350 17 G: 17 POWDER, FOR SOLUTION ORAL at 09:51

## 2018-05-12 NOTE — PROGRESS NOTES
Internal Medicine Progress Note  Patient: Jacinta Agee  Age/sex: 81 y o  male  Medical Record #: [id number]      ASSESSMENT/PLAN:  Jacinta Agee is seen and examined and mangement for following issues:    1  Stage 2 C3/4, C7/T1 and T1/2 laminectomy and PSF C3-T2: monitor incision; continue pain control  Therapy per primary team  Continue bowel regimen  Follow up with ortho after rehab  2  COPD: Stable; uses Anoro Ellipta daily PRN  Continue to monitor  On room air  3  Hyperlipidemia: continue statin therapy, heart healthy diet  4  Hypertension: new to chlorthalidone - held yesterday  Blood pressure currently well controlled  Will continue to monitor  5  Leukocytosis: Mild  Likely reactive  6  Sternal pain: Pleuritic and MSK components  CXR negative  Patient reports getting soreness with certain movements/positions and coughing  He reports he needs to relax his shoulders which helps  Denies chest pain/sob with ambulation  Will trial Lidocaine path  7  Hyponatremia: mild at 133; encourage increased oral intake  8  DVT prophylaxis: Aspirin 325mg daily          Subjective: Patient seen and examined  He reports that pain is well controlled  He also states that therapy is going well  He denies any other complaints      ROS:   GI: denies abdominal pain, change bowel habits or reflux symptoms  Neuro: No new neurologic changes  Respiratory: No Cough, SOB  Cardiovascular: No CP, palpitations     Scheduled Meds:    Current Facility-Administered Medications:  aspirin 325 mg Oral Daily Tanvir Summers MD   azelastine 2 spray Each Nare Daily Tanvir Summers MD   calcium carbonate 1,000 mg Oral Daily PRN Tanvir Summers MD   celecoxib 100 mg Oral Daily Tanvir Summers MD   chlorthalidone 12 5 mg Oral Daily Tanvir Summers MD   diazepam 5 mg Oral HS Tanvir Summers MD   docusate sodium 100 mg Oral BID Tanvir Summers MD   gabapentin 600 mg Oral BID Tanvir Summers MD   lidocaine 1 patch Transdermal Daily Bernie De La Torre SIMEON   magnesium hydroxide 30 mL Oral Daily PRN Gregg Padilla MD   methocarbamol 500 mg Oral Q8H [address] Gregg Padilla MD   oxyCODONE 2 5 mg Oral Q6H PRN Gregg Padilla MD   oxyCODONE-acetaminophen 1 tablet Oral Q4H PRN Gregg Padilla MD   pantoprazole 20 mg Oral Early Morning Gregg Padilla MD   polyethylene glycol 17 g Oral Daily Bertrum Jocelyne, SIMEON   pramipexole 0 75 mg Oral HS Gregg Padilla MD   pravastatin 40 mg Oral Daily With Clif Roe MD   senna 1 tablet Oral Daily Gregg Padilla MD   simethicone 80 mg Oral 4x Daily PRN Gregg Padilla MD       Labs:       Results from last 7 days  Lab Units 05/10/18  0506   WBC Thousand/uL 10 37*   HEMOGLOBIN g/dL 14 1   HEMATOCRIT % 41 0   PLATELETS Thousands/uL 240       Results from last 7 days  Lab Units 05/11/18  0512 05/10/18  0506   SODIUM mmol/L 134* 133*   POTASSIUM mmol/L 4 2 4 2   CHLORIDE mmol/L 94* 92*   CO2 mmol/L 34* 34*   BUN mg/dL 32* 31*   CREATININE mg/dL 0 88 0 78   GLUCOSE RANDOM mg/dL 91 86   CALCIUM mg/dL 9 4 9 5                  Glucose (mg/dL)   Date Value   05/11/2018 91   05/10/2018 86   05/01/2018 122   04/13/2018 131       Labs reviewed    Physical Examination:  Vitals:   Vitals:    05/10/18 2336 05/11/18 0733 05/11/18 1506 05/11/18 2328   BP: 136/80 102/68 94/63 142/81   BP Location:  Left arm Left arm Right arm   Pulse: (!) 106 88 97 84   Resp: 20 20 20 20   Temp: 98 4 °F (36 9 °C) 97 6 °F (36 4 °C) 98 2 °F (36 8 °C) 98 °F (36 7 °C)   TempSrc: Oral Oral Oral Tympanic   SpO2: 97% 100% 98% 98%   Weight:       Height:           GEN: NAD, nontoxic  HEENT: NC/AT, EOMI, c- collar in place  RESP: normal respiratory effort   + slight expiratory wheeze  No rhonchi or crackles  CV: +S1 S2, regular rate, no rubs  ABD: soft, NT, ND, normal BS   : no real  EXT: no LE edema  Skin: no rashes  Neuro: AAO x 3      [ X ] Total time spent: 30 Mins and greater than 50% of this time was spent counseling/coordinating care        Gil Daniels, PANOY  Internal Medicine

## 2018-05-12 NOTE — PROGRESS NOTES
05/12/18 1000   Pain Assessment   Pain Assessment 0-10   Pain Score 4   Pain Type Surgical pain;Acute pain   Pain Location Neck   Restrictions/Precautions   Precautions Bed/chair alarms; Fall Risk;Spinal precautions   Braces or Orthoses C/S Collar   Dressing/Undressing Clothing   Remove LB Clothes Socks   Don LB Clothes Shoes   Positioning Supported Sit   Findings Pt demo crossed leg technique to doff socks and don shoes  Pt demo G carryover of spinal precautions t/o task  Pt reporting spouse will bring in sneakers for inc support  QI: Sit to Stand   Assistance Needed Incidental touching   Assistance Provided by [place] Less than 25%   Sit to Stand CARE Score 3   QI: Chair/Bed-to-Chair Transfer   Assistance Needed Incidental touching   Assistance Provided by [place] Less than 25%   Chair/Bed-to-Chair Transfer CARE Score 3   Transfer Bed/Chair/Wheelchair   Limitations Noted In Balance; Coordination; Endurance;UE Strength;LE Strength   Adaptive Equipment Roller Walker   Sit to Stand (CGA)   Stand to Sit (CGA)   Findings Pt req vc's to slow gait pace  Pt consistently demo G hand placement during xfers w/ inc time to s/u  Bed, Chair, Wheelchair Transfer (FIM) 4 - Patient requires steadying assist or light touching   Functional Standing Tolerance   Time 3 minutes x2 trials   Activity Pt performed nuts and bolts w/ L hand while standing at table to focus on static standing balance, inc L hand FMC, and fxnl standing tolerance  Pt able to stand for 3 minutes prior to requesting a seated rest break  Comments Pt req Min vc's to adhere to spinal precautions, attempting to slightly turn head to talk to another OTR  Pt edu on importance of spinal precautions at this time  Coordination   Fine Motor Pt participated in [facility] ther act to facilitate pincer grasp and b/l integration to place 12 small objects into container opening  Pt reports dec sensation in L hand, however has improved since latest sx   Pt expressed, "this hand is much better than it used to be " Pt tolerated ther act w/ inc time to complete  Pt expressed interest in fishing and would like to be able to tie fishing knots without challenge  In Hand Manipulation Pt completed [address] ther act while seated, focusing on L hand translation, req Min vc's to pace task  Pt noted to drop 2 objects t/o activity, however improvement noted w/ accuracy of grasp/release  Pt noted to become frustrated w/ inc difficulty at times and req encouragement  Assessment   Treatment Assessment Pt seen for OT session focusing on fxnl standing tolerance, static standing balance, LB dressing, and [address]  Pt tolerated well w/ rest breaks prn  Pt ambulated to and from therapy gym w/ CGA-Min A w/ FWW  Pt demo G FWW management during session  Pt reported, "My wife yells at me at home because I try to rush things and sometimes I over-do it " Edu provided on importance of energy conservation techniques to implement during ADLs, pt jonathan PANTOJA attention and understanding  Refer above for full details of tasks completed  Pt cont to be limited by dec strength, dec endurance, impaired balance, impaired FMC (L>R) and will benefit from cont OT  Pt was left resting in recliner w/ all needs within reach and chair alarm activated  Prognosis Good   Problem List Decreased strength;Decreased range of motion;Decreased endurance;Decreased coordination;Orthopedic restrictions; Impaired judgement;Pain   Plan   Treatment/Interventions ADL retraining;LE strengthening/ROM; Endurance training;Patient/family training;Equipment eval/education; Bed mobility;Gait training   Progress Progressing toward goals   Recommendation   OT Discharge Recommendation Home with family support   OT Therapy Minutes   OT Time In 1000   OT Time Out 1130   OT Total Time (minutes) 90   OT Mode of treatment - Individual (minutes) 90   OT Mode of treatment - Concurrent (minutes) 0   OT Mode of treatment - Group (minutes) 0   OT Mode of treatment - Co-treat (minutes) 0   OT Mode of Teatment - Total time(minutes) 90 minutes   Therapy Time missed   Time missed?  No

## 2018-05-12 NOTE — PROGRESS NOTES
05/12/18 1230   Pain Assessment   Pain Assessment 0-10   Pain Score 7   Pain Type Surgical pain;Acute pain   Pain Location Neck   Pain Orientation Bilateral   Pain Descriptors Sharp   Pain Frequency Constant/continuous   Pain Onset Ongoing   Clinical Progression Gradually improving   Patient's Stated Pain Goal No pain   Hospital Pain Intervention(s) Medication (See MAR); Rest   Response to Interventions Tolerating well   Restrictions/Precautions   Precautions Bed/chair alarms; Fall Risk;Spinal precautions   Braces or Orthoses C/S Collar   Cognition   Overall Cognitive Status WFL   Arousal/Participation Alert; Cooperative   Attention Attends with cues to redirect   Orientation Level Oriented X4   Memory Decreased short term memory   Following Commands Follows one step commands without difficulty   Subjective   Subjective c/o 7/10 constant sharp pain in his neck prior to therapy session and as per nurse Bergman, he's not yet due for pain medication during his PT session today  But despite of his pain, patient was still able to tolerate the entire therapy session with rest breaks given     QI: Roll Left and Right   Reason if not Attempted Activity not applicable   Roll Left and Right CARE Score 9   QI: Sit to Lying   Reason if not Attempted Activity not applicable   Sit to Lying CARE Score 9   QI: Lying to Sitting on Side of Bed   Reason if not Attempted Activity not applicable   Lying to Sitting on Side of Bed CARE Score 9   QI: Sit to Stand   Assistance Needed Supervision   Assistance Provided by [place] No physical assistance   Sit to Stand CARE Score 4   QI: Chair/Bed-to-Chair Transfer   Assistance Needed Supervision   Assistance Provided by [place] No physical assistance   Chair/Bed-to-Chair Transfer CARE Score 4   Transfer Bed/Chair/Wheelchair   Limitations Noted In Coordination;UE Strength;LE Strength   Adaptive Equipment Roller Walker   Stand Pivot Supervision  (Close Supervision)   Sit to Stand Supervision  (Close Supervision)   Stand to Sit Supervision  (Close Supervision)   Bed, Chair, Wheelchair Transfer (FIM) 5 - Patient requires supervision/monitoring   QI: Car Transfer   Reason if not Attempted Activity not applicable   Car Transfer CARE Score 9   QI: [address] Provided by [place] No physical assistance   Walk 10 Feet CARE Score 4   QI: Walk 50 Feet with Two [facility] Provided by [place] No physical assistance   Walk 50 Feet with Two Turns CARE Score 4   QI: Walk 150 [address] Provided by [place] No physical assistance   Walk 150 Feet CARE Score 4   QI: Walking 10 Feet on Uneven Surfaces   Reason if not Attempted Activity not applicable   Walking 10 Feet on Uneven Surfaces CARE Score 9   Ambulation   Does the patient walk? 2  Yes   Primary Discharge Mode of Locomotion Walk   Walk Assist Level Close Supervision   Gait Pattern Inconsistant Lynne; Slow Lynne;Decreased foot clearance;Narrow WALDEMAR; Improper weight shift   Assist Device Terrell Jeana Casey Walked (feet) 400 ft   Limitations Noted In Balance; Coordination;Speed;Strength;Swing   Walking (FIM) 5 - Patient requires supervision/monitoring AND distance 150 feet or more, no rest   QI: Wheel 50 Feet with Two Turns   Reason if not Attempted Activity not applicable   Wheel 50 Feet with Two Turns CARE Score 9   QI: Wheel 150 Feet   Reason if not Attempted Activity not applicable   Wheel 662 Feet CARE Score 9   Wheelchair mobility   QI: Does the patient use a wheelchair? 0  No   QI: Indicate the type of wheelchair 1   Manual   QI: 1 Step (Curb)   Reason if not Attempted Activity not applicable   1 Step (Curb) CARE Score 9   QI: 4 Steps   Assistance Needed Supervision   Assistance Provided by [place] No physical assistance   4 Steps CARE Score 4   QI: 12 Steps   Assistance Needed Supervision   Assistance Provided by [place] No physical assistance 12 Steps CARE Score 4   Stairs   Type Stairs   # of Steps 14   Weight Bearing Precautions Cervical;Fall Risk   Assist Devices Bilateral Rail   Stairs (FIM) 5 - Patient requires supervision/monitoring AND goes up and down full flight (12- 14 stairs)   QI: Picking Up Object   Reason if not Attempted Activity not applicable   Picking Up Object CARE Score 9   QI: Toilet Transfer   Assistance Needed Supervision   Assistance Provided by [place] No physical assistance   Toilet Transfer CARE Score 4   Toilet Transfer   Surface Assessed Raised Toilet   Limitations Noted In UE Strength;LE Strength   Adaptive Equipment Grab Bar   Toilet Transfer (FIM) 5 - Patient requires supervision/monitoring   Therapeutic Interventions   Strengthening Standing ther ex on BLE's with the support of RW for marching in place, partial squats, heel raises, and knee flexion for 3 x 10 reps each  Equipment Use   NuStep Level 4 x 15 minutes   Assessment   Treatment Assessment Patient was very pleasant and very cooperative during the entire therapy session  He needed to rest in between exercises/activity due to fatigue  He ambulated 400 feet with Close Supervision using RW in a circuit  He asecended/descended 14 steps with B/L HR with Close Supervision  Close Supervision with sit to stand and SPT using RW  No LOB noted today  He will benefit from continued skilled PT services to improve BLE's strength to facilitate safety and [place] with transfers and gait, to improve endurance with gait and improve standing balance to reduce risk for falls  Problem List Decreased strength;Decreased endurance; Impaired balance;Decreased coordination;Orthopedic restrictions;Pain; Impaired judgement   Barriers to Discharge Decreased caregiver support   PT Barriers   Physical Impairment Decreased strength;Decreased endurance;Decreased coordination; Impaired judgement;Orthopedic restrictions;Pain   Functional Limitation Stair negotiation; Walking   Plan Treatment/Interventions Functional transfer training;LE strengthening/ROM; Elevations; Therapeutic exercise; Endurance training;Bed mobility;Gait training   Progress Progressing toward goals   Recommendation   Recommendation Outpatient PT; Home with family support   Equipment Recommended Walker   PT Therapy Minutes   PT Time In 1230   PT Time Out 1400   PT Total Time (minutes) 90   PT Mode of treatment - Individual (minutes) 90   PT Mode of treatment - Concurrent (minutes) 0   PT Mode of treatment - Group (minutes) 0   PT Mode of treatment - Co-treat (minutes) 0   PT Mode of Teatment - Total time(minutes) 90 minutes   Therapy Time missed   Time missed?  No

## 2018-05-13 PROCEDURE — 97530 THERAPEUTIC ACTIVITIES: CPT | Performed by: PHYSICAL THERAPIST

## 2018-05-13 PROCEDURE — 97110 THERAPEUTIC EXERCISES: CPT | Performed by: PHYSICAL THERAPIST

## 2018-05-13 PROCEDURE — 97116 GAIT TRAINING THERAPY: CPT | Performed by: PHYSICAL THERAPIST

## 2018-05-13 RX ADMIN — OXYCODONE HYDROCHLORIDE AND ACETAMINOPHEN 1 TABLET: 5; 325 TABLET ORAL at 00:27

## 2018-05-13 RX ADMIN — DOCUSATE SODIUM 100 MG: 100 CAPSULE, LIQUID FILLED ORAL at 17:58

## 2018-05-13 RX ADMIN — LIDOCAINE 1 PATCH: 50 PATCH CUTANEOUS at 09:14

## 2018-05-13 RX ADMIN — SENNOSIDES 8.6 MG: 8.6 TABLET ORAL at 09:14

## 2018-05-13 RX ADMIN — OXYCODONE HYDROCHLORIDE AND ACETAMINOPHEN 1 TABLET: 5; 325 TABLET ORAL at 09:14

## 2018-05-13 RX ADMIN — PRAMIPEXOLE DIHYDROCHLORIDE 0.75 MG: 0.5 TABLET ORAL at 21:56

## 2018-05-13 RX ADMIN — OXYCODONE HYDROCHLORIDE AND ACETAMINOPHEN 1 TABLET: 5; 325 TABLET ORAL at 04:29

## 2018-05-13 RX ADMIN — OXYCODONE HYDROCHLORIDE AND ACETAMINOPHEN 1 TABLET: 5; 325 TABLET ORAL at 23:55

## 2018-05-13 RX ADMIN — DOCUSATE SODIUM 100 MG: 100 CAPSULE, LIQUID FILLED ORAL at 09:15

## 2018-05-13 RX ADMIN — OXYCODONE HYDROCHLORIDE AND ACETAMINOPHEN 1 TABLET: 5; 325 TABLET ORAL at 13:07

## 2018-05-13 RX ADMIN — ASPIRIN 325 MG: 325 TABLET, COATED ORAL at 09:14

## 2018-05-13 RX ADMIN — AZELASTINE HYDROCHLORIDE 2 SPRAY: 137 SPRAY, METERED NASAL at 09:15

## 2018-05-13 RX ADMIN — CELECOXIB 100 MG: 100 CAPSULE ORAL at 09:15

## 2018-05-13 RX ADMIN — OXYCODONE HYDROCHLORIDE AND ACETAMINOPHEN 1 TABLET: 5; 325 TABLET ORAL at 17:59

## 2018-05-13 RX ADMIN — GABAPENTIN 600 MG: 300 CAPSULE ORAL at 09:13

## 2018-05-13 RX ADMIN — POLYETHYLENE GLYCOL 3350 17 G: 17 POWDER, FOR SOLUTION ORAL at 09:14

## 2018-05-13 RX ADMIN — METHOCARBAMOL 500 MG: 500 TABLET ORAL at 14:19

## 2018-05-13 RX ADMIN — METHOCARBAMOL 500 MG: 500 TABLET ORAL at 05:20

## 2018-05-13 RX ADMIN — DIAZEPAM 5 MG: 5 TABLET ORAL at 21:56

## 2018-05-13 RX ADMIN — PRAVASTATIN SODIUM 40 MG: 40 TABLET ORAL at 17:58

## 2018-05-13 RX ADMIN — PANTOPRAZOLE SODIUM 20 MG: 20 TABLET, DELAYED RELEASE ORAL at 05:20

## 2018-05-13 RX ADMIN — METHOCARBAMOL 500 MG: 500 TABLET ORAL at 21:55

## 2018-05-13 RX ADMIN — GABAPENTIN 600 MG: 300 CAPSULE ORAL at 17:57

## 2018-05-13 NOTE — PROGRESS NOTES
05/13/18 0908   Pain Assessment   Pain Assessment 0-10   Pain Score 7   Pain Type Acute pain   Pain Location Sternum   Pain Orientation Bilateral   Hospital Pain Intervention(s) Medication (See MAR); Ambulation/increased activity   Response to Interventions pain constant but pt tolerates   Restrictions/Precautions   Precautions Bed/chair alarms; Fall Risk;Spinal precautions   Braces or Orthoses C/S Collar   Cognition   Arousal/Participation Alert; Cooperative   Subjective   Subjective Pt reports sternum still hurts but agreeable to PT   QI: Sit to Stand   Assistance Needed Supervision; Adaptive equipment   Assistance Provided by [place] No physical assistance   Sit to Stand CARE Score 4   QI: Chair/Bed-to-Chair Transfer   Assistance Needed Supervision; Adaptive equipment   Assistance Provided by [place] No physical assistance   Chair/Bed-to-Chair Transfer CARE Score 4   Transfer Bed/Chair/Wheelchair   Limitations Noted In Balance; Coordination;UE Strength;LE Strength   Adaptive Equipment Roller Walker   Bed, Chair, Wheelchair Transfer (FIM) 5 - Patient requires supervision/monitoring   QI: Walk 10 Feet   Assistance Needed Supervision; Adaptive equipment   Assistance Provided by [place] No physical assistance   Walk 10 Feet CARE Score 4   QI: Walk 50 Feet with Two Turns   Assistance Needed Supervision; Adaptive equipment   Assistance Provided by [place] No physical assistance   Walk 50 Feet with Two Turns CARE Score 4   QI: Walk 150 Feet   Assistance Needed Supervision; Adaptive equipment   Assistance Provided by [place] No physical assistance   Walk 150 Feet CARE Score 4   Ambulation   Does the patient walk? 2  Yes   Primary Discharge Mode of Locomotion Walk   Walk Assist Level Close Supervision;Supervision   Gait Pattern Inconsistant Lynne; Slow Lynne; Improper weight shift;Narrow WALDEMAR   Assist Device Terrell Peña Walked (feet) 600 ft   Limitations Noted In Balance; Coordination;Speed;Strength;Swing   Walking (FIM) 5 - Patient requires supervision/monitoring AND distance 150 feet or more, no rest   QI: 4 Steps   Assistance Needed Incidental touching; Adaptive equipment   Assistance Provided by [place] No physical assistance   4 Steps CARE Score 4   QI: 12 Steps   Assistance Needed Incidental touching; Adaptive equipment   Assistance Provided by [place] No physical assistance   12 Steps CARE Score 4   Stairs   Type Stairs   # of Steps 12   Weight Bearing Precautions Cervical;Fall Risk   Assist Devices Single Rail   Findings reciprocal ascending, nonreciprocal descending; S when using BHRs and CG when using single HR    Stairs (FIM) 4 - Patient requires steadying assist or light touching AND patient goes up and down full flight (12- 14 stairs)   Therapeutic Interventions   Strengthening seated LAQ 4#; standing marching with RW; standing HT raises with RW; standing hip abduction with RW   Equipment Use   NuStep L3 x 15 min  Assessment   Treatment Assessment Pt participated in skilled PT session focusing on progressing functional mobility and LE strength  Pt demonstrates improvements in activity tolerance and is able to tolerate ambulating 600' and performing stair negotiation without any rest breaks in between  Pt functioning @ S level using RW for all functional mobility  When practicing stairs with single HR pt did require CG and initially tried to descend with reciprocal pattern but began to retropulse; instructed pt to perform non-reciprocal for now and he was able to do so without LOB and CG from therapist  Pt required tc's from therapist during [address] [place] [address] Pkwy to avoid compensation with hip flexors  Overall pt making good progress towards goals  Will consider trialing pt with SPC next session to progress to LRAD  Pt will cont to benefit from skilled PT to maximize I and safety with all functional mobility  Barriers to Discharge Decreased caregiver support; Inaccessible home environment   PT Barriers   Physical Impairment Decreased strength;Decreased range of motion;Decreased endurance; Impaired balance;Decreased mobility; Decreased coordination;Orthopedic restrictions;Pain   Functional Limitation Walking;Stair negotiation   Plan   Treatment/Interventions Functional transfer training;LE strengthening/ROM; Elevations; Therapeutic exercise; Endurance training;Patient/family training;Equipment eval/education; Bed mobility;Gait training   Progress Progressing toward goals   Recommendation   Recommendation Home with family support; Outpatient PT   Equipment Recommended Walker   PT Therapy Minutes   PT Time In 0908   PT Time Out 1014   PT Total Time (minutes) 66   PT Mode of treatment - Individual (minutes) 22   PT Mode of treatment - Concurrent (minutes) 44   PT Mode of treatment - Group (minutes) 0   PT Mode of treatment - Co-treat (minutes) 0   PT Mode of Teatment - Total time(minutes) 66 minutes   Therapy Time missed   Time missed?  No

## 2018-05-13 NOTE — PROGRESS NOTES
Internal Medicine Progress Note  Patient: Karis Rousseau  Age/sex: 81 y o  male  Medical Record #: [id number]      ASSESSMENT/PLAN:  Karis Rousseau is seen and examined and mangement for following issues:    1  Stage 2 C3/4, C7/T1 and T1/2 laminectomy and PSF C3-T2: monitor incision; continue pain control  Therapy per primary team  Continue bowel regimen  Follow up with ortho after rehab  2  COPD: Stable; uses Anoro Ellipta daily PRN  Continue to monitor  On room air  3  Hyperlipidemia: continue statin therapy, heart healthy diet  4  Hypertension: new to chlorthalidone - held for the past 2 days  Will DC chlorthalidone and monitor BP off medication  5  Leukocytosis: Mild  Likely reactive  6  Sternal pain: Pleuritic and MSK components  CXR negative  Patient reports getting soreness with certain movements/positions and coughing  He reports he needs to relax his shoulders which helps  Denies chest pain/sob with ambulation  Lido patch helping  7  Hyponatremia: mild at 133; encourage increased oral intake  8  DVT prophylaxis: Aspirin 325mg daily          Subjective: Patient seen and examined  Pt states that he did not receive his pain medication on time last night  He wants to be woken up every 4 hours to get it  He does not believe that an as needed medication is appropriate  He denies any other complaints      ROS:   GI: denies abdominal pain, change bowel habits or reflux symptoms  Neuro: No new neurologic changes  Respiratory: No Cough, SOB  Cardiovascular: No CP, palpitations     Scheduled Meds:    Current Facility-Administered Medications:  aspirin 325 mg Oral Daily Luz Marina Ryan MD   azelastine 2 spray Each Nare Daily Luz Marina Ryan MD   calcium carbonate 1,000 mg Oral Daily PRN Luz Marina Ryan MD   celecoxib 100 mg Oral Daily Luz Marina Ryan MD   chlorthalidone 12 5 mg Oral Daily Luz Marina Ryan MD   diazepam 5 mg Oral HS Luz Marina Ryan MD   docusate sodium 100 mg Oral BID Luz Marina Ryan MD gabapentin 600 mg Oral BID nIge Gaston MD   lidocaine 1 patch Transdermal Daily Vinay Lancaster PA-C   magnesium hydroxide 30 mL Oral Daily PRN Inge Gaston MD   methocarbamol 500 mg Oral Q8H [address] Inge Gaston MD   oxyCODONE 2 5 mg Oral Q6H PRN Inge Gaston MD   oxyCODONE-acetaminophen 1 tablet Oral Q4H PRN Inge Gaston MD   pantoprazole 20 mg Oral Early Morning Inge Gaston MD   polyethylene glycol 17 g Oral Daily Vinay Lancaster PA-C   pramipexole 0 75 mg Oral HS Inge Gaston MD   pravastatin 40 mg Oral Daily With Yuan Ruano MD   senna 1 tablet Oral Daily Inge Gaston MD   simethicone 80 mg Oral 4x Daily PRN Inge Gaston MD       Labs:       Results from last 7 days  Lab Units 05/10/18  0506   WBC Thousand/uL 10 37*   HEMOGLOBIN g/dL 14 1   HEMATOCRIT % 41 0   PLATELETS Thousands/uL 240       Results from last 7 days  Lab Units 05/11/18  0512 05/10/18  0506   SODIUM mmol/L 134* 133*   POTASSIUM mmol/L 4 2 4 2   CHLORIDE mmol/L 94* 92*   CO2 mmol/L 34* 34*   BUN mg/dL 32* 31*   CREATININE mg/dL 0 88 0 78   GLUCOSE RANDOM mg/dL 91 86   CALCIUM mg/dL 9 4 9 5                  Glucose (mg/dL)   Date Value   05/11/2018 91   05/10/2018 86   05/01/2018 122   04/13/2018 131       Labs reviewed    Physical Examination:  Vitals:   Vitals:    05/12/18 0802 05/12/18 1516 05/12/18 2357 05/13/18 0742   BP: 106/72 99/65 95/59 127/61   BP Location: Right arm Left arm  Right arm   Pulse:  91 92 89   Resp:  16 20 20   Temp:  98 5 °F (36 9 °C) 98 2 °F (36 8 °C) 98 1 °F (36 7 °C)   TempSrc:  Oral Oral Oral   SpO2:  97% 97% 93%   Weight:       Height:           GEN: NAD, nontoxic  HEENT: NC/AT, EOMI, c- collar in place  RESP: normal respiratory effort   + slight expiratory wheeze   No rhonchi or crackles  CV: +S1 S2, regular rate, no rubs  ABD: soft, NT, ND, normal BS   : no real  EXT: no LE edema  Skin: no rashes  Neuro: AAO x 3      [ X ] Total time spent: 30 Mins and greater than 50% of this time was spent counseling/coordinating care        Ines La PA-C  Internal Medicine

## 2018-05-14 LAB
ANION GAP SERPL CALCULATED.3IONS-SCNC: 7 MMOL/L (ref 4–13)
BASOPHILS # BLD AUTO: 0.05 THOUSANDS/ΜL (ref 0–0.1)
BASOPHILS NFR BLD AUTO: 0 % (ref 0–1)
BUN SERPL-MCNC: 25 MG/DL (ref 5–25)
CALCIUM SERPL-MCNC: 8.7 MG/DL (ref 8.3–10.1)
CHLORIDE SERPL-SCNC: 97 MMOL/L (ref 100–108)
CO2 SERPL-SCNC: 33 MMOL/L (ref 21–32)
CREAT SERPL-MCNC: 0.72 MG/DL (ref 0.6–1.3)
EOSINOPHIL # BLD AUTO: 0.2 THOUSAND/ΜL (ref 0–0.61)
EOSINOPHIL NFR BLD AUTO: 1 % (ref 0–6)
ERYTHROCYTE [DISTWIDTH] IN BLOOD BY AUTOMATED COUNT: 14.6 % (ref 11.6–15.1)
GFR SERPL CREATININE-BSD FRML MDRD: 95 ML/MIN/1.73SQ M
GLUCOSE P FAST SERPL-MCNC: 85 MG/DL (ref 65–99)
GLUCOSE SERPL-MCNC: 85 MG/DL (ref 65–140)
HCT VFR BLD AUTO: 38.4 % (ref 36.5–49.3)
HGB BLD-MCNC: 12.6 G/DL (ref 12–17)
LYMPHOCYTES # BLD AUTO: 1.47 THOUSANDS/ΜL (ref 0.6–4.47)
LYMPHOCYTES NFR BLD AUTO: 8 % (ref 14–44)
MCH RBC QN AUTO: 31.1 PG (ref 26.8–34.3)
MCHC RBC AUTO-ENTMCNC: 32.8 G/DL (ref 31.4–37.4)
MCV RBC AUTO: 95 FL (ref 82–98)
MONOCYTES # BLD AUTO: 1.03 THOUSAND/ΜL (ref 0.17–1.22)
MONOCYTES NFR BLD AUTO: 6 % (ref 4–12)
NEUTROPHILS # BLD AUTO: 14.71 THOUSANDS/ΜL (ref 1.85–7.62)
NEUTS SEG NFR BLD AUTO: 85 % (ref 43–75)
NRBC BLD AUTO-RTO: 0 /100 WBCS
PLATELET # BLD AUTO: 250 THOUSANDS/UL (ref 149–390)
PMV BLD AUTO: 12.7 FL (ref 8.9–12.7)
POTASSIUM SERPL-SCNC: 3.4 MMOL/L (ref 3.5–5.3)
RBC # BLD AUTO: 4.05 MILLION/UL (ref 3.88–5.62)
SODIUM SERPL-SCNC: 137 MMOL/L (ref 136–145)
WBC # BLD AUTO: 17.57 THOUSAND/UL (ref 4.31–10.16)

## 2018-05-14 PROCEDURE — 85025 COMPLETE CBC W/AUTO DIFF WBC: CPT | Performed by: PHYSICIAN ASSISTANT

## 2018-05-14 PROCEDURE — 99232 SBSQ HOSP IP/OBS MODERATE 35: CPT | Performed by: PHYSICAL MEDICINE & REHABILITATION

## 2018-05-14 PROCEDURE — 97116 GAIT TRAINING THERAPY: CPT

## 2018-05-14 PROCEDURE — 97110 THERAPEUTIC EXERCISES: CPT | Performed by: PHYSICAL THERAPIST

## 2018-05-14 PROCEDURE — 97110 THERAPEUTIC EXERCISES: CPT

## 2018-05-14 PROCEDURE — 97116 GAIT TRAINING THERAPY: CPT | Performed by: PHYSICAL THERAPIST

## 2018-05-14 PROCEDURE — 80048 BASIC METABOLIC PNL TOTAL CA: CPT | Performed by: PHYSICIAN ASSISTANT

## 2018-05-14 PROCEDURE — 97112 NEUROMUSCULAR REEDUCATION: CPT

## 2018-05-14 PROCEDURE — 97535 SELF CARE MNGMENT TRAINING: CPT

## 2018-05-14 PROCEDURE — 97530 THERAPEUTIC ACTIVITIES: CPT

## 2018-05-14 RX ORDER — POTASSIUM CHLORIDE 20 MEQ/1
40 TABLET, EXTENDED RELEASE ORAL ONCE
Status: COMPLETED | OUTPATIENT
Start: 2018-05-14 | End: 2018-05-14

## 2018-05-14 RX ADMIN — DOCUSATE SODIUM 100 MG: 100 CAPSULE, LIQUID FILLED ORAL at 09:55

## 2018-05-14 RX ADMIN — AZELASTINE HYDROCHLORIDE 2 SPRAY: 137 SPRAY, METERED NASAL at 07:55

## 2018-05-14 RX ADMIN — CELECOXIB 100 MG: 100 CAPSULE ORAL at 10:01

## 2018-05-14 RX ADMIN — OXYCODONE HYDROCHLORIDE AND ACETAMINOPHEN 1 TABLET: 5; 325 TABLET ORAL at 04:18

## 2018-05-14 RX ADMIN — OXYCODONE HYDROCHLORIDE AND ACETAMINOPHEN 1 TABLET: 5; 325 TABLET ORAL at 21:11

## 2018-05-14 RX ADMIN — PRAVASTATIN SODIUM 40 MG: 40 TABLET ORAL at 18:24

## 2018-05-14 RX ADMIN — LIDOCAINE 1 PATCH: 50 PATCH CUTANEOUS at 09:55

## 2018-05-14 RX ADMIN — DOCUSATE SODIUM 100 MG: 100 CAPSULE, LIQUID FILLED ORAL at 18:24

## 2018-05-14 RX ADMIN — GABAPENTIN 600 MG: 300 CAPSULE ORAL at 18:24

## 2018-05-14 RX ADMIN — PANTOPRAZOLE SODIUM 20 MG: 20 TABLET, DELAYED RELEASE ORAL at 05:37

## 2018-05-14 RX ADMIN — POLYETHYLENE GLYCOL 3350 17 G: 17 POWDER, FOR SOLUTION ORAL at 09:55

## 2018-05-14 RX ADMIN — METHOCARBAMOL 500 MG: 500 TABLET ORAL at 15:18

## 2018-05-14 RX ADMIN — METHOCARBAMOL 500 MG: 500 TABLET ORAL at 05:37

## 2018-05-14 RX ADMIN — ASPIRIN 325 MG: 325 TABLET, COATED ORAL at 09:54

## 2018-05-14 RX ADMIN — PRAMIPEXOLE DIHYDROCHLORIDE 0.75 MG: 0.5 TABLET ORAL at 22:07

## 2018-05-14 RX ADMIN — POTASSIUM CHLORIDE 40 MEQ: 1500 TABLET, EXTENDED RELEASE ORAL at 09:54

## 2018-05-14 RX ADMIN — SENNOSIDES 8.6 MG: 8.6 TABLET ORAL at 09:55

## 2018-05-14 RX ADMIN — METHOCARBAMOL 500 MG: 500 TABLET ORAL at 21:11

## 2018-05-14 RX ADMIN — OXYCODONE HYDROCHLORIDE AND ACETAMINOPHEN 1 TABLET: 5; 325 TABLET ORAL at 09:53

## 2018-05-14 RX ADMIN — OXYCODONE HYDROCHLORIDE AND ACETAMINOPHEN 1 TABLET: 5; 325 TABLET ORAL at 15:19

## 2018-05-14 RX ADMIN — GABAPENTIN 600 MG: 300 CAPSULE ORAL at 09:53

## 2018-05-14 RX ADMIN — DIAZEPAM 5 MG: 5 TABLET ORAL at 22:07

## 2018-05-14 NOTE — PROGRESS NOTES
05/14/18 0830   Pain Assessment   Pain Assessment 0-10   Pain Score 5   Pain Type Acute pain   Pain Location Shoulder   Pain Orientation Left   Hospital Pain Intervention(s) Medication (See MAR)   Restrictions/Precautions   Precautions Fall Risk;Pain;Spinal precautions   Braces or Orthoses C/S Collar   QI: Oral Hygiene   Assistance Needed Supervision   Assistance Provided by [place] No physical assistance   Comment seated in w/c at sink 2* to decreased standing balance  Oral Hygiene CARE Score 4   Grooming   Able To Initiate Tasks; Wash/Dry Face;Brush/Clean Teeth;Wash/Dry Hands   Limitation Noted In Coordination; Safety;Strength   Findings Seated at sink 2* to decreased standing balance  Grooming (FIM) 5 - Patient requires supervision/monitoring   QI: Shower/Bathe Self   Assistance Needed Physical assistance   Assistance Provided by [place] 50%-74%   Shower/Bathe Self CARE Score 2   Bathing   Assessed Bath Style Shower   Anticipated D/C Bath Style Shower   Able to [place] Tj No   Able to [employer] Temperature No   Able to Wash/Rinse/Dry (body part) Left Arm;Right Arm;L Upper Leg;R Upper Leg;Chest;Abdomen;Perineal Area   Limitations Noted in Balance; Coordination; Endurance;ROM;Safety;Strength;Timeliness   Positioning Seated;Standing   Adaptive Equipment Hand Held Shower; Tub Bench; Shower Bars   Findings  Pt continues to be limited by decreased UE ROM/strength and dec standing balance  Bathing (FIM) 3 - Patient completes 5/10  6/10 or 7/10 parts   Tub/Shower Transfer   Limitations Noted In Balance; Endurance; Safety;UE Strength;LE Strength   Adaptive Equipment Transfer Bench;Grab Bars   Assessed Shower   Shower Transfer (FIM) 4 - Patient requires steadying assist or light touching   QI: Upper Body Dressing   Assistance Needed Supervision;Verbal cues   Assistance Provided by [place] No physical assistance   Comment Increased time      Upper Body Dressing CARE Score 4   QI: Lower Body Dressing   Assistance Needed Physical assistance   Assistance Provided by [place] Less than 25%   Comment Steadying A in stance for CM over hips  Lower Body Dressing CARE Score 3   QI: Putting On/Taking Off Footwear   Assistance Needed Verbal cues; Supervision   Assistance Provided by [place] No physical assistance   Comment Leg over opposing knee technique with increased time for task completion  Putting On/Taking Off Footwear CARE Score 4   Dressing/Undressing Clothing   Remove UB Clothes Pullover Shirt   Remove LB Clothes Socks;Pants   Don UB Clothes Pullover Shirt   Don LB Clothes Pants;Socks; Shoes   Limitations Noted In Balance; Endurance;Strength;ROM; Timeliness   Adaptive Equipment Elastic Laces   Positioning Supported Sit;Standing   Findings Pt demos increased independence donning/doffing pullover shirt this session  Requires steadying A in stance for CM over hips in stance  UB Dressing (FIM) 5 - Patient requires supervision/monitoring   LB Dressing (FIM) 4 - Patient completes 75% of all tasks   QI: Sit to [address] Provided by [place] No physical assistance   Sit to Stand CARE Score 4   QI: Chair/Bed-to-Chair Transfer   Assistance Needed Supervision   Assistance Provided by [place] No physical assistance   Chair/Bed-to-Chair Transfer CARE Score 4   Transfer Bed/Chair/Wheelchair   Limitations Noted In Balance; Coordination;UE Strength;LE Strength; Endurance   Adaptive Equipment Roller Walker   Bed, Chair, Wheelchair Transfer (FIM) 5 - Patient requires supervision/monitoring   Exercise Tools   Other Exercise Tool 1 UE towel glides completed moving through all planes 3 x 10 each to pt's tolerance to increase UB strength and ROM for increased independence with ADL tasks  Cognition   Overall Cognitive Status WFL   Arousal/Participation Alert; Cooperative   Attention Attends with cues to redirect   Orientation Level Oriented X4   Memory Decreased short term memory   Following Commands Follows one step commands without difficulty   Activity Tolerance   Activity Tolerance Patient tolerated treatment well   Assessment   Treatment Assessment Pt participated in skilled OT services with focus on  ADL retraining, functional transfers, and UE ROM  Pt demos improvement with bathing and dressing tasks  He is able to don/doff shirt at a supervision level  He continues to require A with donning/doffing cervical collar  Call placed for shower collar and incision covered with tegaderm  Incision dry and in tact before and after  He continues to be limited by decreased UE strength/ROM (LUE>RUE), decreased standing balance, and decreased endurance  He requires VC's to pace activities and for safety  A required to bathe BLEs and for rear hygiene  Pt will continue to benefit from skilled OT services with focus on UE ROM/strength, standing balance, and endurance to increase safety and independence with ADL tasks  Prognosis Good   Problem List Decreased strength;Decreased endurance; Impaired balance;Decreased coordination;Orthopedic restrictions;Pain; Impaired judgement   Plan   Treatment/Interventions ADL retraining;Functional transfer training; Therapeutic exercise; Endurance training;Equipment eval/education; Compensatory technique education   Progress Progressing toward goals   Recommendation   OT Discharge Recommendation Home with family support   OT Therapy Minutes   OT Time In 0830   OT Time Out 1000   OT Total Time (minutes) 90   OT Mode of treatment - Individual (minutes) 90   OT Mode of treatment - Concurrent (minutes) 0   OT Mode of treatment - Group (minutes) 0   OT Mode of treatment - Co-treat (minutes) 0   OT Mode of Teatment - Total time(minutes) 90 minutes   Therapy Time missed   Time missed?  No

## 2018-05-14 NOTE — PROGRESS NOTES
Internal Medicine Progress Note  Patient: Shannon Mares  Age/sex: 81 y o  male  Medical Record #: [id number]      ASSESSMENT/PLAN:  Shannon Mares is seen and examined and mangement for following issues:    1  Stage 2 C3/4, C7/T1 and T1/2 laminectomy and PSF C3-T2: monitor incision; continue pain control  Therapy per primary team  Continue bowel regimen  Follow up with ortho after rehab  2  COPD: Stable; uses Anoro Ellipta daily PRN  Continue to monitor  On room air  3  Hyperlipidemia: continue statin therapy, heart healthy diet  4  Hypertension: Chlorthalidone stopped as BP has been running low without the use of medication  Stable off medication  5  Leukocytosis: Mild  Likely reactive  6  Sternal pain: Pleuritic and MSK components  CXR negative  Patient reports getting soreness with certain movements/positions and coughing  He reports he needs to relax his shoulders which helps  Denies chest pain/sob with ambulation  Lido patch helping  7  Hyponatremia: Resolved  8  Hypokalemia: Replace with 40meq  Continue to monitor  9  DVT prophylaxis: Aspirin 325mg daily          Subjective: Patient seen and examined  Pt reports he is doing well  BP has been stable  He denies any other complaints      ROS:   GI: denies abdominal pain, change bowel habits or reflux symptoms  Neuro: No new neurologic changes  Respiratory: No Cough, SOB  Cardiovascular: No CP, palpitations     Scheduled Meds:    Current Facility-Administered Medications:  aspirin 325 mg Oral Daily Hilda Romero MD   azelastine 2 spray Each Nare Daily Hilda Romero MD   calcium carbonate 1,000 mg Oral Daily PRN Hilda Romero MD   celecoxib 100 mg Oral Daily Hilda Romero MD   diazepam 5 mg Oral HS Hilda Romero MD   docusate sodium 100 mg Oral BID Hilda Romero MD   gabapentin 600 mg Oral BID Hilda Romero MD   lidocaine 1 patch Transdermal Daily Navid Mosher PA-C   magnesium hydroxide 30 mL Oral Daily PRN Hilda Romero MD methocarbamol 500 mg Oral Q8H [facility] Isauro Cisse MD   oxyCODONE 2 5 mg Oral Q6H PRN Isauro Cisse MD   oxyCODONE-acetaminophen 1 tablet Oral Q4H PRN Isauro Cisse MD   pantoprazole 20 mg Oral Early Morning Isauro Cisse MD   polyethylene glycol 17 g Oral Daily Geo Palumbo PA-C   pramipexole 0 75 mg Oral HS Isauro Cisse MD   pravastatin 40 mg Oral Daily With Munira Haney MD   senna 1 tablet Oral Daily Isauro Cisse MD   simethicone 80 mg Oral 4x Daily PRN Isauro Cisse MD       Labs:       Results from last 7 days  Lab Units 05/14/18  0519 05/10/18  0506   WBC Thousand/uL 17 57* 10 37*   HEMOGLOBIN g/dL 12 6 14 1   HEMATOCRIT % 38 4 41 0   PLATELETS Thousands/uL 250 240       Results from last 7 days  Lab Units 05/14/18  0519 05/11/18  0512   SODIUM mmol/L 137 134*   POTASSIUM mmol/L 3 4* 4 2   CHLORIDE mmol/L 97* 94*   CO2 mmol/L 33* 34*   BUN mg/dL 25 32*   CREATININE mg/dL 0 72 0 88   GLUCOSE RANDOM mg/dL 85 91   CALCIUM mg/dL 8 7 9 4                  Glucose (mg/dL)   Date Value   05/14/2018 85   05/11/2018 91   05/10/2018 86   05/01/2018 122       Labs reviewed    Physical Examination:  Vitals:   Vitals:    05/13/18 1527 05/14/18 0017 05/14/18 0743 05/14/18 0753   BP: 94/60 93/60 94/54 136/68   BP Location: Left arm  Right arm Left arm   Pulse: 97 84 90 86   Resp: 18 20 20 20   Temp: 98 4 °F (36 9 °C) 98 °F (36 7 °C) 98 6 °F (37 °C) 98 1 °F (36 7 °C)   TempSrc: Oral Oral Oral Oral   SpO2: 96% 93% (!) 88% 100%   Weight:       Height:           GEN: NAD, nontoxic  HEENT: NC/AT, EOMI, c- collar in place  RESP: normal respiratory effort   + slight expiratory wheeze  No rhonchi or crackles  CV: +S1 S2, regular rate, no rubs  ABD: soft, NT, ND, normal BS   : no real  EXT: no LE edema  Skin: no rashes  Neuro: AAO x 3      [ X ] Total time spent: 30 Mins and greater than 50% of this time was spent counseling/coordinating care        Arturo Leon PA-C  Internal Medicine

## 2018-05-14 NOTE — PROGRESS NOTES
05/14/18 1030   Pain Assessment   Pain Assessment 0-10   Pain Score 5   Pain Type Acute pain   Pain Location Shoulder;Sternum   Pain Orientation Left   Hospital Pain Intervention(s) Medication (See MAR); Ambulation/increased activity   Response to Interventions pt tolerates session without c/o pain    Restrictions/Precautions   Precautions Bed/chair alarms; Fall Risk;Spinal precautions   Braces or Orthoses C/S Collar   Cognition   Arousal/Participation Alert; Cooperative   Subjective   Subjective Pt reports feeling ready for therapy session   QI: Sit to Stand   Assistance Needed Supervision; Incidental touching   Assistance Provided by Clementon No physical assistance   Sit to Stand CARE Score 4   QI: Chair/Bed-to-Chair Transfer   Assistance Needed Supervision; Adaptive equipment   Assistance Provided by Clementon No physical assistance   Chair/Bed-to-Chair Transfer CARE Score 4   Transfer Bed/Chair/Wheelchair   Limitations Noted In Balance; Coordination;UE Strength;LE Strength   Adaptive Equipment Roller Walker   Stand Pivot Supervision   Sit to [employer]   Stand to [employer]   Findings sit>stand CG without using UE support   Bed, Chair, Wheelchair Transfer (FIM) 4 - Patient requires steadying assist or light touching   QI: Walk 10 Feet   Assistance Needed Supervision; Adaptive equipment   Assistance Provided by Clementon No physical assistance   Walk 10 Feet CARE Score 4   QI: Walk 50 Feet with Two Turns   Assistance Needed Supervision; Adaptive equipment   Assistance Provided by Clementon No physical assistance   Walk 50 Feet with Two Turns CARE Score 4   QI: Walk 150 Feet   Assistance Needed Supervision; Adaptive equipment   Assistance Provided by Clementon No physical assistance   Walk 150 Feet CARE Score 4   Ambulation   Does the patient walk? 2  Yes   Primary Discharge Mode of Locomotion Walk   Walk Assist Level Supervision   Gait Pattern Inconsistant Lynne; Slow Lynne; Improper weight shift;Narrow WALDEMAR   Assist Device Micheleter Marcella Peña Walked (feet) 800 ft   Limitations Noted In Balance; Coordination;Speed;Strength;Swing   Findings noted dec LLE eccentric HS control during swing   Walking (FIM) 5 - Patient requires supervision/monitoring AND distance 150 feet or more, no rest   Therapeutic Interventions   Strengthening standing HT raises with RW; standing HS curls with RW; sit>stands without UE support   Other Comments   Comments Per RN SpO2 dropped to 80's this morning; pt remained above 95% on RA for entire PT session    Assessment   Treatment Assessment Pt participated in skilled PT session fousing on ambulation and LE strengthening  Noted dec eccentric HS control on LLE during gait  Performed HS curls but required tc's to perform without hip flexor compensation  Pt able to ambulate community distances using RW @ S level; will consider trialing SPC in PM session  Overall pt making excellent progress towards goals  Pt will cont to benefit from skilled PT to maximize I and safety with all functional mobility  Barriers to Discharge Inaccessible home environment;Decreased caregiver support   PT Barriers   Physical Impairment Decreased strength;Decreased range of motion;Decreased endurance; Impaired balance;Decreased mobility; Decreased coordination;Orthopedic restrictions;Pain   Functional Limitation Walking;Stair negotiation   Plan   Treatment/Interventions Functional transfer training;LE strengthening/ROM; Elevations; Therapeutic exercise; Endurance training;Patient/family training;Equipment eval/education; Bed mobility;Gait training   Progress Improving as expected   Recommendation   Recommendation Home with family support; Outpatient PT   Equipment Recommended Walker   PT Therapy Minutes   PT Time In 1030   PT Time Out 1100   PT Total Time (minutes) 30   PT Mode of treatment - Individual (minutes) 30   PT Mode of treatment - Concurrent (minutes) 0   PT Mode of treatment - Group (minutes) 0   PT Mode of treatment - Co-treat (minutes) 0   PT Mode of Teatment - Total time(minutes) 30 minutes   Therapy Time missed   Time missed?  No

## 2018-05-14 NOTE — PCC PHYSICAL THERAPY
5/14/18  Pt is demonstrating consistent progress in PT as expected  Currently is at S level for functional mobilities when using a RW while min-CS with SPC trial on level surface amb  Also requires CG for FF negotiation using 1 HR or 8" curb step negotiation using a  RW  Ongoing barriers are pain, dec standing balance with narrow WALDEMAR and impaired WS ability,  And dec strength  Pt will benefit from continued skilled PT services to maximize functional mobility indep and safety and perform FT to faciltiate a safe d/c to home  At this time recommend for pt to have S for functional mobilities using AD at d/c with outpatient PT services

## 2018-05-14 NOTE — PROGRESS NOTES
Physical Medicine & Rehabilitation Progress Note:    Primary Rehabilitation Diagnosis: Severe Cervical Stenosis with Cervical Radicuopathy    Subjective: No acute events over this weekend  Patient denies any complaints  Pain is controlled; states sternal pain has improved significantly  Review Of Systems:   10 point ROS completed, pertinent positives above    Functional Update:  Functional Update:  Physical Therapy Occupational Therapy          Stand Pivot Supervision   Sit to Stand Supervision;Contact Guard   Stand to Sit Supervision   Findings sit>stand CG without using UE support   Bed, Chair, Wheelchair Transfer (FIM) 4 - Patient requires steadying assist or light touching     Walking (FIM) 5 - Patient requires supervision/monitoring AND distance 150 feet or more, no rest         Bed, Chair, Wheelchair Transfer (FIM) 4 - Patient requires steadying assist or light touching     Pt ambulated to and from therapy gym w/ CGA-Min A w/ FWW  Pt demo G FWW management during session  Pt reported, "My wife yells at me at home because I try to rush things and sometimes I over-do it " Edu provided on importance of energy conservation techniques to implement during ADLs, pt demo G attention and understanding  Refer above for full details of tasks completed  Pt cont to be limited by dec strength, dec endurance, impaired balance, impaired FMC (L>R) and will benefit from cont OT  Pt was left resting in recliner w/ all needs within reach and chair alarm activated  Objective:  /68 (BP Location: Left arm)   Pulse 86   Temp 98 1 °F (36 7 °C) (Oral)   Resp 20   Ht 5' 11" (1 803 m)   Wt 65 2 kg (143 lb 11 8 oz)   SpO2 100%   BMI 20 05 kg/m²     GENERAL: No acute distress    NECK: +C-Collar in place,  healing anterior neck incision    Posterior neck incision C/D/I   CARDIOVASCULAR: regular rate rhythm, no murmurs  LUNGS: clear to ausculation bilaterally, good inspiratory effort, no wheezes, rales, rhonchi ABDOMEN: soft, non-tender, non-distended, no guarding  EXTREMITIES: no pedal edema bilaterally, negative Coleen's bilaterally  MUSCULOSKELETAL: active assist and passive ROM functional x 4  NEUROLOGICAL: Awake, alert, oriented to person, place, time, and situation  Speech fluent  Able to follow simple commands  Assessment:    Juan C Brown is a 81 y o  male in fair condition with severe cervical stenosis/radiculopathy s/p a two part surgery ACDF and PSF levels C3-T2:    Rehabilitation Plan:     -Rehabilitation of cervical stenosis/radiculopathy: PT/OT therapies  Deficits include L > R UE weakness, generalized weakness, decreased balance and strength  +cervical spine precautions     -Appreciate Internal Medicine following - Dr Sierra Pratt service    -severe cervical stenosis/radiculopathy s/p a two part surgery ACDF and PSF levels C3-T2 on 4/12/18 and 4/30/18:   C Collar to be worn while OOB  F/U with Dr Rachel Snow  Cervical precautions     -substernal palpable chest wall pain: likely mild costochondritis from surgical positioning  Continue  lidoderm patch      -nociceptive pain: managed on Percocet 1 tab Q4h PRN, Tylenol PRN, and scheduled low dose celebrex 100mg daily     -neuropathic pain: managed on neurontin 600mg BID     -muscular spasms: managed on valium 5mg QHS and scheduled robaxin 500mg Q8h    Wean as progressing       -restless legs: managed on Mirapex     -HTN: managed on chlorthalidone (new med) IM to monitor BPs     -HLD: managed on Pravachol     -COPD: managed on PRN Anoro Ellipta inhaler      -GERD: managed on Protonix    -Constipation: BM documented on 5/12     -DVT ppx: SCDS + Aspirin 325mg daily per Surg team       Lab Results   Component Value Date    WBC 17 57 (H) 05/14/2018    HGB 12 6 05/14/2018    HCT 38 4 05/14/2018    MCV 95 05/14/2018     05/14/2018     Lab Results   Component Value Date    GLUCOSE 85 05/14/2018    CALCIUM 8 7 05/14/2018     05/14/2018    K 3 4 (L) 05/14/2018    CO2 33 (H) 05/14/2018    CL 97 (L) 05/14/2018    BUN 25 05/14/2018    CREATININE 0 72 05/14/2018       Current Facility-Administered Medications:     aspirin (ECOTRIN) EC tablet 325 mg, 325 mg, Oral, Daily, Felicia Brock MD, 325 mg at 05/14/18 0954    azelastine (ASTELIN) 0 1 % nasal spray 2 spray, 2 spray, Each Nare, Daily, Felicia Brock MD, 2 spray at 05/14/18 0755    calcium carbonate (TUMS) chewable tablet 1,000 mg, 1,000 mg, Oral, Daily PRN, Felicia Brock MD    celecoxib (CeleBREX) capsule 100 mg, 100 mg, Oral, Daily, Felicia Brock MD, 100 mg at 05/14/18 1001    diazepam (VALIUM) tablet 5 mg, 5 mg, Oral, HS, Felicia Brock MD, 5 mg at 05/13/18 2156    docusate sodium (COLACE) capsule 100 mg, 100 mg, Oral, BID, Felicia Brock MD, 100 mg at 05/14/18 0955    gabapentin (NEURONTIN) capsule 600 mg, 600 mg, Oral, BID, Felicia Brock MD, 600 mg at 05/14/18 0953    lidocaine (LIDODERM) 5 % patch 1 patch, 1 patch, Transdermal, Daily, Greri Khan PA-C, 1 patch at 05/14/18 0955    magnesium hydroxide (MILK OF MAGNESIA) 400 mg/5 mL oral suspension 30 mL, 30 mL, Oral, Daily PRN, Felicia Brock MD    methocarbamol (ROBAXIN) tablet 500 mg, 500 mg, Oral, Q8H [address], Felicia Brock MD, 500 mg at 05/14/18 0537    oxyCODONE (ROXICODONE) IR tablet 2 5 mg, 2 5 mg, Oral, Q6H PRN, Felicia Brock MD    oxyCODONE-acetaminophen (PERCOCET) 5-325 mg per tablet 1 tablet, 1 tablet, Oral, Q4H PRN, Felicia Brock MD, 1 tablet at 05/14/18 0953    pantoprazole (PROTONIX) EC tablet 20 mg, 20 mg, Oral, Early Morning, Felicia Brock MD, 20 mg at 05/14/18 0537    polyethylene glycol (MIRALAX) packet 17 g, 17 g, Oral, Daily, Gerri Khan PA-C, 17 g at 05/14/18 0955    pramipexole (MIRAPEX) tablet 0 75 mg, 0 75 mg, Oral, HS, Felicia Brock MD, 0 75 mg at 05/13/18 2156    pravastatin (PRAVACHOL) tablet 40 mg, 40 mg, Oral, Daily With Candance Lesch, MD, 40 mg at 05/13/18 1758    senna (SENOKOT) tablet 8 6 mg, 1 tablet, Oral, Daily, Hilda Romero MD, 8 6 mg at 05/14/18 0955    simethicone (MYLICON) chewable tablet 80 mg, 80 mg, Oral, 4x Daily PRN, MD Iris Mendoza MD  [facility]

## 2018-05-14 NOTE — PROGRESS NOTES
05/14/18 1230   Pain Assessment   Pain Assessment 0-10   Pain Score 5   Pain Location Shoulder   Pain Orientation Left   Pain Frequency Constant/continuous   Hospital Pain Intervention(s) Medication (See MAR); Distraction   Multiple Pain Sites Yes   Pain 2   Pain Score 2 3  (sternum)   Restrictions/Precautions   Precautions Fall Risk;Pain;Spinal precautions   Braces or Orthoses C/S Collar   Cognition   Arousal/Participation Alert; Cooperative   Attention Attends with cues to redirect   Orientation Level Oriented X4   Memory Decreased short term memory   Following Commands Follows multistep commands with increased time or repetition   Subjective   Subjective Pt reported 5/10 L shoulder pain and 3/10 sternal pain  throughout PT session  QI: Sit to Stand   Assistance Needed Supervision   Sit to Stand CARE Score 4   QI: Chair/Bed-to-Chair Transfer   Assistance Needed Supervision   Chair/Bed-to-Chair Transfer CARE Score 4   Transfer Bed/Chair/Wheelchair   Limitations Noted In LE Strength;UE Strength;Balance; Endurance   Adaptive Equipment Roller Walker;Cane   Stand Pivot Supervision   Sit to Stand Supervision   Stand to W  R  [place], Chair, Wheelchair Transfer (FIM) 5 - Patient requires supervision/monitoring   QI: Walk 50 Feet with Two Turns   Assistance Needed Supervision   Comment RW   Walk 50 Feet with Two Turns CARE Score 4   QI: Walk 150 Feet   Assistance Needed Supervision   Walk 150 Feet CARE Score 4   QI: Walking 10 Feet on Uneven Surfaces   Assistance Needed Supervision   Walking 10 Feet on Uneven Surfaces CARE Score 4   Ambulation   Does the patient walk? 2  Yes   Primary Discharge Mode of Locomotion Walk   Walk Assist Level Supervision   Gait Pattern Inconsistant Lynne;Narrow WALDEMAR; Improper weight shift   Assist Device Roller Marcella Peña Walked (feet) 400 ft  (200)   Findings trialed SPC x 200' x 2, 400' x 1 with CG-min A to CG-CS    Walking (FIM) 5 - Patient requires supervision/monitoring AND distance 150 feet or more, no rest   Wheelchair mobility   QI: Does the patient use a wheelchair? 0  No   QI: 1 Step (Curb)   Assistance Needed Incidental touching   Comment 8" curb with RW   1 Step (Curb) CARE Score 4   Therapeutic Interventions   Flexibility bilat hamstring  x 1 min and calf mm stretch x 30 SH x 2 reps   Balance ball tossing while standing on blue toyin disc with walker infront while PT provided min-mod A on hips to assist with anterior weight shifting, SLS x 3 SH , alt tapping on 6" stool and ball kicking using RW for support  Equipment Use   NuStep L3 x 15 mins LE only   Assessment   Treatment Assessment Pt tolerated tx session which focused on strengthening, standing balance training, gait training with RW and trial with SPC  Pt is at S level using a RW while required initially min-CG during gait training with SPC while CG-CS with subsequent trials, demonstrates scissoring gait at times due to narrow WALDEMAR with slight lateral deviations but no LOB or foot tripping noted  attempted to perform standing balance training with SPC but he was unable to safely perform lateral weight shift with reported dec confidence  also noted impaired anterior WS during ball tossing act requiring min-mod A to stabilize  PT will continue to assess pt if appropriate to be progress pass S level using a RW, as well as continue balance training, strengthening and functional mobility training with walker or SPC to maximize mobility indep  Barriers to Discharge Inaccessible home environment;Decreased caregiver support   PT Barriers   Physical Impairment Decreased strength;Decreased range of motion;Decreased endurance; Impaired balance;Decreased mobility; Decreased coordination;Orthopedic restrictions;Pain   Functional Limitation Walking;Stair negotiation   Plan   Treatment/Interventions Functional transfer training;LE strengthening/ROM; Therapeutic exercise; Endurance training;Gait training;Spoke to nursing;OT Progress Improving as expected   Recommendation   Recommendation Home with family support;24 hour supervision/assist;Outpatient PT   Equipment Recommended Walker   PT Therapy Minutes   PT Time In 1230   PT Time Out 1415   PT Total Time (minutes) 105   PT Mode of treatment - Individual (minutes) 90   PT Mode of treatment - Concurrent (minutes) 15   PT Mode of treatment - Group (minutes) 0   PT Mode of treatment - Co-treat (minutes) 0   PT Mode of Teatment - Total time(minutes) 105 minutes   Therapy Time missed   Time missed?  No

## 2018-05-15 LAB
ANION GAP SERPL CALCULATED.3IONS-SCNC: 4 MMOL/L (ref 4–13)
BUN SERPL-MCNC: 23 MG/DL (ref 5–25)
CALCIUM SERPL-MCNC: 8.9 MG/DL (ref 8.3–10.1)
CHLORIDE SERPL-SCNC: 98 MMOL/L (ref 100–108)
CO2 SERPL-SCNC: 33 MMOL/L (ref 21–32)
CREAT SERPL-MCNC: 0.65 MG/DL (ref 0.6–1.3)
GFR SERPL CREATININE-BSD FRML MDRD: 99 ML/MIN/1.73SQ M
GLUCOSE SERPL-MCNC: 85 MG/DL (ref 65–140)
POTASSIUM SERPL-SCNC: 3.9 MMOL/L (ref 3.5–5.3)
SODIUM SERPL-SCNC: 135 MMOL/L (ref 136–145)

## 2018-05-15 PROCEDURE — 80048 BASIC METABOLIC PNL TOTAL CA: CPT | Performed by: PHYSICIAN ASSISTANT

## 2018-05-15 PROCEDURE — 97530 THERAPEUTIC ACTIVITIES: CPT

## 2018-05-15 PROCEDURE — 99233 SBSQ HOSP IP/OBS HIGH 50: CPT | Performed by: PHYSICAL MEDICINE & REHABILITATION

## 2018-05-15 PROCEDURE — 97112 NEUROMUSCULAR REEDUCATION: CPT

## 2018-05-15 PROCEDURE — 97110 THERAPEUTIC EXERCISES: CPT

## 2018-05-15 PROCEDURE — 97116 GAIT TRAINING THERAPY: CPT | Performed by: PHYSICAL THERAPIST

## 2018-05-15 PROCEDURE — 97116 GAIT TRAINING THERAPY: CPT

## 2018-05-15 RX ADMIN — METHOCARBAMOL 500 MG: 500 TABLET ORAL at 14:41

## 2018-05-15 RX ADMIN — PRAMIPEXOLE DIHYDROCHLORIDE 0.75 MG: 0.5 TABLET ORAL at 21:40

## 2018-05-15 RX ADMIN — OXYCODONE HYDROCHLORIDE AND ACETAMINOPHEN 1 TABLET: 5; 325 TABLET ORAL at 14:41

## 2018-05-15 RX ADMIN — PRAVASTATIN SODIUM 40 MG: 40 TABLET ORAL at 17:20

## 2018-05-15 RX ADMIN — OXYCODONE HYDROCHLORIDE AND ACETAMINOPHEN 1 TABLET: 5; 325 TABLET ORAL at 08:12

## 2018-05-15 RX ADMIN — CELECOXIB 100 MG: 100 CAPSULE ORAL at 08:13

## 2018-05-15 RX ADMIN — OXYCODONE HYDROCHLORIDE AND ACETAMINOPHEN 1 TABLET: 5; 325 TABLET ORAL at 21:41

## 2018-05-15 RX ADMIN — AZELASTINE HYDROCHLORIDE 2 SPRAY: 137 SPRAY, METERED NASAL at 08:13

## 2018-05-15 RX ADMIN — PANTOPRAZOLE SODIUM 20 MG: 20 TABLET, DELAYED RELEASE ORAL at 05:27

## 2018-05-15 RX ADMIN — DOCUSATE SODIUM 100 MG: 100 CAPSULE, LIQUID FILLED ORAL at 08:12

## 2018-05-15 RX ADMIN — GABAPENTIN 600 MG: 300 CAPSULE ORAL at 08:12

## 2018-05-15 RX ADMIN — SENNOSIDES 8.6 MG: 8.6 TABLET ORAL at 08:12

## 2018-05-15 RX ADMIN — DIAZEPAM 5 MG: 5 TABLET ORAL at 21:40

## 2018-05-15 RX ADMIN — METHOCARBAMOL 500 MG: 500 TABLET ORAL at 05:27

## 2018-05-15 RX ADMIN — LIDOCAINE 1 PATCH: 50 PATCH CUTANEOUS at 08:14

## 2018-05-15 RX ADMIN — DOCUSATE SODIUM 100 MG: 100 CAPSULE, LIQUID FILLED ORAL at 17:21

## 2018-05-15 RX ADMIN — GABAPENTIN 600 MG: 300 CAPSULE ORAL at 17:21

## 2018-05-15 RX ADMIN — POLYETHYLENE GLYCOL 3350 17 G: 17 POWDER, FOR SOLUTION ORAL at 08:14

## 2018-05-15 RX ADMIN — ASPIRIN 325 MG: 325 TABLET, COATED ORAL at 08:13

## 2018-05-15 RX ADMIN — OXYCODONE HYDROCHLORIDE AND ACETAMINOPHEN 1 TABLET: 5; 325 TABLET ORAL at 02:35

## 2018-05-15 RX ADMIN — METHOCARBAMOL 500 MG: 500 TABLET ORAL at 21:40

## 2018-05-15 NOTE — PROGRESS NOTES
Internal Medicine Progress Note  Patient: Edward Light  Age/sex: 81 y o  male  Medical Record #: [id number]      ASSESSMENT/PLAN:  Edward Light is seen and examined and mangement for following issues:    1  Stage 2 C3/4, C7/T1 and T1/2 laminectomy and PSF C3-T2: monitor incision; continue pain control  Therapy per primary team  Continue bowel regimen  Follow up with ortho after rehab  2  COPD: Stable; uses Anoro Ellipta daily PRN  Continue to monitor  On room air  3  Hyperlipidemia: continue statin therapy, heart healthy diet  4  Hypertension: Suspect pain related  Chlorthalidone stopped as BP has been running low without the use of medication  Stable off medication  5  Leukocytosis: Mild  Likely reactive  6  Sternal pain: Pleuritic and MSK components  CXR negative  Improving  Continue Lidoderm patch  7  Hypokalemia: Replaced with 40meq  Now normal   8  DVT prophylaxis: Aspirin 325mg daily          Subjective: Patient seen and examined  Pt reports he is doing well  BP has been stable  He denies any other complaints      ROS:   GI: denies abdominal pain, change bowel habits or reflux symptoms  Neuro: No new neurologic changes  Respiratory: No Cough, SOB  Cardiovascular: No CP, palpitations     Scheduled Meds:    Current Facility-Administered Medications:  aspirin 325 mg Oral Daily Kristie Neely MD   azelastine 2 spray Each Nare Daily Kristie Neely MD   calcium carbonate 1,000 mg Oral Daily PRN Kristie Neely MD   celecoxib 100 mg Oral Daily Kristie Neely MD   diazepam 5 mg Oral HS Kristie Neely MD   docusate sodium 100 mg Oral BID Kristie Neely MD   gabapentin 600 mg Oral BID Kristie Neely MD   lidocaine 1 patch Transdermal Daily Logan Cheek PA-C   magnesium hydroxide 30 mL Oral Daily PRN Kristie Neely MD   methocarbamol 500 mg Oral Q8H [address] Kristie Neely MD   oxyCODONE 2 5 mg Oral Q6H PRN Kristie Neely MD   oxyCODONE-acetaminophen 1 tablet Oral Q4H PRN Kristie Neely MD   pantoprazole 20 mg Oral Early Morning Redd Ragland MD   polyethylene glycol 17 g Oral Daily Chucho Hyman PA-C   pramipexole 0 75 mg Oral HS Redd Ragland MD   pravastatin 40 mg Oral Daily With Kam Matthew MD   senna 1 tablet Oral Daily Redd Ragland MD   simethicone 80 mg Oral 4x Daily PRN Redd Ragland MD       Labs:       Results from last 7 days  Lab Units 05/14/18  0519 05/10/18  0506   WBC Thousand/uL 17 57* 10 37*   HEMOGLOBIN g/dL 12 6 14 1   HEMATOCRIT % 38 4 41 0   PLATELETS Thousands/uL 250 240       Results from last 7 days  Lab Units 05/15/18  0509 05/14/18  0519   SODIUM mmol/L 135* 137   POTASSIUM mmol/L 3 9 3 4*   CHLORIDE mmol/L 98* 97*   CO2 mmol/L 33* 33*   BUN mg/dL 23 25   CREATININE mg/dL 0 65 0 72   GLUCOSE RANDOM mg/dL 85 85   CALCIUM mg/dL 8 9 8 7                  Glucose (mg/dL)   Date Value   05/15/2018 85   05/14/2018 85   05/11/2018 91   05/10/2018 86       Labs reviewed    Physical Examination:  Vitals:   Vitals:    05/14/18 0753 05/14/18 1533 05/14/18 2347 05/15/18 0719   BP: 136/68 108/59 114/62 110/58   BP Location: Left arm Right arm  Right arm   Pulse: 86 82 80 84   Resp: 20 18 20 20   Temp: 98 1 °F (36 7 °C) 98 °F (36 7 °C) 98 1 °F (36 7 °C) 98 3 °F (36 8 °C)   TempSrc: Oral Oral Oral Oral   SpO2: 100% 97% 92% 96%   Weight:       Height:           GEN: NAD, nontoxic  HEENT: NC/AT, EOMI, c- collar in place  RESP: normal respiratory effort   + slight expiratory wheeze  No rhonchi or crackles  CV: +S1 S2, regular rate, no rubs  ABD: soft, NT, ND, normal BS   : no real  EXT: no LE edema  Skin: no rashes  Neuro: AAO x 3      [ X ] Total time spent: 30 Mins and greater than 50% of this time was spent counseling/coordinating care        Elisa Jacques PA-C  Internal Medicine

## 2018-05-15 NOTE — PCC OCCUPATIONAL THERAPY
Pt making progress towards LTGs  Pt currently functioning at a supervision/CGA level for functional transfers  He continues to require Jean Paul for all LB ADL tasks and cervical collar management  He continues to be limited by decreased standing balance, functional mobility, orthopedic restrictions, and BUE weakness and dec BUE ROM  Pt will continue to benefit from skilled OT services following POC with focus on noted deficits in order to reach maximum level of function  Pt with supportive wife at home who is able to A PRN

## 2018-05-15 NOTE — PROGRESS NOTES
Physical Medicine & Rehabilitation Progress Note:    Primary Rehabilitation Diagnosis: Severe Cervical Stenosis with Cervical Radicuopathy    Subjective: No acute events overnight  Denies any complaints  Review Of Systems:   10 point ROS completed, pertinent positives above    Functional Update:  Physical Therapy Occupational Therapy   Transfers: Supervision  Bed Mobility: Minimal Assistance  Amulation Distance (ft): 400 feet  Ambulation: Supervision  Assistive Device for Ambulation: Roller Walker  Number of Stairs: 12  Assistive Device for Stairs: Right Hand Rail  Stair Assistance: Contact Guard  Ramp: Contact Guard  Discharge Recommendations: Home with:  [address] with[de-identified] 24 Hour Supervision, Family Support, Outpatient Physical Therapy Eating: Supervision  Grooming: Supervision  Bathing: Moderate Assistance  Bathing: Moderate Assistance  Upper Body Dressing: Supervision  Lower Body Dressing: Minimal Assistance  Toileting: Minimal Assistance  Tub/Shower Transfer: Minimal Assistance  Toilet Transfer: Minimal Assistance  Cognition: Within Defined Limits  Orientation: Person, Place, Time, Situation         Objective:  /58 (BP Location: Right arm)   Pulse 84   Temp 98 3 °F (36 8 °C) (Oral)   Resp 20   Ht 5' 11" (1 803 m)   Wt 65 2 kg (143 lb 11 8 oz)   SpO2 96%   BMI 20 05 kg/m²     GENERAL: No acute distress    NECK: +C-Collar in place,  healing anterior neck incision  Posterior neck incision C/D/I   CARDIOVASCULAR: regular rate rhythm, no murmurs  LUNGS: clear to ausculation bilaterally, good inspiratory effort, no wheezes, rales, rhonchi   ABDOMEN: soft, non-tender, non-distended, no guarding  EXTREMITIES: no pedal edema bilaterally, negative Coleen's bilaterally  MUSCULOSKELETAL: active assist and passive ROM functional x 4  NEUROLOGICAL: Awake, alert, oriented to person, place, time, and situation  Speech fluent  Able to follow simple commands  Assessment:    Jose Rafael Willis is a 81 y o  male in fair condition with severe cervical stenosis/radiculopathy s/p a two part surgery ACDF and PSF levels C3-T2:    Rehabilitation Plan:     -Rehabilitation of cervical stenosis/radiculopathy: PT/OT therapies  Deficits include L > R UE weakness, generalized weakness, decreased balance and strength  +cervical spine precautions     -Appreciate Internal Medicine following - Dr Yessenia Kramer service    -severe cervical stenosis/radiculopathy s/p a two part surgery ACDF and PSF levels C3-T2 on 4/12/18 and 4/30/18:   C Collar to be worn while OOB  F/U with Dr Nair Prudent  Cervical precautions     -substernal palpable chest wall pain: likely mild costochondritis from surgical positioning  Improving  Continue lidoderm patch      -nociceptive pain: managed on Percocet 1 tab Q4h PRN, Tylenol PRN, and scheduled low dose celebrex 100mg daily     -neuropathic pain: managed on neurontin 600mg BID     -muscular spasms: managed on valium 5mg QHS and scheduled robaxin 500mg Q8h    Wean as progressing       -restless legs: managed on Mirapex     -HTN: managed on chlorthalidone (new med) IM to monitor BPs     -HLD: managed on Pravachol     -COPD: managed on PRN Anoro Ellipta inhaler      -GERD: managed on Protonix    -Constipation: BM documented on 5/12     -DVT ppx: SCDS + Aspirin 325mg daily per Surg team     Dipsosition: Home 5/19      Current Facility-Administered Medications:     aspirin (ECOTRIN) EC tablet 325 mg, 325 mg, Oral, Daily, Dashawn Pleitez MD, 325 mg at 05/15/18 0813    azelastine (ASTELIN) 0 1 % nasal spray 2 spray, 2 spray, Each Nare, Daily, Dashawn Pleitez MD, 2 spray at 05/15/18 0813    calcium carbonate (TUMS) chewable tablet 1,000 mg, 1,000 mg, Oral, Daily PRN, Dashawn Pleitez MD    celecoxib (CeleBREX) capsule 100 mg, 100 mg, Oral, Daily, Dashawn Pleitez MD, 100 mg at 05/15/18 0813    diazepam (VALIUM) tablet 5 mg, 5 mg, Oral, HS, Dashawn Pleitez MD, 5 mg at 05/14/18 2207    docusate sodium (COLACE) capsule 100 mg, 100 mg, Oral, BID, Elijah Arteaga MD, 100 mg at 05/15/18 3554    gabapentin (NEURONTIN) capsule 600 mg, 600 mg, Oral, BID, Elijah Arteaga MD, 600 mg at 05/15/18 7351    lidocaine (LIDODERM) 5 % patch 1 patch, 1 patch, Transdermal, Daily, Angela Wynn PA-C, 1 patch at 05/15/18 9625    magnesium hydroxide (MILK OF MAGNESIA) 400 mg/5 mL oral suspension 30 mL, 30 mL, Oral, Daily PRN, Elijah Arteaga MD    methocarbamol (ROBAXIN) tablet 500 mg, 500 mg, Oral, Q8H [facility], Elijah Arteaga MD, 500 mg at 05/15/18 0527    oxyCODONE (ROXICODONE) IR tablet 2 5 mg, 2 5 mg, Oral, Q6H PRN, Elijah Arteaga MD    oxyCODONE-acetaminophen (PERCOCET) 5-325 mg per tablet 1 tablet, 1 tablet, Oral, Q4H PRN, Elijah Arteaga MD, 1 tablet at 05/15/18 0812    pantoprazole (PROTONIX) EC tablet 20 mg, 20 mg, Oral, Early Morning, Elijah Arteaga MD, 20 mg at 05/15/18 0527    polyethylene glycol (MIRALAX) packet 17 g, 17 g, Oral, Daily, Angela Wynn PA-C, 17 g at 05/15/18 0814    pramipexole (MIRAPEX) tablet 0 75 mg, 0 75 mg, Oral, HS, Elijah Arteaga MD, 0 75 mg at 05/14/18 2207    pravastatin (PRAVACHOL) tablet 40 mg, 40 mg, Oral, Daily With Saida Kyle MD, 40 mg at 05/14/18 1824    senna (SENOKOT) tablet 8 6 mg, 1 tablet, Oral, Daily, Elijah Arteaga MD, 8 6 mg at 05/15/18 8593    simethicone (MYLICON) chewable tablet 80 mg, 80 mg, Oral, 4x Daily PRN, Elijah Arteaga MD     Diagnostic Studies:  Reviewed, no new imaging    Vitals: Reviewed   Temp:  [98 °F (36 7 °C)-98 3 °F (36 8 °C)] 98 3 °F (36 8 °C)  HR:  [80-84] 84  Resp:  [18-20] 20  BP: (108-114)/(58-62) 110/58   Intake/Output Summary (Last 24 hours) at 05/15/18 1104  Last data filed at 05/15/18 0930   Gross per 24 hour   Intake             1190 ml   Output              725 ml   Net              465 ml        Laboratory: Reviewed  Lab Results   Component Value Date    HGB 12 6 05/14/2018    HCT 38 4 05/14/2018    WBC 17 57 (H) 05/14/2018     Lab Results Component Value Date    BUN 23 05/15/2018     (L) 05/15/2018    K 3 9 05/15/2018    CL 98 (L) 05/15/2018    GLUCOSE 85 05/15/2018    CREATININE 0 65 05/15/2018     No results found for: PROTIME, INR     Patient Active Problem List   Diagnosis    Spinal stenosis of cervical region    Upper extremity weakness    S/P laminectomy with spinal fusion       ** Please Note: Fluency Direct voice to text software may have been used in the creation of this document  **    **This patient was discussed by the Interdisciplinary Team in weekly case conference today  The care of the patient was extensively discussed with all care providers and an appropriate rehabilitation plan was formulated unique for this patient  Barriers were identified preventing progression of therapy and appropriate interventions were discussed with each discipline  Please see the team note for input from all disciplines regarding barriers, intervention, and discharge planning  [ x ] Total time spent: 45 Mins, and greater than 50% of this time was spent counseling/coordinating care      Sage Chacon MD  [facility] Primary [facility]

## 2018-05-15 NOTE — PROGRESS NOTES
05/15/18 0930   Pain Assessment   Pain Assessment No/denies pain   Pain Score No Pain   Restrictions/Precautions   Precautions Fall Risk;Pain;Spinal precautions   Weight Bearing Restrictions No   ROM Restrictions No   Braces or Orthoses C/S Collar   QI: Sit to [address] Provided by [place] No physical assistance   Sit to Stand CARE Score 4   QI: Chair/Bed-to-Chair Transfer   Assistance Needed Supervision   Assistance Provided by [place] No physical assistance   Chair/Bed-to-Chair Transfer CARE Score 4   Transfer Bed/Chair/Wheelchair   Limitations Noted In Balance; Coordination; Endurance;UE Strength;LE Strength   Adaptive Equipment Roller Walker   Bed, Chair, Wheelchair Transfer (FIM) 5 - Patient requires supervision/monitoring   Exercise Tools   Theraputty Pt completes theraputty HEP with use of red medium soft putty following handout completing 3 x 10 each to increase LUE strength  Cognition   Overall Cognitive Status WFL   Arousal/Participation Alert; Cooperative   Attention Attends with cues to redirect   Orientation Level Oriented X4   Memory Decreased short term memory   Following Commands Follows multistep commands with increased time or repetition   Activity Tolerance   Activity Tolerance Patient tolerated treatment well   Assessment   Treatment Assessment Pt participated in skilled OT services with focus on UB ROM/strengthening, cervical collar management, and functional mobility  Pt tolerates towel glides moving through all planes 3 x 10 each to reduce UE stiffness and increase ROM for increased independence with ADL tasks  Pt educated on cervical collar management and pad changes  After education pt chava PANTOJA carryover  Recommending pt have 2nd person A to change collar 2* to decreased compliance with spinal precautions despite being seated in front of mirror   Pt will continue to benefit from skilled OT services with focus on UE ROM, UB strengthening, and endurance to increase safety and independence with ADL tasks  Prognosis Good   Problem List Decreased strength;Decreased endurance; Impaired balance;Decreased coordination;Orthopedic restrictions;Pain; Impaired judgement   Plan   Treatment/Interventions ADL retraining;Functional transfer training; Endurance training;Equipment eval/education; Compensatory technique education;Patient/family training   Progress Progressing toward goals   Recommendation   OT Discharge Recommendation Home with family support   OT Therapy Minutes   OT Time In 0930   OT Time Out 1100   OT Total Time (minutes) 90   OT Mode of treatment - Individual (minutes) 60   OT Mode of treatment - Concurrent (minutes) 30   OT Mode of treatment - Group (minutes) 0   OT Mode of treatment - Co-treat (minutes) 0   OT Mode of Teatment - Total time(minutes) 90 minutes   Therapy Time missed   Time missed?  No

## 2018-05-15 NOTE — PROGRESS NOTES
05/15/18 1330   Pain Assessment   Pain Assessment No/denies pain   Pain Score No Pain   Restrictions/Precautions   Precautions Fall Risk;Spinal precautions   Braces or Orthoses C/S Collar   Cognition   Arousal/Participation Alert; Cooperative   Subjective   Subjective Pt reports frustrated about D/C date and was hoping to go home sooner    QI: Sit to [facility] Provided by [place] No physical assistance   Sit to Stand CARE Score 4   QI: Chair/Bed-to-Chair Transfer   Assistance Needed Supervision; Adaptive equipment   Assistance Provided by [place] No physical assistance   Chair/Bed-to-Chair Transfer CARE Score 4   Transfer Bed/Chair/Wheelchair   Limitations Noted In Balance; Coordination;LE Strength   Adaptive Equipment Roller [employer], Chair, Wheelchair Transfer (FIM) 5 - Patient requires supervision/monitoring   QI: Walk 10 Feet   Assistance Needed Physical assistance; Incidental touching;Supervision; Adaptive equipment   Assistance Provided by [place] Less than 25%   Walk 10 Feet CARE Score 3   QI: Walk 50 Feet with Two Turns   Assistance Needed Physical assistance; Incidental touching;Supervision; Adaptive equipment   Assistance Provided by [place] Less than 25%   Walk 50 Feet with Two Turns CARE Score 3   QI: Walk 150 Feet   Assistance Needed Physical assistance; Incidental touching;Supervision; Adaptive equipment   Assistance Provided by [place] Less than 25%   Walk 150 Feet CARE Score 3   Ambulation   Does the patient walk? 2  Yes   Primary Discharge Mode of Locomotion Walk   Walk Assist Level Supervision;Contact Guard;Minimum Assist   Gait Pattern Inconsistant Lynne; Improper weight shift;Narrow WALDEMAR   Assist Device Roller [facility] Walked (feet) 400 ft   Limitations Noted In Balance; Coordination;Swing;Strength;Speed   Findings S with RW; min/CG with SPC   Walking (FIM) 4 - Patient requires steadying assist or light touching AND distance 150 feet or more, no rest Assessment   Treatment Assessment Pt participated in skilled PT session focusing on gait training using SPC  Initiated session by ambulating with RW; progressed to ambulating along hallway railing; and then progression to [facility]  Initially pt required min A but with repetitive practice he progressed to   Noted proper sequencing of SPC but still narrow WALDEMAR  Pt more unsteady when turning requiring CG or grabbing onto hallway rail for support  Educated pt on progression from  to [facility] and eventually no AD (with continued outpatient PT); pt verbalized understanding  Recommend continued gait training using SPC to maximize safety  Barriers to Discharge Inaccessible home environment;Decreased caregiver support   PT Barriers   Physical Impairment Decreased strength;Decreased endurance; Impaired balance;Decreased mobility; Decreased range of motion;Decreased coordination;Orthopedic restrictions   Functional Limitation Walking;Stair negotiation   Plan   Treatment/Interventions Functional transfer training;LE strengthening/ROM; Elevations; Therapeutic exercise; Endurance training;Patient/family training;Equipment eval/education; Bed mobility;Gait training   Progress Progressing toward goals   Recommendation   Recommendation Home with family support;24 hour supervision/assist;Outpatient PT   Equipment Recommended Walker   PT Therapy Minutes   PT Time In 1330   PT Time Out 1400   PT Total Time (minutes) 30   PT Mode of treatment - Individual (minutes) 30   PT Mode of treatment - Concurrent (minutes) 0   PT Mode of treatment - Group (minutes) 0   PT Mode of treatment - Co-treat (minutes) 0   PT Mode of Teatment - Total time(minutes) 30 minutes   Therapy Time missed   Time missed?  No

## 2018-05-15 NOTE — PROGRESS NOTES
05/15/18 0830   Pain Assessment   Pain Assessment No/denies pain   Pain Score 5   Pain Type Acute pain   Pain Location Shoulder   Pain Orientation Bilateral   Hospital Pain Intervention(s) Distraction   Restrictions/Precautions   Precautions Fall Risk;Pain;Spinal precautions   Braces or Orthoses C/S Collar   Cognition   Arousal/Participation Alert; Cooperative   Attention Attends with cues to redirect   Orientation Level Oriented X4   Memory Decreased short term memory   Following Commands Follows multistep commands with increased time or repetition   Subjective   Subjective pt reported 5/10 shoulder blades pain at start of PT session to 4/10 at end of tx   QI: Roll Left and Right   Assistance Needed Set-up / clean-up   Roll Left and Right CARE Score 5   QI: Sit to [address] / clean-up   Sit to Lying CARE Score 5   QI: Lying to Sitting on Side of Bed   Assistance Needed Set-up / clean-up   Comment high bed to simulate home set up   Lying to Sitting on Side of Bed CARE Score 5   QI: Sit to Stand   Assistance Needed Supervision   Sit to Stand CARE Score 4   Bed Mobility   Findings S   QI: Chair/Bed-to-Chair Transfer   Assistance Needed Supervision   Chair/Bed-to-Chair Transfer CARE Score 4   Transfer Bed/Chair/Wheelchair   Limitations Noted In [address] to [address] [facility] to Sit Supervision   Supine to Sit Supervision   Sit to Supine Supervision   Car Transfer Supervision   Bed, Chair, Wheelchair Transfer (FIM) 5 - Patient requires supervision/monitoring   QI: Car Transfer   Assistance Needed Supervision   Car Transfer CARE Score 4   QI: [address] 10 Feet CARE Score 4   QI: Walk 50 Feet with Two [place] 50 Feet with Two Turns CARE Score 4   QI: Walk 150 Feet   Assistance Needed Supervision   Walk 150 Feet CARE Score 4   QI: Walking 10 Feet on Uneven Surfaces   Assistance Needed Supervision   Comment ramp with RW CS   Walking 10 Feet on Uneven Surfaces CARE Score 4   Ambulation   Does the patient walk? 2  Yes   Primary Discharge Mode of Locomotion Walk   Walk Assist Level Supervision   Gait Pattern Inconsistant Lynne; Improper weight shift;Narrow WALDEMAR   Assist Device Roller Marcella Peña Walked (feet) 400 ft  (150 on carpet)   Limitations Noted In Balance; Safety;Strength;Swing   Walking (FIM) 5 - Patient requires supervision/monitoring AND distance 150 feet or more, no rest   Wheelchair mobility   QI: Does the patient use a wheelchair? 0  No   QI: 1 Step (Curb)   Assistance Needed Incidental touching   Comment curb with RW  CS to ascend, CG to descend with cues lead with L LE going down   1 Step (Curb) CARE Score 4   Stairs   Type Curb;Ramp   # of Steps 1   Weight Bearing Precautions Fall Risk;Cervical   Assist Devices Roller Walker   Findings CG-CS   Stairs (FIM) 1 - Patient goes up and down less than 4 stairs regardless of assist/device/set up   Therapeutic Interventions   Strengthening L4 x 15 mins LE only   Balance stepping over small red foam roller x 20 reps without visual cues using a RW for support, SLS x 5 SH with light support on RW x 20 reps each side, alt tapping on 6" stool with and without visual cues using RW for light support  Assessment   Treatment Assessment Pt participated in gait training using a RW, bed/chair/car transfer and curb step/ramp negotiation  training using a RW  completed functional transfers and gait task at S level using a RW however continues to require cueing for sequencing with CS to ascend curb while CG to descend curb step for safety due to noted slight unsteadiness  also continues to demonstrate narrow WALDEMAR when walking despite repeated education, pt expressed that its because of the shoes he is wearing has " wider wings "   PT will continue to work on balance training, gait training using a RW and SPC as well  Family/Caregiver Present no   Problem List Decreased strength; Impaired balance; Impaired sensation;Orthopedic restrictions;Pain; Impaired judgement   Barriers to Discharge Inaccessible home environment;Decreased caregiver support   PT Barriers   Functional Limitation Stair negotiation;Ramp negotiation; Walking   Plan   Treatment/Interventions Functional transfer training; Therapeutic exercise; Endurance training;Bed mobility;Gait training;LE strengthening/ROM;Spoke to nursing;OT   Progress Progressing toward goals   Recommendation   Recommendation Home with family support;24 hour supervision/assist;Outpatient PT   Equipment Recommended Walker   PT - OK to Discharge No   PT Therapy Minutes   PT Time In 0830   PT Time Out 0930   PT Total Time (minutes) 60   PT Mode of treatment - Individual (minutes) 60   PT Mode of treatment - Concurrent (minutes) 0   PT Mode of treatment - Group (minutes) 0   PT Mode of treatment - Co-treat (minutes) 0   PT Mode of Teatment - Total time(minutes) 60 minutes   Therapy Time missed   Time missed?  No

## 2018-05-15 NOTE — TEAM CONFERENCE
Acute RehabilitationTeam Conference Note  Date: 5/15/2018   Time: 10:48 AM       Patient Name:  Howard Woods       Medical Record Number: [id number]   YOB: 1945  Sex: Male          Room/Bed:  [facility] 457/[facility] 457-01  Payor Info:  Payor: MEDICARE / Plan: MEDICARE A AND B / Product Type: Medicare A & B Fee for Service /      Admitting Diagnosis: S/P laminectomy [Z98 [id number]]   Admit Date/Time:  5/9/2018  2:08 PM  Admission Comments: No comment available     Primary Diagnosis:  Spinal stenosis of cervical region  Principal Problem: Spinal stenosis of cervical region    Patient Active Problem List    Diagnosis Date Noted    Upper extremity weakness 05/10/2018    S/P laminectomy with spinal fusion 05/10/2018    Spinal stenosis of cervical region 04/13/2018       Physical Therapy:    Transfers: Supervision  Bed Mobility: Minimal Assistance  Amulation Distance (ft): 400 feet  Ambulation: Supervision  Assistive Device for Ambulation: Roller Walker  Number of Stairs: 12  Assistive Device for Stairs: Right Hand Rail  Stair Assistance: Contact Guard  Ramp: Contact Guard  Discharge Recommendations: Home with:  [address] with[de-identified] 24 Hour Supervision, Family Support, Outpatient Physical Therapy    5/14/18  Pt is demonstrating consistent progress in PT as expected  Currently is at S level for functional mobilities when using a RW while min-CS with SPC trial on level surface amb  Also requires CG for FF negotiation using 1 HR or 8" curb step negotiation using a  RW  Ongoing barriers are pain, dec standing balance with narrow WALDEMAR and impaired WS ability,  And dec strength  Pt will benefit from continued skilled PT services to maximize functional mobility indep and safety and perform FT to faciltiate a safe d/c to home  At this time recommend for pt to have S for functional mobilities using AD at d/c with outpatient PT services  Occupational Therapy:  Eating: Supervision  Grooming: Supervision  Bathing:  Moderate Assistance  Bathing: Moderate Assistance  Upper Body Dressing: Supervision  Lower Body Dressing: Minimal Assistance  Toileting: Minimal Assistance  Tub/Shower Transfer: Minimal Assistance  Toilet Transfer: Minimal Assistance  Cognition: Within Defined Limits  Orientation: Person, Place, Time, Situation  Discharge Recommendations: Home with:  [address] with[de-identified] Family Support       Pt making progress towards LTGs  Pt currently functioning at a supervision/CGA level for functional transfers  He continues to require Jean [place] for all LB ADL tasks and cervical collar management  He continues to be limited by decreased standing balance, functional mobility, orthopedic restrictions, and BUE weakness and dec BUE ROM  Pt will continue to benefit from skilled OT services following POC with focus on noted deficits in order to reach maximum level of function  Pt with supportive wife at home who is able to A PRN  Speech Therapy:           No notes on file    Nursing Notes:  Appetite: Good  Diet Type: Regular/House                           Type of Wound (LDA): Incision           Incision 04/12/18 Neck Other (Comment) (Active)   Incision Description Clean;Dry; Intact 5/13/2018  4:16 PM   Rachel-wound Assessment Clean;Dry; Intact 5/13/2018  4:16 PM   Closure Open to air 5/14/2018  9:00 PM   Drainage Amount None 5/13/2018  4:16 PM   Treatments Cleansed 5/11/2018 10:00 AM   Dressing Open to air 5/13/2018  4:16 PM   Wound packed? No 5/7/2018  9:00 AM   Dressing Status Clean;Dry; Intact 5/6/2018  7:19 PM       Incision 04/30/18 Neck Other (Comment) (Active)   Incision Description Clean;Dry; Intact 5/14/2018  9:00 PM   Rachel-wound Assessment Clean;Dry; Intact 5/14/2018  9:00 PM   Closure Hystocryl 5/13/2018  4:16 PM   Drainage Amount None 5/13/2018  4:16 PM   Treatments Cleansed 5/11/2018 10:00 AM   Dressing Open to air 5/14/2018  9:00 PM   Wound packed?  No 5/13/2018  4:16 PM   Patient Tolerance Tolerated well 5/9/2018  8:00 AM   Dressing Status Removed 5/9/2018  8:00 AM     Wound 04/12/18 Abrasion(s) Head Right pt scratched open scab  (Active)        Type of Wound Patient/Family Education: Yes  Bladder: 7 - Complete [place]     Bladder Patient/Family Education: Yes  Bowel: 6 - Modified [place]     Bowel Patient/Family Education: Yes  Pain Location: Shoulder, Sternum  Pain Orientation: Bilateral, Frontal  Pain Score: 5  Pain 2  Pain Score 2: 3 (sternum)                    Hospital Pain Intervention(s): Medication (See MAR)          Pt admitted to [facility] s/p Stage 2 C3/4, C7/T1 and T1/2 laminectomy and PSF C3-T2, has collar to be on when OOB  Hx COPD,Hypertension  Pt c/o of Sternal pain  Pt's pain is being managed with percocet, oxy 2 5 mg and lidoderm  Pt is continent of bowel and bladder  This week we will continue to monitor pt's vitals signs and labs  We will continue pain management  We will encourage independence with adls and prevent falls  Case Management:     Discharge Planning  Living Arrangements: Spouse/significant other  Support Systems: Spouse/significant other  Assistance Needed: PT/OT  Type of Current Residence: Private residence  Current Home Care Services: No  Pt is participating well with therapy and making good progress  pts wife is supportive and able to assist on dc  Pt and wife have been educated on potential dc needs for contd therapy  Following to assist w/dc planning needs  Is the patient actively participating in therapies? yes  List any modifications to the treatment plan:     Barriers Interventions   balance Least restrictive device for safe ambulation                     Is the patient making expected progress toward goals?  yes  List any update or changes to goals:     Medical Goals:     Weekly Team Goals:   Rehab Team Goals  ADL Team Goal: Patient will require supervision with ADLs with least restrictive device upon completion of rehab program  Bowel/Bladder Team Goal: Patient will be independent with bladder/bowel management with least restrictive device upon completion of rehab program  Transfer Team Goal: Patient will require supervision with transfers with least restrictive device upon completion of rehab program  Locomotion Team Goal: Patient will require supervision with locomotion with least restrictive device upon completion of rehab program  Cognitive Team Goal: Patient will be independent for basic and complex tasks upon completion of rehab program    Discussion: in attendance to review pts progress is rn pt ot cm and physican  Pt is making good progress and is currently supervision with all functional mobility  Pt is using a cane for ambulation but is contact guard, goals are for supervision to mod I  Wife is supportive and training needs to occur re: cervical collar   Recommendations are for contd oupt physical therapy    Anticipated Discharge Date:  5/19/18

## 2018-05-15 NOTE — SOCIAL WORK
Met w/pt and reviewed team update and dc for Saturday 5/19, pt was hoping it would be Thursday  Cm encouraged pt to speak with his therapists and dr germain re: dc date  reviewed team info as to why the date was selected  Reviewed recommendations for cotnd outpt physical therapy and pt is agreeable to go to [facility] to assist w/scheduling apmt   Following to assist w/additional dc needs

## 2018-05-15 NOTE — PCC NURSING
Pt admitted to [facility] s/p Stage 2 C3/4, C7/T1 and T1/2 laminectomy and PSF C3-T2, has collar to be on when OOB  Hx COPD,Hypertension  Pt c/o of Sternal pain  Pt's pain is being managed with percocet, oxy 2 5 mg and lidoderm  Pt is continent of bowel and bladder  This week we will continue to monitor pt's vitals signs and labs  We will continue pain management  We will encourage independence with adls and prevent falls

## 2018-05-15 NOTE — PCC CARE MANAGEMENT
Pt is participating well with therapy and making good progress  pts wife is supportive and able to assist on dc  Pt and wife have been educated on potential dc needs for contd therapy  Following to assist w/dc planning needs

## 2018-05-16 PROCEDURE — 0HBRXZZ EXCISION OF TOE NAIL, EXTERNAL APPROACH: ICD-10-PCS | Performed by: PODIATRIST

## 2018-05-16 PROCEDURE — 97530 THERAPEUTIC ACTIVITIES: CPT

## 2018-05-16 PROCEDURE — 97535 SELF CARE MNGMENT TRAINING: CPT

## 2018-05-16 PROCEDURE — 97112 NEUROMUSCULAR REEDUCATION: CPT

## 2018-05-16 PROCEDURE — 97116 GAIT TRAINING THERAPY: CPT

## 2018-05-16 PROCEDURE — 99232 SBSQ HOSP IP/OBS MODERATE 35: CPT | Performed by: PHYSICAL MEDICINE & REHABILITATION

## 2018-05-16 PROCEDURE — 97110 THERAPEUTIC EXERCISES: CPT

## 2018-05-16 RX ORDER — LIDOCAINE 50 MG/G
2 PATCH TOPICAL DAILY
Status: DISCONTINUED | OUTPATIENT
Start: 2018-05-17 | End: 2018-05-19 | Stop reason: HOSPADM

## 2018-05-16 RX ORDER — LIDOCAINE 50 MG/G
1 PATCH TOPICAL ONCE
Status: COMPLETED | OUTPATIENT
Start: 2018-05-16 | End: 2018-05-16

## 2018-05-16 RX ORDER — METHOCARBAMOL 500 MG/1
500 TABLET, FILM COATED ORAL EVERY 8 HOURS PRN
Status: DISCONTINUED | OUTPATIENT
Start: 2018-05-16 | End: 2018-05-19 | Stop reason: HOSPADM

## 2018-05-16 RX ADMIN — OXYCODONE HYDROCHLORIDE AND ACETAMINOPHEN 1 TABLET: 5; 325 TABLET ORAL at 17:16

## 2018-05-16 RX ADMIN — METHOCARBAMOL 500 MG: 500 TABLET ORAL at 23:22

## 2018-05-16 RX ADMIN — PRAMIPEXOLE DIHYDROCHLORIDE 0.75 MG: 0.5 TABLET ORAL at 21:30

## 2018-05-16 RX ADMIN — OXYCODONE HYDROCHLORIDE AND ACETAMINOPHEN 1 TABLET: 5; 325 TABLET ORAL at 06:20

## 2018-05-16 RX ADMIN — OXYCODONE HYDROCHLORIDE AND ACETAMINOPHEN 1 TABLET: 5; 325 TABLET ORAL at 01:57

## 2018-05-16 RX ADMIN — CELECOXIB 100 MG: 100 CAPSULE ORAL at 09:04

## 2018-05-16 RX ADMIN — METHOCARBAMOL 500 MG: 500 TABLET ORAL at 14:28

## 2018-05-16 RX ADMIN — POLYETHYLENE GLYCOL 3350 17 G: 17 POWDER, FOR SOLUTION ORAL at 09:04

## 2018-05-16 RX ADMIN — DIAZEPAM 5 MG: 5 TABLET ORAL at 21:30

## 2018-05-16 RX ADMIN — GABAPENTIN 600 MG: 300 CAPSULE ORAL at 09:04

## 2018-05-16 RX ADMIN — GABAPENTIN 600 MG: 300 CAPSULE ORAL at 17:16

## 2018-05-16 RX ADMIN — LIDOCAINE 1 PATCH: 50 PATCH CUTANEOUS at 09:04

## 2018-05-16 RX ADMIN — OXYCODONE HYDROCHLORIDE AND ACETAMINOPHEN 1 TABLET: 5; 325 TABLET ORAL at 23:22

## 2018-05-16 RX ADMIN — LIDOCAINE 1 PATCH: 50 PATCH CUTANEOUS at 11:28

## 2018-05-16 RX ADMIN — AZELASTINE HYDROCHLORIDE 2 SPRAY: 137 SPRAY, METERED NASAL at 09:04

## 2018-05-16 RX ADMIN — OXYCODONE HYDROCHLORIDE AND ACETAMINOPHEN 1 TABLET: 5; 325 TABLET ORAL at 11:27

## 2018-05-16 RX ADMIN — SENNOSIDES 8.6 MG: 8.6 TABLET ORAL at 09:04

## 2018-05-16 RX ADMIN — PANTOPRAZOLE SODIUM 20 MG: 20 TABLET, DELAYED RELEASE ORAL at 06:21

## 2018-05-16 RX ADMIN — METHOCARBAMOL 500 MG: 500 TABLET ORAL at 06:21

## 2018-05-16 RX ADMIN — DOCUSATE SODIUM 100 MG: 100 CAPSULE, LIQUID FILLED ORAL at 17:16

## 2018-05-16 RX ADMIN — DOCUSATE SODIUM 100 MG: 100 CAPSULE, LIQUID FILLED ORAL at 09:04

## 2018-05-16 RX ADMIN — ASPIRIN 325 MG: 325 TABLET, COATED ORAL at 09:03

## 2018-05-16 RX ADMIN — PRAVASTATIN SODIUM 40 MG: 40 TABLET ORAL at 17:16

## 2018-05-16 NOTE — PROGRESS NOTES
05/16/18 1230   Pain Assessment   Pain Assessment 0-10   Pain Score 5   Pain Location Shoulder   Pain Orientation Left   Pain Frequency Constant/continuous   Hospital Pain Intervention(s) Distraction   Restrictions/Precautions   Precautions Fall Risk;Spinal precautions   Braces or Orthoses C/S Collar   Cognition   Arousal/Participation Alert; Cooperative   Subjective   Subjective pt reported 5/10 L shoulder pain before and after PT session  RN was notified by PT regarding pt's request to see RN supervisor  QI: Sit to Stand   Assistance Needed Supervision   Comment RW   Sit to Stand CARE Score 4   QI: Chair/Bed-to-Chair Transfer   Assistance Needed Supervision   Comment RW   Chair/Bed-to-Chair Transfer CARE Score 4   Transfer Bed/Chair/Wheelchair   Limitations Noted In LE Strength; Endurance;Balance   Adaptive Equipment Roller Walker   Stand Pivot Supervision   Sit to Stand Supervision   Stand to Sit Supervision   Findings S with RW, CG-CS during mobility training using a SPC   Bed, Chair, Wheelchair Transfer (FIM) 5 - Patient requires supervision/monitoring   QI: Walk 10 Feet   Assistance Needed Supervision   Walk 10 Feet CARE Score 4   QI: Walk 50 Feet with Two Turns   Assistance Needed Supervision   Walk 50 Feet with Two Turns CARE Score 4   QI: Walk 150 Feet   Assistance Needed Supervision   Walk 150 Feet CARE Score 4   Ambulation   Does the patient walk? 2   Yes   Primary Discharge Mode of Locomotion Walk   Walk Assist Level Supervision   Gait Pattern Inconsistant Lynne   Assist Device Roller Walker   Distance Walked (feet) 400 ft   Limitations Noted In Balance;Strength   Findings trialed gait training with SPC x 400' x 3 reps requiring CG-min with LOB to CS most of the time   Walking (FIM) 5 - Patient requires supervision/monitoring AND distance 150 feet or more, no rest   Therapeutic Interventions   Flexibility bilat hamstring mm stretches x 3 mins each Leg, calf mm x 30 SH x 4 reps   Neuromuscular Re-Education alt tapping on 6" foot stool using SPC x 15 reps, SLS x 3 SH x 15 reps with SPC, ball tossing with narrow WALDEMAR , stepping over small red foam roller with SPC support x 10 reps each side, ball kicking with SPC support   Equipment Use   NuStep L4 x 15 mins LE only   Assessment   Treatment Assessment Pt participated with strengthening/flexibility exercises, standing balance training and functional mobility training  at this time pt requires S when using RW with no LOB demonstrated  but during functional mobility training using a SPC he required CG-CS during transfers, min to CS during gait training  He required CS most of the time when walking with the cane however continues to demonstrate occasional slight LOB when rounding the corner, turning around or when he tries to speed up  pt verbalized understanding with education to turn slowly and walk at a slower pace  Discussed with pt regarding PT's recommendation for him to use a RW at [facility] as he requires further training  pt also agrees to stay until saturday to work on additional balance training, strengthening and gait training using a SPC  PT Family training done with: PT talked to pt and wife at noon and discussed therapy recommendations for pt to have S with all functional mobilities using a RW  wife verbalized confidence of being able to provide appropriate S to the pt at home  Barriers to Discharge Inaccessible home environment;Decreased caregiver support   PT Barriers   Physical Impairment Decreased strength;Decreased endurance; Impaired balance;Decreased mobility; Decreased range of motion;Decreased coordination;Orthopedic restrictions   Functional Limitation Walking;Stair negotiation   Plan   Treatment/Interventions Functional transfer training;LE strengthening/ROM; Therapeutic exercise; Endurance training;Bed mobility;Gait training;Spoke to nursing;OT;Patient/family training   Progress Progressing toward goals   Recommendation Recommendation 24 hour supervision/assist;Home with family support; Outpatient PT   Equipment Recommended Walker   PT Equipment ordered RW   Date ordered 05/16/18   PT Therapy Minutes   PT Time In 1230   PT Time Out 1425   PT Total Time (minutes) 115   PT Mode of treatment - Individual (minutes) 115   PT Mode of treatment - Concurrent (minutes) 0   PT Mode of treatment - Group (minutes) 0   PT Mode of treatment - Co-treat (minutes) 0   PT Mode of Teatment - Total time(minutes) 115 minutes   Therapy Time missed   Time missed?  No

## 2018-05-16 NOTE — DISCHARGE INSTR - APPOINTMENTS
[facility] [phone] to schedule outpatient PT for 5/22 at 10:30am  Arrive 15 minutes early   Written instruction provided to patient

## 2018-05-16 NOTE — SOCIAL WORK
CM spoke to Melanie Henley at [address] [place] [address] Drive to schedule outpatient PT for 5/22 at 10:30am  Arrive 15 minutes early   Written instruction provided to patient

## 2018-05-16 NOTE — PROGRESS NOTES
Internal Medicine Progress Note  Patient: Armando Paez  Age/sex: 81 y o  male  Medical Record #: [id number]      ASSESSMENT/PLAN:  Armando Paez is seen and examined and mangement for following issues:    1  Stage 2 C3/4, C7/T1 and T1/2 laminectomy and PSF C3-T2: monitor incision; continue pain control  Therapy per primary team  Continue bowel regimen  Follow up with ortho after rehab  2  COPD: Stable; uses Anoro Ellipta daily PRN  Continue to monitor  On room air  3  Hyperlipidemia: continue statin therapy, heart healthy diet  4  Hypotension Hold blood pressure medications  Pt without orthostasis  Monitor  5  Leukocytosis: Mild  Likely reactive  6  Sternal pain: Pleuritic and MSK components  CXR negative  Improving  Continue Lidoderm patch  7  Hypokalemia: Replaced with 40meq yesterday  Now normal on this AM BMP  8  DVT prophylaxis: Aspirin 325mg daily          Subjective: Patient seen and examined  Pt reports he is doing well  BP has been stable  He denies any other complaints      ROS:   GI: denies abdominal pain, change bowel habits or reflux symptoms  Neuro: No new neurologic changes  Respiratory: No Cough, SOB  Cardiovascular: No CP, palpitations     Scheduled Meds:    Current Facility-Administered Medications:  aspirin 325 mg Oral Daily Bud Lazaro MD   azelastine 2 spray Each Nare Daily Bud Lazaro MD   calcium carbonate 1,000 mg Oral Daily PRN Bud Lazaro MD   celecoxib 100 mg Oral Daily Bud Lazaro MD   diazepam 5 mg Oral HS Bud Lazaro MD   docusate sodium 100 mg Oral BID Bud Lazaro MD   gabapentin 600 mg Oral BID Bud Lazaro MD   lidocaine 1 patch Transdermal Daily Casper Dixon PA-C   magnesium hydroxide 30 mL Oral Daily PRN Bud Lazaro MD   methocarbamol 500 mg Oral Q8H [address] Bud Lazaro MD   oxyCODONE 2 5 mg Oral Q6H PRN Bud Lazaro MD   oxyCODONE-acetaminophen 1 tablet Oral Q4H PRN Bud Lazaro MD   pantoprazole 20 mg Oral Early Morning Bud Lazaro, MD   polyethylene glycol 17 g Oral Daily Navid Mosher PA-C   pramipexole 0 75 mg Oral HS Hilda Romero MD   pravastatin 40 mg Oral Daily With Maryann Cardenas MD   senna 1 tablet Oral Daily Hilda Romero MD   simethicone 80 mg Oral 4x Daily PRN Hilda Romero MD       Labs:       Results from last 7 days  Lab Units 05/14/18  0519 05/10/18  0506   WBC Thousand/uL 17 57* 10 37*   HEMOGLOBIN g/dL 12 6 14 1   HEMATOCRIT % 38 4 41 0   PLATELETS Thousands/uL 250 240       Results from last 7 days  Lab Units 05/15/18  0509 05/14/18  0519   SODIUM mmol/L 135* 137   POTASSIUM mmol/L 3 9 3 4*   CHLORIDE mmol/L 98* 97*   CO2 mmol/L 33* 33*   BUN mg/dL 23 25   CREATININE mg/dL 0 65 0 72   GLUCOSE RANDOM mg/dL 85 85   CALCIUM mg/dL 8 9 8 7                  Glucose (mg/dL)   Date Value   05/15/2018 85   05/14/2018 85   05/11/2018 91   05/10/2018 86       Labs reviewed    Physical Examination:  Vitals:   Vitals:    05/15/18 0719 05/15/18 1505 05/15/18 1700 05/15/18 2344   BP: 110/58 (!) 85/54 119/69 101/63   BP Location: Right arm Right arm Right arm Left arm   Pulse: 84 90  94   Resp: 20 18  20   Temp: 98 3 °F (36 8 °C) 98 °F (36 7 °C)  98 °F (36 7 °C)   TempSrc: Oral Oral  Oral   SpO2: 96% 97%  95%   Weight:       Height:           GEN: NAD, nontoxic  HEENT: NC/AT, EOMI, c- collar in place  RESP: normal respiratory effort   + slight expiratory wheeze  No rhonchi or crackles  CV: +S1 S2, regular rate, no rubs  ABD: soft, NT, ND, normal BS   : no real  EXT: no LE edema  Skin: no rashes  Neuro: AAO x 3      [ X ] Total time spent: 30 Mins and greater than 50% of this time was spent counseling/coordinating care        Marcie Bernardo DO  Internal Medicine

## 2018-05-16 NOTE — CONSULTS
Consult Routine Foot Care- Podiatry   Darnell Benito 81 y o  male MRN: [id number]  Unit/Bed#: -01 Encounter: [id number]    Assessment/Plan     Assessment:  1  Onychomycosis    Plan:  - Nails debrided x10 without incidence utilizing a sharp nail nipper  - All questions and concerns addressed  - Will discuss this plan with my attending  - Podiatry signing off, thank you for the consult  History of Present Illness     HPI:  Darnell Benito is a 81 y o  male who presents with elongated toenails  Patient states that the used to see a podiatrist years ago, but has not been to them in a while  He states that about 3 years ago he dropped something on his left foot which caused the left 1st toenail to become thick and elongated  He states that his left toenail cause him discomfort when applying shoes and socks  He wishes to have all of his nails cut today  The pressure within their shoe gear is painful and they have been unable to cut their nails adequately  Consults  Review of Systems   Constitutional: Negative  HENT: Negative  Eyes: Negative  Respiratory: Negative  Cardiovascular: Negative  Gastrointestinal: Negative  Musculoskeletal: Negative   Skin: elongated thickened toenails   Neurological: Negative  Historical Information   Past Medical History:   Diagnosis Date    Arthritis     Back pain     Cervical pain (neck)     [facility]chronic obstructive pulmonary disease[facility] ([facility])     History of transfusion     Age 8 after bleeding post tonsillectomy    Hyperlipidemia     Mild heartburn     Neck pain     Numbness     Left hand and arm    Scratches     Multiple on limbs and hands    Wears dentures     Upper and lower   Has 4 pins on bottom to hold bottom denture     Past Surgical History:   Procedure Laterality Date    COLONOSCOPY      ESOPHAGOGASTRODUODENOSCOPY      EYE SURGERY      Bilateral implants to correct vision    NE ANTERIOR INSTRUMENTATION 2-3 VERTEBRAL SEGMENTS N/A 4/12/2018    Procedure: FUSION CERVICAL ANTERIOR W DISCECTOMY C3/4, C7/T1;  Surgeon: Stephenie Redd MD;  [facility];  Service: Orthopedics    RI ARTHRODESIS POSTERIOR/POSTERIORLATERAL CERVICAL BELOW C2 N/A 4/30/2018    Procedure: POSSIBLE C3-4, C4/5 LAMINECTOMY;  Surgeon: Stephenie Redd MD;  [facility];  Service: Orthopedics    RI ARTHRODESIS POSTERIOR/POSTEROLATERAL THORACIC N/A 4/30/2018    Procedure: C3-T2 PSF With Instrumentation;  Surgeon: Stephenie Redd MD;  [facility];  Service: Orthopedics    RI EXCIS CERV DISK,ONE LEVEL N/A 4/30/2018    Procedure: C 6-7 Left HEMILAMINOTOMY AND FORAMINOTOMY C7-T2 LAMINECTOMY C7-T2 Left Foraminotomy;  Surgeon: Stephenie Redd MD;  [facility];  Service: Orthopedics    TONSILLECTOMY       Social History   History   Alcohol Use    Yes     Comment: Occasional     History   Drug Use    Types: Marijuana     Comment: two to three bowls a day     History   Smoking Status    Former Smoker    Years: 55 00    Types: Cigars, Cigarettes    Quit date: 1/29/2018   Smokeless Tobacco    Former User    Types: Chew     Comment: Quit cigarettes in 2002  Had smoked at most 1 5 packs daily  Family History: History reviewed  No pertinent family history      Meds/Allergies   Prescriptions Prior to Admission   Medication    azelastine (ASTELIN) 0 1 % nasal spray    calcium carbonate (TUMS) 500 mg chewable tablet    Cholecalciferol (VITAMIN D3) 82293 units TABS    diazepam (VALIUM) 10 mg tablet    gabapentin (NEURONTIN) 600 MG tablet    oxyCODONE-acetaminophen (PERCOCET) 5-325 mg per tablet    pramipexole (MIRAPEX) 0 75 MG tablet    simvastatin (ZOCOR) 20 mg tablet    tiZANidine (ZANAFLEX) 4 mg tablet    tiZANidine (ZANAFLEX) 4 mg tablet     No Known Allergies    Objective   First Vitals:   Blood Pressure: 142/78 (05/09/18 1423)  Pulse: 96 (05/09/18 1423)  Temperature: 98 6 °F (37 °C) (05/09/18 1423)  Temp Source: Oral (05/09/18 1423)  Respirations: 20 (05/09/18 1423)  Height: 5' 11" (180 3 cm) (05/09/18 1423)  Weight - Scale: 65 2 kg (143 lb 11 8 oz) (05/09/18 1423)  SpO2: 95 % (05/09/18 1423)    Current Vitals:   Blood Pressure: 102/52 (05/16/18 0908)  Pulse: 90 (05/16/18 0908)  Temperature: 98 1 °F (36 7 °C) (05/16/18 0908)  Temp Source: Oral (05/16/18 0908)  Respirations: 20 (05/16/18 0908)  Height: 5' 11" (180 3 cm) (05/09/18 1423)  Weight - Scale: 69 5 kg (153 lb 3 5 oz) (05/16/18 0621)  SpO2: 97 % (05/16/18 0908)        /52 (BP Location: Left arm)   Pulse 90   Temp 98 1 °F (36 7 °C) (Oral)   Resp 20   Ht 5' 11" (1 803 m)   Wt 69 5 kg (153 lb 3 5 oz)   SpO2 97%   BMI 21 37 kg/m²     General Appearance:    Alert, cooperative, no distress   Head:    Normocephalic, without obvious abnormality, atraumatic   Eyes:    PERRL, conjunctiva/corneas clear, EOM's intact            Nose:   Moist mucous membranes, no drainage or sinus tenderness   Throat:   No tenderness, no exudates   Neck:   Supple, symmetrical, trachea midline, no JVD   Back:     Symmetric, no CVA tenderness   Lungs:     Clear to auscultation bilaterally, respirations unlabored   Chest wall:    No tenderness or deformity   Heart:    Regular rate and rhythm, S1 and S2 normal, no murmur, rub   or gallop   Abdomen:     Soft, non-tender, bowel sounds active all four quadrants,     no masses, no organomegaly           Extremities:   MMT is 5/5 to all compartments of the LE, +0/4 edema B/L, Digital ROM is intact,    Pulses:   R DP is +2/4, R PT is +2/4, L DP is +2/4, L PT is +2/4, CFT< 3sec to all digits   Skin:   Nails are thickened, elongated, notable subungual debris  No open Lesions  Skin of the LE is normal texture, turgor  Neurologic:   Gross sensation is intact   Protective sensation is intact           Lab Results:   Admission on 05/09/2018   Component Date Value    Sodium 05/10/2018 133*    Potassium 05/10/2018 4 2     Chloride 05/10/2018 92*    CO2 05/10/2018 34*    Anion Gap 05/10/2018 7     BUN 05/10/2018 31*    Creatinine 05/10/2018 0 78     Glucose 05/10/2018 86     Calcium 05/10/2018 9 5     eGFR 05/10/2018 91     Magnesium 05/10/2018 2 4     WBC 05/10/2018 10 37*    RBC 05/10/2018 4 47     Hemoglobin 05/10/2018 14 1     Hematocrit 05/10/2018 41 0     MCV 05/10/2018 92     MCH 05/10/2018 31 5     MCHC 05/10/2018 34 4     RDW 05/10/2018 14 5     MPV 05/10/2018 13 3*    Platelets 56/44/6362 240     nRBC 05/10/2018 0     Neutrophils Relative 05/10/2018 59     Lymphocytes Relative 05/10/2018 24     Monocytes Relative 05/10/2018 14*    Eosinophils Relative 05/10/2018 2     Basophils Relative 05/10/2018 1     Neutrophils Absolute 05/10/2018 6 18     Lymphocytes Absolute 05/10/2018 2 45     Monocytes Absolute 05/10/2018 1 43*    Eosinophils Absolute 05/10/2018 0 21     Basophils Absolute 05/10/2018 0 06     Sodium 05/11/2018 134*    Potassium 05/11/2018 4 2     Chloride 05/11/2018 94*    CO2 05/11/2018 34*    Anion Gap 05/11/2018 6     BUN 05/11/2018 32*    Creatinine 05/11/2018 0 88     Glucose 05/11/2018 91     Calcium 05/11/2018 9 4     eGFR 05/11/2018 87     WBC 05/14/2018 17 57*    RBC 05/14/2018 4 05     Hemoglobin 05/14/2018 12 6     Hematocrit 05/14/2018 38 4     MCV 05/14/2018 95     MCH 05/14/2018 31 1     MCHC 05/14/2018 32 8     RDW 05/14/2018 14 6     MPV 05/14/2018 12 7     Platelets 51/05/6465 250     nRBC 05/14/2018 0     Neutrophils Relative 05/14/2018 85*    Lymphocytes Relative 05/14/2018 8*    Monocytes Relative 05/14/2018 6     Eosinophils Relative 05/14/2018 1     Basophils Relative 05/14/2018 0     Neutrophils Absolute 05/14/2018 14 71*    Lymphocytes Absolute 05/14/2018 1 47     Monocytes Absolute 05/14/2018 1 03     Eosinophils Absolute 05/14/2018 0 20     Basophils Absolute 05/14/2018 0 05     Sodium 05/14/2018 137     Potassium 05/14/2018 3 4*    Chloride 05/14/2018 97*    CO2 05/14/2018 33*    Anion Gap 05/14/2018 7     BUN 05/14/2018 25     Creatinine 05/14/2018 0 72     Glucose 05/14/2018 85     Glucose, Fasting 05/14/2018 85     Calcium 05/14/2018 8 7     eGFR 05/14/2018 95     Sodium 05/15/2018 135*    Potassium 05/15/2018 3 9     Chloride 05/15/2018 98*    CO2 05/15/2018 33*    Anion Gap 05/15/2018 4     BUN 05/15/2018 23     Creatinine 05/15/2018 0 65     Glucose 05/15/2018 85     Calcium 05/15/2018 8 9     eGFR 05/15/2018 99      Imaging: I have personally reviewed pertinent films in PACS  EKG, Pathology, and Other Studies: I have personally reviewed pertinent reports        Code Status: Level 1 - Full Code

## 2018-05-16 NOTE — PROGRESS NOTES
Pt states that he wants his pain meds immediately when he calls  Is upset that he had to wait previously  States that "you better get in here with my pain medicine and muscle relaxer at 1:45a"  Reviewed prn pain meds and came up with an acceptable plan with pt to manage his pain overnight  Pt aware that percocet is q 4 hrs prn moderate pain  Also aware that robaxin is scheduled, not prn  Reassured pt that we will do everything we can to manage his pain appropriately overnight   Pt verbalizes understanding and is agreeable to plan

## 2018-05-16 NOTE — SOCIAL WORK
Received order for a roller walker from therapy  Order placed with [facility] to be delivered to pts room prior to dc

## 2018-05-16 NOTE — PROGRESS NOTES
Occupational Therapy   05/16/18 0700   Pain Assessment   Pain Location Chest  (noted aching pain when reaching across midline w/ L UE  )   Pain Orientation Left   Pain Onset Gradual   Restrictions/Precautions   Precautions Fall Risk;Spinal precautions;Pain   Weight Bearing Restrictions No   ROM Restrictions No   Braces or Orthoses C/S Collar   QI: Eating   Assistance Needed Set-up / clean-up   Assistance Provided by [place] Less than 25%   Eating CARE Score 3   Eating Assessment   Meal Assessed Breakfast   Current Diet Regular   Opens Packages No   Findings Pt required assistance to open juice and creamers  Eating (FIM) 5 - Patient needs help to open contianers or set up tray   QI: [address] Provided by [place] No physical assistance   Comment seated at sink   Oral Hygiene CARE Score 4   Grooming   Able To Initiate Tasks; Wash/Dry Face;Brush/Clean Teeth   Limitation Noted In Timeliness; Safety   Findings Seated at sink    Grooming (FIM) 5 - Patient requires supervision/monitoring   QI: Shower/Bathe Self   Assistance Needed Supervision   Assistance Provided by [place] No physical assistance   Shower/Bathe Self CARE Score 4   Bathing   Assessed Bath Style Shower   Anticipated D/C Bath Style Shower; Tub   Able to Gather/Transport Yes   Able to Adjust Water Temperature Yes   Able to Wash/Rinse/Dry (body part) Left Arm;Right Arm;L Upper Leg;R Upper Leg;L Lower Leg/Foot;R Lower Leg/Foot; Abdomen; Chest;Perineal Area; Buttocks   Limitations Noted in Balance; Coordination; Endurance; Safety;ROM; Timeliness   Positioning Seated;Standing   Adaptive Equipment Hand Held Shower; Tub Bench; Shower Bars   Findings  Pt demo ability to wash B LE by placing foot on opposite knee to adhere to spinal precautions  Pt require min VC to remember spinal precautions during bathing to inc safety xiao w/ reminder not to bend fwd to wash B LE   Pt stood w/ GB for unilateral support to wash buttocks/perineal area  Pt require inc time and effort to complete bathing  Provided pt education on recommendation for shower chair, hand held shower, and GB at home w/ pt considering options  Bathing (FIM) 5 - Patient requires verbal cues but completes 10/10 parts   Tub/Shower Transfer   Limitations Noted In Balance; Endurance; Safety;UE Strength;LE Strength   Adaptive Equipment Transfer Bench   Assessed Shower   Findings Pt required initial VC to don slippers to complete transfer safely  Pt SPT w/ RW, pt demo good safety awareness w/ proper hand placement on GB  Shower Transfer (FIM) 5 - Patient requires supervision/monitoring   QI: Upper Body Dressing   Assistance Needed Incidental touching   Assistance Provided by [place] Less than 25%   Comment inc time and A for cervical collar    Upper Body Dressing CARE Score 3   QI: Lower Body Dressing   Assistance Needed Supervision   Assistance Provided by [place] No physical assistance   Lower Body Dressing CARE Score 4   QI: Putting On/Taking Off Footwear   Assistance Needed Verbal cues   Assistance Provided by [place] No physical assistance   Putting On/Taking Off Footwear CARE Score 4   QI: Picking Up Object   Comment safety concern 2* spinal precautions   Reason if not Attempted Activity not applicable   Picking Up Object CARE Score 9   Dressing/Undressing Clothing   Able to  [address] [place]; Other  (cervical collar)   Remove LB Clothes Pants;Socks; [place]; Other  (cervical collar)   Don LB Clothes Pants;Socks; Shoes   Limitations Noted In Balance; Endurance; Safety;Timeliness;ROM;Strength   Positioning Supported Sit;Standing   Findings Pt required incidental touching to adjust cervical collar for comfort even after being placed in front of mirror  Pt demo Fair standing balance w/ b/l release to pull pants o/ hips  Pt required inc time and VC to adhere to spinal precautions   Pt utilizes leg o/ opposing knee to don/doff socks/shoes  UB Dressing (FIM) 5 - [place] sets up supplies or applies device   LB Dressing (FIM) 6 - Patient requires assistive device/extra time/safety concerns but completes independently   QI: Sit to [facility] Provided by [place] No physical assistance   Sit to Lying CARE Score 4   QI: Lying to Sitting on Side of Bed   Assistance Needed Supervision   Assistance Provided by [place] No physical assistance   Lying to Sitting on Side of Bed CARE Score 4   QI: Sit to [address] Provided by [place] No physical assistance   Sit to Stand CARE Score 4   QI: Chair/Bed-to-Chair Transfer   Assistance Needed Supervision   Assistance Provided by [place] No physical assistance   Chair/Bed-to-Chair Transfer CARE Score 4   Transfer Bed/Chair/Wheelchair   Limitations Noted In Balance; Coordination; Endurance;UE Strength;LE Strength   Adaptive Equipment Roller Walker   Stand Pivot Supervision   Sit to Stand Supervision   Stand to Sit Supervision   Supine to Sit Supervision   Sit to Supine Supervision   Findings Raised bed to simulate home setting  Pt consitently demo G hand placement for safe transfers and maintained slow, safe movements  Bed, Chair, Wheelchair Transfer (FIM) 5 - Patient requires supervision/monitoring   QI: [address]   Reason if not Attempted Activity not applicable   Toileting Hygiene CARE Score 9   QI: Toilet Transfer   Reason if not Attempted Activity not applicable   Toilet Transfer CARE Score 9   Cognition   Overall Cognitive Status WFL   Arousal/Participation Alert; Cooperative   Attention Attends with cues to redirect   Orientation Level Oriented X4   Memory Decreased recall of precautions   Following Commands Follows multistep commands with increased time or repetition   Activity Tolerance   Activity Tolerance Patient tolerated treatment well   Assessment   Treatment Assessment Pt participated in skilled OT Tx session w/ focus on completing full ADL and safe fxnl transfers  Pt tangential requiring cues to stay on task  Pt reports frustration w/ NSG and administration of pain meds  NSG aware  Pt noted experience of pain in L shoulder and pain/tingling on dorsal aspect of L hand over night after completing theraputty exercise from previous OT Tx session  OT removed exercises and theraputty from pt room w/ plan to monitor pain in L UE more closely w/ strengthening or ROM exercises  Pt noted no pain in L UE during ADL routine  Pt demo ability to doff cervical collar, but would continue to benefit from practice donning cervical collar in front of mirror to maximize comfort  Pt would cont to benefit from training w/ reacher to inc safety with LB dressing and adhere to spinal precautions  Pt would cont to benefit from skilled OT services w/ focus on AE training, endurance, UB/LB strengthening, and coordination to inc Indep w/ ADLs and all fxnl transfers  Prognosis Good   Problem List Decreased strength;Decreased range of motion;Decreased endurance; Impaired balance;Decreased coordination   Barriers to Discharge Other (Comment)  (No GB or hand held shower head in BR at home)   Plan   Treatment/Interventions ADL retraining;Functional transfer training;LE strengthening/ROM; Therapeutic exercise; Endurance training;Patient/family training;Equipment eval/education   Progress Progressing toward goals   Recommendation   OT Discharge Recommendation Home with family support   Equipment Recommended Tub seat with back; Other (comment)  (Hand Held Shower Head, GB)   OT - OK to Discharge Yes   OT Therapy Minutes   OT Time In 0700   OT Time Out 0830   OT Total Time (minutes) 90   OT Mode of treatment - Individual (minutes) 90   OT Mode of treatment - Concurrent (minutes) 0   OT Mode of treatment - Group (minutes) 0   OT Mode of treatment - Co-treat (minutes) 0   OT Mode of Teatment - Total time(minutes) 90 minutes   Therapy Time missed   Time missed? No

## 2018-05-16 NOTE — PROGRESS NOTES
Physical Medicine & Rehabilitation Progress Note:    Primary Rehabilitation Diagnosis: Severe Cervical Stenosis with Cervical Radicuopathy    Subjective: No acute events overnight  Patient without complaints today  Hoping to have podiatry consultation for nail management  Also asking for lidoderm patch to upper back  Review Of Systems:   10 point ROS completed, pertinent positives above    Functional Update:  Physical Therapy Occupational Therapy   Transfers: Supervision  Bed Mobility: Minimal Assistance  Amulation Distance (ft): 400 feet  Ambulation: Supervision  Assistive Device for Ambulation: Roller Walker  Number of Stairs: 12  Assistive Device for Stairs: Right Hand Rail  Stair Assistance: Contact Guard  Ramp: Contact Guard  Discharge Recommendations: Home with:  [address] with[de-identified] 24 Hour Supervision, Family Support, Outpatient Physical Therapy Eating: Supervision  Grooming: Supervision  Bathing: Moderate Assistance  Bathing: Moderate Assistance  Upper Body Dressing: Supervision  Lower Body Dressing: Minimal Assistance  Toileting: Minimal Assistance  Tub/Shower Transfer: Minimal Assistance  Toilet Transfer: Minimal Assistance  Cognition: Within Defined Limits  Orientation: Person, Place, Time, Situation         Objective:  /52 (BP Location: Left arm)   Pulse 90   Temp 98 1 °F (36 7 °C) (Oral)   Resp 20   Ht 5' 11" (1 803 m)   Wt 69 5 kg (153 lb 3 5 oz)   SpO2 97%   BMI 21 37 kg/m²     GENERAL: No acute distress    NECK: +C-Collar in place,  healing anterior neck incision  Posterior neck incision C/D/I   CARDIOVASCULAR: regular rate rhythm, no murmurs  LUNGS: clear to ausculation bilaterally, good inspiratory effort, no wheezes, rales, rhonchi   ABDOMEN: soft, non-tender, non-distended, no guarding     EXTREMITIES: no pedal edema bilaterally, negative Coleen's bilaterally  MUSCULOSKELETAL: active assist and passive ROM functional x 4  NEUROLOGICAL: Awake, alert, oriented to person, place, time, and situation  Speech fluent  Able to follow simple commands  Assessment:    Leeanna Maldonado is a 81 y o  male in fair condition with severe cervical stenosis/radiculopathy s/p a two part surgery ACDF and PSF levels C3-T2:    Rehabilitation Plan:     -Rehabilitation of cervical stenosis/radiculopathy: PT/OT therapies  Deficits include L > R UE weakness, generalized weakness, decreased balance and strength  +cervical spine precautions Lidoderm to upper back  -Appreciate Internal Medicine following - Dr Livingston Sites service    -severe cervical stenosis/radiculopathy s/p a two part surgery ACDF and PSF levels C3-T2 on 4/12/18 and 4/30/18: C Collar to be worn while OOB  F/U with Dr Maritza Bueno  Cervical precautions     -substernal palpable chest wall pain: likely mild costochondritis from surgical positioning  Improving  Continue lidoderm patch      -nociceptive pain: managed on Percocet 1 tab Q4h PRN, Tylenol PRN, and scheduled low dose celebrex 100mg daily     -neuropathic pain: managed on neurontin 600mg BID     -muscular spasms: managed on valium 5mg QHS  Switched robaxin 500mg to Q8h prn    Wean as progressing       -restless legs: managed on Mirapex     -HTN: managed on chlorthalidone (new med) IM to monitor BPs     -HLD: managed on Pravachol     -COPD: managed on PRN Anoro Ellipta inhaler      -GERD: managed on Protonix    -Constipation: improved     -DVT ppx: SCDS + Aspirin 325mg daily per Surg team     Dipsosition: Home 5/19      Current Facility-Administered Medications:     aspirin (ECOTRIN) EC tablet 325 mg, 325 mg, Oral, Daily, Melissa Buchanan MD, 325 mg at 05/16/18 0903    azelastine (ASTELIN) 0 1 % nasal spray 2 spray, 2 spray, Each Nare, Daily, Melissa Buchanan MD, 2 spray at 05/16/18 0904    calcium carbonate (TUMS) chewable tablet 1,000 mg, 1,000 mg, Oral, Daily PRN, Melissa Buchanan MD    celecoxib (CeleBREX) capsule 100 mg, 100 mg, Oral, Daily, Melissa Buchanan MD, 100 mg at 05/16/18 0904   diazepam (VALIUM) tablet 5 mg, 5 mg, Oral, HS, Isauro Cisse MD, 5 mg at 05/15/18 2140    docusate sodium (COLACE) capsule 100 mg, 100 mg, Oral, BID, Isauro Cisse MD, 100 mg at 05/16/18 0904    gabapentin (NEURONTIN) capsule 600 mg, 600 mg, Oral, BID, Isauro Cisse MD, 600 mg at 05/16/18 0904    lidocaine (LIDODERM) 5 % patch 1 patch, 1 patch, Transdermal, Daily, Geo Palumbo PA-C, 1 patch at 05/16/18 9107    magnesium hydroxide (MILK OF MAGNESIA) 400 mg/5 mL oral suspension 30 mL, 30 mL, Oral, Daily PRN, Isauro Cisse MD    methocarbamol (ROBAXIN) tablet 500 mg, 500 mg, Oral, Q8H [address], Isauro Cisse MD, 500 mg at 05/16/18 5837    oxyCODONE (ROXICODONE) IR tablet 2 5 mg, 2 5 mg, Oral, Q6H PRN, Isauro Cisse MD    oxyCODONE-acetaminophen (PERCOCET) 5-325 mg per tablet 1 tablet, 1 tablet, Oral, Q4H PRN, Isauro Cisse MD, 1 tablet at 05/16/18 0620    pantoprazole (PROTONIX) EC tablet 20 mg, 20 mg, Oral, Early Morning, Isauro Cisse MD, 20 mg at 05/16/18 0621    polyethylene glycol (MIRALAX) packet 17 g, 17 g, Oral, Daily, Geo Palumbo PA-C, 17 g at 05/16/18 0904    pramipexole (MIRAPEX) tablet 0 75 mg, 0 75 mg, Oral, HS, Isauro Cisse MD, 0 75 mg at 05/15/18 2140    pravastatin (PRAVACHOL) tablet 40 mg, 40 mg, Oral, Daily With Denny Nicolas MD, 40 mg at 05/15/18 1720    senna (SENOKOT) tablet 8 6 mg, 1 tablet, Oral, Daily, Isauro Cisse MD, 8 6 mg at 05/16/18 0904    simethicone (MYLICON) chewable tablet 80 mg, 80 mg, Oral, 4x Daily PRN, Isauro Cisse MD     Diagnostic Studies:  Reviewed, no new imaging    Vitals: Reviewed   Temp:  [98 °F (36 7 °C)-98 1 °F (36 7 °C)] 98 1 °F (36 7 °C)  HR:  [90-94] 90  Resp:  [18-20] 20  BP: ()/(52-69) 102/52   Intake/Output Summary (Last 24 hours) at 05/16/18 1000  Last data filed at 05/16/18 0908   Gross per 24 hour   Intake              885 ml   Output             1225 ml   Net             -340 ml        Laboratory: Reviewed  Lab Results   Component Value Date    HGB 12 6 05/14/2018    HCT 38 4 05/14/2018    WBC 17 57 (H) 05/14/2018     Lab Results   Component Value Date    BUN 23 05/15/2018     (L) 05/15/2018    K 3 9 05/15/2018    CL 98 (L) 05/15/2018    GLUCOSE 85 05/15/2018    CREATININE 0 65 05/15/2018     No results found for: PROTIME, INR     Patient Active Problem List   Diagnosis    Spinal stenosis of cervical region    Upper extremity weakness    S/P laminectomy with spinal fusion       ** Please Note: Fluency Direct voice to text software may have been used in the creation of this document  **    [ x ] Total time spent: 30 Mins, and greater than 50% of this time was spent counseling/coordinating care      Deyanira Hopkins MD  [facility]

## 2018-05-17 PROCEDURE — 97530 THERAPEUTIC ACTIVITIES: CPT

## 2018-05-17 PROCEDURE — 99232 SBSQ HOSP IP/OBS MODERATE 35: CPT | Performed by: PHYSICAL MEDICINE & REHABILITATION

## 2018-05-17 PROCEDURE — 97535 SELF CARE MNGMENT TRAINING: CPT

## 2018-05-17 PROCEDURE — 97110 THERAPEUTIC EXERCISES: CPT

## 2018-05-17 PROCEDURE — 97116 GAIT TRAINING THERAPY: CPT

## 2018-05-17 RX ORDER — OXYCODONE HYDROCHLORIDE 5 MG/1
5 TABLET ORAL EVERY 8 HOURS PRN
Qty: 30 TABLET | Refills: 0 | Status: SHIPPED | OUTPATIENT
Start: 2018-05-17 | End: 2018-05-27

## 2018-05-17 RX ORDER — GINSENG 100 MG
1 CAPSULE ORAL 2 TIMES DAILY
Status: DISCONTINUED | OUTPATIENT
Start: 2018-05-17 | End: 2018-05-19 | Stop reason: HOSPADM

## 2018-05-17 RX ORDER — PANTOPRAZOLE SODIUM 20 MG/1
20 TABLET, DELAYED RELEASE ORAL
Qty: 30 TABLET | Refills: 0 | Status: SHIPPED | OUTPATIENT
Start: 2018-05-18 | End: 2018-07-04

## 2018-05-17 RX ADMIN — ASPIRIN 325 MG: 325 TABLET, COATED ORAL at 08:15

## 2018-05-17 RX ADMIN — PRAMIPEXOLE DIHYDROCHLORIDE 0.75 MG: 0.5 TABLET ORAL at 22:06

## 2018-05-17 RX ADMIN — OXYCODONE HYDROCHLORIDE AND ACETAMINOPHEN 1 TABLET: 5; 325 TABLET ORAL at 11:53

## 2018-05-17 RX ADMIN — DIAZEPAM 5 MG: 5 TABLET ORAL at 22:06

## 2018-05-17 RX ADMIN — DOCUSATE SODIUM 100 MG: 100 CAPSULE, LIQUID FILLED ORAL at 17:15

## 2018-05-17 RX ADMIN — GABAPENTIN 600 MG: 300 CAPSULE ORAL at 08:15

## 2018-05-17 RX ADMIN — PANTOPRAZOLE SODIUM 20 MG: 20 TABLET, DELAYED RELEASE ORAL at 05:23

## 2018-05-17 RX ADMIN — CELECOXIB 100 MG: 100 CAPSULE ORAL at 08:15

## 2018-05-17 RX ADMIN — SENNOSIDES 8.6 MG: 8.6 TABLET ORAL at 08:15

## 2018-05-17 RX ADMIN — PRAVASTATIN SODIUM 40 MG: 40 TABLET ORAL at 17:15

## 2018-05-17 RX ADMIN — GABAPENTIN 600 MG: 300 CAPSULE ORAL at 17:15

## 2018-05-17 RX ADMIN — AZELASTINE HYDROCHLORIDE 2 SPRAY: 137 SPRAY, METERED NASAL at 08:16

## 2018-05-17 RX ADMIN — METHOCARBAMOL 500 MG: 500 TABLET ORAL at 22:06

## 2018-05-17 RX ADMIN — OXYCODONE HYDROCHLORIDE AND ACETAMINOPHEN 1 TABLET: 5; 325 TABLET ORAL at 05:23

## 2018-05-17 RX ADMIN — BACITRACIN ZINC 1 SMALL APPLICATION: 500 OINTMENT TOPICAL at 11:54

## 2018-05-17 RX ADMIN — LIDOCAINE 2 PATCH: 50 PATCH CUTANEOUS at 08:14

## 2018-05-17 RX ADMIN — BACITRACIN ZINC 1 SMALL APPLICATION: 500 OINTMENT TOPICAL at 17:16

## 2018-05-17 RX ADMIN — METHOCARBAMOL 500 MG: 500 TABLET ORAL at 13:59

## 2018-05-17 RX ADMIN — OXYCODONE HYDROCHLORIDE AND ACETAMINOPHEN 1 TABLET: 5; 325 TABLET ORAL at 19:09

## 2018-05-17 RX ADMIN — DOCUSATE SODIUM 100 MG: 100 CAPSULE, LIQUID FILLED ORAL at 08:15

## 2018-05-17 NOTE — PROGRESS NOTES
05/17/18 0830   Pain Assessment   Pain Assessment 0-10   Pain Score No Pain   Restrictions/Precautions   Precautions Fall Risk   Braces or Orthoses C/S Collar   Grooming   Able To Initiate Tasks; Wash/Dry Hands;Brush/Clean Teeth   Findings pt completed grooming while standin gat sink with use of RW for support  Pt requires supervision for safety due to balance   Grooming (FIM) 5 - Patient requires supervision/monitoring   QI: Sit to Stand   Assistance Needed Adaptive equipment   Assistance Provided by [place] No physical assistance   Comment RW   Sit to Stand CARE Score 6   QI: Chair/Bed-to-Chair Transfer   Assistance Needed Supervision   Assistance Provided by [place] No physical assistance   Comment RW   Chair/Bed-to-Chair Transfer CARE Score 4   Transfer Bed/Chair/Wheelchair   Stand Pivot Supervision   Sit to Stand (mod i)   Stand to Sit (mod i)   Bed, Chair, Wheelchair Transfer (FIM) 5 - Patient requires supervision/monitoring   QI: [address] Needed Supervision   Assistance Provided by [place] No physical assistance   Comment pt able to complete bowel hygiene care while seated on toilet  Pt reuqirse supervision during pants up/down  Pt leaning against wall for support  Toileting Hygiene CARE Score 4   Toileting   Able to [address] down yes, up yes  Manage Hygiene Bowel   Limitations Noted In Balance; Safety   Toileting (FIM) 5 - Patient requires supervision/monitoring   QI: Toilet Transfer   Assistance Needed Supervision   Assistance Provided by [place] No physical assistance   Toilet Transfer CARE Score 4   Toilet Transfer   Surface Assessed Standard Toilet   Transfer Technique Standard   Findings pt walking into bathroom  Completes transfer at supervision level   Toilet Transfer (FIM) 5 - Patient requires supervision/monitoring   Meal Prep   Meal Prep Level Walker   Meal Prep Level of Assistance Close supervision   Meal Preparation Pt completed coffee making task   P teducated on bridging items from surface to surface  Pt states when emptying  he would drag garbage can across kitchen  Pt attempting to demonstrate however therapist recommended using bag on RW for trash to help transport from counter to garbage can  FOr coffee making task pt woul dbenefit from supervision   Kitchen Mobility   Kitchen-Mobility Level Walker   Kitchen Activity Retrieve items;Transport items   Kitchen Mobility Comments  [facility] [address] Management Level of Assistance Modified independent   Health Management pt given current medication list and able to accurately fill pill box for 8/8 medications  Pt nicola to recall medications that he was on at baseline and able to recall dosage and time of day  Pt overall completed task safely  Is ok to resume medication management independently at home using pill box  Pt states he has pill box at home  Exercise Tools   Exercise Tools Yes   UE Ergometer 8 min prograde 7 min retrograde power level 4 for UE ergometer to improve UE endurance and ROM  Pt with no pain in shoulder during ergometer  Pt tolerated well for endurance training  Cognition   Overall Cognitive Status WFL   Arousal/Participation Alert; Cooperative   Attention Attends with cues to redirect   Orientation Level Oriented X4   Memory Decreased short term memory   Following Commands Follows multistep commands with increased time or repetition   Activity Tolerance   Activity Tolerance Patient tolerated treatment well   Assessment   Treatment Assessment Pt engaged in OT treatment session with focus on balance, endurance, fxnl mobility, IADL tasks  Pt with decreased safety with decision making for CM post toileting  Pt tolerates standing with no LOB but verbal cues for safety and positioning of RW during IADL tasks  Pt would benefit from continued OT session with focus on dynamic standing balance, endurance, and fxnl mobility      Prognosis Good   Problem List Decreased strength;Decreased endurance; Impaired balance;Decreased mobility; Decreased safety awareness   Plan   Treatment/Interventions ADL retraining;Functional transfer training;LE strengthening/ROM; Bed mobility; Compensatory technique education;Patient/family training;Equipment eval/education   Progress Progressing toward goals   OT Therapy Minutes   OT Time In 0830   OT Time Out 1000   OT Total Time (minutes) 90   OT Mode of treatment - Individual (minutes) 90   OT Mode of treatment - Concurrent (minutes) 0   OT Mode of treatment - Group (minutes) 0   OT Mode of treatment - Co-treat (minutes) 0   OT Mode of Teatment - Total time(minutes) 90 minutes   Therapy Time missed   Time missed?  No

## 2018-05-17 NOTE — PROGRESS NOTES
Physical Medicine & Rehabilitation Progress Note:    Primary Rehabilitation Diagnosis: Severe Cervical Stenosis with Cervical Radicuopathy    Subjective: No acute events overnight  Patient without complaint this AM      Review Of Systems:   10 point ROS completed, pertinent positives above    Functional Update:  Physical Therapy Occupational Therapy   Transfers: Supervision  Bed Mobility: Minimal Assistance  Amulation Distance (ft): 400 feet  Ambulation: Supervision  Assistive Device for Ambulation: Roller Walker  Number of Stairs: 12  Assistive Device for Stairs: Right Hand Rail  Stair Assistance: Contact Guard  Ramp: Contact Guard  Discharge Recommendations: Home with:  [address] with[de-identified] 24 Hour Supervision, Family Support, Outpatient Physical Therapy Eating: Supervision  Grooming: Supervision  Bathing: Moderate Assistance  Bathing: Moderate Assistance  Upper Body Dressing: Supervision  Lower Body Dressing: Minimal Assistance  Toileting: Minimal Assistance  Tub/Shower Transfer: Minimal Assistance  Toilet Transfer: Minimal Assistance  Cognition: Within Defined Limits  Orientation: Person, Place, Time, Situation         Objective:  /58 (BP Location: Right arm)   Pulse 84   Temp 97 6 °F (36 4 °C) (Oral)   Resp 18   Ht 5' 11" (1 803 m)   Wt 69 5 kg (153 lb 3 5 oz)   SpO2 96%   BMI 21 37 kg/m²     GENERAL: No acute distress    NECK: +C-Collar in place,  healing anterior neck incision  Posterior neck incision C/D/I   CARDIOVASCULAR: regular rate rhythm, no murmurs  LUNGS: clear to ausculation bilaterally, good inspiratory effort, no wheezes, rales, rhonchi   ABDOMEN: soft, non-tender, non-distended, no guarding  EXTREMITIES: no pedal edema bilaterally, negative Coleen's bilaterally  MUSCULOSKELETAL: active assist and passive ROM functional x 4  NEUROLOGICAL: Awake, alert, oriented to person, place, time, and situation  Speech fluent  Able to follow simple commands      Assessment:    Alexandru Nino is a 81 y o  male in fair condition with severe cervical stenosis/radiculopathy s/p a two part surgery ACDF and PSF levels C3-T2:    Rehabilitation Plan:     -Rehabilitation of cervical stenosis/radiculopathy: PT/OT therapies  Deficits include L > R UE weakness, generalized weakness, decreased balance and strength  +cervical spine precautions Lidoderm to upper back  -Appreciate Internal Medicine following - Dr Ellen Vaughan service    -severe cervical stenosis/radiculopathy s/p a two part surgery ACDF and PSF levels C3-T2 on 4/12/18 and 4/30/18: C Collar to be worn while OOB  F/U with Dr Brandon Zapata  Cervical precautions     -substernal palpable chest wall pain: likely mild costochondritis from surgical positioning  Improving  Continue lidoderm patch      -nociceptive pain: managed on Percocet 1 tab Q4h PRN, Tylenol PRN  Discontinue celebrex      -neuropathic pain: managed on neurontin 600mg BID     -muscular spasms: managed on valium 5mg QHS  Switched robaxin 500mg to Q8h prn    Wean as progressing       -restless legs: managed on Mirapex     -HTN: managed on chlorthalidone (new med) IM to monitor BPs     -HLD: managed on Pravachol     -COPD: managed on PRN Anoro Ellipta inhaler      -GERD: managed on Protonix    -Constipation: improved     -DVT ppx: SCDS + Aspirin 325mg daily per Surg team     Dipsosition: Home 5/19      Current Facility-Administered Medications:     aspirin (ECOTRIN) EC tablet 325 mg, 325 mg, Oral, Daily, Dioni Warner MD, 325 mg at 05/17/18 0815    azelastine (ASTELIN) 0 1 % nasal spray 2 spray, 2 spray, Each Nare, Daily, Dioni Warner MD, 2 spray at 05/17/18 0816    bacitracin topical ointment 1 small application, 1 small application, Topical, BID, RENUKA Jain    calcium carbonate (TUMS) chewable tablet 1,000 mg, 1,000 mg, Oral, Daily PRN, Dioni Warner MD    celecoxib (CeleBREX) capsule 100 mg, 100 mg, Oral, Daily, Dioni Warner MD, 100 mg at 05/17/18 0815    diazepam (VALIUM) tablet 5 mg, 5 mg, Oral, HS, Corey Hsu MD, 5 mg at 05/16/18 2130    docusate sodium (COLACE) capsule 100 mg, 100 mg, Oral, BID, Corey Hsu MD, 100 mg at 05/17/18 0815    gabapentin (NEURONTIN) capsule 600 mg, 600 mg, Oral, BID, Corey Hsu MD, 600 mg at 05/17/18 0815    lidocaine (LIDODERM) 5 % patch 2 patch, 2 patch, Transdermal, Daily, Alice Kramer MD, 2 patch at 05/17/18 0814    magnesium hydroxide (MILK OF MAGNESIA) 400 mg/5 mL oral suspension 30 mL, 30 mL, Oral, Daily PRN, Corey Hsu MD    methocarbamol (ROBAXIN) tablet 500 mg, 500 mg, Oral, Q8H PRN, Alice Kramer MD, 500 mg at 05/16/18 2322    oxyCODONE (ROXICODONE) IR tablet 2 5 mg, 2 5 mg, Oral, Q6H PRN, Corey Hsu MD    oxyCODONE-acetaminophen (PERCOCET) 5-325 mg per tablet 1 tablet, 1 tablet, Oral, Q4H PRN, Corey Hsu MD, 1 tablet at 05/17/18 0523    pantoprazole (PROTONIX) EC tablet 20 mg, 20 mg, Oral, Early Morning, Corey Hsu MD, 20 mg at 05/17/18 0523    polyethylene glycol (MIRALAX) packet 17 g, 17 g, Oral, Daily, Arron Barlow PA-C, 17 g at 05/16/18 0904    pramipexole (MIRAPEX) tablet 0 75 mg, 0 75 mg, Oral, HS, Corey Hsu MD, 0 75 mg at 05/16/18 2130    pravastatin (PRAVACHOL) tablet 40 mg, 40 mg, Oral, Daily With Melly Milner MD, 40 mg at 05/16/18 1716    senna (SENOKOT) tablet 8 6 mg, 1 tablet, Oral, Daily, Corey Hsu MD, 8 6 mg at 05/17/18 0815    simethicone (MYLICON) chewable tablet 80 mg, 80 mg, Oral, 4x Daily PRN, Corey Hsu MD     Diagnostic Studies:  Reviewed, no new imaging    Vitals: Reviewed   Temp:  [97 6 °F (36 4 °C)-98 7 °F (37 1 °C)] 97 6 °F (36 4 °C)  HR:  [83-95] 84  Resp:  [18-20] 18  BP: ()/(58-69) 104/58   Intake/Output Summary (Last 24 hours) at 05/17/18 1017  Last data filed at 05/17/18 0900   Gross per 24 hour   Intake             1882 ml   Output             1650 ml   Net              232 ml        Laboratory: Reviewed  Lab Results   Component Value Date    HGB 12 6 05/14/2018    HCT 38 4 05/14/2018    WBC 17 57 (H) 05/14/2018     Lab Results   Component Value Date    BUN 23 05/15/2018     (L) 05/15/2018    K 3 9 05/15/2018    CL 98 (L) 05/15/2018    GLUCOSE 85 05/15/2018    CREATININE 0 65 05/15/2018     No results found for: PROTIME, INR     Patient Active Problem List   Diagnosis    Spinal stenosis of cervical region    Upper extremity weakness    S/P laminectomy with spinal fusion       ** Please Note: Fluency Direct voice to text software may have been used in the creation of this document  **    [ x ] Total time spent: 30 Mins, and greater than 50% of this time was spent counseling/coordinating care      Tito Cosme MD  [facility]

## 2018-05-17 NOTE — PROGRESS NOTES
05/17/18 1030   Pain Assessment   Pain Assessment 0-10   Pain Score 4   Pain Location Shoulder   Pain Orientation Left   Pain Frequency Constant/continuous   Hospital Pain Intervention(s) Repositioned;Distraction   Restrictions/Precautions   Precautions Fall Risk;Spinal precautions   Braces or Orthoses C/S Collar   Cognition   Arousal/Participation Alert; Cooperative   Attention Attends with cues to redirect   Following Commands Follows multistep commands with increased time or repetition   Subjective   Subjective pt reported 4/10 pain on his shoulder blades before and after PT session  QI: Roll Left and Right   Assistance Needed Set-up / clean-up   Roll Left and Right CARE Score 5   QI: Sit to [address] / clean-up   Sit to Lying CARE Score 5   QI: Lying to Sitting on Side of Bed   Assistance Needed Set-up / clean-up   Lying to Sitting on Side of Bed CARE Score 5   QI: Sit to Stand   Assistance Needed Supervision   Sit to Stand CARE Score 4   Bed Mobility   Findings set up   QI: Chair/Bed-to-Chair Transfer   Assistance Needed Supervision   Chair/Bed-to-Chair Transfer CARE Score 4   Transfer Bed/Chair/Wheelchair   Limitations Noted In LE Strength; Endurance;Balance   Adaptive Equipment Roller Walker   Stand Pivot Supervision   Sit to Stand Supervision   Stand to Sit Supervision   Supine to Sit Supervision   Sit to Supine Supervision   Car Transfer Supervision   Findings S with RWSUDHA during [facility] trial   Bed, Chair, Wheelchair Transfer (FIM) 5 - Patient requires supervision/monitoring   QI: Car Transfer   Assistance Needed Supervision   Car Transfer CARE Score 4   QI: Walk 10 Feet   Assistance Needed Supervision   Walk 10 Feet CARE Score 4   QI: Walk 50 Feet with Two Turns   Assistance Needed Supervision   Walk 50 Feet with Two Turns CARE Score 4   QI: Walk 150 Feet   Assistance Needed Supervision   Walk 150 Feet CARE Score 4   QI: Walking 10 Feet on Uneven Surfaces   Assistance Needed Supervision;Verbal cues   Comment CS ramp with RW   Walking 10 Feet on Uneven Surfaces CARE Score 4   Ambulation   Does the patient walk? 2  Yes   Primary Discharge Mode of Locomotion Walk   Walk Assist Level Supervision   Gait Pattern Inconsistant Lynne; Improper weight shift   Assist Device Roller Marcella Peña Walked (feet) 500 ft  (with RW, 400 x 1 with SPC with CG-min)   Limitations Noted In Balance; Endurance;Strength   Walking (FIM) 5 - Patient requires supervision/monitoring AND distance 150 feet or more, no rest   Wheelchair mobility   QI: Does the patient use a wheelchair? 0  No   QI: 1 Step (Curb)   Assistance Needed Supervision;Verbal cues   Comment CS with curb using RW with cues on sequencing   1 Step (Curb) CARE Score 4   Stairs   Type Curb;Ramp   # of Steps 1   Weight Bearing Precautions Fall Risk;Cervical   Assist Devices Roller Walker   Findings CS with safety cues   Stairs (FIM) 5 - Patient requires supervision/monitoring AND goes up and down full flight (12- 14 stairs)   Therapeutic Interventions   Strengthening L4 x 16 mins using LE only   Assessment   Treatment Assessment Pt continues to only require S during functional mobilities using a RW  But during continued training using a SPC he was more unsteady this AM requiring CG-min A which could be fatigue induced  pt reported legs were shaky but no knee buckling noted  able to negotiate curb and ramp with RW at CS level however required safety cueing including stepping closer to edge of the step prior to stepping down for safety, as well as cueing for sequencing as he still forgets to lead with L LE going down curb step  Family/Caregiver Present no   Barriers to Discharge Inaccessible home environment;Decreased caregiver support   PT Barriers   Physical Impairment Decreased strength;Decreased endurance; Impaired balance;Decreased mobility; Decreased range of motion;Decreased coordination;Orthopedic restrictions   Functional Limitation Walking;Stair negotiation   Plan   Treatment/Interventions Functional transfer training;LE strengthening/ROM; Therapeutic exercise; Endurance training;Bed mobility;Gait training;Spoke to nursing;OT   Progress Improving as expected   Recommendation   Recommendation 24 hour supervision/assist;Outpatient PT; Home with family support   PT Therapy Minutes   PT Time In 1030   PT Time Out 1130   PT Total Time (minutes) 60   PT Mode of treatment - Individual (minutes) 60   PT Mode of treatment - Concurrent (minutes) 0   PT Mode of treatment - Group (minutes) 0   PT Mode of treatment - Co-treat (minutes) 0   PT Mode of Teatment - Total time(minutes) 60 minutes

## 2018-05-17 NOTE — DISCHARGE INSTRUCTIONS
Please note you are restricted from driving/operating a motorized vehicle/operating heavy machinery/etc until you are cleared by your surgeon  Please see your doctors listed in the follow up providers section of your discharge paperwork, and take the discharge paperwork with you to your appointments    Please note changes may have been made to your medications please refer to your discharge paperwork for your current medications and take this list with you to all your doctors appointments for your doctors to review    Please do not resume a home medication unless the medication reconciliation sheet indicates to do so, please do not assume that a medication that you were given a prescription for is the same as a medication you have at home based on both medications having the same name as dosages and frequency may have changed    Please do not combine any muscle relaxants (including but not limited to zanaflex, flexaril, soma, robaxin, etc) pain medications (including but not limited to tramadol, vicodin, norco, percocet, oxycodone, oxycontin, morphine, hydropmorphone, oxymorphone, fentanyl, hydrocodone, etc)  or sedatives/sleep aids/anti-anxiety medications (including but not limited to [place], trazodone, valium, xanax, klonipin, ativan, lorazepam, etc) that you may have been prescribed prior to admission with the pain medication you are being prescribed upon discharge from the rehab unit  Please do not drive or operate heavy machinery while using these medications  Do not drink alcohol while using these medications       It is advisable to limit the use of pain medications (including but not limited to tramadol, vicodin, norco, percocet, oxycodone, oxycontin, morphine, hydropmorphone, oxymorphone, fentanyl, hydrocodone, etc) and avoid sedatives/sleep aids/anti-anxiety medications (including but not limited to [place], trazodone, valium, xanax, klonipin, ativan, lorazepam etc), muscle relaxants (including but not limited to zanaflex, flexaril, soma, robaxin, etc) as they may become habit forming  Please do not drive or operate heavy machinery while using these medications  Do not drink alcohol while using these medications    You will be given a limited supply of pain medications on discharge; should you require additional refills/medications, your PCP and/or surgeon may prescribe at his/her discretion  Please note the following incidental findings were found during your recent hospitalization please discuss them with your doctors so that they may arrange any tests/referrals as they deem necessary:   · Chest Pain - You experienced chest pain which was believed to be musculoskeletal in origin, improved with lidocaine patch  Please follow-up with your family physician  · Hypokalemia - This is the term used to indicate low potassium levels  This improved throughout your rehabilitation stay  Please follow-up with your family physician  · Hypotension - This is a term used to indicate low blood pressures  Your blood pressure remained stable without the need for additional medications  Please follow-up with your family physician  Please avoid NSAID (including but not limited to advil, aleve, motrin, naproxen, ibuprofen, mobic, meloxicam, etc) medications as NSAID medications may delay bone healing    Please complete your course of aspirin 325 mg every 12 hours for 28 days from the date of your surgery      Unless specifically noted in your medication list provided to you in your discharge paper work, please discuss with your family doctor prior to resuming any vitamins/minerals/supplements you may have been taking prior to your hospitalization     Please note a summary of your hospital stay with relevant information for your doctors has been sent to them, please confirm with your doctors at your follow up visits that they have received this summary and have them contact [address] [place] [address] if they have not received them along with any other medical records they may require

## 2018-05-17 NOTE — PROGRESS NOTES
05/17/18 1400   Pain Assessment   Pain Assessment 0-10   Pain Score 4   Pain Location Shoulder   Pain Orientation Left   Pain Frequency Constant/continuous   Hospital Pain Intervention(s) Distraction   Restrictions/Precautions   Precautions Fall Risk;Spinal precautions   Braces or Orthoses C/S Collar   Cognition   Arousal/Participation Alert; Cooperative   Attention Attends with cues to redirect   Following Commands Follows multistep commands with increased time or repetition   Subjective   Subjective pt reported 4/10 L shoulder pain before and after PT session   Transfer Bed/Chair/Wheelchair   Limitations Noted In LE Strength;Balance   Adaptive Equipment Roller Walker;Cane   Stand Pivot Supervision   Sit to Stand Supervision   Stand to Sit Supervision   Findings S with RW, CS with SPC   Bed, Chair, Wheelchair Transfer (FIM) 5 - Patient requires supervision/monitoring   Ambulation   Primary Discharge Mode of Locomotion Walk   Walk Assist Level Supervision   Gait Pattern Inconsistant Lynne   Assist Device Roller Walker   Distance Walked (feet) 400 ft   Findings CS with SPC x 400 x 3 trials   Walking (FIM) 5 - Patient requires supervision/monitoring AND distance 150 feet or more, no rest   QI: 4 Steps   Assistance Needed Supervision;Verbal cues   4 Steps CARE Score 4   QI: 12 Steps   Assistance Needed Supervision;Verbal cues   12 Steps CARE Score 4   Stairs   Type Stairs   # of Steps 12   Weight Bearing Precautions Fall Risk;Cervical   Assist Devices Single Rail   Findings CS reciprocal pattern ascending, non reciprocal pattern descending using R HR only    Stairs (FIM) 5 - Patient requires supervision/monitoring AND goes up and down full flight (12- 14 stairs)   Assessment   Treatment Assessment PM session focused on gait training with SPC  pt was more steady this PM with no LOB demonstrated  He walked 400' x 3 reps using a SPC at CS level consistently   pt also negotiated FF using 1 HR at CS level, improved carry over of sequencing noted as well  PT continues to recommend for pt to use RW at d/c and continue training with [facility] with outpatient PT services  also recommended to the pt to discuss with outpatient PT prior to buying four prong base to replace SPC base, pt verbalized understanding  Pt in agreement with PT POC to continue gait training with SPC, balance training and strengthening exercises  Barriers to Discharge Inaccessible home environment;Decreased caregiver support   PT Barriers   Physical Impairment Decreased strength;Decreased endurance; Impaired balance;Decreased mobility; Decreased range of motion;Decreased coordination;Orthopedic restrictions   Functional Limitation Walking;Stair negotiation   Plan   Treatment/Interventions Functional transfer training; Therapeutic exercise; Endurance training;Gait training   Progress Improving as expected   Recommendation   Discharge Summary d/c to home on 5/19 at S level using a RW   PT Therapy Minutes   PT Time In 1400   PT Time Out 1430   PT Total Time (minutes) 30   PT Mode of treatment - Individual (minutes) 30   PT Mode of treatment - Concurrent (minutes) 0   PT Mode of treatment - Group (minutes) 0   PT Mode of treatment - Co-treat (minutes) 0   PT Mode of Teatment - Total time(minutes) 30 minutes   Therapy Time missed   Time missed?  No

## 2018-05-18 PROCEDURE — 97110 THERAPEUTIC EXERCISES: CPT

## 2018-05-18 PROCEDURE — 97530 THERAPEUTIC ACTIVITIES: CPT

## 2018-05-18 PROCEDURE — 97535 SELF CARE MNGMENT TRAINING: CPT

## 2018-05-18 PROCEDURE — 97116 GAIT TRAINING THERAPY: CPT

## 2018-05-18 PROCEDURE — 99232 SBSQ HOSP IP/OBS MODERATE 35: CPT | Performed by: PHYSICAL MEDICINE & REHABILITATION

## 2018-05-18 RX ADMIN — LIDOCAINE 2 PATCH: 50 PATCH CUTANEOUS at 10:32

## 2018-05-18 RX ADMIN — GABAPENTIN 600 MG: 300 CAPSULE ORAL at 17:58

## 2018-05-18 RX ADMIN — GABAPENTIN 600 MG: 300 CAPSULE ORAL at 08:54

## 2018-05-18 RX ADMIN — METHOCARBAMOL 500 MG: 500 TABLET ORAL at 22:26

## 2018-05-18 RX ADMIN — BACITRACIN ZINC 1 SMALL APPLICATION: 500 OINTMENT TOPICAL at 10:31

## 2018-05-18 RX ADMIN — OXYCODONE HYDROCHLORIDE AND ACETAMINOPHEN 1 TABLET: 5; 325 TABLET ORAL at 00:05

## 2018-05-18 RX ADMIN — DOCUSATE SODIUM 100 MG: 100 CAPSULE, LIQUID FILLED ORAL at 08:54

## 2018-05-18 RX ADMIN — PRAMIPEXOLE DIHYDROCHLORIDE 0.75 MG: 0.5 TABLET ORAL at 22:25

## 2018-05-18 RX ADMIN — OXYCODONE HYDROCHLORIDE AND ACETAMINOPHEN 1 TABLET: 5; 325 TABLET ORAL at 17:59

## 2018-05-18 RX ADMIN — OXYCODONE HYDROCHLORIDE AND ACETAMINOPHEN 1 TABLET: 5; 325 TABLET ORAL at 05:49

## 2018-05-18 RX ADMIN — OXYCODONE HYDROCHLORIDE AND ACETAMINOPHEN 1 TABLET: 5; 325 TABLET ORAL at 23:53

## 2018-05-18 RX ADMIN — OXYCODONE HYDROCHLORIDE AND ACETAMINOPHEN 1 TABLET: 5; 325 TABLET ORAL at 12:21

## 2018-05-18 RX ADMIN — PANTOPRAZOLE SODIUM 20 MG: 20 TABLET, DELAYED RELEASE ORAL at 06:28

## 2018-05-18 RX ADMIN — PRAVASTATIN SODIUM 40 MG: 40 TABLET ORAL at 17:59

## 2018-05-18 RX ADMIN — DIAZEPAM 5 MG: 5 TABLET ORAL at 22:25

## 2018-05-18 RX ADMIN — AZELASTINE HYDROCHLORIDE 2 SPRAY: 137 SPRAY, METERED NASAL at 10:31

## 2018-05-18 RX ADMIN — BACITRACIN ZINC 1 SMALL APPLICATION: 500 OINTMENT TOPICAL at 22:27

## 2018-05-18 RX ADMIN — ASPIRIN 325 MG: 325 TABLET, COATED ORAL at 08:54

## 2018-05-18 RX ADMIN — METHOCARBAMOL 500 MG: 500 TABLET ORAL at 05:49

## 2018-05-18 RX ADMIN — METHOCARBAMOL 500 MG: 500 TABLET ORAL at 13:57

## 2018-05-18 RX ADMIN — SENNOSIDES 8.6 MG: 8.6 TABLET ORAL at 08:54

## 2018-05-18 NOTE — PROGRESS NOTES
Internal Medicine Progress Note  Patient: Jessica Archer  Age/sex: 81 y o  male  Medical Record #: [id number]      ASSESSMENT/PLAN:  Jessica Archer is seen and examined and mangement for following issues:    1  Stage 2 C3/4, C7/T1 and T1/2 laminectomy and PSF C3-T2: monitor incision; continue pain control  Therapy per primary team  Continue bowel regimen  Follow up with ortho after rehab  2  COPD: Stable; uses Anoro Ellipta daily PRN  Continue to monitor  On room air  3  Hyperlipidemia: continue statin therapy, heart healthy diet  4  Hypotension: asymptomatic maintain off bp meds  5  Leukocytosis: Mild  Likely reactive  afebrile  6  Sternal pain: Pleuritic and MSK components  CXR negative  Improving  Continue Lidoderm patch  7  Hypokalemia: stable; eating better  8  DVT prophylaxis: Aspirin 325mg daily  9   Scalp abrasion: healing well; cont local care          Subjective: Slept well overnight; doing well in therapy, cont to do IS; had BM last evening    ROS:   GI: denies abdominal pain, change bowel habits or reflux symptoms  Neuro: No new neurologic changes  Respiratory: No Cough, SOB  Cardiovascular: No CP, palpitations     Scheduled Meds:    Current Facility-Administered Medications:  aspirin 325 mg Oral Daily Samantha Bonilla MD   azelastine 2 spray Each Nare Daily Samantha Bonilla MD   bacitracin 1 small application Topical BID LeanneRENUKA Guzman   calcium carbonate 1,000 mg Oral Daily PRN Samantha Bonilla MD   diazepam 5 mg Oral HS Samantha Bonilla MD   docusate sodium 100 mg Oral BID Samantha Bonilla MD   gabapentin 600 mg Oral BID Samantha Bonilla MD   lidocaine 2 patch Transdermal Daily Tristan Mckenzie MD   magnesium hydroxide 30 mL Oral Daily PRN Samantha Bonilla MD   methocarbamol 500 mg Oral Q8H PRN Tristan Mckenzie MD   oxyCODONE 2 5 mg Oral Q6H PRN Samantha Bonilla MD   oxyCODONE-acetaminophen 1 tablet Oral Q4H PRN Samantha Bonilla MD   pantoprazole 20 mg Oral Early Morning Samantha Bonilla MD   polyethylene glycol 17 g Oral Daily Parisa Bueno PA-C   pramipexole 0 75 mg Oral HS Marie Chavira MD   pravastatin 40 mg Oral Daily With Kelly Paiz MD   senna 1 tablet Oral Daily Marie Chavira MD   simethicone 80 mg Oral 4x Daily PRN Marie Chavira MD       Labs:       Results from last 7 days  Lab Units 05/14/18  0519   WBC Thousand/uL 17 57*   HEMOGLOBIN g/dL 12 6   HEMATOCRIT % 38 4   PLATELETS Thousands/uL 250       Results from last 7 days  Lab Units 05/15/18  0509 05/14/18  0519   SODIUM mmol/L 135* 137   POTASSIUM mmol/L 3 9 3 4*   CHLORIDE mmol/L 98* 97*   CO2 mmol/L 33* 33*   BUN mg/dL 23 25   CREATININE mg/dL 0 65 0 72   GLUCOSE RANDOM mg/dL 85 85   CALCIUM mg/dL 8 9 8 7                  Glucose (mg/dL)   Date Value   05/15/2018 85   05/14/2018 85   05/11/2018 91   05/10/2018 86       Labs reviewed    Physical Examination:  Vitals:   Vitals:    05/17/18 0751 05/17/18 1535 05/18/18 0004 05/18/18 0726   BP: 104/58 101/63 130/71 96/65   BP Location: Right arm Right arm Left arm Right arm   Pulse: 84 77 81 76   Resp: 18 18 18 20   Temp: 97 6 °F (36 4 °C) 97 8 °F (36 6 °C) 97 6 °F (36 4 °C) 98 °F (36 7 °C)   TempSrc: Oral Oral Oral Oral   SpO2: 96% 97% 96% 94%   Weight:       Height:           GEN: NAD, nontoxic  HEENT: NC/AT, EOMI, c- collar in place  RESP: normal respiratory effort  Decreased throughout no wheezes this a m   CV: +S1 S2, regular rate, no rubs  ABD: soft, NT, ND, normal BS   : no real  EXT: no LE edema  Skin: no rashes; scab on scalp w/band aid in place  Neuro: AAO x 3      [ X ] Total time spent: 30 Mins and greater than 50% of this time was spent counseling/coordinating care

## 2018-05-18 NOTE — PROGRESS NOTES
Physical Therapy Progress Note   05/18/18 1330   Pain Assessment   Pain Assessment No/denies pain   Pain Score No Pain   Restrictions/Precautions   Precautions Fall Risk;Spinal precautions   Weight Bearing Restrictions No   ROM Restrictions No   Braces or Orthoses C/S Collar   Cognition   Overall Cognitive Status WFL   Arousal/Participation Alert; Cooperative   Attention Attends with cues to redirect   Orientation Level Oriented X4   Memory Decreased short term memory   Following Commands Follows multistep commands with increased time or repetition   Subjective   Subjective denies pain; no c/o   QI: Roll Left and Right   Assistance Needed Set-up / clean-up   Assistance Provided by [place] No physical assistance   Roll Left and Right CARE Score 5   QI: Sit to [address] [facility] / [address] [facility] [place] Provided by [place] No physical assistance   Sit to Lying CARE Score 5   QI: Lying to Sitting on Side of Bed   Assistance Needed Set-up / clean-up   Assistance Provided by [place] No physical assistance   Lying to Sitting on Side of Bed CARE Score 5   QI: Sit to [facility] / [address] [place] Provided by [place] No physical assistance   Sit to Stand CARE Score 5   Bed Mobility   Able to Roll Left to Right;Right to Left;Scoot Up   Findings S   QI: Chair/Bed-to-Chair Transfer   Assistance Needed Set-up / [address] [facility] [place] Provided by [place] No physical assistance   Chair/Bed-to-Chair Transfer CARE Score 5   Transfer Bed/Chair/Wheelchair   Limitations Noted In LE Strength;Balance; Coordination;Pain Management;Problem Solving;UE Strength   Adaptive Equipment Roller Walker   Stand Pivot Supervision   Sit to Stand Supervision   Stand to Sit Supervision   Supine to Sit Supervision   Sit to Supine Supervision   Car Transfer Supervision   Findings S   Bed, Chair, Wheelchair Transfer (FIM) 5 - Patient requires supervision/monitoring   QI: Car Transfer   Assistance Needed Supervision Assistance Provided by [place] No physical assistance   Car Transfer CARE Score 4   QI: [address] Provided by [place] No physical assistance   Walk 10 Feet CARE Score 4   QI: Walk 50 Feet with [facility] Provided by [place] No physical assistance   Walk 50 Feet with Two Turns CARE Score 4   QI: [address] Provided by [place] No physical assistance   Walk 150 Feet CARE Score 4   QI: Walking 10 Feet on Uneven Surfaces   Assistance Needed Supervision   Assistance Provided by [place] No physical assistance   Walking 10 Feet on Uneven Surfaces CARE Score 4   Ambulation   Does the patient walk? 2  Yes   Primary Discharge Mode of Locomotion Walk   Walk Assist Level Supervision   Gait Pattern Inconsistant Lynne   Assist Device Roller Walker   Distance Walked (feet) 400 ft   Limitations Noted In Balance; Endurance   Findings occ LOB, S with RW   Walking (FIM) 5 - Patient requires supervision/monitoring AND distance 150 feet or more, no rest   QI: Wheel 50 Feet with Two Turns   Reason if not Attempted Activity not applicable   Wheel 50 Feet with Two Turns CARE Score 9   QI: Wheel 150 Feet   Reason if not Attempted Activity not applicable   Wheel 569 Feet CARE Score 9   Wheelchair mobility   QI: Does the patient use a wheelchair? 0   No   Wheelchair (FIM) 0 - Activity does not occur   QI: 1 Step (Curb)   [address] Provided by [place] No physical assistance   1 Step (Curb) CARE Score 4   QI: 4 Steps   Assistance Needed Supervision   Assistance Provided by [place] No physical assistance   4 Steps CARE Score 4   QI: 12 Steps   Assistance Needed Supervision   Assistance Provided by [place] No physical assistance   12 Steps CARE Score 4   Stairs   Type Stairs   # of Steps 12   Weight Bearing Precautions Fall Risk;Cervical   Assist Devices Single Rail;Roller [place] Findings curb/ramp with RW; FF with R HR   Stairs (FIM) 5 - Patient requires supervision/monitoring AND goes up and down full flight (12- 14 stairs)   QI: Picking Up Object   Reason if not Attempted Safety concerns   Picking Up Object CARE Score 88   QI: Toilet Transfer   Assistance Needed Supervision   Assistance Provided by [place] No physical assistance   Toilet Transfer CARE Score 4   Toilet Transfer   Surface Assessed Raised Toilet   Toilet Transfer (FIM) 5 - Patient requires supervision/monitoring   Therapeutic Interventions   Strengthening standing TE (hip flex/ext/abd/add, HS curls, mini squats, calf raises) reps until fatigued   Flexibility manual stretch B HS and gastroc;    Equipment Use   NuStep x16 minutes, level 4   Assessment   Treatment Assessment Pt S for xfers to/from all surfaces including bed/chair/toilet and ambulates S with RW, occasionally looses balance with divided attention and therefore not appropriate to progress to DS or further; stairs/curb and ramp with RW/HR and S; finished session seated in bedside recliner with all needs in reach; recommend cont PT POC; Family/Caregiver Present no   Barriers to Discharge Inaccessible home environment;Decreased caregiver support   PT Barriers   Physical Impairment Decreased strength;Decreased endurance; Impaired balance;Decreased mobility; Decreased range of motion;Decreased coordination;Orthopedic restrictions   Functional Limitation Walking;Stair negotiation   Recommendation   Recommendation 24 hour supervision/assist;Outpatient PT; Home with family support   Equipment Recommended Walker   PT Equipment ordered RW   Date ordered 05/16/18   PT Therapy Minutes   PT Time In 1330   PT Time Out 1500   PT Total Time (minutes) 90   PT Mode of treatment - Individual (minutes) 60   PT Mode of treatment - Concurrent (minutes) 30   PT Mode of treatment - Group (minutes) 0   PT Mode of treatment - Co-treat (minutes) 0   PT Mode of Teatment - Total time(minutes) 90 minutes   Therapy Time missed   Time missed?  No     [place] Jahaira, PTA

## 2018-05-18 NOTE — PROGRESS NOTES
05/18/18 0845   Pain Assessment   Pain Assessment No/denies pain   Pain Score No Pain   Restrictions/Precautions   Precautions Fall Risk;Spinal precautions   Weight Bearing Restrictions No   ROM Restrictions No   Braces or Orthoses C/S Collar   QI: [address] Provided by [place] No physical assistance   Eating CARE Score 6   Eating Assessment   Eating (FIM) 6 - Patient requires assistive device or dentures   QI: Oral Hygiene   Assistance Needed Supervision   Assistance Provided by [place] No physical assistance   Comment in stance at sink   Oral Hygiene CARE Score 4   Grooming   Able To Initiate Tasks; Acquire Items; Wash/Dry Face;Brush/Clean Teeth;Wash/Dry Hands   Findings In stance at sink with unilateral UE support on RW  Grooming (FIM) 5 - Patient requires supervision/monitoring   QI: Shower/Bathe Self   Assistance Needed Supervision   Assistance Provided by [place] No physical assistance   Shower/Bathe Self CARE Score 4   Bathing   Assessed Bath Style Shower   Anticipated D/C Bath Style Shower   Able to [place] Tj Yes   Able to [employer] Temperature Yes   Able to Wash/Rinse/Dry (body part) Left Arm;Right Arm;L Upper Leg;R Upper Leg;L Lower Leg/Foot;R Lower Leg/Foot;Chest;Abdomen;Perineal Area; Buttocks   Limitations Noted in Balance;Strength   Positioning Seated;Standing   Adaptive Equipment Hand Held Shower; Shower Seat;Shower Bars   Findings  Pt reports at home he will have a shower chair and grab bar and will utilize shower stall  He completes bathing activity simulated to home set up at supervision level      Bathing (FIM) 5 - Patient requires supervision/monitoring but completes 10/10 parts   Tub/Shower Transfer   Limitations Noted In Balance;UE Strength;LE Strength   Adaptive Equipment Seat with Back;Grab Bars   Assessed Shower   Shower Transfer (FIM) 5 - Patient requires supervision/monitoring   QI: Upper Body Dressing   Assistance Needed Supervision Assistance Provided by [place] No physical assistance   Upper Body Dressing CARE Score 4   QI: Lower Body Dressing   Assistance Needed Supervision   Assistance Provided by [place] No physical assistance   Lower Body Dressing CARE Score 4   QI: Putting On/Taking Off [facility] [address] [facility] Provided by [place] No physical assistance   Comment Leg over opposing knee technique  Putting On/Taking Off Footwear CARE Score 4   Dressing/Undressing Clothing   Able to  Obtain Clothing;Store removed clothing   Remove UB Clothes Pullover Shirt   Remove LB Clothes Pants; Undergarment;Socks   Don UB [address] [place] [address] Avenue; Undergarment;Socks; Shoes   Limitations Noted In Balance   Adaptive Equipment Elastic Laces   Positioning Supported Sit;Standing   UB Dressing (FIM) 5 - [place] sets up supplies or applies device   LB Dressing (FIM) 5 - Patient requires supervision/monitoring   QI: Sit to [address] Provided by [place] No physical assistance   Sit to Stand CARE Score 4   QI: Chair/Bed-to-Chair Transfer   Assistance Needed Supervision   Assistance Provided by [place] No physical assistance   Chair/Bed-to-Chair Transfer CARE Score 4   Transfer Bed/Chair/Wheelchair   Limitations Noted In Balance   Adaptive Equipment Roller Walker   Bed, Chair, Wheelchair Transfer (FIM) 5 - Patient requires supervision/monitoring   Exercise Tools   Other Exercise Tool 1 Pt engaged in UE towel glides moving through all planes 2 x 15 each to increase ROM and reduce stiffness and then completes HEP with use of blue therapy sponge for digit flexion/ext, opposition, and adduction to increase [address] and strength for increased independence with I/ADL tasks  Pt chava PANTOJA form and carryover by end of session and reports understanding of HEP  Cognition   Overall Cognitive Status WFL   Arousal/Participation Alert; Cooperative   Attention Attends with cues to redirect Orientation Level Oriented X4   Memory Decreased short term memory   Following Commands Follows multistep commands with increased time or repetition   Activity Tolerance   Activity Tolerance Patient tolerated treatment well   Assessment   Treatment Assessment Pt participated in skilled OT services with focus on ADL tasks, functional mobility, and UB HEP  Pt completes all ADL tasks at an overall Supervision level  Pt states he will have shower chair placed in shower stall and suction cup grab bar upon d/c home  Educated pt on safety concerns with suction cup grab bars with pt verbalizing understanding  Pt continues to require Supervision for ADL tasks 2* to decreased standing balance at times  Pt reports his wife will be present for all ADL task completion upon initial d/c home and will be available to A PRN  Pt states feeling confident in d/c plan and reports all DME needs met  Recommending pt be dc'd home with family support  Prognosis Good   Problem List Decreased strength;Decreased endurance; Impaired balance;Decreased mobility; Decreased safety awareness   Plan   Treatment/Interventions ADL retraining;Functional transfer training; Therapeutic exercise; Endurance training;Equipment eval/education; Bed mobility; Compensatory technique education   Progress Progressing toward goals   Recommendation   OT Discharge Recommendation Home with family support   OT Therapy Minutes   OT Time In 0845   OT Time Out 8119   OT Total Time (minutes) 90   OT Mode of treatment - Individual (minutes) 90   OT Mode of treatment - Concurrent (minutes) 0   OT Mode of treatment - Group (minutes) 0   OT Mode of treatment - Co-treat (minutes) 0   OT Mode of Teatment - Total time(minutes) 90 minutes   Therapy Time missed   Time missed?  No

## 2018-05-18 NOTE — PROGRESS NOTES
Physical Medicine & Rehabilitation Progress Note:    Primary Rehabilitation Diagnosis: Severe Cervical Stenosis with Cervical Radicuopathy    Subjective: No acute events overnight  Excited to be going home tomorrow  Denies any complaints this AM      Review Of Systems:   10 point ROS completed, pertinent positives above    Functional Update:  Physical Therapy Occupational Therapy   Transfers: Supervision  Bed Mobility: Minimal Assistance  Amulation Distance (ft): 400 feet  Ambulation: Supervision  Assistive Device for Ambulation: Roller Walker  Number of Stairs: 12  Assistive Device for Stairs: Right Hand Rail  Stair Assistance: Contact Guard  Ramp: Contact Guard  Discharge Recommendations: Home with:  [address] with[de-identified] 24 Hour Supervision, Family Support, Outpatient Physical Therapy Eating: Supervision  Grooming: Supervision  Bathing: Moderate Assistance  Bathing: Moderate Assistance  Upper Body Dressing: Supervision  Lower Body Dressing: Minimal Assistance  Toileting: Minimal Assistance  Tub/Shower Transfer: Minimal Assistance  Toilet Transfer: Minimal Assistance  Cognition: Within Defined Limits  Orientation: Person, Place, Time, Situation         Objective:  BP 96/65 (BP Location: Right arm)   Pulse 76   Temp 98 °F (36 7 °C) (Oral)   Resp 20   Ht 5' 11" (1 803 m)   Wt 69 5 kg (153 lb 3 5 oz)   SpO2 94%   BMI 21 37 kg/m²     GENERAL: No acute distress    NECK: +C-Collar in place,  healing anterior neck incision  Posterior neck incision C/D/I   CARDIOVASCULAR: regular rate rhythm, no murmurs  LUNGS: clear to ausculation bilaterally, good inspiratory effort, no wheezes, rales, rhonchi   ABDOMEN: soft, non-tender, non-distended, no guarding  EXTREMITIES: no pedal edema bilaterally, negative Coleen's bilaterally  MUSCULOSKELETAL: active assist and passive ROM functional x 4  NEUROLOGICAL: Awake, alert, oriented to person, place, time, and situation  Speech fluent    Able to follow simple commands  Assessment:    Charlotte Dow is a 81 y o  male in fair condition with severe cervical stenosis/radiculopathy s/p a two part surgery ACDF and PSF levels C3-T2:    Rehabilitation Plan:     -Rehabilitation of cervical stenosis/radiculopathy: PT/OT therapies  Deficits include L > R UE weakness, generalized weakness, decreased balance and strength  +cervical spine precautions Lidoderm to upper back  -Appreciate Internal Medicine following - Dr Charis Altman service    -severe cervical stenosis/radiculopathy s/p a two part surgery ACDF and PSF levels C3-T2 on 4/12/18 and 4/30/18: C Collar to be worn while OOB  F/U with Dr Elías Boone  Cervical precautions     -substernal palpable chest wall pain: likely mild costochondritis from surgical positioning  Improving  Continue lidoderm patch      -nociceptive pain: managed on Percocet 1 tab Q4h PRN, Tylenol PRN  Discontinue celebrex      -neuropathic pain: managed on neurontin 600mg BID     -muscular spasms: managed on valium 5mg QHS  Switched robaxin 500mg to Q8h prn  Wean as progressing       -restless legs: managed on Mirapex     -HTN: holding BP meds due to hypotension, has been stable otherwise     Temp:  [97 6 °F (36 4 °C)-98 °F (36 7 °C)] 98 °F (36 7 °C)  HR:  [76-81] 76  Resp:  [18-20] 20  BP: ()/(63-71) 96/65     -HLD: managed on Pravachol     -COPD: managed on PRN Anoro Ellipta inhaler      -GERD: managed on Protonix    -Constipation: improved     -DVT ppx: SCDS + Aspirin 325mg daily per Surg team     Dipsosition: Home 5/19      Current Facility-Administered Medications:     aspirin (ECOTRIN) EC tablet 325 mg, 325 mg, Oral, Daily, Inge Gaston MD, 325 mg at 05/18/18 0854    azelastine (ASTELIN) 0 1 % nasal spray 2 spray, 2 spray, Each Nare, Daily, Inge Gaston MD, 2 spray at 05/17/18 0816    bacitracin topical ointment 1 small application, 1 small application, Topical, BID, RENUKA Flores, 1 small application at 19/53/88 1369   calcium carbonate (TUMS) chewable tablet 1,000 mg, 1,000 mg, Oral, Daily PRN, Bud Lazaro MD    diazepam (VALIUM) tablet 5 mg, 5 mg, Oral, HS, Bud Lazaro MD, 5 mg at 05/17/18 2206    docusate sodium (COLACE) capsule 100 mg, 100 mg, Oral, BID, Bud Lazaro MD, 100 mg at 05/18/18 0854    gabapentin (NEURONTIN) capsule 600 mg, 600 mg, Oral, BID, Bud Lazaro MD, 600 mg at 05/18/18 0854    lidocaine (LIDODERM) 5 % patch 2 patch, 2 patch, Transdermal, Daily, Jnoathan Diaz MD, 2 patch at 05/17/18 0814    magnesium hydroxide (MILK OF MAGNESIA) 400 mg/5 mL oral suspension 30 mL, 30 mL, Oral, Daily PRN, Bud Lazaro MD    methocarbamol (ROBAXIN) tablet 500 mg, 500 mg, Oral, Q8H PRN, Jonathan Diaz MD, 500 mg at 05/18/18 0549    oxyCODONE (ROXICODONE) IR tablet 2 5 mg, 2 5 mg, Oral, Q6H PRN, Bud Lazaro MD    oxyCODONE-acetaminophen (PERCOCET) 5-325 mg per tablet 1 tablet, 1 tablet, Oral, Q4H PRN, Bud Lazaro MD, 1 tablet at 05/18/18 0549    pantoprazole (PROTONIX) EC tablet 20 mg, 20 mg, Oral, Early Morning, Bud Lazaro MD, 20 mg at 05/18/18 3828    polyethylene glycol (MIRALAX) packet 17 g, 17 g, Oral, Daily, Casper Dixon PA-C, 17 g at 05/16/18 0904    pramipexole (MIRAPEX) tablet 0 75 mg, 0 75 mg, Oral, HS, Bud Lazaro MD, 0 75 mg at 05/17/18 2206    pravastatin (PRAVACHOL) tablet 40 mg, 40 mg, Oral, Daily With Danyelle Burgos MD, 40 mg at 05/17/18 1715    senna (SENOKOT) tablet 8 6 mg, 1 tablet, Oral, Daily, Bud Lazaro MD, 8 6 mg at 05/18/18 0854    simethicone (MYLICON) chewable tablet 80 mg, 80 mg, Oral, 4x Daily PRN, Bud Lazaro MD     Diagnostic Studies:  Reviewed, no new imaging    Vitals: Reviewed   Temp:  [97 6 °F (36 4 °C)-98 °F (36 7 °C)] 98 °F (36 7 °C)  HR:  [76-81] 76  Resp:  [18-20] 20  BP: ()/(63-71) 96/65   Intake/Output Summary (Last 24 hours) at 05/18/18 0912  Last data filed at 05/18/18 0884   Gross per 24 hour   Intake             1148 ml   Output             1550 ml   Net             -405 ml        Laboratory: Reviewed  Lab Results   Component Value Date    HGB 12 6 05/14/2018    HCT 38 4 05/14/2018    WBC 17 57 (H) 05/14/2018     Lab Results   Component Value Date    BUN 23 05/15/2018     (L) 05/15/2018    K 3 9 05/15/2018    CL 98 (L) 05/15/2018    GLUCOSE 85 05/15/2018    CREATININE 0 65 05/15/2018     No results found for: PROTIME, INR     Patient Active Problem List   Diagnosis    Spinal stenosis of cervical region    Upper extremity weakness    S/P laminectomy with spinal fusion       ** Please Note: Fluency Direct voice to text software may have been used in the creation of this document  **    [ x ] Total time spent: 30 Mins, and greater than 50% of this time was spent counseling/coordinating care      Sharon Brooks MD  [facility]

## 2018-05-19 VITALS
WEIGHT: 153 LBS | BODY MASS INDEX: 21.42 KG/M2 | SYSTOLIC BLOOD PRESSURE: 110 MMHG | OXYGEN SATURATION: 96 % | TEMPERATURE: 98.1 F | HEIGHT: 71 IN | RESPIRATION RATE: 20 BRPM | DIASTOLIC BLOOD PRESSURE: 72 MMHG | HEART RATE: 88 BPM

## 2018-05-19 PROCEDURE — 99239 HOSP IP/OBS DSCHRG MGMT >30: CPT | Performed by: PHYSICAL MEDICINE & REHABILITATION

## 2018-05-19 RX ADMIN — ASPIRIN 325 MG: 325 TABLET, COATED ORAL at 08:43

## 2018-05-19 RX ADMIN — GABAPENTIN 600 MG: 300 CAPSULE ORAL at 08:43

## 2018-05-19 RX ADMIN — PANTOPRAZOLE SODIUM 20 MG: 20 TABLET, DELAYED RELEASE ORAL at 05:52

## 2018-05-19 RX ADMIN — OXYCODONE HYDROCHLORIDE AND ACETAMINOPHEN 1 TABLET: 5; 325 TABLET ORAL at 10:39

## 2018-05-19 RX ADMIN — METHOCARBAMOL 500 MG: 500 TABLET ORAL at 08:43

## 2018-05-19 RX ADMIN — OXYCODONE HYDROCHLORIDE AND ACETAMINOPHEN 1 TABLET: 5; 325 TABLET ORAL at 05:55

## 2018-05-19 RX ADMIN — AZELASTINE HYDROCHLORIDE 2 SPRAY: 137 SPRAY, METERED NASAL at 08:50

## 2018-05-19 RX ADMIN — LIDOCAINE 2 PATCH: 50 PATCH CUTANEOUS at 08:44

## 2018-05-19 NOTE — NURSING NOTE
Pt d/c home with Supervision level  No c/o pain,, VS stable  Given d/c information and prescription to pt, no further question at this time  PCA wheeled pt down to his car

## 2018-05-21 NOTE — DISCHARGE SUMMARY
Discharge Summary - PMR   Jessica Archer 81 y o  male MRN: [id number]  Unit/Bed#: [facility] 457-01 Encounter: [id number]    Admission Date: 5/9/2018     Discharge Date: 5/19/2018    Etiologic/Rehabilitation Diagnosis: Impairment of mobility, safety and Activities of Daily Living (ADLs) due to Orthopedic Disorders:  08 9  Other Orthopedic Severe Cervical Stenosis with Cervical Radicuopathy    HPI:   Jessica Archer is a 81 y o  RIGHT -handed  male with past medical history significant for COPD, HLD, GERD and known severe cervical stenosis/cervical radiculopathy who presented to [address] on 4/30/18 for second part elective surgical intervention of cervical stenosis  Patient underwent an ACDF C3/4, C7/T1 by Dr Gus Taylor on 4/12/18  Patient was taken to the OR for second part posterior surgery on 4/30/18 for a C3-T2 laminectomy and fusion  Post-operatively patient was placed in a cervical collar to be worn while out of bed  Patient was started on Aspirin 325mg for DVT ppx per Dr Edwar Newsome team and managed for increased post-operative pain  Medically, patient had HTN and was started on Chlorthalidone  Patient had an episode of chest pain which resolved with negative workup  Patient with acute functional decline from baseline with L>R upper extremity weakness, generalized weakness, instability  After medical stabilization and clearance to participate in an intensive rehabilitation program from surgical team, patient was admitted to [facility] on 5/10/18      Procedures Performed During [facility] Admission: None    Acute Rehabilitation Center Course:   Patient participated in a comprehensive interdisciplinary inpatient rehabilitation program which included involvment of MD, therapies (PT, OT, and/or SLP), RN, CM, SW, dietary, and psychology services  He was able to be advanced to a supervision level of assist and considered safe for discharge home with family   Please see below for patient's day to day management of medical needs       -Rehabilitation of cervical stenosis/radiculopathy: PT/OT therapies   Deficits include L > R UE weakness, generalized weakness, decreased balance and strength  +cervical spine precautions Lidoderm to upper back       -Appreciate Internal Medicine following - Dr Melani Young service     -severe cervical stenosis/radiculopathy s/p a two part surgery ACDF and PSF levels C3-T2 on 4/12/18 and 4/30/18: C Collar to be worn while OOB   F/U with Dr Akin Cartagena  precautions      -substernal palpable chest wall pain: likely mild costochondritis from surgical positioning  Improving  Continue lidoderm patch      -nociceptive pain: managed on Percocet 1 tab Q4h PRN, Tylenol PRN  Discontinue celebrex      -neuropathic pain: managed on neurontin 600mg BID     -muscular spasms: managed on valium 5mg QHS  Switched robaxin 500mg to Q8h prn   Wean as progressing       -restless legs: managed on Mirapex     -HTN: holding BP meds due to hypotension, has been stable otherwise  -HLD: managed on Pravachol     -COPD: managed on PRN Anoro Ellipta inhaler      -GERD: managed on Protonix     -Constipation: improved     -DVT ppx: SCDS + Aspirin 325mg daily per Surg team    Discharge Physical Examination:  GENERAL: No acute distress    NECK: +C-Collar in place,  healing anterior neck incision   Posterior neck incision C/D/I   CARDIOVASCULAR: regular rate rhythm, no murmurs  LUNGS: clear to ausculation bilaterally, good inspiratory effort, no wheezes, rales, rhonchi   ABDOMEN: soft, non-tender, non-distended, no guarding     EXTREMITIES: no pedal edema bilaterally, negative Coleen's bilaterally  MUSCULOSKELETAL: active assist and passive ROM functional x 4  NEUROLOGICAL: Awake, alert, oriented to person, place, time, and situation  Speech fluent   Able to follow simple commands      Significant Findings, Care, Treatment and Services Provided: Acute comprehensive interdisciplinary inpatient rehabilitation including PT, OT, SLP, RN, CM, SW, dietary, psychology, etc     Complications: none    Functional Status Upon Discharge:    Physical Therapy Occupational Therapy   Pt D/C home on 5/19/18 with family support  Pt made steady progress throughout LOS and met S level goals using RW  Pt also functioning @ CS level using SPC but continue to recommend use of RW for home to maximize safety and reduce fall risk; recommend continued gait training with SPC at outpatient PT sessions to watch for consistency and maximize independence  Pt will cont to benefit from outpatient PT services to improve gait with LRAD, improve dynamic standing balance, and increase BLE strength  Grooming (FIM) 5 - Patient requires supervision/monitoring     Eating (FIM) 6 - Patient requires assistive device or dentures     Bathing (FIM) 5 - Patient requires supervision/monitoring but completes 10/10 parts     Shower Transfer (FIM) 5 - Patient requires supervision/monitoring     UB Dressing (FIM) 5 - [place] sets up supplies or applies device   LB Dressing (FIM) 5 - Patient requires supervision/monitoring          Discharge Diagnosis: Impairment of mobility, safety and Activities of Daily Living (ADLs) due to Orthopedic Disorders:  08 9  Other Orthopedic  Severe Cervical Stenosis with Cervical Radicuopathy    Discharge Medications:   See after visit summary for reconciled discharge medications provided to patient and family  Condition at Discharge: stable     Discharge instructions/Information to patient and family:   See after visit summary for information provided to patient and family  Provisions for Follow-Up Care:  See after visit summary for information related to follow-up care and any pertinent home health orders  Disposition: Home    Planned Readmission: No    Discharge Statement   I spent 45 minutes discharging the patient  This time was spent on the day of discharge  I had direct contact with the patient on the day of discharge  Greater than 50% of the total time was spent examining patient, answering all patient questions, arranging and discussing plan of care with patient as well as directly providing post-discharge instructions  Additional time then spent on discharge activities  Discharge Medications:  See after visit summary for reconciled discharge medications provided to patient and family

## 2018-05-21 NOTE — PHYSICAL THERAPY NOTE
PT Discharge Summary:    Pt D/C home on 5/19/18 with family support  Pt made steady progress throughout LOS and met S level goals using RW  Pt also functioning @ CS level using SPC but continue to recommend use of RW for home to maximize safety and reduce fall risk; recommend continued gait training with SPC at outpatient PT sessions to watch for consistency and maximize independence  Pt will cont to benefit from outpatient PT services to improve gait with LRAD, improve dynamic standing balance, and increase BLE strength

## 2018-05-21 NOTE — CASE MANAGEMENT
Team dc summary - pt made good progress and returned home w/spouse and contd outpt physical therapy at [facility]  appmt scheduled for pt and provided to him  Pt received a roller walker from [facility] medical  Spouse present for dc instructions and aware of pts functional ability

## 2018-05-22 ENCOUNTER — EVALUATION (OUTPATIENT)
Dept: PHYSICAL THERAPY | Facility: REHABILITATION | Age: 73
End: 2018-05-22
Payer: MEDICARE

## 2018-05-22 ENCOUNTER — TELEPHONE (OUTPATIENT)
Dept: NEUROLOGY | Facility: CLINIC | Age: 73
End: 2018-05-22

## 2018-05-22 DIAGNOSIS — Z98.1 S/P LAMINECTOMY WITH SPINAL FUSION: ICD-10-CM

## 2018-05-22 DIAGNOSIS — M48.02 SPINAL STENOSIS OF CERVICAL REGION: Primary | ICD-10-CM

## 2018-05-22 DIAGNOSIS — R29.898 UPPER EXTREMITY WEAKNESS: ICD-10-CM

## 2018-05-22 DIAGNOSIS — Z98.1 STATUS POST CERVICAL SPINAL FUSION: ICD-10-CM

## 2018-05-22 PROCEDURE — 97163 PT EVAL HIGH COMPLEX 45 MIN: CPT | Performed by: PHYSICAL THERAPIST

## 2018-05-22 PROCEDURE — G8985 CARRY GOAL STATUS: HCPCS | Performed by: PHYSICAL THERAPIST

## 2018-05-22 PROCEDURE — G8984 CARRY CURRENT STATUS: HCPCS | Performed by: PHYSICAL THERAPIST

## 2018-05-22 NOTE — PROGRESS NOTES
PT Evaluation     Today's date: 2018  Patient name: Sherwin Conti  : [birth year]  MRN: [id number]  Referring provider: Louis Guevara  Dx:   Encounter Diagnoses   Name Primary? 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 left UE C6-7 neuropathy  He has a lot of left UE atrophy related to this original injury  Pt  Underwent two cervical surgeries in  was an anterior approach for C3-4 and C7-T1 anterior disc spacers and on  C3-T2 posterior fusion with rods and screws  Pt  Described an extensive history of rehab related to his original injury and the current post surgical rehab  He has a lot to say and tends to jump around so a clear history is difficult to capture  Pt  Presented to therapy wearing a rigid cervical collar with report that is only allowed to remove it while sleeping  He has not been taking it off at night because he is a restless sleeper  He has taken it off to shave  Pt  Had if off for about 15 minutes during my evaluation without discomfort  Pain level at his neck is managed with pain medication - reporting 0/10 pain during his evaluation today  Pt  Also presented to therapy ambulating with a roller walker  He reports that he did not have any limitations prior to his cervical surgeries  He reports having a lot of left leg weakness related to laying in bed and loss of weight after surgeries  He reportedly lost 20 pounds in 20 days after surgery but has subsequently gained 10 pounds back  Pt  Has a history of right sided sciatica which has reportedly resolved    Pt  Presents with impaired cervical rom as expected given his fusions  His left lower extremity is weaker than his right  He has fairly significant ataxia with loss of proprioception in bilateral lower extremities but appears to be worse on the left  He is extremely unsteady with walking without his walker  I didn't get into his [employer] history in [facility]   Evaluation findings will be ongoing as he has a lot going on and getting a full history is a lengthy process  STG:  Pt  Will be able to ambulate household distances without AD  Pt  Will be able to walk up/dn steps safely     LTG:  Pt  Will be able to lift a gallon of milk without neck pain or limitation  Pt  Will be able to vacuum without LOB or UE or neck limitations      Objective   Cervical ROM  Flexion  30  Extension 10  Side bending R = 12,  L = 10  Rotation R = 12,  L = 15    UE ROM - WFL  Right UE strength  Grossly - 4/5  Left UE strength  Flexion  4/5  Abduction 4/5  IR   4/5  ER   3+/5  Biceps  3+/5  Triceps 4/5  Wrist flexion  3+/5  Extension  4/5  RD  4-/5  UD  4/5     strength:  R = 50#,  L = 20# (setting #2)    LE strength  L R  Hip  Flexion  4 4  Extension nt nt  Abduction 3+ 4  Add  4 4  Knee   Extension 5 4  Flexion  4 4  Ankle  DF  3 4  Ev  3 4  In  4 5  PF  2+ 3    Sensation:  Intact to light touch bilateral UE  Diminished sensation L4-5 left LE    Reflexes - unable to get a good reflex response anywhere as he does not relax    Impaired proprioception bilateral LE - left worse than right    Static standing - a lot of accessory motion to stay up right  Ataxic gait - source?         Precautions: Fall risk - ataxia    Daily Treatment Diary     Manuals 5/22                         Exercise Diary              Bike             UBE             HR/TR             Step up             March             Squat             Side stepping at bar             Coordination exercises                                                                                                                                                                                      Modalities Flowsheet Rows      Most Recent Value   PT/OT G-Codes   Current Score  44   Projected Score  49   FOTO information reviewed  Yes   Assessment Type  Evaluation   G code set  Carrying, Moving & Handling Objects   Carrying, Moving and Handling Objects Current Status ()  CK   Carrying, Moving and Handling Objects Goal Status ()  JEANETTE Deal, PT  5/22/2018,11:23 AM

## 2018-05-25 ENCOUNTER — OFFICE VISIT (OUTPATIENT)
Dept: PHYSICAL THERAPY | Facility: REHABILITATION | Age: 73
End: 2018-05-25
Payer: MEDICARE

## 2018-05-25 DIAGNOSIS — Z98.1 S/P LAMINECTOMY WITH SPINAL FUSION: ICD-10-CM

## 2018-05-25 DIAGNOSIS — M48.02 SPINAL STENOSIS OF CERVICAL REGION: Primary | ICD-10-CM

## 2018-05-25 DIAGNOSIS — Z98.1 STATUS POST CERVICAL SPINAL FUSION: ICD-10-CM

## 2018-05-25 DIAGNOSIS — R29.898 UPPER EXTREMITY WEAKNESS: ICD-10-CM

## 2018-05-25 PROCEDURE — 97110 THERAPEUTIC EXERCISES: CPT | Performed by: PHYSICAL THERAPIST

## 2018-05-25 NOTE — PROGRESS NOTES
Daily Note     Today's date: 2018  Patient name: Sharmila Bray  :   MRN: [id number]  Referring provider: Samara Hernandez  Dx:   Encounter Diagnosis     ICD-10-CM    1  Spinal stenosis of cervical region M48 02    2  Status post cervical spinal fusion Z98 1    3  S/P laminectomy with spinal fusion Z98 1    4  Upper extremity weakness R29 898                   Subjective: Started to go without the cervical collar at home and his neck is feeling good  He is getting some pain in the region of the left axilla with reaching across his body  Exercises feel good to do  Objective: See treatment diary below  Daily Treatment Diary     Manuals                         Exercise Diary              Bike  16'           UBE no UE            HR/TR  2x10           Step up  2x10           March  15           Squat  2x10           Side stepping at bar             Coordination exercises                                                                                                                                                                                      Modalities                              Assessment: Tolerated treatment well  Legs were a little tired after therapy  Plan: Continue per plan of care

## 2018-05-29 ENCOUNTER — OFFICE VISIT (OUTPATIENT)
Dept: PHYSICAL THERAPY | Facility: REHABILITATION | Age: 73
End: 2018-05-29
Payer: MEDICARE

## 2018-05-29 DIAGNOSIS — R29.898 UPPER EXTREMITY WEAKNESS: ICD-10-CM

## 2018-05-29 DIAGNOSIS — Z98.1 S/P LAMINECTOMY WITH SPINAL FUSION: ICD-10-CM

## 2018-05-29 DIAGNOSIS — M48.02 SPINAL STENOSIS OF CERVICAL REGION: Primary | ICD-10-CM

## 2018-05-29 DIAGNOSIS — Z98.1 STATUS POST CERVICAL SPINAL FUSION: ICD-10-CM

## 2018-05-29 PROCEDURE — 97110 THERAPEUTIC EXERCISES: CPT | Performed by: PHYSICAL THERAPIST

## 2018-05-29 NOTE — PROGRESS NOTES
Daily Note     Today's date: 2018  Patient name: Shannon Mares  : [birth year]  MRN: [id number]  Referring provider: Vera Diaz  Dx:   Encounter Diagnosis     ICD-10-CM    1  Spinal stenosis of cervical region M48 02    2  Status post cervical spinal fusion Z98 1    3  S/P laminectomy with spinal fusion Z98 1    4  Upper extremity weakness R29 898                   Subjective: Reports that his legs are sore from all of the exercises  Has some morning neck pain  Objective: See treatment diary below  Daily Treatment Diary     Manuals                        Exercise Diary              Bike  16' 5'/10'          UBE no UE            HR/TR  2x10 x          Step up  2x10 x          March  15 20          Squat  2x10 x          Side stepping at bar   3 laps          Coordination exercises                                                                                                                                                                                      Modalities                          X=same as last visit    Assessment: Fatigues with exercise  Did bike in 2 separate points due to scheduling and also due to him LE soreness  Went through exercises more quickly today  Plan: Continue per plan of care

## 2018-05-31 ENCOUNTER — OFFICE VISIT (OUTPATIENT)
Dept: PHYSICAL THERAPY | Facility: REHABILITATION | Age: 73
End: 2018-05-31
Payer: MEDICARE

## 2018-05-31 DIAGNOSIS — M48.02 SPINAL STENOSIS OF CERVICAL REGION: Primary | ICD-10-CM

## 2018-05-31 DIAGNOSIS — Z98.1 STATUS POST CERVICAL SPINAL FUSION: ICD-10-CM

## 2018-05-31 DIAGNOSIS — Z98.1 S/P LAMINECTOMY WITH SPINAL FUSION: ICD-10-CM

## 2018-05-31 PROCEDURE — 97116 GAIT TRAINING THERAPY: CPT | Performed by: PHYSICAL THERAPIST

## 2018-05-31 PROCEDURE — 97110 THERAPEUTIC EXERCISES: CPT | Performed by: PHYSICAL THERAPIST

## 2018-05-31 NOTE — PROGRESS NOTES
Daily Note     Today's date: 2018  Patient name: Jackie Becker  :   MRN: [id number]  Referring provider: Tom Driscoll  Dx:   Encounter Diagnosis     ICD-10-CM    1  Spinal stenosis of cervical region M48 02    2  Status post cervical spinal fusion Z98 1    3  S/P laminectomy with spinal fusion Z98 1                   Subjective: Legs are still sore  Objective: See treatment diary below  Daily Treatment Diary     Manuals                       Exercise Diary              Bike  16' 5'/10' 10'/8'         UBE no UE            HR/TR  2x10 x 2x15         Step up  2x10 x 15 ea foot         March  15 20 x         Squat  2x10 x x         Side stepping at bar   3 laps 4 laps         Coordination exercises             Gait w/SPC    100ft x2                                                                                                                                                                     Modalities                          X=same as last visit    Assessment: Gait was safe for short distances with the SPC  Legs are a little shaky during ambulation  Plan: Continue per plan of care

## 2018-06-01 ENCOUNTER — APPOINTMENT (OUTPATIENT)
Dept: PHYSICAL THERAPY | Facility: REHABILITATION | Age: 73
End: 2018-06-01
Payer: MEDICARE

## 2018-06-05 ENCOUNTER — OFFICE VISIT (OUTPATIENT)
Dept: PHYSICAL THERAPY | Facility: REHABILITATION | Age: 73
End: 2018-06-05
Payer: MEDICARE

## 2018-06-05 DIAGNOSIS — Z98.1 STATUS POST CERVICAL SPINAL FUSION: Primary | ICD-10-CM

## 2018-06-05 PROCEDURE — 97110 THERAPEUTIC EXERCISES: CPT | Performed by: PHYSICAL THERAPIST

## 2018-06-05 NOTE — PROGRESS NOTES
Daily Note     Today's date: 2018  Patient name: Jose Rafael Willis  :   MRN: [id number]  Referring provider: Josse Raphael  Dx:   Encounter Diagnosis     ICD-10-CM    1  Status post cervical spinal fusion Z98 1                   Subjective: Patient reports that he is doing well  Objective: See treatment diary below  Daily Treatment Diary     Manuals                      Exercise Diary              Bike  16' 5'/10' 10'/8' x        UBE no UE            HR/TR  2x10 x 2x15 x        Step up  2x10 x 15 ea foot x        March  15 20 x x        Squat  2x10 x x x        Side stepping at bar   3 laps 4 laps x        Coordination exercises             Gait w/SPC    100ft x2 x                                                                                                                                                                    Modalities                          X=same as last visit    Assessment: Gait training continues to improve  Patient continues to do well with exercises  Assistance from PTA with tx  Plan: Continue per plan of care

## 2018-06-08 ENCOUNTER — OFFICE VISIT (OUTPATIENT)
Dept: PHYSICAL THERAPY | Facility: REHABILITATION | Age: 73
End: 2018-06-08
Payer: MEDICARE

## 2018-06-08 DIAGNOSIS — Z98.1 STATUS POST CERVICAL SPINAL FUSION: Primary | ICD-10-CM

## 2018-06-08 DIAGNOSIS — R29.898 UPPER EXTREMITY WEAKNESS: ICD-10-CM

## 2018-06-08 DIAGNOSIS — M48.02 SPINAL STENOSIS OF CERVICAL REGION: ICD-10-CM

## 2018-06-08 PROCEDURE — 97112 NEUROMUSCULAR REEDUCATION: CPT | Performed by: PHYSICAL THERAPIST

## 2018-06-08 PROCEDURE — 97110 THERAPEUTIC EXERCISES: CPT | Performed by: PHYSICAL THERAPIST

## 2018-06-08 NOTE — PROGRESS NOTES
Daily Note     Today's date: 2018  Patient name: Harriett Wu  : 1945  MRN: [id number]  Referring provider: Josse Camarillo  Dx:   Encounter Diagnosis     ICD-10-CM    1  Status post cervical spinal fusion Z98 1    2  Spinal stenosis of cervical region M48 02    3  Upper extremity weakness R29 898                   Subjective: Patient reports that he is doing well  Assessment: Moves cautiously t/o his exercise routine secondary to poor proprioception  Plan: Continue per plan of care         Objective: See treatment diary below  Daily Treatment Diary     Manuals  6-8                    Exercise Diary              Bike  16' 5'/10' 10'/8' x x                    HR/TR  2x10 x 2x15 x x       Step up  6"step  2x10 x 15 ea foot x x       March  15 20 x x x       Squat  2x10 x x x x       Side stepping at bar   3 laps 4 laps x x       Sit<->stand at low mat table      15x3                                                                                                                                                                                Modalities                          X=same as last visit

## 2018-06-12 ENCOUNTER — OFFICE VISIT (OUTPATIENT)
Dept: PHYSICAL THERAPY | Facility: REHABILITATION | Age: 73
End: 2018-06-12
Payer: MEDICARE

## 2018-06-12 DIAGNOSIS — Z98.1 STATUS POST CERVICAL SPINAL FUSION: ICD-10-CM

## 2018-06-12 DIAGNOSIS — M48.02 SPINAL STENOSIS OF CERVICAL REGION: Primary | ICD-10-CM

## 2018-06-12 PROCEDURE — G8984 CARRY CURRENT STATUS: HCPCS | Performed by: PHYSICAL THERAPIST

## 2018-06-12 PROCEDURE — 97112 NEUROMUSCULAR REEDUCATION: CPT | Performed by: PHYSICAL THERAPIST

## 2018-06-12 PROCEDURE — 97110 THERAPEUTIC EXERCISES: CPT | Performed by: PHYSICAL THERAPIST

## 2018-06-12 PROCEDURE — G8985 CARRY GOAL STATUS: HCPCS | Performed by: PHYSICAL THERAPIST

## 2018-06-12 NOTE — PROGRESS NOTES
Daily Note     Today's date: 2018  Patient name: Lety Angelo  :   MRN: [id number]  Referring provider: Yamile Loredo  Dx:   Encounter Diagnosis     ICD-10-CM    1  Spinal stenosis of cervical region M48 02    2  Status post cervical spinal fusion Z98 1                   Subjective: Rode total of 3 miles on bike, needed a break after 2  Aubrey Flair bike in order to make legs tired before going to bed  Felt good coming in today and was able to ride bike again for 15 minutes  Assessment: Watch his balance when transitioning exercises until he gets the pattern    Plan: Continue per plan of care         Objective: See treatment diary below  Daily Treatment Diary     Manuals  6-8 6-12                   Exercise Diary              Bike  16' 5'/10' 10'/8' x x x                   HR/TR  2x10 x 2x15 x x x      Step up  6"step  2x10 x 15 ea foot x x x      March  15 20 x x x x      Squat  2x10 x x x x x      Side stepping at bar   3 laps 4 laps x x x      Sit<->stand at low mat table      15x3 50                                                                                                                                                                               Modalities                          X=same as last visit

## 2018-06-13 ENCOUNTER — OFFICE VISIT (OUTPATIENT)
Dept: NEUROLOGY | Facility: CLINIC | Age: 73
End: 2018-06-13
Payer: OTHER MISCELLANEOUS

## 2018-06-13 VITALS
HEART RATE: 79 BPM | WEIGHT: 150.4 LBS | BODY MASS INDEX: 21.06 KG/M2 | HEIGHT: 71 IN | DIASTOLIC BLOOD PRESSURE: 66 MMHG | SYSTOLIC BLOOD PRESSURE: 127 MMHG | RESPIRATION RATE: 14 BRPM

## 2018-06-13 DIAGNOSIS — M48.02 SPINAL STENOSIS OF CERVICAL REGION: Chronic | ICD-10-CM

## 2018-06-13 DIAGNOSIS — Z98.1 S/P LAMINECTOMY WITH SPINAL FUSION: ICD-10-CM

## 2018-06-13 DIAGNOSIS — M54.2 NECK PAIN: ICD-10-CM

## 2018-06-13 DIAGNOSIS — R29.898 UPPER EXTREMITY WEAKNESS: Primary | ICD-10-CM

## 2018-06-13 PROCEDURE — 99204 OFFICE O/P NEW MOD 45 MIN: CPT | Performed by: PHYSICAL MEDICINE & REHABILITATION

## 2018-06-13 RX ORDER — MAGNESIUM HYDROXIDE 400 MG/5ML
100 SUSPENSION, ORAL (FINAL DOSE FORM) ORAL EVERY 8 HOURS PRN
Refills: 0 | COMMUNITY
Start: 2018-04-18 | End: 2018-07-04

## 2018-06-13 RX ORDER — IBUPROFEN 800 MG/1
TABLET ORAL EVERY 6 HOURS PRN
COMMUNITY
End: 2018-07-24

## 2018-06-13 RX ORDER — CHLORHEXIDINE GLUCONATE 0.12 MG/ML
RINSE ORAL
COMMUNITY
End: 2018-07-24

## 2018-06-13 RX ORDER — OXYCODONE AND ACETAMINOPHEN 7.5; 325 MG/1; MG/1
TABLET ORAL
COMMUNITY

## 2018-06-13 NOTE — PROGRESS NOTES
Physical Medicine & Rehabilitation New Patient Evaluation  Leslie Marquez y o male        Assessment/Plan:    Sonia Bates was seen in clinic today 1 month after his discharge from inpatient rehabilitation  Patient is progressing functionally as expected  Subjectively, feels improvement in his symptoms post both surgeries  Patient continues outpatient physical therapy  Neck pain has also significantly decreased  Diagnoses and all orders for this visit:    Upper extremity weakness  -Strength improved on examination  Continues to have acute on chronic left proximal weakness in elbow extensors and shoulder flexors  Patient to see  Dr Lakshmi Wong in 2 weeks for clearance to specifically work on UE strengthening      S/P laminectomy with spinal fusion  -F/U with Dr Lakshmi Wong    Spinal stenosis of cervical region  -F/U with Dr Lakshmi Wong     Neck Pain:  -Improving  -Recommend to continue to wean off Percocet  -Recommend and counseled on reducing dose of Valium from 10mg to 5mg QHS as this in combination with Percocet may be contributing to his subtle memory and wobbly sensation in the mornings (Patient to cut his tablet in half)     Excessive Snoring:  -After cervical clearance from Dr Lakshmi Wong, patient recommended to discuss a future sleep study with this primary care physician to rule out sleep apnea  RTC in 6 months or sooner PRN          HPI:   Alexandru Nino 81 y o  male right handed, with  has a past medical history of Arthritis; Back pain; Cervical pain (neck); COPD (chronic obstructive pulmonary disease) ([address] ); History of transfusion; Hyperlipidemia; Mild heartburn; Neck pain; Numbness; Scratches; and Wears dentures   Keshia Wade a 81 y o  RIGHT -handed male with past medical history significant for COPD, HLD, GERD and known severe cervical stenosis/cervical radiculopathy who presented to [address] on 4/30/18 for second part elective surgical intervention of cervical stenosis   Patient underwent an ACDF C3/4, C7/T1 by Dr Bernice Rizzo on 4/12/18  Dennis Gayle was taken to the OR for second part posterior surgery on 4/30/18 for a C3-T2 laminectomy and fusion  Post-operatively patient was placed in a cervical collar to be worn while out of bed   Patient was started on Aspirin 325mg for DVT ppx per Dr Edil Crowe team and managed for increased post-operative pain  Medically, patient had HTN and was started on Chlorthalidone   Patient had an episode of chest pain which resolved with negative workup  Patient was [facility] to [facility] from 5/9/18-5/19/18 for inpatient rehabilitaiton  SUBJECTIVE:  Patient presents today in the office for follow-up  Patient accompanied by his wife today  Is doing outpatient physical therapy which is going well  Patient reports he isn't doing much strengthening with his arms as instructed by Dr Bernice Rizzo  Wife reports some subtle memory deficits since having 2 surgeries  Patient continues to take Percocet but is weaning off  Takes Ibuprofen 800mg once a week or so only during severe pain  Continues to take Valium 10mg QHS  Feels wobbly in the mornings at times, but no falls  Since his first surgery states he's lost weight due to difficulty swallowing and eating at first - lost about 20 lbs  Now eating 100% of his meals without any difficulty swallowing and has a goal to gain back some weight  Wife also reports excessive snoring at night           Expanded Social History:   Patient lives with spouse in a single family home with 0 steps to enter    Retired fork   Driving: Not currently     Function:   Current Level of Function:   Transfers   Modified Independent   Ambulation   Modified Independent   Stair negotiation   Modified Independent   Upper Body ADLs   Modified Independent   Lower Body ADLs   Modified Independent   Toileting   Modified Independent   Bathing   Modified Independent     Prior to level of function: Independent Functional Goals: To drive again and get stronger overall    ROS:   Review of Systems   Constitutional:        Improved with his eating 100% of his meals now   HENT: Negative  Eyes: Negative  Respiratory: Negative  Cardiovascular: Negative  Gastrointestinal: Negative  Endocrine: Negative  Genitourinary: Negative  Musculoskeletal: Positive for neck pain  Skin: Negative  Allergic/Immunologic: Negative  Neurological: Positive for weakness  Hematological: Negative  Psychiatric/Behavioral: Negative          OBJECTIVE:   /66 (BP Location: Left arm, Patient Position: Sitting, Cuff Size: Standard)   Pulse 79   Resp 14   Ht 5' 11" (1 803 m)   Wt 68 2 kg (150 lb 6 4 oz)   BMI 20 98 kg/m²      Physical Exam  Physical Exam   Constitutional: He appears well-developed and well-nourished  HENT:   Mouth/Throat: Oropharynx is clear and moist    Eyes: EOM are normal  Pupils are equal, round, and reactive to light  Neck:   Aspen Cervical collar in place   Posterior neck incision healing C/D/I - no drainage   Cardiovascular: Normal rate and regular rhythm  Pulmonary/Chest:   Coarse breath sounds throughout  Mild expiratory wheezes intermittently   Abdominal: Soft  Bowel sounds are normal  He exhibits no distension  There is no tenderness  Musculoskeletal: Normal range of motion  No increased muscle tone   Skin: Skin is warm  Psychiatric: He has a normal mood and affect  Nursing note and vitals reviewed  Neuro:  Sensation to light touch intact  Gait: Ambulates with a cane with step through gait  Motor Exam:   Right Left Site Right Left Site   4+ 4 S Ab:  Shoulder Abductors 4+ 4+ HF:  Hip Flexors   4+ 4+ EF:  Elbow Flexors   H Ab:   Hip Abductors   4+ 3 EE:  Elbow Extensors 4+ 4+ KF: Knee Flexors   4+ 4+ WE:  Wrist Extensors 4+ 4+ KE:  Knee Extensors   4+ 4+ FF:  Finger Flexors 4+ 4+ DR:  Dorsi Flexors   4+ 4+ HI:  Hand Intrinsics   EHL: Ext Rhodes Longus   4+ 4+  PF:  Plantar Flexors       Imaging: I personally reviewed pertinent imaging  Past Medical History:   Diagnosis Date    Arthritis     Back pain     Cervical pain (neck)     [facility]     History of transfusion     Age 8 after bleeding post tonsillectomy    Hyperlipidemia     Mild heartburn     Neck pain     Numbness     Left hand and arm    Scratches     Multiple on limbs and hands    Wears dentures     Upper and lower  Has 4 pins on bottom to hold bottom denture       Patient Active Problem List    Diagnosis Date Noted    Status post cervical spinal fusion 06/05/2018    Upper extremity weakness 05/10/2018    S/P laminectomy with spinal fusion 05/10/2018    Spinal stenosis of cervical region 04/13/2018       Past Surgical History:   Procedure Laterality Date    COLONOSCOPY      ESOPHAGOGASTRODUODENOSCOPY      EYE SURGERY      Bilateral implants to correct vision    CO ANTERIOR INSTRUMENTATION 2-3 VERTEBRAL SEGMENTS N/A 4/12/2018    Procedure: FUSION CERVICAL ANTERIOR W DISCECTOMY C3/4, C7/T1;  Surgeon: Maria Gottron, MD;  Location: [facility];  Service: Orthopedics    CO ARTHRODESIS POSTERIOR/POSTERIORLATERAL CERVICAL BELOW C2 N/A 4/30/2018    Procedure: POSSIBLE C3-4, C4/5 LAMINECTOMY;  Surgeon: Maria Gottron, MD;  [facility];  Service: Orthopedics    CO ARTHRODESIS POSTERIOR/POSTEROLATERAL THORACIC N/A 4/30/2018    Procedure: C3-T2 PSF With Instrumentation;  Surgeon: Maria Gottron, MD;  [facility];  Service: Orthopedics    CO EXCIS CERV DISK,ONE LEVEL N/A 4/30/2018    Procedure: C 6-7 Left HEMILAMINOTOMY AND FORAMINOTOMY C7-T2 LAMINECTOMY C7-T2 Left Foraminotomy;  Surgeon: Maria Gottron, MD;  Location[facility];  Service: Orthopedics    TONSILLECTOMY         History reviewed  No pertinent family history      Social History     No Known Allergies      Current Outpatient Prescriptions:     azelastine (ASTELIN) 0 1 % nasal spray, 2 sprays into each nostril daily Use in each nostril as directed  , Disp: , Rfl:     chlorhexidine (PERIDEX) 0 12 % solution, SWISH AND SPIT 1 CAPFUL BY MOUTH TWICE A DAY FOR 5 DAYS, Disp: , Rfl:     Cholecalciferol (VITAMIN D3) 22498 units TABS, Take 50,000 Units by mouth once a week Takes on Sundays , Disp: , Rfl:     diazepam (VALIUM) 10 mg tablet, Take 10 mg by mouth daily at bedtime  , Disp: , Rfl:     gabapentin (NEURONTIN) 600 MG tablet, Take 600 mg by mouth 2 (two) times a day  , Disp: , Rfl:     ibuprofen (MOTRIN) 800 mg tablet, Take by mouth every 6 (six) hours as needed for mild pain, Disp: , Rfl:     oxyCODONE-acetaminophen (PERCOCET) 7 5-325 MG per tablet, oxycodone-acetaminophen 7 5 mg-325 mg tablet, Disp: , Rfl:     pramipexole (MIRAPEX) 0 75 MG tablet, Take 0 75 mg by mouth daily at bedtime, Disp: , Rfl:     RA STOOL SOFTENER 100 MG capsule, Take 100 mg by mouth every 8 (eight) hours as needed, Disp: , Rfl: 0    simvastatin (ZOCOR) 20 mg tablet, Take 20 mg by mouth daily at bedtime, Disp: , Rfl:     aspirin (ECOTRIN) 325 mg EC tablet, Take 1 tablet (325 mg total) by mouth daily, Disp: 30 tablet, Rfl: 0    pantoprazole (PROTONIX) 20 mg tablet, Take 1 tablet (20 mg total) by mouth daily in the early morning, Disp: 30 tablet, Rfl: 0       *I have spent 45 minutes with Patient and family today in which greater than 50% of this time was spent in counseling/coordination of care regarding Intructions for management, Importance of tx compliance and Impressions

## 2018-06-13 NOTE — PATIENT INSTRUCTIONS
Reminder:  Discuss future possible sleep study to evaluate night time snoring with you Primary Care Physician

## 2018-06-14 ENCOUNTER — APPOINTMENT (OUTPATIENT)
Dept: PHYSICAL THERAPY | Facility: REHABILITATION | Age: 73
End: 2018-06-14
Payer: MEDICARE

## 2018-06-15 ENCOUNTER — APPOINTMENT (OUTPATIENT)
Dept: PHYSICAL THERAPY | Facility: REHABILITATION | Age: 73
End: 2018-06-15
Payer: MEDICARE

## 2018-07-04 ENCOUNTER — APPOINTMENT (EMERGENCY)
Dept: RADIOLOGY | Facility: HOSPITAL | Age: 73
End: 2018-07-04
Payer: MEDICARE

## 2018-07-04 ENCOUNTER — HOSPITAL ENCOUNTER (EMERGENCY)
Facility: HOSPITAL | Age: 73
Discharge: HOME | End: 2018-07-04
Attending: EMERGENCY MEDICINE | Admitting: EMERGENCY MEDICINE
Payer: MEDICARE

## 2018-07-04 VITALS
DIASTOLIC BLOOD PRESSURE: 95 MMHG | SYSTOLIC BLOOD PRESSURE: 161 MMHG | TEMPERATURE: 97 F | OXYGEN SATURATION: 97 % | HEART RATE: 86 BPM | RESPIRATION RATE: 16 BRPM

## 2018-07-04 DIAGNOSIS — M25.562 ACUTE PAIN OF LEFT KNEE: Primary | ICD-10-CM

## 2018-07-04 DIAGNOSIS — I47.9 TACHYCARDIA, PAROXYSMAL (HCC): ICD-10-CM

## 2018-07-04 DIAGNOSIS — M25.552 ACUTE PAIN OF LEFT HIP: ICD-10-CM

## 2018-07-04 LAB
ALBUMIN SERPL BCP-MCNC: 3.6 G/DL (ref 3.5–5)
ALP SERPL-CCNC: 122 U/L (ref 46–116)
ALT SERPL W P-5'-P-CCNC: 19 U/L (ref 12–78)
ANION GAP BLD CALC-SCNC: 15 MMOL/L (ref 4–13)
ANION GAP SERPL CALCULATED.3IONS-SCNC: 6 MMOL/L (ref 4–13)
AST SERPL W P-5'-P-CCNC: 22 U/L (ref 5–45)
ATRIAL RATE: 143 BPM
BASOPHILS # BLD AUTO: 0.1 THOUSANDS/ΜL (ref 0–0.1)
BASOPHILS NFR BLD AUTO: 1 % (ref 0–1)
BILIRUB SERPL-MCNC: 0.33 MG/DL (ref 0.2–1)
BUN BLD-MCNC: 8 MG/DL (ref 5–25)
BUN SERPL-MCNC: 8 MG/DL (ref 5–25)
CA-I BLD-SCNC: 1.08 MMOL/L (ref 1.12–1.32)
CALCIUM SERPL-MCNC: 8.7 MG/DL (ref 8.3–10.1)
CHLORIDE BLD-SCNC: 99 MMOL/L (ref 100–108)
CHLORIDE SERPL-SCNC: 101 MMOL/L (ref 100–108)
CO2 SERPL-SCNC: 31 MMOL/L (ref 21–32)
CREAT BLD-MCNC: 0.7 MG/DL (ref 0.6–1.3)
CREAT SERPL-MCNC: 0.78 MG/DL (ref 0.6–1.3)
EOSINOPHIL # BLD AUTO: 0.17 THOUSAND/ΜL (ref 0–0.61)
EOSINOPHIL NFR BLD AUTO: 2 % (ref 0–6)
ERYTHROCYTE [DISTWIDTH] IN BLOOD BY AUTOMATED COUNT: 14 % (ref 11.6–15.1)
GFR SERPL CREATININE-BSD FRML MDRD: 91 ML/MIN/1.73SQ M
GFR SERPL CREATININE-BSD FRML MDRD: 95 ML/MIN/1.73SQ M
GLUCOSE SERPL-MCNC: 101 MG/DL (ref 65–140)
GLUCOSE SERPL-MCNC: 90 MG/DL (ref 65–140)
HCT VFR BLD AUTO: 49.1 % (ref 36.5–49.3)
HCT VFR BLD CALC: 48 % (ref 36.5–49.3)
HGB BLD-MCNC: 15.8 G/DL (ref 12–17)
HGB BLDA-MCNC: 16.3 G/DL (ref 12–17)
IMM GRANULOCYTES # BLD AUTO: 0.04 THOUSAND/UL (ref 0–0.2)
IMM GRANULOCYTES NFR BLD AUTO: 0 % (ref 0–2)
LYMPHOCYTES # BLD AUTO: 1.44 THOUSANDS/ΜL (ref 0.6–4.47)
LYMPHOCYTES NFR BLD AUTO: 15 % (ref 14–44)
MAGNESIUM SERPL-MCNC: 2.3 MG/DL (ref 1.6–2.6)
MCH RBC QN AUTO: 30.6 PG (ref 26.8–34.3)
MCHC RBC AUTO-ENTMCNC: 32.2 G/DL (ref 31.4–37.4)
MCV RBC AUTO: 95 FL (ref 82–98)
MONOCYTES # BLD AUTO: 0.7 THOUSAND/ΜL (ref 0.17–1.22)
MONOCYTES NFR BLD AUTO: 7 % (ref 4–12)
NEUTROPHILS # BLD AUTO: 7.37 THOUSANDS/ΜL (ref 1.85–7.62)
NEUTS SEG NFR BLD AUTO: 75 % (ref 43–75)
NRBC BLD AUTO-RTO: 0 /100 WBCS
P AXIS: 268 DEGREES
PCO2 BLD: 31 MMOL/L (ref 21–32)
PLATELET # BLD AUTO: 126 THOUSANDS/UL (ref 149–390)
PMV BLD AUTO: 14 FL (ref 8.9–12.7)
POTASSIUM BLD-SCNC: 4.2 MMOL/L (ref 3.5–5.3)
POTASSIUM SERPL-SCNC: 4.1 MMOL/L (ref 3.5–5.3)
PR INTERVAL: 120 MS
PROT SERPL-MCNC: 7.3 G/DL (ref 6.4–8.2)
QRS AXIS: 69 DEGREES
QRSD INTERVAL: 80 MS
QT INTERVAL: 268 MS
QTC INTERVAL: 413 MS
RBC # BLD AUTO: 5.16 MILLION/UL (ref 3.88–5.62)
SODIUM BLD-SCNC: 139 MMOL/L (ref 136–145)
SODIUM SERPL-SCNC: 138 MMOL/L (ref 136–145)
SPECIMEN SOURCE: ABNORMAL
T WAVE AXIS: -88 DEGREES
TROPONIN I SERPL-MCNC: <0.02 NG/ML
TSH SERPL DL<=0.05 MIU/L-ACNC: 1.45 UIU/ML (ref 0.36–3.74)
VENTRICULAR RATE: 143 BPM
WBC # BLD AUTO: 9.82 THOUSAND/UL (ref 4.31–10.16)

## 2018-07-04 PROCEDURE — 93010 ELECTROCARDIOGRAM REPORT: CPT | Performed by: INTERNAL MEDICINE

## 2018-07-04 PROCEDURE — 84443 ASSAY THYROID STIM HORMONE: CPT | Performed by: EMERGENCY MEDICINE

## 2018-07-04 PROCEDURE — 99284 EMERGENCY DEPT VISIT MOD MDM: CPT

## 2018-07-04 PROCEDURE — 85025 COMPLETE CBC W/AUTO DIFF WBC: CPT | Performed by: EMERGENCY MEDICINE

## 2018-07-04 PROCEDURE — 93005 ELECTROCARDIOGRAM TRACING: CPT

## 2018-07-04 PROCEDURE — 80047 BASIC METABLC PNL IONIZED CA: CPT

## 2018-07-04 PROCEDURE — 80053 COMPREHEN METABOLIC PANEL: CPT | Performed by: EMERGENCY MEDICINE

## 2018-07-04 PROCEDURE — 74177 CT ABD & PELVIS W/CONTRAST: CPT

## 2018-07-04 PROCEDURE — 85014 HEMATOCRIT: CPT

## 2018-07-04 PROCEDURE — 84484 ASSAY OF TROPONIN QUANT: CPT | Performed by: EMERGENCY MEDICINE

## 2018-07-04 PROCEDURE — 36415 COLL VENOUS BLD VENIPUNCTURE: CPT | Performed by: EMERGENCY MEDICINE

## 2018-07-04 PROCEDURE — 83735 ASSAY OF MAGNESIUM: CPT | Performed by: EMERGENCY MEDICINE

## 2018-07-04 RX ORDER — OXYCODONE HYDROCHLORIDE AND ACETAMINOPHEN 5; 325 MG/1; MG/1
1 TABLET ORAL EVERY 6 HOURS PRN
Qty: 4 TABLET | Refills: 0 | Status: SHIPPED | OUTPATIENT
Start: 2018-07-04 | End: 2018-07-14

## 2018-07-04 RX ORDER — OXYCODONE HYDROCHLORIDE AND ACETAMINOPHEN 5; 325 MG/1; MG/1
1 TABLET ORAL ONCE
Status: COMPLETED | OUTPATIENT
Start: 2018-07-04 | End: 2018-07-04

## 2018-07-04 RX ORDER — CYCLOBENZAPRINE HCL 10 MG
10 TABLET ORAL ONCE
Status: COMPLETED | OUTPATIENT
Start: 2018-07-04 | End: 2018-07-04

## 2018-07-04 RX ADMIN — OXYCODONE HYDROCHLORIDE AND ACETAMINOPHEN 1 TABLET: 5; 325 TABLET ORAL at 19:06

## 2018-07-04 RX ADMIN — IOHEXOL 100 ML: 350 INJECTION, SOLUTION INTRAVENOUS at 18:01

## 2018-07-04 RX ADMIN — CYCLOBENZAPRINE HYDROCHLORIDE 10 MG: 10 TABLET, FILM COATED ORAL at 17:51

## 2018-07-04 NOTE — ED NOTES
Pts heart rate slowed down into the 90's, where it had been in the 140s  Spoke with Dr Brenda Streeter, RN  07/04/18 [id number]

## 2018-07-04 NOTE — DISCHARGE INSTRUCTIONS
Hip Pain   WHAT YOU NEED TO KNOW:   Hip pain can be caused by a number of conditions, such as bursitis, arthritis, or muscle or tendon strain  X-rays do not show broken bones  You may have swelling in the fluid-filled sacs that protect your muscles and tendons  Hip pain can also be caused by a lower back problem  Hip pain may be caused by trauma, playing sports, or running  Your pain may start in your hip and go to your thigh, buttock, or groin  DISCHARGE INSTRUCTIONS:   Medicines:   · NSAIDs , such as ibuprofen, help decrease swelling, pain, and fever  This medicine is available with or without a doctor's order  NSAIDs can cause stomach bleeding or kidney problems in certain people  If you take blood thinner medicine, always ask your healthcare provider if NSAIDs are safe for you  Always read the medicine label and follow directions  · Take your medicine as directed  Contact your healthcare provider if you think your medicine is not helping or if you have side effects  Tell him of her if you are allergic to any medicine  Keep a list of the medicines, vitamins, and herbs you take  Include the amounts, and when and why you take them  Bring the list or the pill bottles to follow-up visits  Carry your medicine list with you in case of an emergency  Return to the emergency department if:   · Your pain gets worse  · You have numbness in your leg or toes  · You cannot put any weight on or move your hip  Contact your healthcare provider if:   · You have a fever  · Your pain does not decrease, even after treatment  · You have questions or concerns about your condition or care  Follow up with your healthcare provider as directed: You may need physical therapy, an injection, or more testing  You may need to see an orthopedic specialist  Write down your questions so you remember to ask them during your visits    Manage your hip pain:   · Rest  your injured hip so that it can heal  You may need to avoid putting any weight on your hip for at least 48 hours  Return to normal activities as directed  · Ice  the injury for 20 minutes every 4 hours, or as directed  Use an ice pack, or put crushed ice in a plastic bag  Cover it with a towel to protect your skin  Ice helps prevent tissue damage and decreases swelling and pain  · Elevate  your injured hip above the level of your heart as often as you can  This will help decrease swelling and pain  If possible, prop your hip and leg on pillows or blankets to keep the area elevated comfortably  · Maintain a healthy weight  Extra body weight can cause pressure and pain in your hip, knee, and ankle joints  Ask your healthcare provider how much you should weigh  Ask him to help you create a weight loss plan if you are overweight  · Use assistive devices as directed  You may need to use a cane or crutches  Assistive devices help decrease pain and pressure on your hip when you walk  Ask your healthcare provider for more information about assistive devices and how to use them correctly  © 2017 [facility] Information is for End User's use only and may not be sold, redistributed or otherwise used for commercial purposes  All illustrations and images included in CareNotes® are the copyrighted property of A D A M [employer]  or Faustino Patel  The above information is an  only  It is not intended as medical advice for individual conditions or treatments  Talk to your doctor, nurse or pharmacist before following any medical regimen to see if it is safe and effective for you

## 2018-07-04 NOTE — ED PROVIDER NOTES
History  Chief Complaint   Patient presents with    Knee Pain     pt with knee pain on LLE starting last night  pt states pain radiated into buttocks, hip, and groin  30-year-old man presents for evaluation of left lower extremity pain  Patient reports acute onset of left-sided knee and thigh pain starting last night without inciting trauma or strenuous activity  Symptoms worsened and he developed a left buttock pain prompting him to come in for evaluation  Pain is stabbing in quality and radiating below the knee  It is worse with movement, in general   No constitutional symptoms, GI symptoms, or back pain  Of note, patient had cervical spine fixation at the end of May and has been in rehab  He is currently walking with a cane or walker and wife says his gait is different than it has been previously  On arrival, patient is afebrile with otherwise normal vital signs  Physical exam shows no significant bony tenderness to palpation in the lumbar spine, hip, pelvis, thigh, or knee  There are no skin changes or effusions  Patient is neurovascularly intact  Given reported groin pain, will check x-rays of left hip and pelvis to evaluate for fracture  Possibly neuropathic/radicular pain verses MSK in the setting of rehab and abnormal gait  Further workup and management pending above results  Prior to Admission Medications   Prescriptions Last Dose Informant Patient Reported? Taking?    Cholecalciferol (VITAMIN D3) 50349 units TABS Past Week at Unknown time  Yes Yes   Sig: Take 50,000 Units by mouth once a week Takes on Sundays    azelastine (ASTELIN) 0 1 % nasal spray 2018 at Unknown time  Yes Yes   Si sprays into each nostril daily Use in each nostril as directed     chlorhexidine (PERIDEX) 0 12 % solution 2018 at Unknown time  Yes Yes   Sig: SWISH AND SPIT 1 CAPFUL BY MOUTH TWICE A DAY FOR 5 DAYS   diazepam (VALIUM) 10 mg tablet 7/3/2018 at Unknown time  Yes Yes   Sig: Take 10 mg by mouth daily at bedtime     gabapentin (NEURONTIN) 600 MG tablet 7/4/2018 at Unknown time  Yes Yes   Sig: Take 600 mg by mouth 2 (two) times a day     ibuprofen (MOTRIN) 800 mg tablet 7/4/2018 at Unknown time  Yes Yes   Sig: Take by mouth every 6 (six) hours as needed for mild pain   oxyCODONE-acetaminophen (PERCOCET) 7 5-325 MG per tablet 7/4/2018 at Unknown time  Yes Yes   Sig: oxycodone-acetaminophen 7 5 mg-325 mg tablet   pramipexole (MIRAPEX) 0 75 MG tablet 7/3/2018 at Unknown time  Yes Yes   Sig: Take 0 75 mg by mouth daily at bedtime   simvastatin (ZOCOR) 20 mg tablet 7/3/2018 at Unknown time  Yes Yes   Sig: Take 20 mg by mouth daily at bedtime      Facility-Administered Medications: None       Past Medical History:   Diagnosis Date    Arthritis     Back pain     Cervical pain (neck)     COPD (chronic obstructive pulmonary disease) (HCC)     History of transfusion     Age 8 after bleeding post tonsillectomy    Hyperlipidemia     Mild heartburn     Neck pain     Numbness     Left hand and arm    Scratches     Multiple on limbs and hands    Wears dentures     Upper and lower   Has 4 pins on bottom to hold bottom denture       Past Surgical History:   Procedure Laterality Date    COLONOSCOPY      ESOPHAGOGASTRODUODENOSCOPY      EYE SURGERY      Bilateral implants to correct vision    RI ANTERIOR INSTRUMENTATION 2-3 VERTEBRAL SEGMENTS N/A 4/12/2018    Procedure: FUSION CERVICAL ANTERIOR W DISCECTOMY C3/4, C7/T1;  Surgeon: Irlanda Reinoso MD;  [facility];  Service: Orthopedics    RI ARTHRODESIS POSTERIOR/POSTERIORLATERAL CERVICAL BELOW C2 N/A 4/30/2018    Procedure: POSSIBLE C3-4, C4/5 LAMINECTOMY;  Surgeon: Irlanda Reinoso MD;  [facility];  Service: Orthopedics    RI ARTHRODESIS POSTERIOR/POSTEROLATERAL THORACIC N/A 4/30/2018    Procedure: C3-T2 PSF With Instrumentation;  Surgeon: Irlanda Reinoso MD;  [facility];  Service: Orthopedics    RI EXCIS CERV DISK,ONE LEVEL N/A 4/30/2018    Procedure: C 6-7 Left HEMILAMINOTOMY AND FORAMINOTOMY C7-T2 LAMINECTOMY C7-T2 Left Foraminotomy;  Surgeon: Chayo Zapata MD;  [facility];  Service: Orthopedics    TONSILLECTOMY         History reviewed  No pertinent family history  I have reviewed and agree with the history as documented  Social History   Substance Use Topics    Smoking status: Former Smoker     Years: 55 00     Types: Cigars, Cigarettes     Quit date: 1/29/2018    Smokeless tobacco: Former User     Types: Chew      Comment: Quit cigarettes in 2002  Had smoked at most 1 5 packs daily   Alcohol use Yes      Comment: rarely         Review of Systems   Constitutional: Negative for chills and fever  HENT: Negative for rhinorrhea and sore throat  Eyes: Negative for photophobia and visual disturbance  Respiratory: Negative for cough and shortness of breath  Cardiovascular: Negative for chest pain and leg swelling  Gastrointestinal: Negative for abdominal pain, diarrhea, nausea and vomiting  Genitourinary: Negative for dysuria, frequency and hematuria  Musculoskeletal: Positive for arthralgias, gait problem, myalgias, neck pain and neck stiffness  Negative for back pain  Skin: Negative for rash and wound  Neurological: Negative for weakness, light-headedness, numbness and headaches  Physical Exam  ED Triage Vitals [07/04/18 1605]   Temperature Pulse Respirations Blood Pressure SpO2   (!) 97 °F (36 1 °C) 87 18 131/79 96 %      Temp Source Heart Rate Source Patient Position - Orthostatic VS BP Location FiO2 (%)   Tympanic Monitor Lying Right arm --      Pain Score       8           Orthostatic Vital Signs  Vitals:    07/04/18 1605 07/04/18 1714 07/04/18 1715 07/04/18 1745   BP: 131/79 144/96 144/96 161/95   Pulse: 87 (!) 144 (!) 146 86   Patient Position - Orthostatic VS: Lying Lying         Physical Exam   Constitutional: He is oriented to person, place, and time   He appears well-developed and well-nourished  No distress  HENT:   Head: Normocephalic and atraumatic  Eyes: Conjunctivae are normal  Pupils are equal, round, and reactive to light  No scleral icterus  Neck: Neck supple  No JVD present  C-collar in place   Cardiovascular: Normal rate, regular rhythm and normal heart sounds  Exam reveals no gallop and no friction rub  No murmur heard  Pulmonary/Chest: Effort normal and breath sounds normal  No respiratory distress  He has no wheezes  He has no rales  Abdominal: Soft  He exhibits no distension  There is no tenderness  There is no rebound and no guarding  Musculoskeletal:   No midline lumbosacral spine tenderness, no paraspinal muscle tenderness, no skin changes or effusion, neurovascularly intact with 4/5 strength, mild groin pain with ranging of the left hip   Neurological: He is alert and oriented to person, place, and time  Skin: Skin is warm and dry  He is not diaphoretic  Psychiatric: He has a normal mood and affect  His behavior is normal  Thought content normal    Vitals reviewed        ED Medications  Medications   cyclobenzaprine (FLEXERIL) tablet 10 mg (10 mg Oral Given 7/4/18 1751)   iohexol (OMNIPAQUE) 350 MG/ML injection (MULTI-DOSE) 100 mL (100 mL Intravenous Given 7/4/18 1801)   oxyCODONE-acetaminophen (PERCOCET) 5-325 mg per tablet 1 tablet (1 tablet Oral Given 7/4/18 1906)       Diagnostic Studies  Results Reviewed     Procedure Component Value Units Date/Time    Comprehensive metabolic panel [[id number]]  (Abnormal) Collected:  07/04/18 1755    Lab Status:  Final result Specimen:  Blood from Arm, Left Updated:  07/04/18 1828     Sodium 138 mmol/L      Potassium 4 1 mmol/L      Chloride 101 mmol/L      CO2 31 mmol/L      Anion Gap 6 mmol/L      BUN 8 mg/dL      Creatinine 0 78 mg/dL      Glucose 90 mg/dL      Calcium 8 7 mg/dL      AST 22 U/L      ALT 19 U/L      Alkaline Phosphatase 122 (H) U/L      Total Protein 7 3 g/dL      Albumin 3 6 g/dL      Total Bilirubin 0 33 mg/dL      eGFR 91 ml/min/1 73sq m     Narrative:         National Kidney Disease Education Program recommendations are as follows:  GFR calculation is accurate only with a steady state creatinine  Chronic Kidney disease less than 60 ml/min/1 73 sq  meters  Kidney failure less than 15 ml/min/1 73 sq  meters  TSH, 3rd generation with Free T4 reflex [[id number]]  (Normal) Collected:  07/04/18 1755    Lab Status:  Final result Specimen:  Blood from Arm, Left Updated:  07/04/18 1828     TSH 3RD GENERATON 1 450 uIU/mL     Narrative:         Patients undergoing fluorescein dye angiography may retain small amounts of fluorescein in the body for 48-72 hours post procedure  Samples containing fluorescein can produce falsely depressed TSH values  If the patient had this procedure,a specimen should be resubmitted post fluorescein clearance      Magnesium [[id number]]  (Normal) Collected:  07/04/18 1755    Lab Status:  Final result Specimen:  Blood from Arm, Left Updated:  07/04/18 1828     Magnesium 2 3 mg/dL     Troponin I [[id number]]  (Normal) Collected:  07/04/18 1755    Lab Status:  Final result Specimen:  Blood from Arm, Left Updated:  07/04/18 1821     Troponin I <0 02 ng/mL     CBC and differential [[id number]]  (Abnormal) Collected:  07/04/18 1755    Lab Status:  Final result Specimen:  Blood from Arm, Left Updated:  07/04/18 1815     WBC 9 82 Thousand/uL      RBC 5 16 Million/uL      Hemoglobin 15 8 g/dL      Hematocrit 49 1 %      MCV 95 fL      MCH 30 6 pg      MCHC 32 2 g/dL      RDW 14 0 %      MPV 14 0 (H) fL      Platelets 361 (L) Thousands/uL      nRBC 0 /100 WBCs      Neutrophils Relative 75 %      Immat GRANS % 0 %      Lymphocytes Relative 15 %      Monocytes Relative 7 %      Eosinophils Relative 2 %      Basophils Relative 1 %      Neutrophils Absolute 7 37 Thousands/µL      Immature Grans Absolute 0 04 Thousand/uL      Lymphocytes Absolute 1 44 Thousands/µL      Monocytes Absolute 0 70 Thousand/µL      Eosinophils Absolute 0 17 Thousand/µL      Basophils Absolute 0 10 Thousands/µL     POCT Chem 8+ [[id number]]  (Abnormal) Collected:  07/04/18 1716    Lab Status:  Final result Updated:  07/04/18 1720     SODIUM, I-STAT 139 mmol/l      Potassium, i-STAT 4 2 mmol/L      Chloride, istat 99 (L) mmol/L      CO2, i-STAT 31 mmol/L      Anion Gap, Istat 15 (H) mmol/L      Calcium, Ionized i-STAT 1 08 (L) mmol/L      BUN, I-STAT 8 mg/dl      Creatinine, i-STAT 0 7 mg/dl      eGFR 95 ml/min/1 73sq m      Glucose, i-STAT 101 mg/dl      Hct, i-STAT 48 %      Hgb, i-STAT 16 3 g/dl      Specimen Type VENOUS                 CT abdomen pelvis with contrast   Final Result by Sunday MD Clarita (07/04 1827)      1  No acute findings in the abdomen or pelvis  No findings for displaced fracture  2   Ectatic abdominal aorta  3   Mildly distended urinary bladder  Workstation performed: [id number]               Procedures  Procedures      Phone Consults  ED Phone Contact    ED Course           Identification of Seniors at Risk      Most Recent Value   (ISAR) Identification of Seniors at Risk   Before the illness or injury that brought you to the Emergency, did you need someone to help you on a regular basis? 0 Filed at: 07/04/2018 1607   In the last 24 hours, have you needed more help than usual?  0 Filed at: 07/04/2018 1607   Have you been hospitalized for one or more nights during the past 6 months? 1 Filed at: 07/04/2018 1607   In general, do you see well?  0 Filed at: 07/04/2018 1607   In general, do you have serious problems with your memory? 0 Filed at: 07/04/2018 1607   Do you take more than three different medications every day?   1 Filed at: 07/04/2018 1607   ISAR Score  2 Filed at: 07/04/2018 1607                          MDM  Number of Diagnoses or Management Options  Acute pain of left hip:   Acute pain of left knee:   [facility]   Diagnosis management comments: CT abdomen/pelvis with contrast was obtained to evaluate for possible underlying fracture or vascular abnormality to explain patient's symptoms  This study was unremarkable  Patient noted to be tachycardic to the 140s-150s, likely atrial flutter  He was stable without symptoms, hypotension, or altered mental status  He spontaneously converted to NSR  Patient was discharged with treatment/follow up for likely MSK pain in the setting of rehabilitation and abnormal gait  He was given follow up instructions and return precautions for atrial flutter  CritCare Time    Disposition  Final diagnoses:   Acute pain of left knee   Acute pain of left hip   Tachycardia, paroxysmal ([facility] [address] )     Time reflects when diagnosis was documented in both MDM as applicable and the Disposition within this note     Time User Action Codes Description Comment    7/4/2018  6:53 PM Ardelia Funmi Add [M25 562] Acute pain of left knee     7/4/2018  6:53 PM Ardelia Funmi Add [M25 552] Acute pain of left hip     7/4/2018  6:53 PM Ardelia Funmi Add [I47 9] Tachycardia, paroxysmal [facility])       ED Disposition     ED Disposition Condition Comment    Discharge  [facility] [address]  discharge to home/self care  Condition at discharge: Good        Follow-up Information     Follow up With Specialties Details Why Contact Clint Saez DO Family Medicine Call As needed   [address]            Discharge Medication List as of 7/4/2018  6:55 PM      START taking these medications    Details   oxyCODONE-acetaminophen (PERCOCET) 5-325 mg per tablet Take 1 tablet by mouth every 6 (six) hours as needed for moderate pain for up to 10 days Max Daily Amount: 4 tablets, Starting Wed 7/4/2018, Until Sat 7/14/2018, Print         CONTINUE these medications which have NOT CHANGED    Details   azelastine (ASTELIN) 0 1 % nasal spray 2 sprays into each nostril daily Use in each nostril as directed  , Historical Med chlorhexidine (PERIDEX) 0 12 % solution SWISH AND SPIT 1 CAPFUL BY MOUTH TWICE A DAY FOR 5 DAYS, Historical Med      Cholecalciferol (VITAMIN D3) 99077 units TABS Take 50,000 Units by mouth once a week Takes on Sundays , Historical Med      diazepam (VALIUM) 10 mg tablet Take 10 mg by mouth daily at bedtime  , Historical Med      gabapentin (NEURONTIN) 600 MG tablet Take 600 mg by mouth 2 (two) times a day  , Historical Med      ibuprofen (MOTRIN) 800 mg tablet Take by mouth every 6 (six) hours as needed for mild pain, Historical Med      oxyCODONE-acetaminophen (PERCOCET) 7 5-325 MG per tablet oxycodone-acetaminophen 7 5 mg-325 mg tablet, Historical Med      pramipexole (MIRAPEX) 0 75 MG tablet Take 0 75 mg by mouth daily at bedtime, Historical Med      simvastatin (ZOCOR) 20 mg tablet Take 20 mg by mouth daily at bedtime, Historical Med           No discharge procedures on file  ED Provider  Attending physically available and evaluated Leeanna Maldonado I managed the patient along with the ED Attending      Electronically Signed by         Leyda Rowan MD  07/05/18 0947

## 2018-07-04 NOTE — ED ATTENDING ATTESTATION
I, Joanne Nuñez DO, saw and evaluated the patient  I have discussed the patient with the resident/non-physician practitioner and agree with the resident's/non-physician practitioner's findings, Plan of Care, and MDM as documented in the resident's/non-physician practitioner's note, except where noted  All available labs and Radiology studies were reviewed  At this point I agree with the current assessment done in the Emergency Department  I have conducted an independent evaluation of this patient a history and physical is as follows:      Critical Care Time  CritCare Time    Procedures     72 yr old male to the ED with knee an upper leg pain  Started yesterday while laying on the soft  First with knee then thigh pain  This am with groin and buttock pain in addition  Recent rehab up to a week ago for deconditioning from neck surgery  No sensory changes, back pain, injury, overuse, prior hx of the same, fever, abd pain, urinary sx  Exm: Very min pain with leg motion, hip compression and rotation  Groin wo mass, no bruits, fem pulses equal bilaterally  No CVAT or back tenderness    Pln: 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

## 2018-07-04 NOTE — ED CARE HANDOFF
Emergency Department Sign Out Note        Sign out and transfer of care from Dr Binta Strong  See Separate Emergency Department note  The patient, Shivani Kidd, was evaluated by the previous provider for left knee pain  Patient apparently reported left-sided knee and thigh pain starting the night prior to presentation without any precipitating factor  He also began developed left buttock pain  Patient does have a history of cervical spinal stenosis and is currently in a cervical collar  He is in physical rehabilitation at this time  He walks with a cane or walker  Physical examination demonstrated pain with passive left leg range of motion  Workup Completed:  I-STAT and CT abdomen and pelvis to evaluate for vascular pathology or musculoskeletal pathology  ED Course / Workup Pending (followup): Patient was noted to be markedly tachycardic into the 140s to 150s  On monitor, rhythm was most consistent with atrial flutter  I evaluated the patient  He was completely asymptomatic  He denied any chest pain or pressure, palpitations, nausea, vomiting, diaphoresis, shortness of breath, cough, or any other symptoms  His blood pressure remained stable  He was mentating appropriately  After a short period of time, patient spontaneously converted to normal sinus rhythm  Patient does not have a history of atrial fibrillation or atrial flutter  EKG was obtained which demonstrated atrial flutter at a rate of 143 beats per minute  No clear ischemia  Cardiac workup was obtained consisting of CBC, CMP, troponin, magnesium, and TSH with reflex T4  Labs demonstrated no acute findings  Patient remained in normal sinus rhythm throughout the rest of his ED course  CT previously ordered demonstrated no acute findings  We will discharge patient with PCP follow-up, return precautions, and diagnosis of musculoskeletal pain          Identification of Seniors at Risk      Most Recent Value   (ISAR) Identification of Seniors at Risk   Before the illness or injury that brought you to the Emergency, did you need someone to help you on a regular basis? 0 Filed at: 07/04/2018 1607   In the last 24 hours, have you needed more help than usual?  0 Filed at: 07/04/2018 1607   Have you been hospitalized for one or more nights during the past 6 months? 1 Filed at: 07/04/2018 1607   In general, do you see well?  0 Filed at: 07/04/2018 1607   In general, do you have serious problems with your memory? 0 Filed at: 07/04/2018 1607   Do you take more than three different medications every day? 1 Filed at: 07/04/2018 1607   ISAR Score  2 Filed at: 07/04/2018 1607                        ED Course as of Jul 05 0120   Wed Jul 04, 2018   1700 Sign-out from Dr Romain Carrillo  72year old male with history of cervical spinal stenosis status post cervical fusion, hyperlipidemia, and hypertension who is presenting with left groin pain  Signed out pending CT of the abdomen and pelvis with IV contrast to evaluate for vascular or MSK pathology  1723 Patient noted to be significantly tachycardic  I evaluated the patient  He is completely asymptomatic  He denies chest pain or pressure, palpitations, nausea, vomiting, shortness of breath, or any other symptoms at this time  He appears comfortable  He is not aware of any history of abnormal heart rhythm  Printout of rhythm strip is most consistent with atrial flutter  We will obtain 12 lead EKG  Pulse: (!) 146   1753 Patient currently in normal sinus rhythm  Will obtain cardiac workup  Pulse: 86   1836 Troponin I: <0 02   1836 Magnesium: 2 3   1836 TSH 3RD GENERATON: 1 450   1838 CT is not demonstrate any acute pathology  There is no acute vascular pathology  No fractures demonstrated in the spine or hip joints   CT abdomen pelvis with contrast     ECG 12 Lead Documentation  Date/Time: 7/4/2018 6:40 PM  Performed by: Lizzie Hickman  Authorized by: Lizzie Hickman ECG reviewed by me, the ED Provider: yes    Patient location:  ED  Previous ECG:     Previous ECG:  Compared to current    Comparison ECG info: May 8, 2018    Similarity:  Changes noted    Comparison to cardiac monitor: Yes    Interpretation:     Interpretation: abnormal    Rate:     ECG rate:  143    ECG rate assessment: tachycardic    Rhythm:     Rhythm: atrial flutter    Ectopy:     Ectopy: none    QRS:     QRS axis:  Normal    QRS intervals:  Normal  Conduction:     Conduction: normal    ST segments:     ST segments:  Normal  T waves:     T waves: normal    Other findings:     Other findings: LOLLY and LVH    Comments:      EKG demonstrates tachyarrhythmia, atrial flutter versus sinus tachycardia at a rate of 143 beats per minute  There are no ischemic changes  There is also right atrial enlargement as evidence by peaked P-waves as well as voltage criteria for LVH  MDM  Number of Diagnoses or Management Options  Acute pain of left hip: new and requires workup  Acute pain of left knee: new and requires workup  Tachycardia, paroxysmal [facility]): new and requires workup  Diagnosis management comments:     As above, cardiac workup was negative  Patient spontaneously converted to normal sinus rhythm and remained in normal sinus rhythm throughout the remainder of his ED course  He was deemed stable for follow-up with his PCP regarding the tachyarrhythmia  CT did not demonstrate any osseous abnormality which would explain the patient's symptoms  Patient was treated with Percocet for his pain  We prescribed a short course of Percocet for use for breakthrough pain  Patient will follow up with his PCP regarding this pain as well         Amount and/or Complexity of Data Reviewed  Clinical lab tests: ordered and reviewed  Tests in the radiology section of CPT®: reviewed  Decide to obtain previous medical records or to obtain history from someone other than the patient: yes  Review and summarize past medical records: yes  Discuss the patient with other providers: yes  Independent visualization of images, tracings, or specimens: yes    Risk of Complications, Morbidity, and/or Mortality  Presenting problems: moderate  Diagnostic procedures: minimal  Management options: minimal    Patient Progress  Patient progress: improved    CritCare Time      Disposition  Final diagnoses:   Acute pain of left knee   Acute pain of left hip   Tachycardia, paroxysmal ([facility] [address] )     Time reflects when diagnosis was documented in both MDM as applicable and the Disposition within this note     Time User Action Codes Description Comment    7/4/2018  6:53 PM Tim Rc Add [M25 562] Acute pain of left knee     7/4/2018  6:53 PM Tim Rc Add [M25 552] Acute pain of left hip     7/4/2018  6:53 PM Tim Rc Add [I47 9] Tachycardia, paroxysmal [facility])       ED Disposition     ED Disposition Condition Comment    Discharge  [address]  discharge to home/self care  Condition at discharge: Good        Follow-up Information     Follow up With Specialties Details Why Contact Clint Caicedo DO Family Medicine Call As needed   [address]          Discharge Medication List as of 7/4/2018  6:55 PM      START taking these medications    Details   oxyCODONE-acetaminophen (PERCOCET) 5-325 mg per tablet Take 1 tablet by mouth every 6 (six) hours as needed for moderate pain for up to 10 days Max Daily Amount: 4 tablets, Starting Wed 7/4/2018, Until Sat 7/14/2018, Print         CONTINUE these medications which have NOT CHANGED    Details   azelastine (ASTELIN) 0 1 % nasal spray 2 sprays into each nostril daily Use in each nostril as directed  , Historical Med      chlorhexidine (PERIDEX) 0 12 % solution SWISH AND SPIT 1 CAPFUL BY MOUTH TWICE A DAY FOR 5 DAYS, Historical Med      Cholecalciferol (VITAMIN D3) 60058 units TABS Take 50,000 Units by mouth once a week Takes on Sundays , Historical Med      diazepam (VALIUM) 10 mg tablet Take 10 mg by mouth daily at bedtime  , Historical Med      gabapentin (NEURONTIN) 600 MG tablet Take 600 mg by mouth 2 (two) times a day  , Historical Med      ibuprofen (MOTRIN) 800 mg tablet Take by mouth every 6 (six) hours as needed for mild pain, Historical Med      oxyCODONE-acetaminophen (PERCOCET) 7 5-325 MG per tablet oxycodone-acetaminophen 7 5 mg-325 mg tablet, Historical Med      pramipexole (MIRAPEX) 0 75 MG tablet Take 0 75 mg by mouth daily at bedtime, Historical Med      simvastatin (ZOCOR) 20 mg tablet Take 20 mg by mouth daily at bedtime, Historical Med           No discharge procedures on file         ED Provider  Electronically Signed by     Juan Gomez MD  07/05/18 1842

## 2018-07-24 ENCOUNTER — HOSPITAL ENCOUNTER (EMERGENCY)
Facility: HOSPITAL | Age: 73
Discharge: HOME | End: 2018-07-24
Attending: EMERGENCY MEDICINE | Admitting: EMERGENCY MEDICINE
Payer: MEDICARE

## 2018-07-24 ENCOUNTER — APPOINTMENT (EMERGENCY)
Dept: RADIOLOGY | Facility: HOSPITAL | Age: 73
End: 2018-07-24
Payer: MEDICARE

## 2018-07-24 VITALS
OXYGEN SATURATION: 94 % | WEIGHT: 152 LBS | SYSTOLIC BLOOD PRESSURE: 152 MMHG | TEMPERATURE: 97.6 F | HEIGHT: 72 IN | BODY MASS INDEX: 20.59 KG/M2 | HEART RATE: 88 BPM | RESPIRATION RATE: 18 BRPM | DIASTOLIC BLOOD PRESSURE: 70 MMHG

## 2018-07-24 DIAGNOSIS — M25.562 LEFT KNEE PAIN: Primary | ICD-10-CM

## 2018-07-24 PROCEDURE — 73560 X-RAY EXAM OF KNEE 1 OR 2: CPT

## 2018-07-24 PROCEDURE — 96374 THER/PROPH/DIAG INJ IV PUSH: CPT

## 2018-07-24 PROCEDURE — 71046 X-RAY EXAM CHEST 2 VIEWS: CPT

## 2018-07-24 PROCEDURE — 99284 EMERGENCY DEPT VISIT MOD MDM: CPT

## 2018-07-24 RX ORDER — MELOXICAM 15 MG/1
15 TABLET ORAL DAILY
COMMUNITY

## 2018-07-24 RX ORDER — ONDANSETRON 4 MG/1
4 TABLET, ORALLY DISINTEGRATING ORAL ONCE
Status: COMPLETED | OUTPATIENT
Start: 2018-07-24 | End: 2018-07-24

## 2018-07-24 RX ORDER — MORPHINE SULFATE 4 MG/ML
4 INJECTION, SOLUTION INTRAMUSCULAR; INTRAVENOUS ONCE
Status: COMPLETED | OUTPATIENT
Start: 2018-07-24 | End: 2018-07-24

## 2018-07-24 RX ADMIN — MORPHINE SULFATE 4 MG: 4 INJECTION INTRAVENOUS at 06:17

## 2018-07-24 RX ADMIN — ONDANSETRON 4 MG: 4 TABLET, ORALLY DISINTEGRATING ORAL at 06:37

## 2018-07-24 NOTE — ED NOTES
Patient transported to [facility] [address] [place] [address]  07/24/18 7727

## 2018-07-24 NOTE — ED PROVIDER NOTES
History  Chief Complaint   Patient presents with    Leg Pain     Patient reports having dental sx July 2nd and since then has been experiencing "tight" left sided leg pain from his groin radiating down to his shin  Patient reports that he has seen his PCP for this problem and was prescribed medication to help  Patient reports the pain has been worse since last night  Patient also reports nausea  HPI   71-year-old gentleman with a history of COPD and cervical spinal stenosis status post laminectomy and fusion in April 2018 presenting with L leg pain  Patient states he began having pain shortly after a dental surgery that was performed in early July 2018  He has been having pain in his left lower extremity extending from his groin down to the ankle  The pain is the worst in his knee  It is intermittent; however, when present is "excruciating" and feels like an intense cramping  He typically uses a cane to ambulate but has lately been using a walker due to the pain  He was seen in the ED 1 month ago for similar symptoms and had a workup including CBC, CMP, trop, Mg, and a CT of the abdomen and pelvis - all unremarkable  He recently followed up with his PCP and was told he likely has osteoarthritis of the left knee  He has been taking Tylenol and ibuprofen without any meaningful relief  He also received a hydrocortisone injection from his PCP with no improvement  He denies any recent trauma or injury to his legs  No chest pain or shortness of breath  No numbness or tingling in the legs or loss of motor function  His wife does believe he has been coughing more frequently  No fever, chills, night sweats, skin changes  Prior to Admission Medications   Prescriptions Last Dose Informant Patient Reported? Taking?    Cholecalciferol (VITAMIN D3) 17615 units TABS   Yes Yes   Sig: Take 50,000 Units by mouth once a week Takes on Sundays    diazepam (VALIUM) 10 mg tablet   Yes Yes   Sig: Take 10 mg by mouth daily at bedtime     gabapentin (NEURONTIN) 600 MG tablet   Yes Yes   Sig: Take 600 mg by mouth 2 (two) times a day     meloxicam (MOBIC) 15 mg tablet   Yes Yes   Sig: Take 15 mg by mouth daily   oxyCODONE-acetaminophen (PERCOCET) 7 5-325 MG per tablet   Yes Yes   Sig: oxycodone-acetaminophen 7 5 mg-325 mg tablet   pramipexole (MIRAPEX) 0 75 MG tablet   Yes Yes   Sig: Take 0 75 mg by mouth daily at bedtime   simvastatin (ZOCOR) 20 mg tablet   Yes Yes   Sig: Take 20 mg by mouth daily at bedtime      Facility-Administered Medications: None       Past Medical History:   Diagnosis Date    Arthritis     Back pain     Cervical pain (neck)     [facility]chronic obstructive pulmonary disease) ([facility])     History of transfusion     Age 8 after bleeding post tonsillectomy    Hyperlipidemia     Mild heartburn     Neck pain     Numbness     Left hand and arm    Scratches     Multiple on limbs and hands    Wears dentures     Upper and lower   Has 4 pins on bottom to hold bottom denture       Past Surgical History:   Procedure Laterality Date    COLONOSCOPY      ESOPHAGOGASTRODUODENOSCOPY      EYE SURGERY      Bilateral implants to correct vision    CT ANTERIOR INSTRUMENTATION 2-3 VERTEBRAL SEGMENTS N/A 4/12/2018    Procedure: FUSION CERVICAL ANTERIOR W DISCECTOMY C3/4, C7/T1;  Surgeon: Peter Schumacher MD;  [facility];  Service: Orthopedics    CT ARTHRODESIS POSTERIOR/POSTERIORLATERAL CERVICAL BELOW C2 N/A 4/30/2018    Procedure: POSSIBLE C3-4, C4/5 LAMINECTOMY;  Surgeon: Peter Schumacher MD;  [facility];  Service: Orthopedics    CT ARTHRODESIS POSTERIOR/POSTEROLATERAL THORACIC N/A 4/30/2018    Procedure: C3-T2 PSF With Instrumentation;  Surgeon: Peter Schumacher MD;  [facility];  Service: Orthopedics    CT EXCIS CERV DISK,ONE LEVEL N/A 4/30/2018    Procedure: C 6-7 Left HEMILAMINOTOMY AND FORAMINOTOMY C7-T2 LAMINECTOMY C7-T2 Left Foraminotomy;  Surgeon: Peter Schumacher MD;  [facility];  Service: Orthopedics    TONSILLECTOMY         History reviewed  No pertinent family history  I have reviewed and agree with the history as documented  Social History   Substance Use Topics    Smoking status: Former Smoker     Years: 55 00     Types: Cigars, Cigarettes     Quit date: 1/29/2018    Smokeless tobacco: Former User     Types: Chew      Comment: Quit cigarettes in 2002  Had smoked at most 1 5 packs daily   Alcohol use Yes      Comment: rarely         Review of Systems   Constitutional: Negative for chills, diaphoresis, fatigue and fever  HENT: Negative for hearing loss, nosebleeds, sinus pain and sore throat  Eyes: Negative for photophobia, pain, redness and visual disturbance  Respiratory: Positive for cough  Negative for chest tightness, shortness of breath, wheezing and stridor  Cardiovascular: Negative for chest pain, palpitations and leg swelling  Gastrointestinal: Negative for abdominal distention, abdominal pain, constipation, diarrhea, nausea and vomiting  Genitourinary: Negative for dysuria, flank pain and hematuria  Musculoskeletal: Positive for arthralgias and neck pain  Negative for back pain and neck stiffness  Skin: Negative for pallor and rash  Allergic/Immunologic: Negative for immunocompromised state  Neurological: Negative for syncope, weakness, numbness and headaches  Hematological: Negative for adenopathy  Psychiatric/Behavioral: Negative for agitation and confusion  All other systems reviewed and are negative           Physical Exam  ED Triage Vitals [07/24/18 0529]   Temperature Pulse Respirations Blood Pressure SpO2   97 6 °F (36 4 °C) 72 20 144/70 98 %      Temp Source Heart Rate Source Patient Position - Orthostatic VS BP Location FiO2 (%)   Oral Monitor Lying Right arm --      Pain Score       Worst Possible Pain           Orthostatic Vital Signs  Vitals:    07/24/18 0529 07/24/18 0639   BP: 144/70 152/70   Pulse: 72 88   Patient Position - Orthostatic VS: Lying Lying       Physical Exam   Constitutional: He is oriented to person, place, and time  He appears well-developed and well-nourished  No distress  HENT:   Head: Normocephalic and atraumatic  Right Ear: External ear normal    Left Ear: External ear normal    Nose: Nose normal    Mouth/Throat: Oropharynx is clear and moist    Eyes: Conjunctivae and EOM are normal  Pupils are equal, round, and reactive to light  No scleral icterus  Neck: Normal range of motion  Neck supple  No JVD present  No tracheal deviation present  Mild cervical spinal tenderness to palpation  Cardiovascular: Normal rate and regular rhythm  Exam reveals no gallop and no friction rub  No murmur heard  Pulmonary/Chest: Effort normal and breath sounds normal  No stridor  No respiratory distress  He has no wheezes  He has no rales  He exhibits no tenderness  Abdominal: Soft  He exhibits no distension  There is no tenderness  There is no rebound and no guarding  Musculoskeletal: Normal range of motion  He exhibits no edema or tenderness  L thigh and calf non-swollen / non-tender to palpation  Some tenderness to palpation of the left knee with slight effusion  Pain with passive range of motion  Lymphadenopathy:     He has no cervical adenopathy  Neurological: He is alert and oriented to person, place, and time  No cranial nerve deficit or sensory deficit  He exhibits normal muscle tone  Left lower extremity neurovascularly intact  Skin: Skin is warm and dry  He is not diaphoretic  No erythema  No pallor  Psychiatric: He has a normal mood and affect  His behavior is normal    Nursing note and vitals reviewed        ED Medications  Medications   morphine (PF) 4 mg/mL injection 4 mg (4 mg Intravenous Given 7/24/18 2398)   ondansetron (ZOFRAN-ODT) dispersible tablet 4 mg (4 mg Oral Given 7/24/18 7354)       Diagnostic Studies  Results Reviewed     None                 XR knee 1 or 2 vw left   ED Interpretation by Desmond Gibbons MD (07/24 7730)   ED Wet Read: no fractures or bony abnormalities       by Rosibel Schmidt (07/24 2738)      XR chest 2 views   ED Interpretation by Desmond Gibbons MD (07/24 7127)   ED Wet Read: hyperexpansion, no acute CP disease            Procedures  Procedures      Phone Consults  ED Phone Contact    ED Course       Identification of Seniors at Risk      Most Recent Value   (ISAR) Identification of Seniors at Risk   Before the illness or injury that brought you to the Emergency, did you need someone to help you on a regular basis? 0 Filed at: 07/24/2018 0532   In the last 24 hours, have you needed more help than usual?  0 Filed at: 07/24/2018 0532   Have you been hospitalized for one or more nights during the past 6 months? 1 Filed at: 07/24/2018 0532   In general, do you see well?  0 Filed at: 07/24/2018 0532   In general, do you have serious problems with your memory? 0 Filed at: 07/24/2018 0532   Do you take more than three different medications every day? 1 Filed at: 07/24/2018 0532   ISAR Score  2 Filed at: 07/24/2018 0532          MDM   31-year-old gentleman with a history of cervical spinal stenosis presenting to the emergency department for left lower extremity pain  The left leg is warm and well perfused with palpable distal pulses  There is no calf swelling or tenderness to palpation to suggest a DVT  The pain does not seem to be exacerbated by exertion and is not consistent with claudication  There are no overlying skin changes to suggest cellulitis or subcutaneous infection  I believe the patient's pain is most likely secondary to osteoarthritis  There may also be a radiculopathic/neuropathic component to his pain  I have ordered x-rays of the left knee  I have also ordered a chest x-ray given his complaint of cough  I will treated his pain with 4 mg of IV morphine and reassess      7:03 am -  I have reviewed the patient's knee x-ray, which shows no signs of fracture or other bony abnormalities by my read  His chest x-ray is also normal     7:20 am -   On re-evaluation, patient is still experiencing some pain, although reduced in intensity  I explained to the patient and his wife that his x-rays do not indicate a cause for his knee/leg pain  I explained that although the definitive cause of his pain is still unknown, I do not believe it is related to an emergent process  I provided the patient with a referral to physical therapy and orthopedic surgery  The patient and his wife were appreciative and interested in pursuing these options  I also Ace wrapped the patient's knee and upper leg for additional comfort  He can continue to take ibuprofen as needed for pain control  Return precautions including fever, warmth /redness of the knee, worsening pain were discussed with the patient  CritCare Time    Disposition  Final diagnoses:   Left knee pain     Time reflects when diagnosis was documented in both MDM as applicable and the Disposition within this note     Time User Action Codes Description Comment    7/24/2018  7:06 AM David Sutherland Add [M25 562] Left knee pain       ED Disposition     ED Disposition Condition Comment    Discharge  Franny  69  discharge to home/self care      Condition at discharge: Good        Follow-up Information     Follow up With Specialties Details Why Contact Info Additional Information    Physical Therapy at [id number] [place] [address] Physical Therapy   [facility]  [phone] BE [address] [place] [address], [address], DO Family Medicine Schedule an appointment as soon as possible for a visit As needed, If symptoms worsen [address] [place] [address] [address]       Ragini Barry MD Orthopedic Surgery Schedule an appointment as soon as possible for a visit As needed, If symptoms worsen [facility] [place] [address]  [phone]             Patient's Medications   Discharge Prescriptions    No medications on file       Outpatient Discharge Orders  Ambulatory referral to Physical Therapy   Standing Status: Future  Standing Exp  Date: 01/24/19         ED Provider  Attending physically available and evaluated Jazmín Cardenas I managed the patient along with the ED Attending      Electronically Signed by         Jody Lamb MD  07/24/18 3735

## 2018-07-24 NOTE — ED ATTENDING ATTESTATION
Humaira Alberto MD, saw and evaluated the patient  I have discussed the patient with the resident/non-physician practitioner and agree with the resident's/non-physician practitioner's findings, Plan of Care, and MDM as documented in the resident's/non-physician practitioner's note, except where noted  All available labs and Radiology studies were reviewed  At this point I agree with the current assessment done in the Emergency Department  I have conducted an independent evaluation of this patient including a focused history and a physical exam     72-year-old male presenting to the emergency department for evaluation of chief complaint left knee pain  Patient was seen in the emergency department on July 4th for the same  Patient relates history that he had dental surgery, and later that day developed pain in his left buttocks, pain in his left groin, and pain in his left knee  Patient was seen in the emergency department on July 4th underwent evaluation with lab work and [employer]  Patient had a paroxysm of atrial flutter while in the emergency department  Patient has followed up with his primary care physician who diagnosed the patient with osteoarthritis of his left knee and injected hydrocortisone  The patient has found that the hydrocortisone injection has not helped his pain significantly  Patient denies any acute trauma  No fever  No skin rashes  Incidentally the patient notes a increased cough  Ten systems reviewed negative except as noted in the history of present illness  The patient is resting comfortably on a stretcher in no acute respiratory distress  The patient appears nontoxic  HEENT reveals moist mucous membranes  Head is normocephalic and atraumatic  Conjunctiva and sclera are normal  Neck is nontender and supple with full range of motion to flexion, extension, lateral rotation  No meningismus appreciated  No masses are appreciated  Lungs are rhonchorous bilaterally   Heart is regular rate and rhythm without any murmurs, rubs or gallops  Abdomen is soft and nontender without any rebound or guarding  Patient has a trace effusion of the left knee  No overlying erythema  No significant warmth  Patient has pain full passive range of motion but can range the left knee to approximately 90°  No calf tenderness  No calf swelling  No tenderness over the deep venous system  2+ dorsalis pedis and posterior tibial pulse  Evaluation of the groin reveals no masses  No rashes  Nontender to palpation over the groin  No inguinal hernias  Evaluation of the buttocks reveals no rashes  No reproducible tenderness with palpation of the buttocks  Patient is awake, alert, and oriented x3  The patient has normal interaction  Motor is 5 out of 5  Left knee pain, likely secondary to osteoarthritis  Plan is x-ray  Pain control  As well chest x-ray for pneumonia      XR knee 1 or 2 vw left   ED Interpretation   ED Wet Read: no fractures or bony abnormalities            XR chest 2 views   ED Interpretation   ED Wet Read: hyperexpansion, no acute CP disease

## 2018-07-24 NOTE — ED NOTES
Dr Kassie Trinh at bedside updating patient on plan of care and results        Goldy Harding  07/24/18 1839

## 2018-07-24 NOTE — DISCHARGE INSTRUCTIONS
Arthralgia   WHAT YOU NEED TO KNOW:   Arthralgia is pain in one or more joints, with no inflammation  It may be short-term and get better within 6 to 8 weeks  Arthralgia can be an early sign of arthritis  Arthralgia may be caused by a medical condition, such as a hormone disorder or a tumor  It may also be caused by an infection or injury  DISCHARGE INSTRUCTIONS:   Medicines: The following medicines may  be ordered for you:  · Acetaminophen  decreases pain  Ask how much to take and how often to take it  Follow directions  Acetaminophen can cause liver damage if not taken correctly  · NSAIDs  decrease pain and prevent swelling  Ask your healthcare provider which medicine is right for you  Ask how much to take and when to take it  Take as directed  NSAIDs can cause stomach bleeding and kidney problems if not taken correctly  · Pain relief cream  decreases pain  Use this cream as directed  · Take your medicine as directed  Contact your healthcare provider if you think your medicine is not helping or if you have side effects  Tell him of her if you are allergic to any medicine  Keep a list of the medicines, vitamins, and herbs you take  Include the amounts, and when and why you take them  Bring the list or the pill bottles to follow-up visits  Carry your medicine list with you in case of an emergency  Follow up with your healthcare provider or specialist as directed:  Write down your questions so you remember to ask them during your visits  Self-care:   · Apply heat  to help decrease pain  Use a heating pad or heat wrap  Apply heat for 20 to 30 minutes every 2 hours for as many days as directed  · Rest  as much as possible  Avoid activities that cause joint pain  · Apply ice  to help decrease swelling and pain  Ice may also help prevent tissue damage  Use an ice pack, or put crushed ice in a plastic bag   Cover it with a towel and place it on your painful joint for 15 to 20 minutes every hour or as directed  · Support  the joint with a brace or elastic wrap as directed  · Elevate  your joint above the level of your heart as often as you can to help decrease swelling and pain  Prop your painful joint on pillows or blankets to keep it elevated comfortably  · Lose weight  if you are overweight  Extra weight can put pressure on your joints and cause more pain  Ask your healthcare provider how much you should weigh  Ask him to help you create a weight loss plan  · Exercise  regularly to help improve joint movement and to decrease pain  Ask about the best exercise plan for you  Low-impact exercises can help take the pressure off your joints  Examples are walking, swimming, and water aerobics  Physical therapy:  A physical therapist teaches you exercises to help improve movement and strength, and to decrease pain  Ask your healthcare provider if physical therapy is right for you  Contact your healthcare provider or specialist if:   · You have a fever  · You continue to have joint pain that cannot be relieved with heat, ice, or medicine  · You have pain and inflammation around your joint  · You have questions or concerns about your condition or care  Return to the emergency department if:   · You have sudden, severe pain when you move your joint  · You have a fever and shaking chills  · You cannot move your joint  · You lose feeling on the side of your body where you have the painful joint  © 2017 [address]  Information is for End User's use only and may not be sold, redistributed or otherwise used for commercial purposes  All illustrations and images included in CareNotes® are the copyrighted property of [employer]  or [employer]  The above information is an  only  It is not intended as medical advice for individual conditions or treatments   Talk to your doctor, nurse or pharmacist before following any medical regimen to see if it is safe and effective for you  Knee Pain   WHAT YOU NEED TO KNOW:   Knee pain may start suddenly, or it may be a long-term problem  You may have pain on the side, front, or back of your knee  You may have knee stiffness and swelling  You may hear popping sounds or feel like your knee is giving way or locking up as you walk  You may feel pain when you sit, stand, walk, or climb up and down stairs  Knee pain can be caused by conditions such as obesity, inflammation, or strains or tears in ligaments or tendons  DISCHARGE INSTRUCTIONS:   Follow up with your healthcare provider within 24 hours or as directed: You may need follow-up treatments, such as steroid injections to decrease pain  Write down your questions so you remember to ask them during your visits  Self-care:   · Rest  your knee so it can heal  Limit activities that increase your pain  · Ice  can help reduce swelling  Wrap ice in a towel and put it on your knee for as long and as often as directed  · Compression  with a brace or bandage can help reduce swelling  Use a brace or bandage only as directed  · Elevation  helps decrease pain and swelling  Elevate your knee while you are sitting or lying down  Prop your leg on pillows to keep your knee above the level of your heart  Medicines:   · NSAIDs  help decrease swelling and pain or fever  This medicine is available with or without a doctor's order  NSAIDs can cause stomach bleeding or kidney problems in certain people  If you take blood thinner medicine, always ask your healthcare provider if NSAIDs are safe for you  Always read the medicine label and follow directions  · Acetaminophen  decreases pain and fever  It is available without a doctor's order  Ask how much to take and when to take it  Follow directions  Acetaminophen can cause liver damage if not taken correctly  · Take your medicine as directed    Contact your healthcare provider if you think your medicine is not helping or if you have side effects  Tell him or her if you are allergic to any medicine  Keep a list of the medicines, vitamins, and herbs you take  Include the amounts, and when and why you take them  Bring the list or the pill bottles to follow-up visits  Carry your medicine list with you in case of an emergency  Exercise as directed: You may need to see a physical therapist or do recommended exercises to improve movement and decrease your pain  You may be directed to walk, swim, or ride a bike  Follow your exercise plan exactly as directed to avoid further injury  Contact your healthcare provider if:   · You have questions or concerns about your condition or care  Return to the emergency department if:   · Your pain is worse, even after treatment  · You cannot bend or straighten your leg completely  · The swelling around your knee does not go down even with treatment  · Your knee is painful and hot to the touch  © 2017 [address] Information is for End User's use only and may not be sold, redistributed or otherwise used for commercial purposes  All illustrations and images included in CareNotes® are the copyrighted property of [employer]  or [employer]  The above information is an  only  It is not intended as medical advice for individual conditions or treatments  Talk to your doctor, nurse or pharmacist before following any medical regimen to see if it is safe and effective for you

## 2019-07-23 ENCOUNTER — EVALUATION (OUTPATIENT)
Dept: PHYSICAL THERAPY | Facility: REHABILITATION | Age: 74
End: 2019-07-23
Payer: MEDICARE

## 2019-07-23 DIAGNOSIS — R26.9 ABNORMALITY OF GAIT: ICD-10-CM

## 2019-07-23 DIAGNOSIS — M48.061 SPINAL STENOSIS OF LUMBAR REGION WITHOUT NEUROGENIC CLAUDICATION: Primary | ICD-10-CM

## 2019-07-23 PROCEDURE — 97110 THERAPEUTIC EXERCISES: CPT | Performed by: PHYSICAL THERAPIST

## 2019-07-23 PROCEDURE — 97162 PT EVAL MOD COMPLEX 30 MIN: CPT | Performed by: PHYSICAL THERAPIST
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
## 2019-07-26 ENCOUNTER — TRANSCRIBE ORDERS (OUTPATIENT)
Dept: PHYSICAL THERAPY | Facility: REHABILITATION | Age: 74
End: 2019-07-26

## 2019-07-26 DIAGNOSIS — M48.061 SPINAL STENOSIS OF LUMBAR REGION WITHOUT NEUROGENIC CLAUDICATION: Primary | ICD-10-CM

## 2019-07-30 ENCOUNTER — OFFICE VISIT (OUTPATIENT)
Dept: PHYSICAL THERAPY | Facility: REHABILITATION | Age: 74
End: 2019-07-30
Payer: MEDICARE

## 2019-07-30 DIAGNOSIS — R26.9 ABNORMALITY OF GAIT: ICD-10-CM

## 2019-07-30 DIAGNOSIS — M48.061 SPINAL STENOSIS OF LUMBAR REGION WITHOUT NEUROGENIC CLAUDICATION: Primary | ICD-10-CM

## 2019-07-30 PROCEDURE — 97112 NEUROMUSCULAR REEDUCATION: CPT | Performed by: PHYSICAL THERAPIST

## 2019-07-30 PROCEDURE — 97012 MECHANICAL TRACTION THERAPY: CPT | Performed by: PHYSICAL THERAPIST

## 2019-07-30 NOTE — PROGRESS NOTES
Daily Note     Today's date: 2019  Patient name: Alexandru Nino  :   MRN: [id number]  Referring provider: Eric Winter MD  Dx:   Encounter Diagnosis     ICD-10-CM    1  Spinal stenosis of lumbar region without neurogenic claudication M48 061    2  Abnormality of gait R26 9             Subjective: Pt  Rides his stationary bike every night to deal with restless leg syndrome  Primary c/o left leg from hip to knee feeling like plastic which started after his last injection  Pt  Has some increased soreness at his low back secondary to riding on his lawn tractor before coming into therapy today  Objective: See treatment diary below      Assessment: Decided to trial traction today  See how he feels from this later  Progress core and hip strengthening as able  Plan: Progress exercises as indicated  Precautions:  Hx of cervical fusion  Fall risk   COPD      Manuals                                                                Exercise Diary              Treadmill                                       Quadruped leg kicks             Sit<->stand             Bridges  2x10                        DLS march             UE<->LE bent knee crunch  4#  10x2                                                                                         Modalities             Traction   85#  Static supine                                       X= performed

## 2019-08-02 ENCOUNTER — OFFICE VISIT (OUTPATIENT)
Dept: PHYSICAL THERAPY | Facility: REHABILITATION | Age: 74
End: 2019-08-02
Payer: MEDICARE

## 2019-08-02 DIAGNOSIS — R26.9 ABNORMALITY OF GAIT: ICD-10-CM

## 2019-08-02 DIAGNOSIS — M48.061 SPINAL STENOSIS OF LUMBAR REGION WITHOUT NEUROGENIC CLAUDICATION: Primary | ICD-10-CM

## 2019-08-02 PROCEDURE — 97012 MECHANICAL TRACTION THERAPY: CPT

## 2019-08-02 PROCEDURE — 97112 NEUROMUSCULAR REEDUCATION: CPT

## 2019-08-02 NOTE — PROGRESS NOTES
Daily Note     Today's date: 2019  Patient name: Erlin Barber  : [birth year]  MRN: [id number]  Referring provider: Miguel Angel Henderson MD  Dx:   Encounter Diagnosis     ICD-10-CM    1  Spinal stenosis of lumbar region without neurogenic claudication M48 061    2  Abnormality of gait R26 9             Subjective:  Reports TX helped decrease pain into the next day  Today back is more painful as he was lifting some heavier things      Objective: See treatment diary below      Assessment:   Added ex with no increase in pain   Decreased LBP after TX, no cahnge in leg    Plan: Progress exercises as indicated  Precautions:  Hx of cervical fusion  Fall risk   COPD      Manuals  8-2                                                              Exercise Diary              Treadmill             D K-C   20x 3"          LTR   20x          Quadruped leg kicks             Sit<->stand   20x          Bridges  2x10 w 4# wt on pelvis x          RDL   10# 10x          DLS march             UE<->LE bent knee crunch  4#  10x2 x                                                                                        Modalities             Traction   85#  Static supine x                                      X= performed

## 2019-08-05 ENCOUNTER — OFFICE VISIT (OUTPATIENT)
Dept: PHYSICAL THERAPY | Facility: REHABILITATION | Age: 74
End: 2019-08-05
Payer: MEDICARE

## 2019-08-05 DIAGNOSIS — R26.9 ABNORMALITY OF GAIT: ICD-10-CM

## 2019-08-05 DIAGNOSIS — M48.061 SPINAL STENOSIS OF LUMBAR REGION WITHOUT NEUROGENIC CLAUDICATION: Primary | ICD-10-CM

## 2019-08-05 PROCEDURE — 97112 NEUROMUSCULAR REEDUCATION: CPT | Performed by: PHYSICAL THERAPIST

## 2019-08-05 PROCEDURE — 97110 THERAPEUTIC EXERCISES: CPT | Performed by: PHYSICAL THERAPIST

## 2019-08-05 NOTE — PROGRESS NOTES
Daily Note     Today's date: 2019  Patient name: Mariaa Ya  :   MRN: [id number]  Referring provider: Shaista Gaffney MD  Dx:   Encounter Diagnosis     ICD-10-CM    1  Spinal stenosis of lumbar region without neurogenic claudication M48 061    2  Abnormality of gait R26 9             Subjective:  No new complaints  Objective: See treatment diary below      Assessment:   Progress conditioning exercises as able  Pt  Tends to fall backward with RDL's  - insure he has support behind him  Pt  Felt good after exercises - no traction today  Plan: Progress exercises as indicated  Precautions:  Hx of cervical fusion  Fall risk   COPD      Manuals  8-2                                                              Exercise Diary              Treadmill    10 min         Bike    10 min         D K-C   20x 3" x         LTR   20x x         Quadruped leg kicks             Sit<->stand   20x x         Bridges  2x10 w 4# wt on pelvis x x         RDL   10# 10x x         DLS march             U<->LE bent knee crunch  4#  10x2 x x                                                                                       Modalities             Traction   85#  Static supine x np                                     X= performed

## 2019-08-08 ENCOUNTER — OFFICE VISIT (OUTPATIENT)
Dept: PHYSICAL THERAPY | Facility: REHABILITATION | Age: 74
End: 2019-08-08
Payer: MEDICARE

## 2019-08-08 DIAGNOSIS — M48.061 SPINAL STENOSIS OF LUMBAR REGION WITHOUT NEUROGENIC CLAUDICATION: Primary | ICD-10-CM

## 2019-08-08 DIAGNOSIS — R26.9 ABNORMALITY OF GAIT: ICD-10-CM

## 2019-08-08 PROCEDURE — 97110 THERAPEUTIC EXERCISES: CPT

## 2019-08-08 PROCEDURE — 97112 NEUROMUSCULAR REEDUCATION: CPT

## 2019-08-08 NOTE — PROGRESS NOTES
Daily Note     Today's date: 2019  Patient name: Charlotte Dow  :   MRN: [id number]  Referring provider: Silvina Neri MD  Dx:   Encounter Diagnosis     ICD-10-CM    1  Spinal stenosis of lumbar region without neurogenic claudication M48 061    2  Abnormality of gait R26 9             Subjective:  C/o stiffness  Reports he felt OK w/o TX    Objective: See treatment diary below      Assessment:    Decreased pain after treatment    Plan: Progress exercises as indicated  Precautions:  Hx of cervical fusion  Fall risk   COPD      Manuals  8-2  8-8                                                            Exercise Diary              Treadmill    10 min x        Bike    10 min x        D K-C   20x 3" x x        LTR   20x x x        Quadruped leg kicks             Sit<->stand   20x x 10# 10x        Bridges  2x10 w 4# wt on pelvis x x x        RDL   10# 10x x x        DLS march             U<->LE bent knee crunch  4#  10x2 x x x                                                                                      Modalities             Traction   85#  Static supine x np                                     X= performed

## 2019-08-12 ENCOUNTER — OFFICE VISIT (OUTPATIENT)
Dept: PHYSICAL THERAPY | Facility: REHABILITATION | Age: 74
End: 2019-08-12
Payer: MEDICARE

## 2019-08-12 DIAGNOSIS — M48.061 SPINAL STENOSIS OF LUMBAR REGION WITHOUT NEUROGENIC CLAUDICATION: Primary | ICD-10-CM

## 2019-08-12 DIAGNOSIS — R26.9 ABNORMALITY OF GAIT: ICD-10-CM

## 2019-08-12 PROCEDURE — 97110 THERAPEUTIC EXERCISES: CPT | Performed by: PHYSICAL THERAPIST

## 2019-08-12 PROCEDURE — 97112 NEUROMUSCULAR REEDUCATION: CPT | Performed by: PHYSICAL THERAPIST

## 2019-08-12 NOTE — PROGRESS NOTES
Daily Note     Today's date: 2019  Patient name: Pilar Troy  : [birth year]  MRN: [id number]  Referring provider: Delma Ogden MD  Dx:   Encounter Diagnosis     ICD-10-CM    1  Spinal stenosis of lumbar region without neurogenic claudication M48 061    2  Abnormality of gait R26 9             Subjective:  Was walking a lot over the weekend  Feel stiff from all the walking  Objective: See treatment diary below    Assessment:    Pt  Moving with less alacrity today - looks stiff  Needs to move cautiously secondary to balance as well as stiffness  Was able to bump up the weights with core workout  He demonstrates good control with exercises - manily because he moves very cautiously  Plan: Progress exercises as symptoms allow  Precautions:  Hx of cervical fusion  Fall risk   COPD      Manuals  8-2  8-8                                                            Exercise Diary              Treadmill    10 min x x       Bike    10 min x x       D K-C   20x 3" x x x       LTR   20x x x x       Quadruped leg kicks             Sit<->stand   20x x 10# 10x x       Bridges  2x10 w 4# wt on pelvis x x x 10#  2x10       RDL   10# 10x x x x       DLS march             UE<->LE bent knee crunch  4#  10x2 x x x 10#  2x10                                                                                     Modalities             Traction   85#  Static supine x np                                     X= performed

## 2019-08-15 ENCOUNTER — OFFICE VISIT (OUTPATIENT)
Dept: PHYSICAL THERAPY | Facility: REHABILITATION | Age: 74
End: 2019-08-15
Payer: MEDICARE

## 2019-08-15 DIAGNOSIS — M48.061 SPINAL STENOSIS OF LUMBAR REGION WITHOUT NEUROGENIC CLAUDICATION: Primary | ICD-10-CM

## 2019-08-15 DIAGNOSIS — R26.9 ABNORMALITY OF GAIT: ICD-10-CM

## 2019-08-15 PROCEDURE — 97112 NEUROMUSCULAR REEDUCATION: CPT

## 2019-08-15 PROCEDURE — 97110 THERAPEUTIC EXERCISES: CPT

## 2019-08-15 NOTE — PROGRESS NOTES
Daily Note     Today's date: 8/15/2019  Patient name: Julio César Hernandes  : [birth year]  MRN: [id number]  Referring provider: Hien Antony MD  Dx:   Encounter Diagnosis     ICD-10-CM    1  Spinal stenosis of lumbar region without neurogenic claudication M48 061    2  Abnormality of gait R26 9             Subjective:  Did a lot of walking yesterday & thighs are sore  C/o stiffness    Objective: See treatment diary below    Assessment:   Stiff with movements    Corrected form for RDL    Back felt Ok after ex, legs still sore    Plan: Progress exercises as symptoms allow  Precautions:  Hx of cervical fusion  Fall risk   COPD      Manuals  8-2  8-8  8-15                                                          Exercise Diary              Treadmill    10 min x x x      Bike    10 min x x x      D K-C   20x 3" x x x x      LTR   20x x x x x      Quadruped leg kicks             Sit<->stand   20x x 10# 10x x x      Bridges  2x10 w 4# wt on pelvis x x x 10#  2x10 x      RDL   10# 10x x x x x      DLS march             UE<->LE bent knee crunch  4#  10x2 x x x 10#  2x10 x                                                                                    Modalities             Traction   85#  Static supine x np                                     X= performed

## 2019-08-19 ENCOUNTER — OFFICE VISIT (OUTPATIENT)
Dept: PHYSICAL THERAPY | Facility: REHABILITATION | Age: 74
End: 2019-08-19
Payer: MEDICARE

## 2019-08-19 DIAGNOSIS — R26.9 ABNORMALITY OF GAIT: ICD-10-CM

## 2019-08-19 DIAGNOSIS — M48.061 SPINAL STENOSIS OF LUMBAR REGION WITHOUT NEUROGENIC CLAUDICATION: Primary | ICD-10-CM

## 2019-08-19 PROCEDURE — 97112 NEUROMUSCULAR REEDUCATION: CPT | Performed by: PHYSICAL THERAPIST

## 2019-08-19 PROCEDURE — 97110 THERAPEUTIC EXERCISES: CPT | Performed by: PHYSICAL THERAPIST

## 2019-08-19 NOTE — PROGRESS NOTES
Daily Note     Today's date: 2019  Patient name: Juan C Brown  :   MRN: [id number]  Referring provider: Garett Green MD  Dx:   Encounter Diagnosis     ICD-10-CM    1  Spinal stenosis of lumbar region without neurogenic claudication M48 061    2  Abnormality of gait R26 9             Subjective:  Pt with no new complaints since his last session  He reports he has been working with his son on their new home and he put some moulding up yesterday which was challenging and caused an increase in his low back pain  Objective: See treatment diary below  Precautions:  Hx of cervical fusion  Fall risk  COPD  Manuals  8-2  8--15 8/19                                                         Exercise Diary              Treadmill    10 min x x x x     Bike    10 min x x x x     D K-C   20x 3" x x x x x     LTR   20x x x x x x     Quadruped leg kicks             Sit<->stand   20x x 10# 10x x x 20     Bridges  2x10 w 4# wt on pelvis x x x 10#  2x10 x x     RDL   10# 10x x x x x      DLS march             UE<->LE bent knee crunch  4#  10x2 x x x 10#  2x10 x x                                                                                   Modalities             Traction   85#  Static supine x np                                     X= performed    Assessment:  Pt demonstrates good knowledge, tolerance and performance with current program reporting mild fatigue and decreased low back pain with completion of session  Pt will benefit from continued skilled PT intervention in order to address his remaining limitations and to restore maximal function  Plan: Progress exercises as symptoms allow

## 2019-08-22 ENCOUNTER — OFFICE VISIT (OUTPATIENT)
Dept: PHYSICAL THERAPY | Facility: REHABILITATION | Age: 74
End: 2019-08-22
Payer: MEDICARE

## 2019-08-22 DIAGNOSIS — M48.061 SPINAL STENOSIS OF LUMBAR REGION WITHOUT NEUROGENIC CLAUDICATION: Primary | ICD-10-CM

## 2019-08-22 DIAGNOSIS — R26.9 ABNORMALITY OF GAIT: ICD-10-CM

## 2019-08-22 PROCEDURE — 97112 NEUROMUSCULAR REEDUCATION: CPT

## 2019-08-22 PROCEDURE — 97110 THERAPEUTIC EXERCISES: CPT

## 2019-08-26 ENCOUNTER — OFFICE VISIT (OUTPATIENT)
Dept: PHYSICAL THERAPY | Facility: REHABILITATION | Age: 74
End: 2019-08-26
Payer: MEDICARE

## 2019-08-26 DIAGNOSIS — R26.9 ABNORMALITY OF GAIT: ICD-10-CM

## 2019-08-26 DIAGNOSIS — M48.061 SPINAL STENOSIS OF LUMBAR REGION WITHOUT NEUROGENIC CLAUDICATION: Primary | ICD-10-CM

## 2019-08-26 PROCEDURE — 97112 NEUROMUSCULAR REEDUCATION: CPT | Performed by: PHYSICAL THERAPIST

## 2019-08-26 PROCEDURE — 97110 THERAPEUTIC EXERCISES: CPT | Performed by: PHYSICAL THERAPIST

## 2019-08-26 NOTE — PROGRESS NOTES
Daily Note     Today's date: 2019  Patient name: Lety Angelo  : [birth year]  MRN: [id number]  Referring provider: Glenn Harvey MD  Dx:   Encounter Diagnosis     ICD-10-CM    1  Spinal stenosis of lumbar region without neurogenic claudication M48 061    2  Abnormality of gait R26 9             Subjective: Sleeping with a pillow between his knees helps a little  He is working moving into a smaller house trailer house so that his work load is more manageable  He hopes to be moved within no more than 2 months  Pt  Feels like he does a lot of work while here  He is noting that the exercises are helping him feel stronger and he is feeling more steady on his feet  He has been able to help his son with small parts of putting in hardwood floor into his new trailer house  Objective: See treatment diary below  Assessment:  Pt  Still walks cautiously secondary balance  He continues to be challenged by prescribed exercises  Try progresses exercises slowly  Add seated triceps push ups next session? Plan: Progress exercises as symptoms allow  Precautions:  Hx of cervical fusion  Fall risk   COPD  Manuals  8-2  8-8  8-15 -                                                       Exercise Diary              Treadmill    10 min x x x x X 1 8 MPH 10' x   Bike    10 min x x x x x x   D K-C   20x 3" x x x x x x x   LTR   20x x x x x x x x   Quadruped leg kicks             Sit<->stand   20x x 10# 10x x x 20 x x   Bridges  2x10 w 4# wt on pelvis x x x 10#  2x10 x x x    RDL   10# 10x x x x x  x x   DLS march             UE<->LE bent knee crunch  4#  10x2 x x x 10#  2x10 x x x x                                                                                 Modalities             Traction   85#  Static supine x np                                     X= performed

## 2019-08-29 ENCOUNTER — EVALUATION (OUTPATIENT)
Dept: PHYSICAL THERAPY | Facility: REHABILITATION | Age: 74
End: 2019-08-29
Payer: MEDICARE

## 2019-08-29 DIAGNOSIS — M48.061 SPINAL STENOSIS OF LUMBAR REGION WITHOUT NEUROGENIC CLAUDICATION: Primary | ICD-10-CM

## 2019-08-29 DIAGNOSIS — R26.9 ABNORMALITY OF GAIT: ICD-10-CM

## 2019-08-29 PROCEDURE — 97112 NEUROMUSCULAR REEDUCATION: CPT | Performed by: PHYSICAL THERAPIST

## 2019-08-29 PROCEDURE — 97110 THERAPEUTIC EXERCISES: CPT | Performed by: PHYSICAL THERAPIST

## 2019-08-29 NOTE — PROGRESS NOTES
PT Re-Evaluation     Today's date: 2019  Patient name: Jevon Yates  : [birth year]  MRN: [id number]  Referring provider: Samantha Borden MD  Dx:   Encounter Diagnoses   Name Primary?  Spinal stenosis of lumbar region without neurogenic claudication Yes    Abnormality of gait         Pt  Has been attending therapy for 10 sessions over the past 2 months  He notes that he feels stronger in that he can transfer out of his chair much easier  He can do his normal tasks with less strain  He walks with more alacrity and less sway  He has been getting up and down from the floor lately to work on a vinyl floor installation in the [facility] home he will be moving to  He does note that he still sways a lot by the end of the day - especially on days that he does a lot  He is a little sore today after doing a lot of floor work yesterday  Pt  Demonstrating good technique with prescribed exercises  Gait:  Forward trunk bend with small step length and minimal pelvic or spinal rotation  Very careful guarded gait  Sways ~3 inches laterally over a course of 30 feet  TU sec      MCTSIB  FT EO on firm: 45s  FT EC on firm:85s   FT EO on foam: 40s   FT EC on foam: 2s     Precautions: hx of cervical fusion, COPD,  History of falls       Daily Treatment Diary     Assessment             Eval/Reval             FOTO     **     **   POC Signed             HEP Issued              Manuals                          Exercise Diary                           Treadmill 10 min            Bike 10 min            RDL's with KB 10#  15x            Bridges with DB 10#  2x10            Supine UE/LE crunch with DB 10#  2x10            Goblet squats 10#  2x10            Seated triceps push ups 2x10                                                                                                                    Modalities                            X= performed                Kallie Griffith, PT  2019,10:03 AM

## 2019-09-05 ENCOUNTER — APPOINTMENT (OUTPATIENT)
Dept: PHYSICAL THERAPY | Facility: REHABILITATION | Age: 74
End: 2019-09-05
Payer: MEDICARE

## 2019-09-09 ENCOUNTER — OFFICE VISIT (OUTPATIENT)
Dept: PHYSICAL THERAPY | Facility: REHABILITATION | Age: 74
End: 2019-09-09
Payer: MEDICARE

## 2019-09-09 DIAGNOSIS — M48.061 SPINAL STENOSIS OF LUMBAR REGION WITHOUT NEUROGENIC CLAUDICATION: Primary | ICD-10-CM

## 2019-09-09 DIAGNOSIS — R26.9 ABNORMALITY OF GAIT: ICD-10-CM

## 2019-09-09 PROCEDURE — 97110 THERAPEUTIC EXERCISES: CPT | Performed by: PHYSICAL THERAPIST

## 2019-09-09 PROCEDURE — 97112 NEUROMUSCULAR REEDUCATION: CPT | Performed by: PHYSICAL THERAPIST

## 2019-09-09 NOTE — PROGRESS NOTES
PT Re-Evaluation     Today's date: 2019  Patient name: Jalil Chauhan  :   MRN: [id number]  Referring provider: Luisa Murillo MD  Dx:   Encounter Diagnoses   Name Primary?  Spinal stenosis of lumbar region without neurogenic claudication Yes    Abnormality of gait            Subjective:  I feel pretty good today  I went to see the doctor  He approved another month of therapy and then I will go back and well see after that  He will not approve more than 3 months of therapy  I feel that this is really helping  Objective:  See flow sheet below    Assessment: Pt  Was able to install a room of lina in the residence that he will be moving to  He was able to increase the speed on the treadmill today and increase the time on the bike  Added treadmill again to end  Plan: Continue with POC    Precautions: hx of cervical fusion, COPD,  History of falls       Daily Treatment Diary     Assessment            Eval/Reval             FOTO     **     **   POC Signed             HEP Issued              Manuals                          Exercise Diary                           Treadmill 10 min 10 min  4 min           Bike 10 min 12 min           RDL's with KB 10#  15x x           Bridges with DB 10#  2x10 x           Supine UE/LE crunch with DB 10#  2x10 x           Goblet squats 10#  2x10 x           Seated triceps push ups 2x10 x                                                                                                                   Modalities                            X= performed                Smiley Hernandez, PT  2019,9:57 AM

## 2019-09-12 ENCOUNTER — OFFICE VISIT (OUTPATIENT)
Dept: PHYSICAL THERAPY | Facility: REHABILITATION | Age: 74
End: 2019-09-12
Payer: MEDICARE

## 2019-09-12 DIAGNOSIS — R26.9 ABNORMALITY OF GAIT: ICD-10-CM

## 2019-09-12 DIAGNOSIS — M48.061 SPINAL STENOSIS OF LUMBAR REGION WITHOUT NEUROGENIC CLAUDICATION: Primary | ICD-10-CM

## 2019-09-12 PROCEDURE — 97112 NEUROMUSCULAR REEDUCATION: CPT | Performed by: PHYSICAL THERAPIST

## 2019-09-12 PROCEDURE — 97110 THERAPEUTIC EXERCISES: CPT | Performed by: PHYSICAL THERAPIST

## 2019-09-16 ENCOUNTER — OFFICE VISIT (OUTPATIENT)
Dept: PHYSICAL THERAPY | Facility: REHABILITATION | Age: 74
End: 2019-09-16
Payer: MEDICARE

## 2019-09-16 DIAGNOSIS — R26.9 ABNORMALITY OF GAIT: ICD-10-CM

## 2019-09-16 DIAGNOSIS — M48.061 SPINAL STENOSIS OF LUMBAR REGION WITHOUT NEUROGENIC CLAUDICATION: Primary | ICD-10-CM

## 2019-09-16 PROCEDURE — 97110 THERAPEUTIC EXERCISES: CPT | Performed by: PHYSICAL THERAPIST

## 2019-09-16 PROCEDURE — 97112 NEUROMUSCULAR REEDUCATION: CPT | Performed by: PHYSICAL THERAPIST

## 2019-09-16 NOTE — PROGRESS NOTES
PT Re-Evaluation     Today's date: 2019  Patient name: Terri Ariza  : [birth year]  MRN: [id number]  Referring provider: Rowan Newsome MD  Dx:   Encounter Diagnoses   Name Primary?  Spinal stenosis of lumbar region without neurogenic claudication Yes    Abnormality of gait            Subjective:  Pt  Reporting that he feel when turning around while standing on uneven rocks  He scraped up his left knee and bruised his right knee but otherwise felt fine  No increased symptoms today  Objective:  See flow sheet below    Assessment:  Pt  Was able to perform all exercises without irritating his knees or any increase any back pain  He was moving more slowly today  Plan: Continue with POC    Precautions: hx of cervical fusion, COPD,  History of falls       Daily Treatment Diary     Assessment          Eval/Reval             FOTO     **     **   POC Signed             HEP Issued              Manuals                          Exercise Diary                           Treadmill 10 min 10 min  4 min 12 min 10 min         Bike 10 min 12 min 12 min 10 min         RDL's with KB 10#  15x x x x         Bridges with DB 10#  2x10 x x x          crunch with DB 10#  2x10 x x x         Goblet squats 10#  2x10 x x x         Apollo triceps    30#  3x10         Apollo lats   (2 handles)    30#  3x10                                                                                                    Modalities                            X= performed            Shaniqua Johnson PT  2019,10:19 AM

## 2019-09-19 ENCOUNTER — OFFICE VISIT (OUTPATIENT)
Dept: PHYSICAL THERAPY | Facility: REHABILITATION | Age: 74
End: 2019-09-19
Payer: MEDICARE

## 2019-09-19 DIAGNOSIS — R26.9 ABNORMALITY OF GAIT: ICD-10-CM

## 2019-09-19 DIAGNOSIS — M48.061 SPINAL STENOSIS OF LUMBAR REGION WITHOUT NEUROGENIC CLAUDICATION: Primary | ICD-10-CM

## 2019-09-19 PROCEDURE — 97112 NEUROMUSCULAR REEDUCATION: CPT | Performed by: PHYSICAL THERAPIST

## 2019-09-19 PROCEDURE — 97110 THERAPEUTIC EXERCISES: CPT | Performed by: PHYSICAL THERAPIST

## 2019-09-19 NOTE — PROGRESS NOTES
PT Re-Evaluation     Today's date: 2019  Patient name: Shelley Hameed  :   MRN: [id number]  Referring provider: Solomon Kidd MD  Dx:   Encounter Diagnoses   Name Primary?  Spinal stenosis of lumbar region without neurogenic claudication Yes    Abnormality of gait            Subjective:  I feel that this is really helping  I have more energy and feel stronger  Objective:  See flow sheet below    Assessment:  Consider adding biceps curls standing - also -seated Apollo rows for postural muscles  Plan: Continue with POC    Precautions: hx of cervical fusion, COPD,  History of falls       Daily Treatment Diary     Assessment         Eval/Reval             FOTO     **     **   POC Signed             HEP Issued              Manuals                          Exercise Diary                           Treadmill 10 min 10 min  4 min 12 min 10 min x        Bike 10 min 12 min 12 min 10 min x        RDL's with KB 10#  15x x x x x        Bridges with DB 10#  2x10 x x x x         crunch with DB 10#  2x10 x x x x        Goblet squats 10#  2x10 x x x x        Apollo triceps    30#  3x10 x        Apollo lats   (2 handles)    30#  3x10 x                                                                                                   Modalities                            X= performed            Rhianna Kidd, PT  2019,10:20 AM

## 2019-09-23 ENCOUNTER — OFFICE VISIT (OUTPATIENT)
Dept: PHYSICAL THERAPY | Facility: REHABILITATION | Age: 74
End: 2019-09-23
Payer: MEDICARE

## 2019-09-23 DIAGNOSIS — R26.9 ABNORMALITY OF GAIT: ICD-10-CM

## 2019-09-23 DIAGNOSIS — M48.061 SPINAL STENOSIS OF LUMBAR REGION WITHOUT NEUROGENIC CLAUDICATION: Primary | ICD-10-CM

## 2019-09-23 PROCEDURE — 97112 NEUROMUSCULAR REEDUCATION: CPT | Performed by: PHYSICAL THERAPIST

## 2019-09-23 PROCEDURE — 97110 THERAPEUTIC EXERCISES: CPT | Performed by: PHYSICAL THERAPIST

## 2019-09-23 NOTE — PROGRESS NOTES
PT Re-Evaluation     Today's date: 2019  Patient name: Felipe Kraus  : [birth year]  MRN: [id number]  Referring provider: Oswaldo Chen MD  Dx:   Encounter Diagnoses   Name Primary?  Spinal stenosis of lumbar region without neurogenic claudication Yes    Abnormality of gait            Subjective:  I was able to pull some sticker bushes out of the woods and drag them to the burn pile  Just walking up to the pasture to the burn pile was an accomplishment as I could not do it before  I was able to swing a  club without falling over as well  Objective:  See flow sheet below    Assessment:  Added DB curls and Apollo rowing today  Plan: Continue with POC    Precautions: hx of cervical fusion, COPD,  History of falls       Daily Treatment Diary     Assessment        Eval/Reval             FOTO     **     **   POC Signed             HEP Issued              Manuals                          Exercise Diary                           Treadmill 10 min 10 min  4 min 12 min 10 min x x       Bike 10 min 12 min 12 min 10 min x x       RDL's with KB 10#  15x x x x x x       Bridges with DB 10#  2x10 x x x x x        crunch with DB 10#  2x10 x x x x x       Goblet squats 10#  2x10 x x x x x       Apollo triceps    30#  3x10 x x       Apollo lats   (2 handles)    30#  3x10 x x       Apollo rows - seated      35#  3x10       DB biceps curls      10#  3x10                                                                        Modalities                            X= performed            Dacia Fernandez, PT  2019,9:51 AM

## 2019-09-26 ENCOUNTER — APPOINTMENT (OUTPATIENT)
Dept: PHYSICAL THERAPY | Facility: REHABILITATION | Age: 74
End: 2019-09-26
Payer: MEDICARE

## 2019-09-30 ENCOUNTER — OFFICE VISIT (OUTPATIENT)
Dept: PHYSICAL THERAPY | Facility: REHABILITATION | Age: 74
End: 2019-09-30
Payer: MEDICARE

## 2019-09-30 DIAGNOSIS — R26.9 ABNORMALITY OF GAIT: ICD-10-CM

## 2019-09-30 DIAGNOSIS — M48.061 SPINAL STENOSIS OF LUMBAR REGION WITHOUT NEUROGENIC CLAUDICATION: Primary | ICD-10-CM

## 2019-09-30 PROCEDURE — 97110 THERAPEUTIC EXERCISES: CPT

## 2019-09-30 PROCEDURE — 97112 NEUROMUSCULAR REEDUCATION: CPT

## 2019-09-30 NOTE — PROGRESS NOTES
Daily Note     Today's date: 2019  Patient name: Shannon Mares  : [birth year]  MRN: [id number]  Referring provider: Fady Kaplan MD  Dx:   Encounter Diagnosis     ICD-10-CM    1  Spinal stenosis of lumbar region without neurogenic claudication M48 061    2  Abnormality of gait R26 9                   Subjective:   Reports his back always hurts at night      Objective: See treatment diary below      Assessment:   Needed some correction with new ex  Fatigue after ex but no c/o pain      Plan: Continue per plan of care         Assessment       Eval/Reval             FOTO     **     **   POC Signed             HEP Issued              Manuals                          Exercise Diary                           Treadmill 10 min 10 min  4 min 12 min 10 min x x x      Bike 10 min 12 min 12 min 10 min x x x      RDL's with KB 10#  15x x x x x x x      Bridges with DB 10#  2x10 x x x x x x       crunch with DB 10#  2x10 x x x x x x      Goblet squats 10#  2x10 x x x x x X         Apollo triceps    30#  3x10 x x X 25x      Apollo lats   (2 handles)    30#  3x10 x x x      Apollo rows - seated      35#  3x10 x      DB biceps curls      10#  3x10 x                                                                       Modalities                            X= performed

## 2019-10-03 ENCOUNTER — OFFICE VISIT (OUTPATIENT)
Dept: PHYSICAL THERAPY | Facility: REHABILITATION | Age: 74
End: 2019-10-03
Payer: MEDICARE

## 2019-10-03 DIAGNOSIS — M48.061 SPINAL STENOSIS OF LUMBAR REGION WITHOUT NEUROGENIC CLAUDICATION: Primary | ICD-10-CM

## 2019-10-03 DIAGNOSIS — R26.9 ABNORMALITY OF GAIT: ICD-10-CM

## 2019-10-03 PROCEDURE — 97110 THERAPEUTIC EXERCISES: CPT | Performed by: PHYSICAL THERAPIST

## 2019-10-03 PROCEDURE — 97112 NEUROMUSCULAR REEDUCATION: CPT | Performed by: PHYSICAL THERAPIST

## 2019-10-03 NOTE — PROGRESS NOTES
Daily Note     Today's date: 10/3/2019  Patient name: Sukhjinder Amin  : [birth year]  MRN: [id number]  Referring provider: Alecia Piña MD  Dx:   Encounter Diagnosis     ICD-10-CM    1  Spinal stenosis of lumbar region without neurogenic claudication M48 061    2  Abnormality of gait R26 9                   Subjective:  Pt is sore from doing a lot of work around the house he will be selling  Burning bushes and closing up the pool  Getting ready for fall weather  Objective: See treatment diary below      Assessment:   No change in prescribed exercises secondary to soreness from work  Needed longer rest breaks  Plan: Continue per plan of care         Assessment 8/29 9/9 9/12 9/16 9/19 9/23 9-30 10/3     Eval/James             FOTO     **     **   POC Signed             HEP Issued              Manuals                          Exercise Diary                           Treadmill 10 min 10 min  4 min 12 min 10 min x x x x     Bike 10 min 12 min 12 min 10 min x x x x     RDL's with KB 10#  15x x x x x x x x     Bridges with DB 10#  2x10 x x x x x x x      crunch with DB 10#  2x10 x x x x x x x     Goblet squats 10#  2x10 x x x x x X    x     Apollo triceps    30#  3x10 x x X 25x x     Apollo lats   (2 handles)    30#  3x10 x x x x     Apollo rows - seated      35#  3x10 x x     DB biceps curls      10#  3x10 x x                                                                      Modalities                            X= performed

## 2019-10-07 ENCOUNTER — OFFICE VISIT (OUTPATIENT)
Dept: PHYSICAL THERAPY | Facility: REHABILITATION | Age: 74
End: 2019-10-07
Payer: MEDICARE

## 2019-10-07 DIAGNOSIS — M48.061 SPINAL STENOSIS OF LUMBAR REGION WITHOUT NEUROGENIC CLAUDICATION: Primary | ICD-10-CM

## 2019-10-07 DIAGNOSIS — R26.9 ABNORMALITY OF GAIT: ICD-10-CM

## 2019-10-07 PROCEDURE — 97112 NEUROMUSCULAR REEDUCATION: CPT | Performed by: PHYSICAL THERAPIST

## 2019-10-07 PROCEDURE — 97110 THERAPEUTIC EXERCISES: CPT | Performed by: PHYSICAL THERAPIST

## 2019-10-07 NOTE — PROGRESS NOTES
Daily Note     Today's date: 10/7/2019  Patient name: Freda Lindsey  :   MRN: [id number]  Referring provider: Altagracia Gatica MD  Dx:   Encounter Diagnosis     ICD-10-CM    1  Spinal stenosis of lumbar region without neurogenic claudication M48 061    2  Abnormality of gait R26 9           Subjective: Pt  Was pleased that he could walk from the far parking lot to make his MD visit  He feels like he is getting stronger  Still feels unsteady on his feet at times  Reports that the MD wants him to continue with therapy for the remainder of the month and then transition to HEP     Objective: See treatment diary below    Assessment:   Increased reps   Needed longer rest breaks  Plan: Continue per plan of care         Assessment 8/29 9/9 9/12 9/16 9/19 9/23 9-30 10/3 10/7    Eval/Reval             FOTO     **     **   POC Signed             HEP Issued              Manuals                          Exercise Diary                           Treadmill 10 min 10 min  4 min 12 min 10 min x x x x x    Bike 10 min 12 min 12 min 10 min x x x x x    RDL's with KB 10#  15x x x x x x x x 10#  3x10    Bridges with DB 10#  2x10 x x x x x x x 10#  3x10     crunch with DB 10#  2x10 x x x x x x x 10#  3x10    Goblet squats 10#  2x10 x x x x x X    x 10#  3x10    Apollo triceps    30#  3x10 x x X 25x x 30#  3x10    Apollo lats   (2 handles)    30#  3x10 x x x x 30#  3x10    Apollo rows - seated      35#  3x10 x x 35#  2x15    DB biceps curls      10#  3x10 x x x                                                                     Modalities                            X= performed

## 2019-10-10 ENCOUNTER — OFFICE VISIT (OUTPATIENT)
Dept: PHYSICAL THERAPY | Facility: REHABILITATION | Age: 74
End: 2019-10-10
Payer: MEDICARE

## 2019-10-10 DIAGNOSIS — M48.061 SPINAL STENOSIS OF LUMBAR REGION WITHOUT NEUROGENIC CLAUDICATION: Primary | ICD-10-CM

## 2019-10-10 DIAGNOSIS — R26.9 ABNORMALITY OF GAIT: ICD-10-CM

## 2019-10-10 PROCEDURE — 97110 THERAPEUTIC EXERCISES: CPT

## 2019-10-10 PROCEDURE — 97112 NEUROMUSCULAR REEDUCATION: CPT

## 2019-10-10 NOTE — PROGRESS NOTES
Daily Note     Today's date: 10/10/2019  Patient name: Tmaar Moon  : [birth year]  MRN: [id number]  Referring provider: Rita Steiner MD  Dx:   Encounter Diagnosis     ICD-10-CM    1  Spinal stenosis of lumbar region without neurogenic claudication M48 061    2  Abnormality of gait R26 9                   Subjective: Patient noted no new complaints  Objective: See treatment diary below      Assessment: Tolerated treatment fair  Patient has good knowledge of exercise program   Patient exhibited good technique with therapeutic exercises and would benefit from continued PT  Re eval next visit  Plan: Continue per plan of care         Assessment 8/29 9/9 9/12 9/16 9/19 9/23 9-30 10/3 10/7 10/10   Eval/Reval             FOTO     **     **   POC Signed             HEP Issued              Manuals                          Exercise Diary                           Treadmill 10 min 10 min  4 min 12 min 10 min x x x x x x   Bike 10 min 12 min 12 min 10 min x x x x x x   RDL's with KB 10#  15x x x x x x x x 10#  3x10 x   Bridges with DB 10#  2x10 x x x x x x x 10#  3x10 x    crunch with DB 10#  2x10 x x x x x x x 10#  3x10 x   Goblet squats 10#  2x10 x x x x x X    x 10#  3x10 x   Apollo triceps    30#  3x10 x x X 25x x 30#  3x10 x   Apollo lats   (2 handles)    30#  3x10 x x x x 30#  3x10 x   Apollo rows - seated      35#  3x10 x x 35#  2x15 x   DB biceps curls      10#  3x10 x x x x                                                                    Modalities                            X= performed    Patient treated 1:1 8:15-8:40   Self perform not billed exercises 8:40-9:05

## 2019-10-14 ENCOUNTER — OFFICE VISIT (OUTPATIENT)
Dept: PHYSICAL THERAPY | Facility: REHABILITATION | Age: 74
End: 2019-10-14
Payer: MEDICARE

## 2019-10-14 DIAGNOSIS — R26.9 ABNORMALITY OF GAIT: ICD-10-CM

## 2019-10-14 DIAGNOSIS — M48.061 SPINAL STENOSIS OF LUMBAR REGION WITHOUT NEUROGENIC CLAUDICATION: Primary | ICD-10-CM

## 2019-10-14 PROCEDURE — 97112 NEUROMUSCULAR REEDUCATION: CPT

## 2019-10-14 PROCEDURE — 97110 THERAPEUTIC EXERCISES: CPT

## 2019-10-14 NOTE — PROGRESS NOTES
Daily Note     Today's date: 10/14/2019  Patient name: Ricardo Cheng  :   MRN: [id number]  Referring provider: Howard Moreno MD  Dx:   Encounter Diagnosis     ICD-10-CM    1  Spinal stenosis of lumbar region without neurogenic claudication M48 061    2  Abnormality of gait R26 9                   Subjective: Patient reports he is not feeling too bad during the day, he states he is walking better  Nights are bad yet which he attributes to over doing during the day  Legs tingle at night & he has to get up & move around  Objective: See treatment diary below      Assessment:  No worse after ex      Plan: Continue per plan of care         Assessment 10-14            Eval/Reval             FOTO             POC Signed             HEP Issued              Manuals                          Exercise Diary                           Treadmill 10 min 1 8 MPH            Bike 10 min            RDL's with KB 10#    3x10            Bridges with DB 10#    3x10               crunch with DB 10#    3x10              Goblet squats 10#    3x10              Apollo triceps 25# 3x10            Apollo lats   (2 handles) 25#    3x10            Apollo rows - seated 35#     3x10            DB biceps curls 10#    3x10                                                                             Modalities                            X= performed

## 2019-10-17 ENCOUNTER — OFFICE VISIT (OUTPATIENT)
Dept: PHYSICAL THERAPY | Facility: REHABILITATION | Age: 74
End: 2019-10-17
Payer: MEDICARE

## 2019-10-17 DIAGNOSIS — R26.9 ABNORMALITY OF GAIT: ICD-10-CM

## 2019-10-17 DIAGNOSIS — M48.061 SPINAL STENOSIS OF LUMBAR REGION WITHOUT NEUROGENIC CLAUDICATION: Primary | ICD-10-CM

## 2019-10-17 PROCEDURE — 97112 NEUROMUSCULAR REEDUCATION: CPT

## 2019-10-17 PROCEDURE — 97110 THERAPEUTIC EXERCISES: CPT

## 2019-10-17 NOTE — PROGRESS NOTES
Daily Note     Today's date: 10/17/2019  Patient name: Rob Lawson  :   MRN: [id number]  Referring provider: Lindsey Childress MD  Dx:   Encounter Diagnosis     ICD-10-CM    1  Spinal stenosis of lumbar region without neurogenic claudication M48 061    2  Abnormality of gait R26 9                   Subjective: Patient reports he is more sore today with the weather  He gets some shoulder soreness with ex      Objective: See treatment diary below      Assessment:  He needed to decrease reps on apollo today due to shoulder pain     Back felt OK with ex      Plan: Continue per plan of care         Assessment 10-14 10-17           Eval/Reval             FOTO             POC Signed             HEP Issued              Manuals                          Exercise Diary                           Treadmill 10 min 1 8 MPH x           Bike 10 min x           RDL's with KB 10#    3x10 x           Bridges with DB 10#    3x10   x            crunch with DB 10#    3x10   x           Goblet squats 10#    3x10   x           Apollo triceps 25# 3x10 X 1x10           Apollo lats   (2 handles) 25#    3x10 X 1x10           Apollo rows - seated 35#     3x10 X 1x10           DB biceps curls 10#    3x10 x                                                                            Modalities                            X= performed

## 2019-10-21 ENCOUNTER — OFFICE VISIT (OUTPATIENT)
Dept: PHYSICAL THERAPY | Facility: REHABILITATION | Age: 74
End: 2019-10-21
Payer: MEDICARE

## 2019-10-21 DIAGNOSIS — M48.061 SPINAL STENOSIS OF LUMBAR REGION WITHOUT NEUROGENIC CLAUDICATION: Primary | ICD-10-CM

## 2019-10-21 DIAGNOSIS — R26.9 ABNORMALITY OF GAIT: ICD-10-CM

## 2019-10-21 PROCEDURE — 97112 NEUROMUSCULAR REEDUCATION: CPT | Performed by: PHYSICAL THERAPIST

## 2019-10-21 PROCEDURE — 97110 THERAPEUTIC EXERCISES: CPT | Performed by: PHYSICAL THERAPIST

## 2019-10-21 NOTE — PROGRESS NOTES
Daily Note     Today's date: 10/21/2019  Patient name: Carole Camarena  : [birth year]  MRN: [id number]  Referring provider: Gia Wood MD  Dx:   Encounter Diagnosis     ICD-10-CM    1  Spinal stenosis of lumbar region without neurogenic claudication M48 061    2  Abnormality of gait R26 9                   Subjective: Patient reports that he continues to get back and leg pain towards the end of the day but notes that the exercising is helping  Left shoulder is a little sore today with bicep curls           Objective: See treatment diary below      Assessment:  Pt was able to complete his whole program today  Shoulder was not as painful  UE strengthening did become difficult during the last set of 10  Plan: Continue per plan of care         Assessment 10-14 10-17 10/21          Tracey/Reval             FOTO             POC Signed             HEP Issued              Manuals                          Exercise Diary                           Treadmill 10 min 1 8 MPH x x          Bike 10 min x x          RDL's with KB 10#    3x10 x x          Bridges with DB 10#    3x10   x x           crunch with DB 10#    3x10   x x          Goblet squats 10#    3x10   x x          Apollo triceps 25# 3x10 X 1x10 3x10          Apollo lats   (2 handles) 25#    3x10 X 1x10 3x10          Apollo rows - seated 35#     3x10 X 1x10 3x10          DB biceps curls 10#    3x10 x x                                                                           Modalities                            X= performed

## 2019-10-24 ENCOUNTER — OFFICE VISIT (OUTPATIENT)
Dept: PHYSICAL THERAPY | Facility: REHABILITATION | Age: 74
End: 2019-10-24
Payer: MEDICARE

## 2019-10-24 DIAGNOSIS — R26.9 ABNORMALITY OF GAIT: ICD-10-CM

## 2019-10-24 DIAGNOSIS — M48.061 SPINAL STENOSIS OF LUMBAR REGION WITHOUT NEUROGENIC CLAUDICATION: Primary | ICD-10-CM

## 2019-10-24 PROCEDURE — 97110 THERAPEUTIC EXERCISES: CPT

## 2019-10-24 PROCEDURE — 97112 NEUROMUSCULAR REEDUCATION: CPT

## 2019-10-24 NOTE — PROGRESS NOTES
Daily Note     Today's date: 10/24/2019  Patient name: Sharan Wisdom  : [birth year]  MRN: [id number]  Referring provider: Gera Pan MD  Dx:   Encounter Diagnosis     ICD-10-CM    1  Spinal stenosis of lumbar region without neurogenic claudication M48 061    2  Abnormality of gait R26 9            Subjective: Patient noted no new complaints  Objective: See treatment diary below      Assessment: Tolerated treatment well  Patient was able to perform with correct form and has good knowledge of exercise program  Patient would benefit from continued PT      Plan: Continue per plan of care        Assessment 10-14 10-17 10/21 10/24         Eval/Reval             FOTO             POC Signed             HEP Issued              Manuals                          Exercise Diary                           Treadmill 10 min 1 8 MPH x x x         Bike 10 min x x x         RDL's with KB 10#    3x10 x x x         Bridges with DB 10#    3x10   x x 2x15          crunch with DB 10#    3x10   x x 2x15         Goblet squats 10#    3x10   x x x         Apollo triceps 25# 3x10 X 1x10 3x10 x         Apollo lats   (2 handles) 25#    3x10 X 1x10 3x10 x         Apollo rows - seated 35#     3x10 X 1x10 3x10 x         DB biceps curls 10#    3x10 x x x                                                                          Modalities                            X= performed

## 2019-10-28 ENCOUNTER — OFFICE VISIT (OUTPATIENT)
Dept: PHYSICAL THERAPY | Facility: REHABILITATION | Age: 74
End: 2019-10-28
Payer: MEDICARE

## 2019-10-28 DIAGNOSIS — M48.061 SPINAL STENOSIS OF LUMBAR REGION WITHOUT NEUROGENIC CLAUDICATION: Primary | ICD-10-CM

## 2019-10-28 DIAGNOSIS — R26.9 ABNORMALITY OF GAIT: ICD-10-CM

## 2019-10-28 PROCEDURE — 97112 NEUROMUSCULAR REEDUCATION: CPT | Performed by: PHYSICAL THERAPIST

## 2019-10-28 PROCEDURE — 97110 THERAPEUTIC EXERCISES: CPT | Performed by: PHYSICAL THERAPIST

## 2019-10-28 NOTE — PROGRESS NOTES
Daily Note     Today's date: 10/28/2019  Patient name: Irena Neely  :   MRN: [id number]  Referring provider: Vick Godwin MD  Dx:   Encounter Diagnosis     ICD-10-CM    1  Spinal stenosis of lumbar region without neurogenic claudication M48 061    2  Abnormality of gait R26 9            Subjective: I was able to fill 2 loads of brush into my trailer unload it at the municipal drop off  Objective: See treatment diary below      Assessment: Corrected RDLs for more hamstring engagement and to squeeze his gluts more effectively when standing up tall  All other exercises looked good  Plan: Continue per plan of care        Assessment 10-14 10-17 10/21 10/24 10/28        Eval/Reval             FOTO             POC Signed             HEP Issued              Manuals                          Exercise Diary                           Treadmill 10 min 1 8 MPH x x x x        Bike 10 min x x x x        RDL's with KB 10#    3x10 x x x x        Bridges with DB 10#    3x10   x x 2x15 x         crunch with DB 10#    3x10   x x 2x15 x        Goblet squats 10#    3x10   x x x x        Apollo triceps 25# 3x10 X 1x10 3x10 x x        Apollo lats   (2 handles) 25#    3x10 X 1x10 3x10 x x        Apollo rows - seated 35#     3x10 X 1x10 3x10 x x        DB biceps curls 10#    3x10 x x x x                                                                         Modalities                            X= performed

## 2019-10-31 ENCOUNTER — OFFICE VISIT (OUTPATIENT)
Dept: PHYSICAL THERAPY | Facility: REHABILITATION | Age: 74
End: 2019-10-31
Payer: MEDICARE

## 2019-10-31 DIAGNOSIS — M48.061 SPINAL STENOSIS OF LUMBAR REGION WITHOUT NEUROGENIC CLAUDICATION: Primary | ICD-10-CM

## 2019-10-31 DIAGNOSIS — R26.9 ABNORMALITY OF GAIT: ICD-10-CM

## 2019-10-31 PROCEDURE — 97112 NEUROMUSCULAR REEDUCATION: CPT

## 2019-10-31 PROCEDURE — 97110 THERAPEUTIC EXERCISES: CPT

## 2019-10-31 NOTE — PROGRESS NOTES
Daily Note     Today's date: 10/31/2019  Patient name: Ricardo Cheng  :   MRN: [id number]  Referring provider: Howard Moreno MD  Dx:   Encounter Diagnosis     ICD-10-CM    1  Spinal stenosis of lumbar region without neurogenic claudication M48 061    2   Abnormality of gait R26 9            Subjective:    Reports he is feeling OK   Sitting will give him pain, he feels better after moving around      Objective: See treatment diary below      Assessment:   He can be I with ex    Plan:   He will D/C to fitness     Assessment 10-14 10-17 10/21 10/24 10/28 10-31       Eval/Reval             FOTO             POC Signed             HEP Issued              Manuals                          Exercise Diary                           Treadmill 10 min 1 8 MPH x x x x x       Bike 10 min x x x x x       RDL's with KB 10#    3x10 x x x x x       Bridges with DB 10#    3x10   x x 2x15 x x        crunch with DB 10#    3x10   x x 2x15 x x       Goblet squats 10#    3x10   x x x x x       Apollo triceps 25# 3x10 X 1x10 3x10 x x x       Apollo lats   (2 handles) 25#    3x10 X 1x10 3x10 x x x       Apollo rows - seated 35#     3x10 X 1x10 3x10 x x x       DB biceps curls 10#    3x10 x x x x x                                                                        Modalities                            X= performed

## 2020-01-22 ENCOUNTER — TELEPHONE (OUTPATIENT)
Dept: NEUROLOGY | Facility: CLINIC | Age: 75
End: 2020-01-22

## 2020-01-22 NOTE — TELEPHONE ENCOUNTER
1st attempt lm f pt tcb and schedul appt   Beverly Coles/Abnormalities of gait/Medicare A and B/Commercial Miscellaneous

## 2020-03-23 NOTE — PROGRESS NOTES
DEPARTMENT OF NEUROLOGICAL SCIENCES  [address] [facility]        NEW PATIENT EVALUATION NOTE    Patient: Freda Lindsey  Medical Record Number: # [id number]  YOB: 1945  Date of visit: 3/25/2020    Referring provider: Kenn Sethi DO    ASSESSMENT     Diagnoses for this encounter:  1  Gait difficulty  Ceruloplasmin    Magnesium    Comprehensive metabolic panel    CBC and differential    Vitamin B12    Vitamin E    Protein electrophoresis, serum    TSH, 3rd generation with Free T4 reflex    EMG 2 limb lower extremity    EMG 2 Limb Upper Extremity    MRI brain without contrast    Vitamin B6   2  Numbness of leg  Vitamin B12    Vitamin E    EMG 2 limb lower extremity   3  Sensory ataxia  Vitamin B12    Vitamin E    Protein electrophoresis, serum    TSH, 3rd generation with Free T4 reflex    EMG 2 limb lower extremity    EMG 2 Limb Upper Extremity    MRI brain without contrast    Vitamin B6   4  Physical deconditioning       Impression of this 68 yo gentleman with a significant history of spinal surgery and instrumentation, presenting for gait imbalance and L leg numbness  He attributes much of his physical deconditioning to the series of epidurals he received  He has known severe cervical stenosis on MRI without cord impingement  His back pain has been most relieved by aquatic therapy over any oral medication  He struggled to describe the left leg sensation, but was subjectively "swollen and tight" in the thigh rather than burning, shooting or tingling pain  The reported distribution of his leg symptoms does not respect nerve pathways and may be more related to vascular issues  His lower leg is unaffected  His L hand is abnormal in its range of motions, abnormal posturing of fingers, and diminished sensation  Examination did show small, low steps with some ataxia, positive neuropathy in feet and near absent reflexes and no spasticity   He did have diffuse but symmetrical muscle atrophy and bilateral ankle edema without thigh edema or tenderness  His gait abnormality does not appear to be related to CNS issues and may be more of the peripheral neuropathy and edema  We were unable to comment further on the recent scans performed as no images were available  Proceed as below  PLAN     · No recent or relevant laboratory results in last 3 months to review  · Checking neuropathy panel - CBC, CMP, TSH, vitamin B12, vitamin E,   · Relevant neuroimaging results reviewed in Epic, and per HPI including the stat MRI cervical and thoracic spine scans from [facility]  However, images are NOT available in system or uploaded to review  · Request images of scans to be sent over for review and records  · EMG of the bilateral upper extremities and bilateral lower extremities, given the severe neuropathy found, r/o radiculopathy involvement  Known spinal stenosis  · For his pain, he is already on gabapentin with questionable difference  We will hold off any medication changes per his preference until the scans and lab results are in and after he completes the aquatherapy  · He may consider CBD oil, which does not require marijuana card and more readily available for his symptoms  · Will defer to Orthopedics regarding intervention for stenosis  · He will resume Aquatic PT as this was historically very helpful for him and he never completed it  · Thank you very much for sending me this interesting patient  · The patient has been instructed to call us about any new neurological problems or medication side effects  · Return to Clinic in 3-4 months     A total of 80 minutes were spent face-to-face with this patient, of which 25% was spent on counseling and coordination of care  We discussed the natural history of the patient's condition, differential diagnosis, level of diagnostic certainty, treatment alternatives and their side effects and possible complications  HISTORY OF PRESENT ILLNESS:     Mr Yasmany Mathis is a 69 y o  right handed male who has been [facility] the [facility] [address] [place] [address] for evaluation of gait abnormality, rule out myelopathy per referral       The patient was not accompanied today  History was obtained from patient  Pt was referred by Margie Gutierrez bothersome neurological symptoms today are:   1  Lack of balance   2  "Left leg feeling plastic"     Per chart review, patient has a long history of spinal related problems  He was in a work-related accident in 5/21/1992 as a  at [employer], when he fell and fractured his ankle and injured his shoulder after tripping over a slab  Since then, he had been out of work but his worker's compensation case has been settled  He was treated by a Dr Asuncion Peters from that time to 2005  He is s/p an ACDF on C3-4 and C7-T1 in 4/12/18 and C3-4 laminectomy, posterior cervical fusion C3-T2, left C4-5, C5-6, and C6-7 hemilaminectomy and foraminotomy, C7-T2 laminectomy on 4/30/18  He was referred to Dr Cj Henry for medical marijuana in 5/28/18, but he turned out to decline it because he would have to give up carrying a gun on his person  He went to ER twice in 7/24/19 and 8/1/19 for low back pain with radiation down left leg  He tells me today that the left leg from the hip to the knee "feels like plastic"  He was referred to Pain Management for injection and s/p L L4-5 injection 8/20/19 with aquatherapy the following month  He found this helpful, but stopped due to an injection of the leg  He has been on gabapentin before, now to 800mg BID as well as ibuprofen 800mg prn in AM  He had previously also been on Oxycontin  He denies having tried Lyrica, Cymbalta, Elavil  As of his Jan 2020 visit with [facility], he remained "very unstable" with walking and MRI of the cervical and thoracic spine imaged   Per most recent notes 2/11/20:     - MRI C-spine 2/1/20 [facility] - prior ACDF C3/4, C7/T1; fused C4/5, 5/6, 6/7; stenosis without myelomalacia C3/4; severe stenosis C5/6 with flattening; severe Left C6/7 stenosis with SC flattening and myelomalacia; central protrusion with Left extruded fragment posterior to T1 vertebral body resulting in spinal cord compression   - MRI T-spine 2/3/20 [facility] - no evidence of cord compression   - CT scan Cspine 2/20/20 [facility] - s/p psterior spinal fusion C3-T2, prior laminectomy C7-T1; ossified protrusion left C7/T1; ACDF C3/4 and C7/T1; solid fusion C3-T1; no evidence of pedicle screw loosening T1 and T2   - MRI Lspine 2/24/20 [facility] - schmorl's node superior endplate L4; mod bilateral LR stenosis L2-S1; mod-severe DDD L1-S1     When he tries to stand up, he feels very "wobbly" and especially worse at night that he feels due to compression of his lower spine  He has Aqua therapy rescheduled for him  Current Relevant Medications: Gabapentin, Mirapex for RLS, Valium  Living Situation + ADLs: living at home with wife and recently moved  Retired [employer]       Family History: Number of First Degree Relatives With Parkinsonism/Dementia:     REVIEW OF PAST MEDICAL, SOCIAL AND FAMILY HISTORY:  This is the list of problems as per our Medical Records:    Patient Active Problem List    Diagnosis Date Noted    Gait difficulty 03/25/2020    Numbness of leg 03/25/2020    Sensory ataxia 03/25/2020    Physical deconditioning 03/25/2020    Status post cervical spinal fusion 06/05/2018    Upper extremity weakness 05/10/2018    S/P laminectomy with spinal fusion 05/10/2018    Spinal stenosis of cervical region 04/13/2018     Past Medical History:   Diagnosis Date    Arthritis     Back pain     Cervical pain (neck)     COPD (chronic obstructive pulmonary disease) (HCC)     History of transfusion     Age 8 after bleeding post tonsillectomy    Hyperlipidemia     Mild heartburn     Neck pain     Numbness     Left hand and arm    Scratches     Multiple on limbs and hands    Wears dentures     Upper and lower   Has 4 pins on bottom to hold bottom denture      Past Surgical History:   Procedure Laterality Date    COLONOSCOPY      ESOPHAGOGASTRODUODENOSCOPY      EYE SURGERY      Bilateral implants to correct vision    TN ANTERIOR INSTRUMENTATION 2-3 VERTEBRAL SEGMENTS N/A 4/12/2018    Procedure: FUSION CERVICAL ANTERIOR W DISCECTOMY C3/4, C7/T1;  Surgeon: Ivory Campbell MD;  [facility];  Service: Orthopedics    TN ARTHRODESIS POSTERIOR/POSTERIORLATERAL CERVICAL BELOW C2 N/A 4/30/2018    Procedure: POSSIBLE C3-4, C4/5 LAMINECTOMY;  Surgeon: Ivory Campbell MD;  [facility];  Service: Orthopedics    TN ARTHRODESIS POSTERIOR/POSTEROLATERAL THORACIC N/A 4/30/2018    Procedure: C3-T2 PSF With Instrumentation;  Surgeon: Ivory Campbell MD;  [facility];  Service: Orthopedics    TN EXCIS CERV DISK,ONE LEVEL N/A 4/30/2018    Procedure: C 6-7 Left HEMILAMINOTOMY AND FORAMINOTOMY C7-T2 LAMINECTOMY C7-T2 Left Foraminotomy;  Surgeon: Ivory Campbell MD;  [facility];  Service: Orthopedics    TONSILLECTOMY        No Known Allergies     Outpatient Encounter Medications as of 3/25/2020   Medication Sig Dispense Refill    Cholecalciferol (VITAMIN D3) 69471 units TABS Take 50,000 Units by mouth once a week Takes on Sundays       diazepam (VALIUM) 10 mg tablet Take 10 mg by mouth daily at bedtime        gabapentin (NEURONTIN) 600 MG tablet Take 800 mg by mouth 2 (two) times a day       ibuprofen (MOTRIN) 800 mg tablet ibuprofen 800 mg tablet      pramipexole (MIRAPEX) 0 75 MG tablet Take 0 75 mg by mouth daily at bedtime      simvastatin (ZOCOR) 20 mg tablet Take 20 mg by mouth daily at bedtime      tiotropium (Spiriva Respimat) 2 5 MCG/ACT AERS inhaler Inhale 2 puffs Daily      meloxicam (MOBIC) 15 mg tablet Take 15 mg by mouth daily      oxyCODONE-acetaminophen (PERCOCET) 7 5-325 MG per tablet oxycodone-acetaminophen 7 5 mg-325 mg tablet       No facility-administered encounter medications on file as of 3/25/2020  Social History     Tobacco Use    Smoking status: Former Smoker     Years: 55 00     Types: Cigars, Cigarettes     Last attempt to quit: 2018     Years since quittin 1    Smokeless tobacco: Former User     Types: Chew    Tobacco comment: Quit cigarettes in   Had smoked at most 1 5 packs daily  Substance Use Topics    Alcohol use: Yes     Comment: rarely       History reviewed  No pertinent family history  REVIEW OF SYSTEMS:  The patient has entered data on an intake form regarding present illness, past medical and surgical history, medications, allergies, family and social history, and a full review of 14 systems  I have reviewed this form with the patient, and all the relevant information has been included on this note  The full review of systems was negative except as stated in HPI and below  Review of Systems   Constitutional: Negative  Negative for appetite change and fever  Recent weight loss   HENT: Positive for hearing loss and tinnitus  Negative for trouble swallowing and voice change  Eyes: Negative  Negative for photophobia and pain  Respiratory: Negative for shortness of breath  Snoring   Cardiovascular: Negative  Negative for palpitations  Gastrointestinal: Positive for diarrhea  Negative for nausea and vomiting  Endocrine: Negative  Negative for cold intolerance  Genitourinary: Negative  Negative for dysuria, frequency and urgency  Immobility or loss of function, erection difficulties   Musculoskeletal: Positive for back pain  Negative for myalgias and neck pain  Joint pain at night, muscle pain, balance issues, difficulty walking   Skin: Negative  Negative for rash  Neurological: Positive for numbness (left hand, tingling left hand and finger tips)   Negative for dizziness, tremors, seizures, syncope, facial asymmetry, speech difficulty, weakness, light-headedness and headaches  Hematological: Bruises/bleeds easily  Psychiatric/Behavioral: Negative  Negative for confusion, hallucinations and sleep disturbance  All other systems reviewed and are negative  PHYSICAL EXAMINATION:     Vital signs:  /78 (BP Location: Right arm, Patient Position: Sitting, Cuff Size: Standard)   Pulse 83   Resp 16   Ht 5' 11" (1 803 m)   Wt 72 8 kg (160 lb 6 4 oz)   BMI 22 37 kg/m²     General:  Well-appearing, well nourished, pleasant patient in no acute distress  Mood appropriate  Head:  Normocephalic, atraumatic  Oropharynx and conjunctiva are clear  Speech  No hypophonia, no bradylalia  No scanning speech  Language: Comprehension intact  Neck:  Supple, strong 5/5 forward flexion and retroflexion  Extremities: Range of motion is normal  Edema on both ankles  Cognitive and Mental Exam:  The patient is alert, oriented to self, location, date and situation  Cranial Nerves:  CN II:  Direct and consensual light reflexes were equally reactive to light symmetrically  No afferent pupillary defect   Visual fields are full to confrontation  CN III / IV / VI: Extraocular movements were full, with normal pursuit and saccades  CN V:   Facial sensation to light touch was intact  CN VII: Face is symmetric with normal strength  CN VIII: Hearing was assessed using the Calibrated Finger Rub Auditory Screening Test (CALFRAST) and was not abnormal (Better than CALFRAST-Strong-70)  CN X:   Palate is up going bilaterally and symmetrically  CN XI:  Neck muscles are strong  CN XII: Tongue protrusion is at midline with normal movements  No dysarthria  Motor:    Dystonia: some posturing of the R fist    Dyskinesia: none  Myoclonus: none  Chorea: none  Tics: none      [id number]                Time since last dose:   3/25/20      Speech  3 hoarse but not quiet     Facial Expression  0    Postural Tremor (Right) 0    Postural Tremor (Left) 0 Kinetic Tremor (Right)  0    Kinetic Tremor (Left)  0    Rest tremor amplitude RUE 0    Rest tremor amplitude LUE 0    Rest tremor amplitude RLE 0    Rest tremor amplitude LLE 0    Lip/Jaw Tremor  0    Consistency of tremor 0    Finger Taps (Right)   0    Finger Taps (Left)  0    Hand Movement (Right)  0    Hand Movement (Left)   0    Pronation/Supination (Right)  0    Pronation/Supination (Left)   0    Toe Tapping (Right) 1    Toe Tapping (Left) 2    Leg Agility (Right)  2    Leg Agility (Left)   2     Rigidity - Neck  0     Rigidity - Upper Extremity (Right)  0      Rigidity - Upper Extremity (Left)   0     Rigidity - Lower Extremity (Right)  0    Rigidity - Lower Extremity (Left)   0    Arising from Chair   1      Gait   2     Freezing of Gait 0     Postural Stability  2     Posture 2     Global spontaneity of movement 2       -------------------------------------------------------------------------------------    Muscle Strength Right Left  Muscle Strength Right Left   Deltoid 5/5 5/5  Hip Adductors 5/5 5/5   Biceps 5/5 5/5  Hip Abductors 5/5 5/5   Triceps 5/5 5/5  Knee Extensors 5/5 5/5   Wrist Extensors 5/5 5/5  Knee Flexors 5/5 5/5   Wrist Flexors 5/5 5/5  Ankle Extensors 5/5 5/5    5/5 5/5  Ankle Flexors 5/5 5/5   Finger Abductors 5/5 5/5       Hip Flexors 5/5 5/5   Hip Extensors 5/5 5/5   No visible or tangible asymmetry of muscle bulk, but overall atrophy present  No spasticity  Sensory  Diminished to Light Touch, Temperature in the LUE hand and fingers and in the LLE to a lesser degree  ABSENT vibration sense in all extremities  Coordination:  Finger-to-nose-finger: normal     Gait:  Pushes self up from chair, uses cane on the R hand but able to walk without the cane  Smaller strides without shuffling  Normal turns  No magnetic gait  Slight titubation, narrow base  Unable to attempt Tandem Gait or stand on either foot independently        Reflexes:     Right Left   Biceps 0/4 0/4 Brachioradialis 0/4 0/4   Triceps 0/4 0/4   Knee 0/4 0/4   Ankle 0/4 0/4   Diffuse hyporeflexia, despite activating procedures       Plantar cutaneous reflex:  Right: flexor  Left: flexor

## 2020-03-25 ENCOUNTER — CONSULT (OUTPATIENT)
Dept: NEUROLOGY | Facility: CLINIC | Age: 75
End: 2020-03-25
Payer: MEDICARE

## 2020-03-25 VITALS
BODY MASS INDEX: 22.46 KG/M2 | HEIGHT: 71 IN | DIASTOLIC BLOOD PRESSURE: 78 MMHG | RESPIRATION RATE: 16 BRPM | HEART RATE: 83 BPM | WEIGHT: 160.4 LBS | SYSTOLIC BLOOD PRESSURE: 130 MMHG

## 2020-03-25 DIAGNOSIS — R20.0 NUMBNESS OF LEG: ICD-10-CM

## 2020-03-25 DIAGNOSIS — R27.8 SENSORY ATAXIA: ICD-10-CM

## 2020-03-25 DIAGNOSIS — R26.9 GAIT DIFFICULTY: Primary | ICD-10-CM

## 2020-03-25 DIAGNOSIS — R53.81 PHYSICAL DECONDITIONING: ICD-10-CM

## 2020-03-25 PROCEDURE — 99205 OFFICE O/P NEW HI 60 MIN: CPT | Performed by: PSYCHIATRY & NEUROLOGY

## 2020-03-25 RX ORDER — IBUPROFEN 800 MG/1
TABLET ORAL
COMMUNITY

## 2020-03-25 NOTE — PROGRESS NOTES
Patient ID: Sarabjit Irizarry is a 69 y o  male  Assessment/Plan:    No problem-specific Assessment & Plan notes found for this encounter  {Assess/PlanSmartLinks:04241}       Subjective:    HPI    {[facility] HPI texts:51778}    {Common ambulatory SmartLinks:89319}         Objective:    Blood pressure 130/78, pulse 83, resp  rate 16, height 5' 11" (1 803 m), weight 72 8 kg (160 lb 6 4 oz)  Physical Exam    Neurological Exam      ROS:    Review of Systems   Constitutional: Negative  Negative for appetite change and fever  Recent weight loss   HENT: Positive for hearing loss and tinnitus  Negative for trouble swallowing and voice change  Eyes: Negative  Negative for photophobia and pain  Respiratory: Negative for shortness of breath  Snoring   Cardiovascular: Negative  Negative for palpitations  Gastrointestinal: Positive for diarrhea  Negative for nausea and vomiting  Endocrine: Negative  Negative for cold intolerance  Genitourinary: Negative  Negative for dysuria, frequency and urgency  Immobility or loss of function, erection difficulties   Musculoskeletal: Positive for back pain  Negative for myalgias and neck pain  Joint pain at night, muscle pain, balance issues, difficulty walking   Skin: Negative  Negative for rash  Neurological: Positive for numbness (left hand, tingling left hand and finger tips)  Negative for dizziness, tremors, seizures, syncope, facial asymmetry, speech difficulty, weakness, light-headedness and headaches  Hematological: Bruises/bleeds easily  Psychiatric/Behavioral: Negative  Negative for confusion, hallucinations and sleep disturbance  All other systems reviewed and are negative

## 2020-03-25 NOTE — PATIENT INSTRUCTIONS
· No recent or relevant laboratory results in last 3 months to review  · Checking neuropathy panel - CBC, CMP, TSH, vitamin B12, vitamin E,   · Relevant neuroimaging results reviewed in Epic, and per HPI including the stat MRI cervical and thoracic spine scans from [facility]  However, images are NOT available in system or uploaded to review  · Request images of scans to be sent over for review and records  · EMG of the bilateral upper extremities and bilateral lower extremities, given the severe neuropathy found, r/o radiculopathy involvement  Known spinal stenosis  · For his pain, he is already on gabapentin with questionable difference  We will hold off any medication changes per his preference until the scans and lab results are in and after he completes the aquatherapy  · Will defer to Orthopedics regarding intervention for stenosis  · He will resume Aquatic PT as this was historically very helpful for him and he never completed it  · Thank you very much for sending me this interesting patient  · The patient has been instructed to call us about any new neurological problems or medication side effects    · Return to Clinic in 3-4 months

## 2020-03-25 NOTE — LETTER
March 25, 2020     MD Eliseo Santamaria [facility] [address]    Patient: Devan Gaitan   YOB: 1945   Date of Visit: 3/25/2020       Dear Dr Elvira Gleason:    Thank you for referring Martinez Ramos to me for evaluation  Below are my notes for this consultation  If you have questions, please do not hesitate to call me  I look forward to following your patient along with you  Sincerely,        Abbey Patino MD        CC: DO Abbey Marcano MD  3/25/2020  8:14 PM  Sign at close encounter  [address] [facility]        NEW PATIENT EVALUATION NOTE    Patient: [address] [facility] Record Number: # [id number]  YOB: 1945  Date of visit: 3/25/2020    Referring provider: Sharif Kim DO    ASSESSMENT     Diagnoses for this encounter:  1  Gait difficulty  Ceruloplasmin    Magnesium    Comprehensive metabolic panel    CBC and differential    Vitamin B12    Vitamin E    Protein electrophoresis, serum    TSH, 3rd generation with Free T4 reflex    EMG 2 limb lower extremity    EMG 2 Limb Upper Extremity    MRI brain without contrast    Vitamin B6   2  Numbness of leg  Vitamin B12    Vitamin E    EMG 2 limb lower extremity   3  Sensory ataxia  Vitamin B12    Vitamin E    Protein electrophoresis, serum    TSH, 3rd generation with Free T4 reflex    EMG 2 limb lower extremity    EMG 2 Limb Upper Extremity    MRI brain without contrast    Vitamin B6   4  Physical deconditioning       Impression of this 68 yo gentleman with a significant history of spinal surgery and instrumentation, presenting for gait imbalance and L leg numbness  He attributes much of his physical deconditioning to the series of epidurals he received  He has known severe cervical stenosis on MRI without cord impingement  His back pain has been most relieved by aquatic therapy over any oral medication   He struggled to describe the left leg sensation, but was subjectively "swollen and tight" in the thigh rather than burning, shooting or tingling pain  The reported distribution of his leg symptoms does not respect nerve pathways and may be more related to vascular issues  His lower leg is unaffected  Examination did show small, low steps with some ataxia, positive neuropathy in feet and near absent reflexes and no spasticity  He did have diffuse but symmetrical muscle atrophy and bilateral ankle edema without thigh edema or tenderness  His gait abnormality does not appear to be related to CNS issues and may be more of the peripheral neuropathy and edema  We were unable to comment further on the recent scans performed as no images were available  Proceed as below  PLAN     · No recent or relevant laboratory results in last 3 months to review  · Checking neuropathy panel - CBC, CMP, TSH, vitamin B12, vitamin E,   · Relevant neuroimaging results reviewed in Epic, and per HPI including the stat MRI cervical and thoracic spine scans from [facility]  However, images are NOT available in system or uploaded to review  · Request images of scans to be sent over for review and records  · EMG of the bilateral upper extremities and bilateral lower extremities, given the severe neuropathy found, r/o radiculopathy involvement  Known spinal stenosis  · For his pain, he is already on gabapentin with questionable difference  We will hold off any medication changes per his preference until the scans and lab results are in and after he completes the aquatherapy  · He may consider CBD oil, which does not require marijuana card and more readily available for his symptoms  · Will defer to Orthopedics regarding intervention for stenosis  · He will resume Aquatic PT as this was historically very helpful for him and he never completed it     · Thank you very much for sending me this interesting patient  · The patient has been instructed to call us about any new neurological problems or medication side effects  · Return to Clinic in 3-4 months     A total of 80 minutes were spent face-to-face with this patient, of which 25% was spent on counseling and coordination of care  We discussed the natural history of the patient's condition, differential diagnosis, level of diagnostic certainty, treatment alternatives and their side effects and possible complications  HISTORY OF PRESENT ILLNESS:     Mr Bhaskar Perez is a 69 y o  right handed male who has been referred to the [facility] [address] [place] [address] for evaluation of gait abnormality, rule out myelopathy per referral       The patient was not accompanied today  History was obtained from patient  Pt was referred by Cyndee Gomez bothersome neurological symptoms today are:   1  Lack of balance   2  "Left leg feeling plastic"     Per chart review, patient has a long history of spinal related problems  He was in a work-related accident in 5/21/1992 as a  at [employer], when he fell and fractured his ankle and injured his shoulder after tripping over a slab  Since then, he had been out of work but his worker's compensation case has been settled  He was treated by a Dr Martina De La Vega from that time to 2005  He is s/p an ACDF on C3-4 and C7-T1 in 4/12/18 and C3-4 laminectomy, posterior cervical fusion C3-T2, left C4-5, C5-6, and C6-7 hemilaminectomy and foraminotomy, C7-T2 laminectomy on 4/30/18  He was referred to Dr Avinash Meneses for medical marijuana in 5/28/18, but he turned out to decline it because he would have to give up carrying a gun on his person  He went to ER twice in 7/24/19 and 8/1/19 for low back pain with radiation down left leg  He tells me today that the left leg from the hip to the knee "feels like plastic"  He was referred to Pain Management for injection and s/p L L4-5 injection 8/20/19 with aquatherapy the following month   He found this helpful, but stopped due to an injection of the leg  He has been on gabapentin before, now to 800mg BID as well as ibuprofen 800mg prn in AM  He had previously also been on Oxycontin  He denies having tried Lyrica, Cymbalta, Elavil  As of his Jan 2020 visit with [facility], he remained "very unstable" with walking and MRI of the cervical and thoracic spine imaged  Per most recent notes 2/11/20:     - MRI C-spine 2/1/20 [facility] - prior ACDF C3/4, C7/T1; fused C4/5, 5/6, 6/7; stenosis without myelomalacia C3/4; severe stenosis C5/6 with flattening; severe Left C6/7 stenosis with SC flattening and myelomalacia; central protrusion with Left extruded fragment posterior to T1 vertebral body resulting in spinal cord compression   - MRI T-spine 2/3/20 [facility] - no evidence of cord compression   - CT scan Cspine 2/20/20 [facility] - s/p psterior spinal fusion C3-T2, prior laminectomy C7-T1; ossified protrusion left C7/T1; ACDF C3/4 and C7/T1; solid fusion C3-T1; no evidence of pedicle screw loosening T1 and T2   - MRI Lspine 2/24/20 [facility] - schmorl's node superior endplate L4; mod bilateral LR stenosis L2-S1; mod-severe DDD L1-S1     When he tries to stand up, he feels very "wobbly" and especially worse at night that he feels due to compression of his lower spine  He has Aqua therapy rescheduled for him  Current Relevant Medications: Gabapentin, Mirapex for RLS, Valium  Living Situation + ADLs: living at home with wife and recently moved  Retired [employer]       Family History: Number of First Degree Relatives With Parkinsonism/Dementia:     REVIEW OF PAST MEDICAL, SOCIAL AND FAMILY HISTORY:  This is the list of problems as per our Medical Records:    Patient Active Problem List    Diagnosis Date Noted    Gait difficulty 03/25/2020    Numbness of leg 03/25/2020    Sensory ataxia 03/25/2020    Physical deconditioning 03/25/2020    Status post cervical spinal fusion 06/05/2018    Upper extremity weakness 05/10/2018    S/P laminectomy with spinal fusion 05/10/2018    Spinal stenosis of cervical region 04/13/2018     Past Medical History:   Diagnosis Date    Arthritis     Back pain     Cervical pain (neck)     [facility]ch[facility] obstructive [facility] disease[facility])     History of transfusion     Age 8 after bleeding post tonsillectomy    Hyperlipidemia     Mild heartburn     Neck pain     Numbness     Left hand and arm    Scratches     Multiple on limbs and hands    Wears dentures     Upper and lower   Has 4 pins on bottom to hold bottom denture      Past Surgical History:   Procedure Laterality Date    COLONOSCOPY      ESOPHAGOGASTRODUODENOSCOPY      EYE SURGERY      Bilateral implants to correct vision    SD ANTERIOR INSTRUMENTATION 2-3 VERTEBRAL SEGMENTS N/A 4/12/2018    Procedure: FUSION CERVICAL ANTERIOR W DISCECTOMY C3/4, C7/T1;  Surgeon: Ivory Campbell MD;  [facility];  Service: Orthopedics    SD ARTHRODESIS POSTERIOR/POSTERIORLATERAL CERVICAL BELOW C2 N/A 4/30/2018    Procedure: POSSIBLE C3-4, C4/5 LAMINECTOMY;  Surgeon: Ivory Campbell MD;  [facility];  Service: Orthopedics    SD ARTHRODESIS POSTERIOR/POSTEROLATERAL THORACIC N/A 4/30/2018    Procedure: C3-T2 PSF With Instrumentation;  Surgeon: Ivory Campbell MD;  [facility];  Service: Orthopedics    SD EXCIS CERV DISK,ONE LEVEL N/A 4/30/2018    Procedure: C 6-7 Left HEMILAMINOTOMY AND FORAMINOTOMY C7-T2 LAMINECTOMY C7-T2 Left Foraminotomy;  Surgeon: Ivory Campbell MD;  [facility];  Service: Orthopedics    TONSILLECTOMY        No Known Allergies     Outpatient Encounter Medications as of 3/25/2020   Medication Sig Dispense Refill    Cholecalciferol (VITAMIN D3) 60318 units TABS Take 50,000 Units by mouth once a week Takes on Sundays       diazepam (VALIUM) 10 mg tablet Take 10 mg by mouth daily at bedtime        gabapentin (NEURONTIN) 600 MG tablet Take 800 mg by mouth 2 (two) times a day       ibuprofen (MOTRIN) 800 mg tablet ibuprofen 800 mg tablet      pramipexole (MIRAPEX) 0 75 MG tablet Take 0 75 mg by mouth daily at bedtime      simvastatin (ZOCOR) 20 mg tablet Take 20 mg by mouth daily at bedtime      tiotropium (Spiriva Respimat) 2 5 MCG/ACT AERS inhaler Inhale 2 puffs Daily      meloxicam (MOBIC) 15 mg tablet Take 15 mg by mouth daily      oxyCODONE-acetaminophen (PERCOCET) 7 5-325 MG per tablet oxycodone-acetaminophen 7 5 mg-325 mg tablet       No facility-administered encounter medications on file as of 3/25/2020  Social History     Tobacco Use    Smoking status: Former Smoker     Years: 55 00     Types: Cigars, Cigarettes     Last attempt to quit: 2018     Years since quittin 1    Smokeless tobacco: Former User     Types: Chew    Tobacco comment: Quit cigarettes in   Had smoked at most 1 5 packs daily  Substance Use Topics    Alcohol use: Yes     Comment: rarely       History reviewed  No pertinent family history  REVIEW OF SYSTEMS:  The patient has entered data on an intake form regarding present illness, past medical and surgical history, medications, allergies, family and social history, and a full review of 14 systems  I have reviewed this form with the patient, and all the relevant information has been included on this note  The full review of systems was negative except as stated in HPI and below  Review of Systems   Constitutional: Negative  Negative for appetite change and fever  Recent weight loss   HENT: Positive for hearing loss and tinnitus  Negative for trouble swallowing and voice change  Eyes: Negative  Negative for photophobia and pain  Respiratory: Negative for shortness of breath  Snoring   Cardiovascular: Negative  Negative for palpitations  Gastrointestinal: Positive for diarrhea  Negative for nausea and vomiting  Endocrine: Negative  Negative for cold intolerance  Genitourinary: Negative    Negative for dysuria, frequency and urgency  Immobility or loss of function, erection difficulties   Musculoskeletal: Positive for back pain  Negative for myalgias and neck pain  Joint pain at night, muscle pain, balance issues, difficulty walking   Skin: Negative  Negative for rash  Neurological: Positive for numbness (left hand, tingling left hand and finger tips)  Negative for dizziness, tremors, seizures, syncope, facial asymmetry, speech difficulty, weakness, light-headedness and headaches  Hematological: Bruises/bleeds easily  Psychiatric/Behavioral: Negative  Negative for confusion, hallucinations and sleep disturbance  All other systems reviewed and are negative  PHYSICAL EXAMINATION:     Vital signs:  /78 (BP Location: Right arm, Patient Position: Sitting, Cuff Size: Standard)   Pulse 83   Resp 16   Ht 5' 11" (1 803 m)   Wt 72 8 kg (160 lb 6 4 oz)   BMI 22 37 kg/m²      General:  Well-appearing, well nourished, pleasant patient in no acute distress  Mood appropriate  Head:  Normocephalic, atraumatic  Oropharynx and conjunctiva are clear  Speech  No hypophonia, no bradylalia  No scanning speech  Language: Comprehension intact  Neck:  Supple, strong 5/5 forward flexion and retroflexion  Extremities: Range of motion is normal  Edema on both ankles  Cognitive and Mental Exam:  The patient is alert, oriented to self, location, date and situation  Cranial Nerves:  CN II:  Direct and consensual light reflexes were equally reactive to light symmetrically  No afferent pupillary defect   Visual fields are full to confrontation  CN III / IV / VI: Extraocular movements were full, with normal pursuit and saccades  CN V:   Facial sensation to light touch was intact  CN VII: Face is symmetric with normal strength  CN VIII: Hearing was assessed using the Calibrated Finger Rub Auditory Screening Test (CALFRAST) and was not abnormal (Better than CALFRAST-Strong-70)      CN X:   Palate is up going bilaterally and symmetrically  CN XI:  Neck muscles are strong  CN XII: Tongue protrusion is at midline with normal movements  No dysarthria  Motor:    Dystonia: some posturing of the R fist    Dyskinesia: none  Myoclonus: none  Chorea: none  Tics: none  [id number]                Time since last dose:    3/25/20      Speech  3 hoarse but not quiet     Facial Expression  0    Postural Tremor (Right) 0    Postural Tremor (Left) 0    Kinetic Tremor (Right)  0    Kinetic Tremor (Left)  0    Rest tremor amplitude RUE 0    Rest tremor amplitude LUE 0    Rest tremor amplitude RLE 0    Rest tremor amplitude LLE 0    Lip/Jaw Tremor  0    Consistency of tremor 0    Finger Taps (Right)   0    Finger Taps (Left)  0    Hand Movement (Right)  0    Hand Movement (Left)   0    Pronation/Supination (Right)  0    Pronation/Supination (Left)   0    Toe Tapping (Right) 1    Toe Tapping (Left) 2    Leg Agility (Right)  2    Leg Agility (Left)   2     Rigidity - Neck  0     Rigidity - Upper Extremity (Right)  0      Rigidity - Upper Extremity (Left)   0     Rigidity - Lower Extremity (Right)  0    Rigidity - Lower Extremity (Left)   0    Arising from Chair   1      Gait    2     Freezing of Gait 0     Postural Stability  2     Posture 2     Global spontaneity of movement 2       -------------------------------------------------------------------------------------    Muscle Strength Right Left  Muscle Strength Right Left   Deltoid 5/5 5/5  Hip Adductors 5/5 5/5   Biceps 5/5 5/5  Hip Abductors 5/5 5/5   Triceps 5/5 5/5  Knee Extensors 5/5 5/5   Wrist Extensors 5/5 5/5  Knee Flexors 5/5 5/5   Wrist Flexors 5/5 5/5  Ankle Extensors 5/5 5/5    5/5 5/5  Ankle Flexors 5/5 5/5   Finger Abductors 5/5 5/5       Hip Flexors 5/5 5/5   Hip Extensors 5/5 5/5   No visible or tangible asymmetry of muscle bulk, but overall atrophy present  No spasticity       Sensory  Diminished to Light Touch, Temperature in the LUE hand and fingers and in the LLE to a lesser degree  ABSENT vibration sense in all extremities  Coordination:  Finger-to-nose-finger: normal     Gait:  Pushes self up from chair, uses cane on the R hand but able to walk without the cane  Smaller strides without shuffling  Normal turns  No magnetic gait  Slight titubation, narrow base  Unable to attempt Tandem Gait or stand on either foot independently  Reflexes:     Right Left   Biceps 0/4 0/4   Brachioradialis 0/4 0/4   Triceps 0/4 0/4   Knee 0/4 0/4   Ankle 0/4 0/4   Diffuse hyporeflexia, despite activating procedures       Plantar cutaneous reflex:  Right: flexor  Left: flexor

## 2020-05-27 ENCOUNTER — HOSPITAL ENCOUNTER (OUTPATIENT)
Dept: NEUROLOGY | Facility: CLINIC | Age: 75
Discharge: HOME | End: 2020-05-27
Payer: MEDICARE

## 2020-05-27 DIAGNOSIS — R26.9 GAIT DIFFICULTY: ICD-10-CM

## 2020-05-27 DIAGNOSIS — R27.8 SENSORY ATAXIA: ICD-10-CM

## 2020-05-27 PROCEDURE — 95886 MUSC TEST DONE W/N TEST COMP: CPT | Performed by: PHYSICAL MEDICINE & REHABILITATION

## 2020-05-27 PROCEDURE — 95912 NRV CNDJ TEST 11-12 STUDIES: CPT | Performed by: PHYSICAL MEDICINE & REHABILITATION

## 2020-05-28 ENCOUNTER — TELEPHONE (OUTPATIENT)
Dept: NEUROLOGY | Facility: CLINIC | Age: 75
End: 2020-05-28

## 2020-05-28 ENCOUNTER — HOSPITAL ENCOUNTER (OUTPATIENT)
Dept: NEUROLOGY | Facility: CLINIC | Age: 75
Discharge: HOME | End: 2020-05-28
Payer: MEDICARE

## 2020-05-28 DIAGNOSIS — R27.8 SENSORY ATAXIA: ICD-10-CM

## 2020-05-28 DIAGNOSIS — R20.0 NUMBNESS OF LEG: ICD-10-CM

## 2020-05-28 DIAGNOSIS — R26.9 GAIT DIFFICULTY: ICD-10-CM

## 2020-05-28 PROCEDURE — 95911 NRV CNDJ TEST 9-10 STUDIES: CPT | Performed by: PHYSICAL MEDICINE & REHABILITATION

## 2020-05-28 PROCEDURE — 95886 MUSC TEST DONE W/N TEST COMP: CPT | Performed by: PHYSICAL MEDICINE & REHABILITATION

## 2020-05-29 ENCOUNTER — TELEPHONE (OUTPATIENT)
Dept: NEUROLOGY | Facility: CLINIC | Age: 75
End: 2020-05-29

## 2020-06-05 ENCOUNTER — HOSPITAL ENCOUNTER (OUTPATIENT)
Dept: MRI IMAGING | Facility: HOSPITAL | Age: 75
Discharge: HOME | End: 2020-06-05
Attending: PSYCHIATRY & NEUROLOGY
Payer: MEDICARE

## 2020-06-05 DIAGNOSIS — R26.9 GAIT DIFFICULTY: ICD-10-CM

## 2020-06-05 DIAGNOSIS — R27.8 SENSORY ATAXIA: ICD-10-CM

## 2020-06-05 PROCEDURE — 70551 MRI BRAIN STEM W/O DYE: CPT

## 2020-06-08 ENCOUNTER — TELEPHONE (OUTPATIENT)
Dept: NEUROLOGY | Facility: CLINIC | Age: 75
End: 2020-06-08

## 2020-07-30 ENCOUNTER — TELEPHONE (OUTPATIENT)
Dept: NEUROLOGY | Facility: CLINIC | Age: 75
End: 2020-07-30

## 2020-07-30 NOTE — TELEPHONE ENCOUNTER
Attempted to reach [place] full has a 5 appointment Dr Inocencio Gonzalez unable to reach to advise provider running behind

## 2020-08-11 ENCOUNTER — OFFICE VISIT (OUTPATIENT)
Dept: NEUROLOGY | Facility: CLINIC | Age: 75
End: 2020-08-11
Payer: MEDICARE

## 2020-08-11 VITALS — TEMPERATURE: 98.6 F | BODY MASS INDEX: 20.22 KG/M2 | WEIGHT: 145 LBS

## 2020-08-11 DIAGNOSIS — R26.9 GAIT DIFFICULTY: ICD-10-CM

## 2020-08-11 DIAGNOSIS — G60.8 POLYNEUROPATHY, PERIPHERAL SENSORIMOTOR AXONAL: Primary | ICD-10-CM

## 2020-08-11 PROCEDURE — 99214 OFFICE O/P EST MOD 30 MIN: CPT | Performed by: PHYSICIAN ASSISTANT

## 2020-08-11 RX ORDER — FUROSEMIDE 20 MG/1
TABLET ORAL
COMMUNITY
Start: 2020-05-11

## 2020-08-11 NOTE — PROGRESS NOTES
Patient ID: Josi Gagnon is a 70 y o  male  Assessment/Plan:    Polyneuropathy, peripheral sensorimotor axonal  Patient overall doing well since his last visit  He does fine aquatherapy to be very helpful he feels that his pain is improved since starting it back up  He does wear brace at times which also is helpful for his back issues  He had EMG performed of both the upper and lower extremities which did reveal evidence of polyneuropathy  He still did not get his neuropathy panel completed and will do this now  There is no intracranial pathology found on brain MRI to account for his gait difficulties  He remains on gabapentin for his underlying neuropathic pain but only remembers to take it b i d  He does not wish to make any changes at this time  For now will have him continue with aquatherapy is this is very beneficial   I certainly agree with Dr Bello Fent prior evaluations as well that his complaints are likely more related to his underlying neuropathy  Subjective:    Mr Darrius Baig is a 70 y o  right handed male who is following for gait abnormality  Pt was referred by Elinor Hodgkins Dr Sandi Smalls  He was initially seen by Dr Vaibhav Ramos and presents for follow up  Per chart review, patient has a long history of spinal related problems  He was in a work-related accident in 5/21/1992 as a  at [employer], when he fell and fractured his ankle and injured his shoulder after tripping over a slab  Since then, he had been out of work but his worker's compensation case has been settled  He was treated by a Dr Lior Durbin from that time to 2005  He is s/p an ACDF on C3-4 and C7-T1 in 4/12/18 and C3-4 laminectomy, posterior cervical fusion C3-T2, left C4-5, C5-6, and C6-7 hemilaminectomy and foraminotomy, C7-T2 laminectomy on 4/30/18        He was referred to Dr Sharon Arizmendi for medical marijuana in 5/28/18, but he turned out to decline it because he would have to give up carrying a gun on his person  He went to ER twice in 7/24/19 and 8/1/19 for low back pain with radiation down left leg  He describes that the left leg from the hip to the knee "feels like plastic"  He was referred to Pain Management for injection and s/p L L4-5 injection 8/20/19 with aquatherapy the following month  He found this helpful, but stopped due to an injection of the leg  He has been on gabapentin before, now to 800mg BID as well as ibuprofen 800mg prn in AM  He had previously also been on Oxycontin  He denies having tried Lyrica, Cymbalta, Elavil  As of his Jan 2020 visit with [facility], he remained "very unstable" with walking  At his initial visit with Dr Lucretia Fitzpatrick he was sent for labs and EMG  It was felt that his gait abnormality was likely not related to CNS issues but rather underlying peripheral neuropathy  EMG of lower and upper extremities revealed evidence of his underlying cervical spine issues with sensorimotor polyneuropathy  No intracranial pathology found on brain MRI  He did not have his neuropathy panel completed  Interval history:  He is now doing aqua therapy which finds to be very helpful  He will wear a back brace at times during the day which is also helpful  Some days he can walk better than others  He will notice that the pain is worse in the evening when he is sitting  It will improve when he lays down  He does have some episodes when he tries to get up in the evening if he fell asleep on the chair and he will be very off balance  No falls  He uses a cane  He remains on Gabapentin 800mg bid  He was unable to remember to take it tid         Images:  - MRI C-spine 2/1/20 [facility] - prior ACDF C3/4, C7/T1; fused C4/5, 5/6, 6/7; stenosis without myelomalacia C3/4; severe stenosis C5/6 with flattening; severe Left C6/7 stenosis with SC flattening and myelomalacia; central protrusion with Left extruded fragment posterior to T1 vertebral body resulting in spinal cord compression   - MRI T-spine 2/3/20 [facility] - no evidence of cord compression   - CT scan Cspine 2/20/20 [facility] - s/p psterior spinal fusion C3-T2, prior laminectomy C7-T1; ossified protrusion left C7/T1; ACDF C3/4 and C7/T1; solid fusion C3-T1; no evidence of pedicle screw loosening T1 and T2   - MRI Lspine 2/24/20 [facility] - schmorl's node superior endplate L4; mod bilateral LR stenosis L2-S1; mod-severe DDD L1-S1   - EMG UEs - There is electrodiagnostic evidence of a large fiber, predominantly axonal, peripheral sensorimotor polyneuropathy  2  There is electrodiagnostic evidence of a chronic right C8 radiculopathy with active denervation findings  3  There is electrodiagnostic evidence of a chronic left C6-C7 radiculopathy with reinnervation findings  No active denervation findings were observed  4  There is electrodiagnostic evidence of a left and right ulnar neuropathy with focal conduction slowing across the elbows  This may be consistent with clinical diagnosis of bilateral cubital tunnel syndrome  Recommend clinical correlation  5  Unable to rule out bilateral median mononeuropathy across the wrists, in setting of underlying peripheral polyneuropathy  Recommend clinical correlation  6  There is no electrodiagnostic evidence of a left or right brachial plexopathy  - EMG LEs- There is electrodiagnostic evidence of a large fiber, predominantly axonal peripheral sensorimotor polyneuropathy  Active and chronic denervation findings were observed in the distal lower extremity muscles  2  There is no electrodiagnostic evidence of a left or right peroneal entrapment neuropathy across the fibular head  3  There is no electrodiagnostic evidence of a left or right lumbosacral plexopathy or L2-S1 radiculopathy   - MRI Brain - Age related cerebral atrophy and mild white matter microangiopathy  I personally reviewed and updated the ROS  Objective:    Temperature 98 6 °F (37 °C), weight 65 8 kg (145 lb)      Physical Exam  Constitutional: Appearance: Normal appearance  Eyes:      General: Lids are normal       Extraocular Movements: Extraocular movements intact  Pupils: Pupils are equal, round, and reactive to light  Pulmonary:      Effort: Pulmonary effort is normal    Neurological:      Mental Status: He is alert  Deep Tendon Reflexes: Strength normal    Psychiatric:         Speech: Speech normal          Neurological Exam  Mental Status  Alert  Oriented only to person, place and time  Speech is normal   Very pleasant       Cranial Nerves  CN III, IV, VI: Extraocular movements intact bilaterally  Normal lids and orbits bilaterally  Pupils equal round and reactive to light bilaterally  CN V:  Right: Facial sensation is normal   Left: Facial sensation is normal on the left  CN VIII: Hearing is normal   CN XI: Shoulder shrug strength is normal   Patient wearing face mask   Motor   Strength is 5/5 throughout all four extremities  Sensory  Diminished to Light Touch in the LUE hand and fingers and in the LLE to a lesser degree but more noticeably in the thigh which he states has been present since [facility]  Vibration was absent at the ankles and minimal in the hands       Reflexes                                           Right                      Left  Patellar                                Tr                         Tr    Coordination  Right: Finger-to-nose normal   Left: Finger-to-nose normal     Gait  Needed to use hands to get up and out of chair  Uses cane on the R hand but able to walk without the cane  Smaller strides without shuffling  No magnetic gait  Slight titubation, narrow base  Unable to attempt tandem           ROS:    Review of Systems   Constitutional: Negative  Negative for appetite change and fever  HENT: Negative  Negative for hearing loss, tinnitus, trouble swallowing and voice change  Eyes: Negative  Negative for photophobia and pain  Respiratory: Negative  Negative for shortness of breath  Cardiovascular: Negative  Negative for palpitations  Gastrointestinal: Negative  Negative for nausea and vomiting  Endocrine: Negative  Negative for cold intolerance  Genitourinary: Negative  Negative for dysuria, frequency and urgency  Musculoskeletal: Positive for back pain  Negative for myalgias and neck pain  Skin: Negative  Negative for rash  Neurological: Negative  Negative for dizziness, tremors, seizures, syncope, facial asymmetry, speech difficulty, weakness, light-headedness, numbness and headaches  Hematological: Negative  Does not bruise/bleed easily  Psychiatric/Behavioral: Negative  Negative for confusion, hallucinations and sleep disturbance

## 2020-08-13 NOTE — ASSESSMENT & PLAN NOTE
Patient overall doing well since his last visit  He does fine aquatherapy to be very helpful he feels that his pain is improved since starting it back up  He does wear brace at times which also is helpful for his back issues  He had EMG performed of both the upper and lower extremities which did reveal evidence of polyneuropathy  He still did not get his neuropathy panel completed and will do this now  There is no intracranial pathology found on brain MRI to account for his gait difficulties  He remains on gabapentin for his underlying neuropathic pain but only remembers to take it b i d  He does not wish to make any changes at this time  For now will have him continue with aquatherapy is this is very beneficial   I certainly agree with Dr Daniela Boykin prior evaluations as well that his complaints are likely more related to his underlying neuropathy

## 2020-08-24 ENCOUNTER — LAB (OUTPATIENT)
Dept: LAB | Age: 75
End: 2020-08-24
Payer: MEDICARE

## 2020-08-24 DIAGNOSIS — R20.0 NUMBNESS OF LEG: ICD-10-CM

## 2020-08-24 DIAGNOSIS — R26.9 GAIT DIFFICULTY: ICD-10-CM

## 2020-08-24 DIAGNOSIS — R27.8 SENSORY ATAXIA: ICD-10-CM

## 2020-08-24 LAB
ALBUMIN SERPL BCP-MCNC: 3.9 G/DL (ref 3.5–5)
ALP SERPL-CCNC: 113 U/L (ref 46–116)
ALT SERPL W P-5'-P-CCNC: 25 U/L (ref 12–78)
ANION GAP SERPL CALCULATED.3IONS-SCNC: 3 MMOL/L (ref 4–13)
AST SERPL W P-5'-P-CCNC: 19 U/L (ref 5–45)
BASOPHILS # BLD AUTO: 0.1 THOUSANDS/ΜL (ref 0–0.1)
BASOPHILS NFR BLD AUTO: 2 % (ref 0–1)
BILIRUB SERPL-MCNC: 0.55 MG/DL (ref 0.2–1)
BUN SERPL-MCNC: 13 MG/DL (ref 5–25)
CALCIUM SERPL-MCNC: 8.9 MG/DL (ref 8.3–10.1)
CHLORIDE SERPL-SCNC: 104 MMOL/L (ref 100–108)
CO2 SERPL-SCNC: 33 MMOL/L (ref 21–32)
CREAT SERPL-MCNC: 0.91 MG/DL (ref 0.6–1.3)
EOSINOPHIL # BLD AUTO: 0.19 THOUSAND/ΜL (ref 0–0.61)
EOSINOPHIL NFR BLD AUTO: 3 % (ref 0–6)
ERYTHROCYTE [DISTWIDTH] IN BLOOD BY AUTOMATED COUNT: 15.4 % (ref 11.6–15.1)
GFR SERPL CREATININE-BSD FRML MDRD: 83 ML/MIN/1.73SQ M
GLUCOSE P FAST SERPL-MCNC: 85 MG/DL (ref 65–99)
HCT VFR BLD AUTO: 47.2 % (ref 36.5–49.3)
HGB BLD-MCNC: 15.6 G/DL (ref 12–17)
IMM GRANULOCYTES # BLD AUTO: 0.02 THOUSAND/UL (ref 0–0.2)
IMM GRANULOCYTES NFR BLD AUTO: 0 % (ref 0–2)
LYMPHOCYTES # BLD AUTO: 2.12 THOUSANDS/ΜL (ref 0.6–4.47)
LYMPHOCYTES NFR BLD AUTO: 34 % (ref 14–44)
MAGNESIUM SERPL-MCNC: 2.7 MG/DL (ref 1.6–2.6)
MCH RBC QN AUTO: 30.8 PG (ref 26.8–34.3)
MCHC RBC AUTO-ENTMCNC: 33.1 G/DL (ref 31.4–37.4)
MCV RBC AUTO: 93 FL (ref 82–98)
MONOCYTES # BLD AUTO: 0.6 THOUSAND/ΜL (ref 0.17–1.22)
MONOCYTES NFR BLD AUTO: 10 % (ref 4–12)
NEUTROPHILS # BLD AUTO: 3.13 THOUSANDS/ΜL (ref 1.85–7.62)
NEUTS SEG NFR BLD AUTO: 51 % (ref 43–75)
NRBC BLD AUTO-RTO: 0 /100 WBCS
PLATELET # BLD AUTO: 128 THOUSANDS/UL (ref 149–390)
PMV BLD AUTO: 13.7 FL (ref 8.9–12.7)
POTASSIUM SERPL-SCNC: 4.3 MMOL/L (ref 3.5–5.3)
PROT SERPL-MCNC: 7.3 G/DL (ref 6.4–8.2)
RBC # BLD AUTO: 5.07 MILLION/UL (ref 3.88–5.62)
SODIUM SERPL-SCNC: 140 MMOL/L (ref 136–145)
TSH SERPL DL<=0.05 MIU/L-ACNC: 1.57 UIU/ML (ref 0.36–3.74)
VIT B12 SERPL-MCNC: 250 PG/ML (ref 100–900)
WBC # BLD AUTO: 6.16 THOUSAND/UL (ref 4.31–10.16)

## 2020-08-24 PROCEDURE — 84446 ASSAY OF VITAMIN E: CPT

## 2020-08-24 PROCEDURE — 84207 ASSAY OF VITAMIN B-6: CPT

## 2020-08-24 PROCEDURE — 80053 COMPREHEN METABOLIC PANEL: CPT

## 2020-08-24 PROCEDURE — 84165 PROTEIN E-PHORESIS SERUM: CPT | Performed by: PATHOLOGY

## 2020-08-24 PROCEDURE — 82390 ASSAY OF CERULOPLASMIN: CPT

## 2020-08-24 PROCEDURE — 84443 ASSAY THYROID STIM HORMONE: CPT

## 2020-08-24 PROCEDURE — 85025 COMPLETE CBC W/AUTO DIFF WBC: CPT

## 2020-08-24 PROCEDURE — 36415 COLL VENOUS BLD VENIPUNCTURE: CPT

## 2020-08-24 PROCEDURE — 82607 VITAMIN B-12: CPT

## 2020-08-24 PROCEDURE — 84165 PROTEIN E-PHORESIS SERUM: CPT

## 2020-08-24 PROCEDURE — 83735 ASSAY OF MAGNESIUM: CPT

## 2020-08-25 LAB
ALBUMIN SERPL ELPH-MCNC: 4.35 G/DL (ref 3.5–5)
ALBUMIN SERPL ELPH-MCNC: 61.2 % (ref 52–65)
ALPHA1 GLOB SERPL ELPH-MCNC: 0.28 G/DL (ref 0.1–0.4)
ALPHA1 GLOB SERPL ELPH-MCNC: 4 % (ref 2.5–5)
ALPHA2 GLOB SERPL ELPH-MCNC: 0.77 G/DL (ref 0.4–1.2)
ALPHA2 GLOB SERPL ELPH-MCNC: 10.8 % (ref 7–13)
BETA GLOB ABNORMAL SERPL ELPH-MCNC: 0.43 G/DL (ref 0.4–0.8)
BETA1 GLOB SERPL ELPH-MCNC: 6 % (ref 5–13)
BETA2 GLOB SERPL ELPH-MCNC: 5.3 % (ref 2–8)
BETA2+GAMMA GLOB SERPL ELPH-MCNC: 0.38 G/DL (ref 0.2–0.5)
CERULOPLASMIN SERPL-MCNC: 26.8 MG/DL (ref 16–31)
GAMMA GLOB ABNORMAL SERPL ELPH-MCNC: 0.9 G/DL (ref 0.5–1.6)
GAMMA GLOB SERPL ELPH-MCNC: 12.7 % (ref 12–22)
IGG/ALB SER: 1.58 {RATIO} (ref 1.1–1.8)
PROT PATTERN SERPL ELPH-IMP: NORMAL
PROT SERPL-MCNC: 7.1 G/DL (ref 6.4–8.2)

## 2020-08-27 LAB
A-TOCOPHEROL VIT E SERPL-MCNC: 13.8 MG/L (ref 9–29)
GAMMA TOCOPHEROL SERPL-MCNC: 2.2 MG/L (ref 0.5–4.9)
VIT B6 SERPL-MCNC: 2.2 UG/L (ref 5.3–46.7)

## 2020-08-27 NOTE — RESULT ENCOUNTER NOTE
Pt does not have MyChart  His blood testing results were normal except for the following:   - his vitamin b6 was critically deficient  - Through vitamin B12 was technically within normal range, we know that levels below 400 can still cause neurological issues and yet excess is eliminated renally  His is 250  We suggest supplementing this with vit B12 1000 mcg daily over the counter and/or increase meat in the diet as a source for this  - He has chronically low platelets    I believe much of the the reason for his neuropathy is from the vitamin deficiencies seen here  Please advise to supplement both B vitamins (can be in a B-complex vitamin where b6 or pyridoxine is 100-200mg a day)  He can f/u with his PCP regarding the low platelets if he has a tendency to bruise and bleed  Continue with the rest of the plan from [address] with Christos Eng  Thank you      Mri results were normal  Please proceed with rest of the same plan  Thank you

## 2020-08-28 ENCOUNTER — TELEPHONE (OUTPATIENT)
Dept: NEUROLOGY | Facility: CLINIC | Age: 75
End: 2020-08-28

## 2020-08-28 DIAGNOSIS — E55.9 VITAMIN D DEFICIENCY: Primary | ICD-10-CM

## 2020-08-28 NOTE — TELEPHONE ENCOUNTER
Patient made aware of lab results  Patient was advised to get OTC B complex and to f/u with PCP regarding bruising/bleeding  Patient verbalized understanding  Labs forwarded to PCP  He wanted to inform Dr Renetta Baeza that he's been taking Vit D 45505 units weekly for the last 12-15 years or so and another one of his doctors thought that this was too long  No recent Vit D blood work  Please advise

## 2020-08-28 NOTE — TELEPHONE ENCOUNTER
----- Message from Elisabeth Elizondo MD sent at 8/27/2020  7:00 PM EDT -----  Pt does not have MyChart  His blood testing results were normal except for the following:   - his vitamin b6 was critically deficient  - Through vitamin B12 was technically within normal range, we know that levels below 400 can still cause neurological issues and yet excess is eliminated renally  His is 250  We suggest supplementing this with vit B12 1000 mcg daily over the counter and/or increase meat in the diet as a source for this  - He has chronically low platelets    I believe much of the the reason for his neuropathy is from the vitamin deficiencies seen here  Please advise to supplement both B vitamins (can be in a B-complex vitamin where b6 or pyridoxine is 100-200mg a day)  He can f/u with his PCP regarding the low platelets if he has a tendency to bruise and bleed  Continue with the rest of the plan from [address] [place] [address] Rd with Clemente Graf  Thank you      Mri results were normal  Please proceed with rest of the same plan  Thank you

## 2020-08-28 NOTE — TELEPHONE ENCOUNTER
The last vitamin D level was in 2018 and 80 (normal range), and calcium level 8/24/20 was normal - so I'm not sure how accurate that is  Not sure who is giving him 50,000 units weekly for that long  He would have encountered toxicity by now if that were accurate  We could check the level, but I'm afraid I'll have to have to kindly ask PCP if can follow up on this  Vitamin D toxicity causes more vomiting, weakness and frequent urination than gait disorder or neuropathy  Thanks  Lab order in

## 2020-08-28 NOTE — TELEPHONE ENCOUNTER
Patient calling in asking when 12/30 appt with Russell Guzman was so he can put it in his phone     Made him aware at 815 am in [place]

## 2020-12-29 ENCOUNTER — TELEPHONE (OUTPATIENT)
Dept: NEUROLOGY | Facility: CLINIC | Age: 75
End: 2020-12-29

## 2021-01-28 ENCOUNTER — TELEPHONE (OUTPATIENT)
Dept: NEUROLOGY | Facility: CLINIC | Age: 76
End: 2021-01-28

## 2021-01-28 NOTE — TELEPHONE ENCOUNTER
Called pt to confirm upcoming apt in 2 weeks on 2/11/21  with Dr Keesha Helms    Patient has nothing new at this time to report,  only concerns of balance  Issues when getting up from sitting position  Takes time for him to get adjusted    Asked pt if they are unable to keep apt or interested in virtual apt to please call office, also advised of no show fee  Advised we will call closer to day of apt for COVID screening

## 2021-02-11 ENCOUNTER — TELEPHONE (OUTPATIENT)
Dept: NEUROLOGY | Facility: CLINIC | Age: 76
End: 2021-02-11

## 2021-02-11 NOTE — TELEPHONE ENCOUNTER
Patient called in , needed to cancel appt due to snow  Patient rescheduled for June  Patient has been put in wait list  For a sooner appt

## 2021-04-03 ENCOUNTER — IMMUNIZATIONS (OUTPATIENT)
Dept: FAMILY MEDICINE CLINIC | Facility: HOSPITAL | Age: 76
End: 2021-04-03

## 2021-04-03 DIAGNOSIS — Z23 ENCOUNTER FOR IMMUNIZATION: Primary | ICD-10-CM

## 2021-04-03 PROCEDURE — 91300 SARS-COV-2 / COVID-19 MRNA VACCINE (PFIZER-BIONTECH) 30 MCG: CPT

## 2021-04-03 PROCEDURE — 0001A SARS-COV-2 / COVID-19 MRNA VACCINE (PFIZER-BIONTECH) 30 MCG: CPT

## 2021-04-28 ENCOUNTER — IMMUNIZATIONS (OUTPATIENT)
Dept: FAMILY MEDICINE CLINIC | Facility: HOSPITAL | Age: 76
End: 2021-04-28

## 2021-04-28 DIAGNOSIS — Z23 ENCOUNTER FOR IMMUNIZATION: Primary | ICD-10-CM

## 2021-04-28 PROCEDURE — 0002A SARS-COV-2 / COVID-19 MRNA VACCINE (PFIZER-BIONTECH) 30 MCG: CPT

## 2021-04-28 PROCEDURE — 91300 SARS-COV-2 / COVID-19 MRNA VACCINE (PFIZER-BIONTECH) 30 MCG: CPT

## 2021-06-21 ENCOUNTER — TELEPHONE (OUTPATIENT)
Dept: OTHER | Facility: OTHER | Age: 76
End: 2021-06-21

## 2021-07-01 ENCOUNTER — TELEPHONE (OUTPATIENT)
Dept: NEUROLOGY | Facility: CLINIC | Age: 76
End: 2021-07-01

## 2021-07-01 NOTE — TELEPHONE ENCOUNTER
Left message to reschedule cancelled appt with Dr Deandre Callejas - please assist patient with first available possibly with residency program the end of august      Appointment notes need to read :  NOTES IN H-DRIVE  NP to Dr Ann Marie BLACK  POLYNEROPATHY/ PERIPHERAL SENSOR, MOTOR AXNAL  NO TESTS ORDERED  LV: 8/11/20 TYE BLACK

## 2022-06-15 ENCOUNTER — OFFICE VISIT (OUTPATIENT)
Dept: PODIATRY | Facility: CLINIC | Age: 77
End: 2022-06-15
Payer: MEDICARE

## 2022-06-15 VITALS
DIASTOLIC BLOOD PRESSURE: 84 MMHG | WEIGHT: 152 LBS | OXYGEN SATURATION: 98 % | BODY MASS INDEX: 21.28 KG/M2 | SYSTOLIC BLOOD PRESSURE: 146 MMHG | HEART RATE: 71 BPM | HEIGHT: 71 IN

## 2022-06-15 DIAGNOSIS — B35.1 ONYCHOMYCOSIS OF TOENAIL: ICD-10-CM

## 2022-06-15 DIAGNOSIS — M79.674 TOE PAIN, BILATERAL: Primary | ICD-10-CM

## 2022-06-15 DIAGNOSIS — I87.2 VENOUS INSUFFICIENCY OF BOTH LOWER EXTREMITIES: ICD-10-CM

## 2022-06-15 DIAGNOSIS — M79.675 TOE PAIN, BILATERAL: Primary | ICD-10-CM

## 2022-06-15 PROCEDURE — 99203 OFFICE O/P NEW LOW 30 MIN: CPT | Performed by: STUDENT IN AN ORGANIZED HEALTH CARE EDUCATION/TRAINING PROGRAM

## 2022-06-15 PROCEDURE — 11721 DEBRIDE NAIL 6 OR MORE: CPT | Performed by: STUDENT IN AN ORGANIZED HEALTH CARE EDUCATION/TRAINING PROGRAM

## 2022-06-16 NOTE — PROGRESS NOTES
Assessment/Plan:    No problem-specific Assessment & Plan notes found for this encounter  Diagnoses and all orders for this visit:    Toe pain, bilateral    Onychomycosis of toenail    Venous insufficiency of both lower extremities        Plan:       1  A thorough neurovascular exam was performed  Patient presents for at-risk foot care  Patient has no acute concerns today  Patient has significant lower extremity risk due to diminished PT pulses in the feet and trophic skin changes to the lower extremity including thick toenail, atrophic skin, and decreased hair growth  2  All 10 toenails debrided Using nail nipper, tiana, and curette, reduced in thickness and length  Devitalized nail tissue and fungal debris excised and removed  Patient tolerated well  3  Patient was educated on importance of daily foot assessment and proper shoe gear  Educational materials were provided  Patient has Q8  findings and is recommended for at risk foot care every 10-12 weeks  4  Reviewed basic labs from 2021, these were most recent in the chart     5  Return in 3 months    Subjective:      Patient ID: Bull Saez is a 78 y o  male  HPI:  Bull Saez is a 78 y o  male who presents with painful, elongated toenails  They have difficulty applying their socks and shoes due to the elongation of the nails  The pressure within their shoe gear is painful and they have been unable to cut their nails adequately  Patient states pain is 1/10 in shoe gear  Pain with pressure  Requires at risk foot care  Past medical history includes polyneuropathy, gait difficulty, COPD, hyperlipidemia venous insufficiency with edema lower extremity  Denies any other complaints denies nausea vomiting fever chills shortness of breath        The following portions of the patient's history were reviewed and updated as appropriate:   He  has a past medical history of Arthritis, Back pain, Cervical pain (neck), COPD (chronic obstructive pulmonary disease) ([facility] [address] ), History of transfusion, Hyperlipidemia, Mild heartburn, Neck pain, Numbness, Scratches, and Wears dentures  He   Patient Active Problem List    Diagnosis Date Noted    Polyneuropathy, peripheral sensorimotor axonal     Gait difficulty 03/25/2020    Numbness of leg 03/25/2020    Sensory ataxia 03/25/2020    Physical deconditioning 03/25/2020    Status post cervical spinal fusion 06/05/2018    Upper extremity weakness 05/10/2018    S/P laminectomy with spinal fusion 05/10/2018    Spinal stenosis of cervical region 04/13/2018     He  has a past surgical history that includes Tonsillectomy; Colonoscopy; Esophagogastroduodenoscopy; Eye surgery; pr anterior instrumentation 2-3 vertebral segments (N/A, 4/12/2018); pr arthrodesis posterior/posteriorlateral cervical below c2 (N/A, 4/30/2018); pr arthrodesis posterior/posterolateral thoracic (N/A, 4/30/2018); and pr excis cerv disk,one level (N/A, 4/30/2018)       Review of Systems   Constitutional: Negative for chills and fever  Gastrointestinal: Negative for diarrhea, nausea and vomiting  Musculoskeletal: Positive for arthralgias  Skin: Positive for color change  Neurological: Negative for dizziness  Psychiatric/Behavioral: Negative for agitation  Objective:      /84 (BP Location: Left arm, Patient Position: Sitting, Cuff Size: Standard)   Pulse 71   Ht 5' 11" (1 803 m)   Wt 68 9 kg (152 lb)   SpO2 98%   BMI 21 20 kg/m²          Physical Exam  Vitals reviewed  Cardiovascular:      Rate and Rhythm: Normal rate  Pulses:           Dorsalis pedis pulses are 1+ on the right side and 1+ on the left side  Posterior tibial pulses are 0 on the right side and 0 on the left side  Musculoskeletal:         General: Tenderness present  Feet:      Right foot:      Protective Sensation: 10 sites tested  10 sites sensed  Skin integrity: Dry skin present        Toenail Condition: Right toenails are abnormally thick and long  Fungal disease present  Left foot:      Protective Sensation: 10 sites tested  8 sites sensed  Skin integrity: Dry skin present  Toenail Condition: Left toenails are abnormally thick and long  Fungal disease present  Comments: On exam patient has thickened, hypertrophic, discolored, brittle toenails with subungual debris and tenderness x10  Patient has diminished pedal pulses and decreased perfusion to the lower extremities  Patient has significant trophic changes to the skin including thick toenails, decreased pedal hair and atrophic skin  Skin:     General: Skin is warm and dry  Neurological:      General: No focal deficit present  Mental Status: He is alert     Psychiatric:         Mood and Affect: Mood normal

## 2023-03-24 ENCOUNTER — TELEPHONE (OUTPATIENT)
Dept: OTHER | Facility: OTHER | Age: 78
End: 2023-03-24

## 2023-03-26 ENCOUNTER — TELEPHONE (OUTPATIENT)
Dept: OTHER | Facility: OTHER | Age: 78
End: 2023-03-26

## 2023-03-26 NOTE — TELEPHONE ENCOUNTER
Patient was insistent the he was scheduled for a colonoscopy on 3/27/2023 nothing is listed in patients chart  Please call back to advise

## 2023-03-27 NOTE — TELEPHONE ENCOUNTER
LVM, I did not see any appt for colonoscopy  Patient is never be a patient for gastro in [facility]   I left a phone perri flores in case he wants to make a appt with us

## 2023-03-28 NOTE — TELEPHONE ENCOUNTER
Left message for patient that we did not have him scheduled for a colonoscopy but either if he would like to schedule to reach out to us

## 2023-08-17 NOTE — OCCUPATIONAL THERAPY NOTE
Occupational Therapy Discharge Summary:     Pt made good progress throughout rehab stay, from OT standpoint  Pt was D/C home with family support  At time of D/C pt was completing ADLS and functional transfers with supervision  Pt recommended for the following DME: RW  Pt owned shower chair at home  Plan for continued outpatient physical therapy       Raymond Rodgers MS, OTR/L , CBIS .

## 2023-10-02 NOTE — PROGRESS NOTES
Daily Note     Today's date: 2019  Patient name: Hero Campbell  :   MRN: [id number]  Referring provider: Hilda Lazo MD  Dx:   Encounter Diagnosis     ICD-10-CM    1  Spinal stenosis of lumbar region without neurogenic claudication M48 061    2  Abnormality of gait R26 9             Subjective:  Reports during the day he feels OK but at night he is painful   He starts out sleeping on his side  He can sleep with taking medication    Objective: See treatment diary below  Precautions:  Hx of cervical fusion  Fall risk  COPD  Manuals  8-2  8-8  8-15 8/19 8-22                                                        Exercise Diary              Treadmill    10 min x x x x X 1 8 MPH 10'    Bike    10 min x x x x x    D K-C   20x 3" x x x x x x    LTR   20x x x x x x x    Quadruped leg kicks             Sit<->stand   20x x 10# 10x x x 20 x    Bridges  2x10 w 4# wt on pelvis x x x 10#  2x10 x x x    RDL   10# 10x x x x x  x    DLS march             U<->LE bent knee crunch  4#  10x2 x x x 10#  2x10 x x x                                                                                  Modalities             Traction   85#  Static supine x np                                     X= performed    Assessment:  Instr in use of pillow betw knees for side sleeping & not to sleep to fetal   He will try doing LTR & D K-C  before bedtime & trying warm shower  No c/o back pain with treatment    Plan: Progress exercises as symptoms allow  Patient states he will not change pcp

## (undated) DEVICE — 3M™ STERI-STRIP™ REINFORCED ADHESIVE SKIN CLOSURES, R1547, 1/2 IN X 4 IN (12 MM X 100 MM), 6 STRIPS/ENVELOPE: Brand: 3M™ STERI-STRIP™

## (undated) DEVICE — STERI DRAPE 1000 NON-STERILE ROLL

## (undated) DEVICE — STANDARD SURGICAL GOWN, L: Brand: CONVERTORS

## (undated) DEVICE — PROXIMATE SKIN STAPLERS (35 WIDE) CONTAINS 35 STAINLESS STEEL STAPLES (FIXED HEAD): Brand: PROXIMATE

## (undated) DEVICE — BIPOLAR SEALER 23-113-1 AQM 2.3: Brand: AQUAMANTYS™

## (undated) DEVICE — ADHESIVE SKN CLSR HISTOACRYL FLEX 0.5ML LF

## (undated) DEVICE — TUBING ACP QUICK CONNECT

## (undated) DEVICE — DRAPE SHEET X-LG

## (undated) DEVICE — NEEDLE SPINAL 25G X 3.5 IN QUINCKE

## (undated) DEVICE — SUT VICRYL 0 CT-1 36 IN J946H

## (undated) DEVICE — 2000CC GUARDIAN II: Brand: GUARDIAN

## (undated) DEVICE — SPONGE LAP 18 X 4 IN

## (undated) DEVICE — SURGIFOAM 8.5 X 12.5

## (undated) DEVICE — SUT VICRYL 3-0 SH 27 IN J416H

## (undated) DEVICE — TOOL 14MH30 LEGEND 14CM 3MM: Brand: MIDAS REX ™

## (undated) DEVICE — INTENDED FOR TISSUE SEPARATION, AND OTHER PROCEDURES THAT REQUIRE A SHARP SURGICAL BLADE TO PUNCTURE OR CUT.: Brand: BARD-PARKER ® CARBON RIB-BACK BLADES

## (undated) DEVICE — UNIVERSAL SPINE,KIT: Brand: CARDINAL HEALTH

## (undated) DEVICE — NEURO PATTIES 1/2 X 1

## (undated) DEVICE — PETRI DISH STERILE

## (undated) DEVICE — MAYO STAND COVER: Brand: CONVERTORS

## (undated) DEVICE — GLOVE SRG BIOGEL 6.5

## (undated) DEVICE — INSULATED BLADE ELECTRODE: Brand: EDGE

## (undated) DEVICE — SUT MONOCRYL 4-0 PS-2 27 IN Y426H

## (undated) DEVICE — GLOVE SRG BIOGEL ECLIPSE 7

## (undated) DEVICE — DRAPE LAPAROTOMY W/POUCHES

## (undated) DEVICE — 3M™ STERI-STRIP™ REINFORCED ADHESIVE SKIN CLOSURES, R1546, 1/4 IN X 4 IN (6 MM X 100 MM), 10 STRIPS/ENVELOPE: Brand: 3M™ STERI-STRIP™

## (undated) DEVICE — GLOVE INDICATOR PI UNDERGLOVE SZ 7.5 BLUE

## (undated) DEVICE — GLOVE INDICATOR PI UNDERGLOVE SZ 8 BLUE

## (undated) DEVICE — GAUZE SPONGES,16 PLY: Brand: CURITY

## (undated) DEVICE — SYRINGE 10ML LL CONTROL TOP

## (undated) DEVICE — GLOVE INDICATOR PI UNDERGLOVE SZ 6.5 BLUE

## (undated) DEVICE — SUT VICRYL 2-0 CT-2 27 IN J269H

## (undated) DEVICE — GAUZE SPONGES,USP TYPE VII GAUZE, 12 PLY: Brand: CURITY

## (undated) DEVICE — DRAPE C-ARMOUR

## (undated) DEVICE — CERVICAL COLLAR ASPEN REG

## (undated) DEVICE — 3M™ DURAPORE™ SURGICAL TAPE 1538-3, 3 INCH X 10 YARD (7,5CM X 9,1M), 4 ROLLS/BOX: Brand: 3M™ DURAPORE™

## (undated) DEVICE — SUT SILK 3-0 30 IN A304H

## (undated) DEVICE — CHLORAPREP HI-LITE 26ML ORANGE

## (undated) DEVICE — INTENDED FOR TISSUE SEPARATION, AND OTHER PROCEDURES THAT REQUIRE A SHARP SURGICAL BLADE TO PUNCTURE OR CUT.: Brand: BARD-PARKER SAFETY BLADES SIZE 10, STERILE

## (undated) DEVICE — BAG DECANTER

## (undated) DEVICE — JACKSON TABLE FOAM POSITIONING KIT: Brand: CARDINAL HEALTH

## (undated) DEVICE — Device

## (undated) DEVICE — SUT VICRYL 1 CTX 36 IN J977H

## (undated) DEVICE — DRESSING MEPILEX BORDER 4 X 8 IN

## (undated) DEVICE — 2963 MEDIPORE SOFT CLOTH TAPE 3 IN X 10 YD 12 RLS/CS: Brand: 3M™ MEDIPORE™

## (undated) DEVICE — GLOVE SRG BIOGEL 8

## (undated) DEVICE — NEEDLE HYPO 22G X 1-1/2 IN

## (undated) DEVICE — SYRINGE 5ML LL

## (undated) DEVICE — NEEDLE SPINAL18G X 3.5 IN QUINCKE

## (undated) DEVICE — HEX-LOCKING BLADE ELECTRODE: Brand: EDGE

## (undated) DEVICE — SCD SEQUENTIAL COMPRESSION COMFORT SLEEVE MEDIUM KNEE LENGTH: Brand: KENDALL SCD

## (undated) DEVICE — MEDI-VAC SUCTION FINE CAPACITY: Brand: CARDINAL HEALTH

## (undated) DEVICE — ASTOUND STANDARD SURGICAL GOWN, XXL: Brand: CONVERTORS

## (undated) DEVICE — INTENDED FOR TISSUE SEPARATION, AND OTHER PROCEDURES THAT REQUIRE A SHARP SURGICAL BLADE TO PUNCTURE OR CUT.: Brand: BARD-PARKER SAFETY BLADES SIZE 15, STERILE

## (undated) DEVICE — IMPLANTABLE DEVICE: Type: IMPLANTABLE DEVICE | Site: SPINE CERVICAL | Status: NON-FUNCTIONAL

## (undated) DEVICE — SNAP KOVER: Brand: UNBRANDED

## (undated) DEVICE — NEEDLE 18 G X 1 1/2

## (undated) DEVICE — FLOSEAL MATRIX IS INDICATED IN SURGICAL PROCEDURES (OTHER THAN IN OPHTHALMIC) AS AN ADJUNCT TO HEMOSTASIS WHEN CONTROL OF BLEEDING BY LIGATURE OR CONVENTIONAL PROCEDURES IS INEFFECTIVE OR IMPRACTICAL.: Brand: FLOSEAL HEMOSTATIC MATRIX

## (undated) DEVICE — TRAY FOLEY 16FR URIMETER SURESTEP

## (undated) DEVICE — POSTIONER HALTER CERVICAL TRACTION

## (undated) DEVICE — FLOSEAL MATRIX IS INDICATED IN SURGICAL PROCEDURES (OTHER THAN IN OPHTHALMIC) AS AN ADJUNCT TO HEMOSTASIS WHEN CONTROL OF BLEEDING BY LIGATURE OR CONVENTIONALPROCEDURES IS INEFFECTIVE OR IMPRACTICAL.: Brand: FLOSEAL HEMOSTATIC MATRIX

## (undated) DEVICE — SPONGE CHERRY 1/2IN

## (undated) DEVICE — SUT MONOCRYL 3-0 PS-2 27 IN Y427H

## (undated) DEVICE — DRILL BIT 12MM

## (undated) DEVICE — GLOVE SRG BIOGEL 7

## (undated) DEVICE — ACCY PA100-A LEGEND LUB/DIFFUSER 4 PACK: Brand: MIDAS REX®